# Patient Record
Sex: FEMALE | Race: WHITE | Employment: UNEMPLOYED | URBAN - METROPOLITAN AREA
[De-identification: names, ages, dates, MRNs, and addresses within clinical notes are randomized per-mention and may not be internally consistent; named-entity substitution may affect disease eponyms.]

---

## 2017-03-16 ENCOUNTER — OFFICE VISIT - HEALTHEAST (OUTPATIENT)
Dept: FAMILY MEDICINE | Facility: CLINIC | Age: 57
End: 2017-03-16

## 2017-03-16 DIAGNOSIS — R13.10 DYSPHAGIA, UNSPECIFIED TYPE: ICD-10-CM

## 2017-03-16 DIAGNOSIS — Z79.899 MEDICATION MANAGEMENT: ICD-10-CM

## 2017-03-16 DIAGNOSIS — Z00.00 HEALTH CARE MAINTENANCE: ICD-10-CM

## 2017-03-16 DIAGNOSIS — R22.0 SWOLLEN TONGUE: ICD-10-CM

## 2017-03-16 DIAGNOSIS — E11.9 TYPE 2 DIABETES MELLITUS WITHOUT COMPLICATION, WITHOUT LONG-TERM CURRENT USE OF INSULIN (H): ICD-10-CM

## 2017-03-16 DIAGNOSIS — E55.9 VITAMIN D DEFICIENCY: ICD-10-CM

## 2017-03-16 DIAGNOSIS — F41.9 ANXIETY: ICD-10-CM

## 2017-03-16 DIAGNOSIS — I25.10 CORONARY ATHEROSCLEROSIS: ICD-10-CM

## 2017-03-16 DIAGNOSIS — K21.9 GASTROESOPHAGEAL REFLUX DISEASE WITHOUT ESOPHAGITIS: ICD-10-CM

## 2017-03-16 DIAGNOSIS — R51.9 FACIAL PAIN: ICD-10-CM

## 2017-03-16 DIAGNOSIS — F33.0 MAJOR DEPRESSIVE DISORDER, RECURRENT EPISODE, MILD (H): ICD-10-CM

## 2017-03-16 LAB — HBA1C MFR BLD: 6.7 % (ref 3.5–6)

## 2017-03-16 ASSESSMENT — MIFFLIN-ST. JEOR: SCORE: 1057.59

## 2017-03-17 ENCOUNTER — COMMUNICATION - HEALTHEAST (OUTPATIENT)
Dept: FAMILY MEDICINE | Facility: CLINIC | Age: 57
End: 2017-03-17

## 2017-03-17 ENCOUNTER — AMBULATORY - HEALTHEAST (OUTPATIENT)
Dept: FAMILY MEDICINE | Facility: CLINIC | Age: 57
End: 2017-03-17

## 2017-03-17 LAB — ANA SER QL: 0.4 U

## 2017-03-28 ENCOUNTER — RECORDS - HEALTHEAST (OUTPATIENT)
Dept: ADMINISTRATIVE | Facility: OTHER | Age: 57
End: 2017-03-28

## 2017-03-29 ENCOUNTER — COMMUNICATION - HEALTHEAST (OUTPATIENT)
Dept: SCHEDULING | Facility: CLINIC | Age: 57
End: 2017-03-29

## 2017-03-29 ENCOUNTER — RECORDS - HEALTHEAST (OUTPATIENT)
Dept: ADMINISTRATIVE | Facility: OTHER | Age: 57
End: 2017-03-29

## 2017-04-28 ENCOUNTER — AMBULATORY - HEALTHEAST (OUTPATIENT)
Dept: CARDIOLOGY | Facility: CLINIC | Age: 57
End: 2017-04-28

## 2017-04-28 ENCOUNTER — OFFICE VISIT - HEALTHEAST (OUTPATIENT)
Dept: CARDIOLOGY | Facility: CLINIC | Age: 57
End: 2017-04-28

## 2017-04-28 DIAGNOSIS — I25.10 CORONARY ATHEROSCLEROSIS DUE TO CALCIFIED CORONARY LESION: ICD-10-CM

## 2017-04-28 DIAGNOSIS — Z95.0 CARDIAC PACEMAKER IN SITU: ICD-10-CM

## 2017-04-28 DIAGNOSIS — I25.84 CORONARY ATHEROSCLEROSIS DUE TO CALCIFIED CORONARY LESION: ICD-10-CM

## 2017-04-28 ASSESSMENT — MIFFLIN-ST. JEOR: SCORE: 1019.49

## 2017-05-01 ENCOUNTER — COMMUNICATION - HEALTHEAST (OUTPATIENT)
Dept: CARDIOLOGY | Facility: CLINIC | Age: 57
End: 2017-05-01

## 2017-05-02 ENCOUNTER — OFFICE VISIT - HEALTHEAST (OUTPATIENT)
Dept: FAMILY MEDICINE | Facility: CLINIC | Age: 57
End: 2017-05-02

## 2017-05-02 DIAGNOSIS — K12.1 STOMATITIS AND MUCOSITIS: ICD-10-CM

## 2017-05-02 DIAGNOSIS — G43.109 MIGRAINE WITH AURA AND WITHOUT STATUS MIGRAINOSUS, NOT INTRACTABLE: ICD-10-CM

## 2017-05-02 DIAGNOSIS — M26.609 TEMPOROMANDIBULAR JOINT DISORDER: ICD-10-CM

## 2017-05-02 DIAGNOSIS — I25.84 CORONARY ATHEROSCLEROSIS DUE TO CALCIFIED CORONARY LESION: ICD-10-CM

## 2017-05-02 DIAGNOSIS — I25.10 CORONARY ATHEROSCLEROSIS DUE TO CALCIFIED CORONARY LESION: ICD-10-CM

## 2017-05-02 DIAGNOSIS — R70.0 ELEVATED SED RATE: ICD-10-CM

## 2017-05-02 DIAGNOSIS — K12.30 STOMATITIS AND MUCOSITIS: ICD-10-CM

## 2017-05-02 DIAGNOSIS — G89.29 CHRONIC NECK PAIN: ICD-10-CM

## 2017-05-02 DIAGNOSIS — M54.2 CHRONIC NECK PAIN: ICD-10-CM

## 2017-05-02 ASSESSMENT — MIFFLIN-ST. JEOR: SCORE: 1030.37

## 2017-05-05 ENCOUNTER — COMMUNICATION - HEALTHEAST (OUTPATIENT)
Dept: FAMILY MEDICINE | Facility: CLINIC | Age: 57
End: 2017-05-05

## 2017-05-09 ENCOUNTER — COMMUNICATION - HEALTHEAST (OUTPATIENT)
Dept: FAMILY MEDICINE | Facility: CLINIC | Age: 57
End: 2017-05-09

## 2017-05-10 ENCOUNTER — HOSPITAL ENCOUNTER (OUTPATIENT)
Dept: NUCLEAR MEDICINE | Facility: HOSPITAL | Age: 57
Discharge: HOME | End: 2017-05-10
Attending: INTERNAL MEDICINE

## 2017-05-10 ENCOUNTER — HOSPITAL ENCOUNTER (OUTPATIENT)
Dept: CARDIOLOGY | Facility: HOSPITAL | Age: 57
Discharge: HOME | End: 2017-05-10
Attending: INTERNAL MEDICINE

## 2017-05-10 ENCOUNTER — RECORDS - HEALTHEAST (OUTPATIENT)
Dept: ADMINISTRATIVE | Facility: OTHER | Age: 57
End: 2017-05-10

## 2017-05-10 ENCOUNTER — COMMUNICATION - HEALTHEAST (OUTPATIENT)
Dept: CARDIOLOGY | Facility: CLINIC | Age: 57
End: 2017-05-10

## 2017-05-10 DIAGNOSIS — I25.10 CORONARY ATHEROSCLEROSIS DUE TO CALCIFIED CORONARY LESION: ICD-10-CM

## 2017-05-10 DIAGNOSIS — I25.84 CORONARY ATHEROSCLEROSIS DUE TO CALCIFIED CORONARY LESION: ICD-10-CM

## 2017-05-10 LAB
AORTIC ROOT: 3.3 CM
CV STRESS CURRENT BP HE: NORMAL
CV STRESS CURRENT HR HE: 64
CV STRESS CURRENT HR HE: 66
CV STRESS CURRENT HR HE: 67
CV STRESS CURRENT HR HE: 69
CV STRESS CURRENT HR HE: 72
CV STRESS CURRENT HR HE: 75
CV STRESS CURRENT HR HE: 76
CV STRESS CURRENT HR HE: 78
CV STRESS CURRENT HR HE: 78
CV STRESS CURRENT HR HE: 80
CV STRESS CURRENT HR HE: 81
CV STRESS CURRENT HR HE: 82
CV STRESS CURRENT HR HE: 84
CV STRESS CURRENT HR HE: 84
CV STRESS CURRENT HR HE: 85
CV STRESS CURRENT HR HE: 86
CV STRESS CURRENT HR HE: 86
CV STRESS CURRENT HR HE: 88
CV STRESS CURRENT HR HE: 91
CV STRESS CURRENT HR HE: 96
CV STRESS CURRENT HR HE: 96
CV STRESS CURRENT HR HE: 99
CV STRESS DEVIATION TIME HE: NORMAL
CV STRESS ECHO PERCENT HR HE: NORMAL
CV STRESS EXERCISE STAGE HE: NORMAL
CV STRESS FINAL RESTING BP HE: NORMAL
CV STRESS FINAL RESTING HR HE: 64
CV STRESS MAX HR HE: 99
CV STRESS MAX TREADMILL GRADE HE: 0
CV STRESS MAX TREADMILL SPEED HE: 0
CV STRESS PEAK DIA BP HE: NORMAL
CV STRESS PEAK SYS BP HE: NORMAL
CV STRESS PHASE HE: NORMAL
CV STRESS PROTOCOL HE: NORMAL
CV STRESS RESTING PT POSITION HE: NORMAL
CV STRESS ST DEVIATION AMOUNT HE: NORMAL
CV STRESS ST DEVIATION ELEVATION HE: NORMAL
CV STRESS ST EVELATION AMOUNT HE: NORMAL
CV STRESS TEST TYPE HE: NORMAL
CV STRESS TOTAL STAGE TIME MIN 1 HE: NORMAL
DOP CALC LVOT AREA: 2.27 CM2
DOP CALC LVOT DIAMETER: 1.7 CM
DOP CALC LVOT STROKE VOLUME: 59 CM3
DOP CALCLVOT PEAK VEL VTI: 26 CM
EJECTION FRACTION: 62 % (ref 55–75)
FRACTIONAL SHORTENING: 42.9 % (ref 28–44)
INTERVENTRICULAR SEPTUM IN END DIASTOLE: 1.2 CM (ref 0.6–0.9)
IVS/PW RATIO: 1.2
LEFT ATRIUM AREA: 11.8 CM2
LEFT ATRIUM LENGTH: 4 CM
LEFT ATRIUM SIZE: 3.1 CM
LEFT ATRIUM TO AORTIC ROOT RATIO: 0.94 NO UNITS
LEFT VENTRICLE DIASTOLIC VOLUME: 45 CM3 (ref 46–106)
LEFT VENTRICLE SYSTOLIC VOLUME: 17 CM3 (ref 14–42)
LEFT VENTRICULAR INTERNAL DIMENSION IN DIASTOLE: 4.2 CM (ref 3.8–5.2)
LEFT VENTRICULAR INTERNAL DIMENSION IN SYSTOLE: 2.4 CM (ref 2.2–3.5)
LEFT VENTRICULAR MASS: 157.1 G
LEFT VENTRICULAR OUTFLOW TRACT MEAN GRADIENT: 2 MMHG
LEFT VENTRICULAR OUTFLOW TRACT MEAN VELOCITY: 65.6 CM/S
LEFT VENTRICULAR POSTERIOR WALL IN END DIASTOLE: 1 CM (ref 0.6–0.9)
MITRAL VALVE E/A RATIO: 1.1
MV AVERAGE E/E' RATIO: 12.6 CM/S
MV DECELERATION TIME: 197 MS
MV E'TISSUE VEL-LAT: 7.41 CM/S
MV E'TISSUE VEL-MED: 6.34 CM/S
MV LATERAL E/E' RATIO: 11.7
MV MEDIAL E/E' RATIO: 13.7
MV PEAK A VELOCITY: 80.6 CM/S
MV PEAK E VELOCITY: 86.6 CM/S
NUC STRESS EJECTION FRACTION: 74 %
PR MAX PG: 3 MMHG
PR PEAK VELOCITY: 94.7 CM/S
STRESS ECHO BASELINE BP: NORMAL
STRESS ECHO BASELINE HR: 78
STRESS ECHO CALCULATED PERCENT HR: 60 %
STRESS ECHO LAST STRESS BP: NORMAL
STRESS ECHO LAST STRESS HR: 86
TRICUSPID REGURGITATION PEAK PRESSURE GRADIENT: 27.7 MMHG
TRICUSPID VALVE ANULAR PLANE SYSTOLIC EXCURSION: 2 CM
TRICUSPID VALVE PEAK REGURGITANT VELOCITY: 263 CM/S

## 2017-05-11 ENCOUNTER — COMMUNICATION - HEALTHEAST (OUTPATIENT)
Dept: FAMILY MEDICINE | Facility: CLINIC | Age: 57
End: 2017-05-11

## 2017-05-18 ENCOUNTER — OFFICE VISIT - HEALTHEAST (OUTPATIENT)
Dept: FAMILY MEDICINE | Facility: CLINIC | Age: 57
End: 2017-05-18

## 2017-05-18 ENCOUNTER — AMBULATORY - HEALTHEAST (OUTPATIENT)
Dept: FAMILY MEDICINE | Facility: CLINIC | Age: 57
End: 2017-05-18

## 2017-05-18 DIAGNOSIS — Z91.030 ALLERGY TO BEE STING: ICD-10-CM

## 2017-05-18 DIAGNOSIS — J45.20 INTERMITTENT ASTHMA, UNCOMPLICATED: ICD-10-CM

## 2017-05-18 DIAGNOSIS — S09.90XA HEAD INJURY, INITIAL ENCOUNTER: ICD-10-CM

## 2017-05-18 DIAGNOSIS — R55 SYNCOPE, UNSPECIFIED SYNCOPE TYPE: ICD-10-CM

## 2017-05-18 ASSESSMENT — MIFFLIN-ST. JEOR: SCORE: 1041.71

## 2017-05-21 ENCOUNTER — COMMUNICATION - HEALTHEAST (OUTPATIENT)
Dept: FAMILY MEDICINE | Facility: CLINIC | Age: 57
End: 2017-05-21

## 2017-05-23 ENCOUNTER — HOSPITAL ENCOUNTER (OUTPATIENT)
Dept: CT IMAGING | Facility: HOSPITAL | Age: 57
Discharge: HOME | End: 2017-05-23
Attending: NURSE PRACTITIONER

## 2017-05-23 DIAGNOSIS — S09.90XA HEAD INJURY, INITIAL ENCOUNTER: ICD-10-CM

## 2017-06-01 ENCOUNTER — RECORDS - HEALTHEAST (OUTPATIENT)
Dept: ADMINISTRATIVE | Facility: OTHER | Age: 57
End: 2017-06-01

## 2017-06-05 ENCOUNTER — COMMUNICATION - HEALTHEAST (OUTPATIENT)
Dept: FAMILY MEDICINE | Facility: CLINIC | Age: 57
End: 2017-06-05

## 2017-06-12 ENCOUNTER — COMMUNICATION - HEALTHEAST (OUTPATIENT)
Dept: SCHEDULING | Facility: CLINIC | Age: 57
End: 2017-06-12

## 2017-06-15 ENCOUNTER — RECORDS - HEALTHEAST (OUTPATIENT)
Dept: ADMINISTRATIVE | Facility: OTHER | Age: 57
End: 2017-06-15

## 2017-06-27 ENCOUNTER — RECORDS - HEALTHEAST (OUTPATIENT)
Dept: ADMINISTRATIVE | Facility: OTHER | Age: 57
End: 2017-06-27

## 2017-07-13 ENCOUNTER — RECORDS - HEALTHEAST (OUTPATIENT)
Dept: ADMINISTRATIVE | Facility: OTHER | Age: 57
End: 2017-07-13

## 2017-07-24 ENCOUNTER — RECORDS - HEALTHEAST (OUTPATIENT)
Dept: ADMINISTRATIVE | Facility: OTHER | Age: 57
End: 2017-07-24

## 2017-08-07 ENCOUNTER — RECORDS - HEALTHEAST (OUTPATIENT)
Dept: ADMINISTRATIVE | Facility: OTHER | Age: 57
End: 2017-08-07

## 2017-08-08 ENCOUNTER — RECORDS - HEALTHEAST (OUTPATIENT)
Dept: ADMINISTRATIVE | Facility: OTHER | Age: 57
End: 2017-08-08

## 2017-08-15 ENCOUNTER — RECORDS - HEALTHEAST (OUTPATIENT)
Dept: ADMINISTRATIVE | Facility: OTHER | Age: 57
End: 2017-08-15

## 2017-08-22 ENCOUNTER — RECORDS - HEALTHEAST (OUTPATIENT)
Dept: ADMINISTRATIVE | Facility: OTHER | Age: 57
End: 2017-08-22

## 2017-08-26 ENCOUNTER — HEALTH MAINTENANCE LETTER (OUTPATIENT)
Age: 57
End: 2017-08-26

## 2017-09-01 ENCOUNTER — RECORDS - HEALTHEAST (OUTPATIENT)
Dept: ADMINISTRATIVE | Facility: OTHER | Age: 57
End: 2017-09-01

## 2017-09-13 ENCOUNTER — RECORDS - HEALTHEAST (OUTPATIENT)
Dept: ADMINISTRATIVE | Facility: OTHER | Age: 57
End: 2017-09-13

## 2017-09-27 ENCOUNTER — COMMUNICATION - HEALTHEAST (OUTPATIENT)
Dept: FAMILY MEDICINE | Facility: CLINIC | Age: 57
End: 2017-09-27

## 2017-10-09 ENCOUNTER — TRANSFERRED RECORDS (OUTPATIENT)
Dept: HEALTH INFORMATION MANAGEMENT | Facility: CLINIC | Age: 57
End: 2017-10-09

## 2017-10-09 ENCOUNTER — RECORDS - HEALTHEAST (OUTPATIENT)
Dept: ADMINISTRATIVE | Facility: OTHER | Age: 57
End: 2017-10-09

## 2017-10-20 ENCOUNTER — AMBULATORY - HEALTHEAST (OUTPATIENT)
Dept: CARDIOLOGY | Facility: CLINIC | Age: 57
End: 2017-10-20

## 2017-10-20 ENCOUNTER — COMMUNICATION - HEALTHEAST (OUTPATIENT)
Dept: TELEHEALTH | Facility: CLINIC | Age: 57
End: 2017-10-20

## 2017-10-20 DIAGNOSIS — Z95.0 CARDIAC PACEMAKER IN SITU: ICD-10-CM

## 2017-10-20 ASSESSMENT — MIFFLIN-ST. JEOR: SCORE: 1053.96

## 2017-10-21 LAB — HCC DEVICE COMMENTS: NORMAL

## 2017-10-23 ENCOUNTER — RECORDS - HEALTHEAST (OUTPATIENT)
Dept: ADMINISTRATIVE | Facility: OTHER | Age: 57
End: 2017-10-23

## 2017-10-25 ENCOUNTER — RECORDS - HEALTHEAST (OUTPATIENT)
Dept: ADMINISTRATIVE | Facility: OTHER | Age: 57
End: 2017-10-25

## 2017-11-21 ENCOUNTER — RECORDS - HEALTHEAST (OUTPATIENT)
Dept: ADMINISTRATIVE | Facility: OTHER | Age: 57
End: 2017-11-21

## 2017-12-04 ENCOUNTER — RECORDS - HEALTHEAST (OUTPATIENT)
Dept: ADMINISTRATIVE | Facility: OTHER | Age: 57
End: 2017-12-04

## 2017-12-29 ENCOUNTER — RECORDS - HEALTHEAST (OUTPATIENT)
Dept: ADMINISTRATIVE | Facility: OTHER | Age: 57
End: 2017-12-29

## 2018-01-08 ENCOUNTER — RECORDS - HEALTHEAST (OUTPATIENT)
Dept: ADMINISTRATIVE | Facility: OTHER | Age: 58
End: 2018-01-08

## 2018-01-11 ENCOUNTER — RECORDS - HEALTHEAST (OUTPATIENT)
Dept: ADMINISTRATIVE | Facility: OTHER | Age: 58
End: 2018-01-11

## 2018-01-26 ENCOUNTER — AMBULATORY - HEALTHEAST (OUTPATIENT)
Dept: CARDIOLOGY | Facility: CLINIC | Age: 58
End: 2018-01-26

## 2018-01-26 DIAGNOSIS — Z95.0 CARDIAC PACEMAKER IN SITU: ICD-10-CM

## 2018-01-26 LAB — HCC DEVICE COMMENTS: NORMAL

## 2018-01-26 ASSESSMENT — MIFFLIN-ST. JEOR: SCORE: 1071.88

## 2018-02-09 ENCOUNTER — COMMUNICATION - HEALTHEAST (OUTPATIENT)
Dept: FAMILY MEDICINE | Facility: CLINIC | Age: 58
End: 2018-02-09

## 2018-02-22 ENCOUNTER — RECORDS - HEALTHEAST (OUTPATIENT)
Dept: ADMINISTRATIVE | Facility: OTHER | Age: 58
End: 2018-02-22

## 2018-03-19 ENCOUNTER — RECORDS - HEALTHEAST (OUTPATIENT)
Dept: ADMINISTRATIVE | Facility: OTHER | Age: 58
End: 2018-03-19

## 2018-07-01 ENCOUNTER — RECORDS - HEALTHEAST (OUTPATIENT)
Dept: ADMINISTRATIVE | Facility: OTHER | Age: 58
End: 2018-07-01

## 2018-07-17 ENCOUNTER — AMBULATORY - HEALTHEAST (OUTPATIENT)
Dept: PULMONOLOGY | Facility: OTHER | Age: 58
End: 2018-07-17

## 2018-07-17 ENCOUNTER — OFFICE VISIT - HEALTHEAST (OUTPATIENT)
Dept: FAMILY MEDICINE | Facility: CLINIC | Age: 58
End: 2018-07-17

## 2018-07-17 ENCOUNTER — COMMUNICATION - HEALTHEAST (OUTPATIENT)
Dept: ADMINISTRATIVE | Facility: CLINIC | Age: 58
End: 2018-07-17

## 2018-07-17 DIAGNOSIS — J42 CHRONIC BRONCHITIS, UNSPECIFIED CHRONIC BRONCHITIS TYPE (H): ICD-10-CM

## 2018-07-17 DIAGNOSIS — Z12.11 SCREEN FOR COLON CANCER: ICD-10-CM

## 2018-07-17 DIAGNOSIS — E11.9 TYPE 2 DIABETES MELLITUS WITHOUT COMPLICATION, WITHOUT LONG-TERM CURRENT USE OF INSULIN (H): ICD-10-CM

## 2018-07-17 DIAGNOSIS — Z12.31 VISIT FOR SCREENING MAMMOGRAM: ICD-10-CM

## 2018-07-17 DIAGNOSIS — F33.0 MAJOR DEPRESSIVE DISORDER, RECURRENT EPISODE, MILD (H): ICD-10-CM

## 2018-07-17 DIAGNOSIS — G43.909 MIGRAINE WITHOUT STATUS MIGRAINOSUS, NOT INTRACTABLE, UNSPECIFIED MIGRAINE TYPE: ICD-10-CM

## 2018-07-17 DIAGNOSIS — I25.10 CORONARY ARTERY DISEASE DUE TO CALCIFIED CORONARY LESION: ICD-10-CM

## 2018-07-17 DIAGNOSIS — R26.81 UNSTEADY GAIT: ICD-10-CM

## 2018-07-17 DIAGNOSIS — K80.20 CALCULUS OF GALLBLADDER WITHOUT CHOLECYSTITIS WITHOUT OBSTRUCTION: ICD-10-CM

## 2018-07-17 DIAGNOSIS — I25.84 CORONARY ARTERY DISEASE DUE TO CALCIFIED CORONARY LESION: ICD-10-CM

## 2018-07-17 DIAGNOSIS — J41.0 SIMPLE CHRONIC BRONCHITIS (H): ICD-10-CM

## 2018-07-17 DIAGNOSIS — Z91.030 BEE STING ALLERGY: ICD-10-CM

## 2018-07-17 DIAGNOSIS — F41.9 ANXIETY: ICD-10-CM

## 2018-07-17 ASSESSMENT — MIFFLIN-ST. JEOR: SCORE: 1009.73

## 2018-07-19 ENCOUNTER — OFFICE VISIT - HEALTHEAST (OUTPATIENT)
Dept: SURGERY | Facility: CLINIC | Age: 58
End: 2018-07-19

## 2018-07-19 DIAGNOSIS — K80.20 CALCULUS OF GALLBLADDER WITHOUT CHOLECYSTITIS WITHOUT OBSTRUCTION: ICD-10-CM

## 2018-07-19 ASSESSMENT — MIFFLIN-ST. JEOR: SCORE: 1004.97

## 2018-07-24 ENCOUNTER — HOSPITAL ENCOUNTER (OUTPATIENT)
Dept: NUCLEAR MEDICINE | Facility: HOSPITAL | Age: 58
Discharge: HOME | End: 2018-07-24
Attending: SURGERY

## 2018-07-24 DIAGNOSIS — K80.20 CALCULUS OF GALLBLADDER WITHOUT CHOLECYSTITIS WITHOUT OBSTRUCTION: ICD-10-CM

## 2018-07-24 ASSESSMENT — MIFFLIN-ST. JEOR: SCORE: 1008.6

## 2018-07-26 ENCOUNTER — COMMUNICATION - HEALTHEAST (OUTPATIENT)
Dept: FAMILY MEDICINE | Facility: CLINIC | Age: 58
End: 2018-07-26

## 2018-08-02 ENCOUNTER — RECORDS - HEALTHEAST (OUTPATIENT)
Dept: ADMINISTRATIVE | Facility: OTHER | Age: 58
End: 2018-08-02

## 2018-08-13 ENCOUNTER — OFFICE VISIT - HEALTHEAST (OUTPATIENT)
Dept: FAMILY MEDICINE | Facility: CLINIC | Age: 58
End: 2018-08-13

## 2018-08-13 DIAGNOSIS — I73.9 PERIPHERAL VASCULAR DISEASE (H): ICD-10-CM

## 2018-08-13 DIAGNOSIS — Z78.9 STATIN INTOLERANCE: ICD-10-CM

## 2018-08-13 DIAGNOSIS — I25.119 CORONARY ARTERY DISEASE WITH ANGINA PECTORIS, UNSPECIFIED VESSEL OR LESION TYPE, UNSPECIFIED WHETHER NATIVE OR TRANSPLANTED HEART (H): ICD-10-CM

## 2018-08-13 DIAGNOSIS — K11.7 XEROSTOMIA: ICD-10-CM

## 2018-08-13 DIAGNOSIS — R70.0 ELEVATED SED RATE: ICD-10-CM

## 2018-08-13 DIAGNOSIS — E11.9 TYPE 2 DIABETES MELLITUS WITHOUT COMPLICATION, WITHOUT LONG-TERM CURRENT USE OF INSULIN (H): ICD-10-CM

## 2018-08-13 DIAGNOSIS — Z12.11 SCREEN FOR COLON CANCER: ICD-10-CM

## 2018-08-13 DIAGNOSIS — M35.9 AUTOIMMUNE DISEASE (H): ICD-10-CM

## 2018-08-13 ASSESSMENT — MIFFLIN-ST. JEOR: SCORE: 1018.13

## 2018-08-27 ENCOUNTER — RECORDS - HEALTHEAST (OUTPATIENT)
Dept: ADMINISTRATIVE | Facility: OTHER | Age: 58
End: 2018-08-27

## 2018-09-04 ENCOUNTER — COMMUNICATION - HEALTHEAST (OUTPATIENT)
Dept: FAMILY MEDICINE | Facility: CLINIC | Age: 58
End: 2018-09-04

## 2018-09-21 ENCOUNTER — OFFICE VISIT - HEALTHEAST (OUTPATIENT)
Dept: PULMONOLOGY | Facility: OTHER | Age: 58
End: 2018-09-21

## 2018-09-21 ENCOUNTER — RECORDS - HEALTHEAST (OUTPATIENT)
Dept: ADMINISTRATIVE | Facility: OTHER | Age: 58
End: 2018-09-21

## 2018-09-21 DIAGNOSIS — F17.200 TOBACCO DEPENDENCE SYNDROME: ICD-10-CM

## 2018-09-21 DIAGNOSIS — R06.09 DOE (DYSPNEA ON EXERTION): ICD-10-CM

## 2018-09-21 ASSESSMENT — MIFFLIN-ST. JEOR: SCORE: 1019.03

## 2018-09-27 ENCOUNTER — AMBULATORY - HEALTHEAST (OUTPATIENT)
Dept: PULMONOLOGY | Facility: OTHER | Age: 58
End: 2018-09-27

## 2018-10-02 ENCOUNTER — HOSPITAL ENCOUNTER (OUTPATIENT)
Dept: CT IMAGING | Facility: HOSPITAL | Age: 58
Discharge: HOME | End: 2018-10-02
Attending: INTERNAL MEDICINE

## 2018-10-02 ENCOUNTER — COMMUNICATION - HEALTHEAST (OUTPATIENT)
Dept: FAMILY MEDICINE | Facility: CLINIC | Age: 58
End: 2018-10-02

## 2018-10-02 DIAGNOSIS — F17.200 TOBACCO DEPENDENCE SYNDROME: ICD-10-CM

## 2018-10-04 ENCOUNTER — COMMUNICATION - HEALTHEAST (OUTPATIENT)
Dept: PULMONOLOGY | Facility: OTHER | Age: 58
End: 2018-10-04

## 2018-10-17 ENCOUNTER — RECORDS - HEALTHEAST (OUTPATIENT)
Dept: ADMINISTRATIVE | Facility: OTHER | Age: 58
End: 2018-10-17

## 2018-11-06 ENCOUNTER — RECORDS - HEALTHEAST (OUTPATIENT)
Dept: ADMINISTRATIVE | Facility: OTHER | Age: 58
End: 2018-11-06

## 2018-11-08 ENCOUNTER — OFFICE VISIT - HEALTHEAST (OUTPATIENT)
Dept: CARDIOLOGY | Facility: CLINIC | Age: 58
End: 2018-11-08

## 2018-11-08 ENCOUNTER — AMBULATORY - HEALTHEAST (OUTPATIENT)
Dept: CARDIOLOGY | Facility: CLINIC | Age: 58
End: 2018-11-08

## 2018-11-08 DIAGNOSIS — Z95.0 CARDIAC PACEMAKER IN SITU: ICD-10-CM

## 2018-11-08 DIAGNOSIS — R55 VASOVAGAL SYNCOPE: ICD-10-CM

## 2018-11-08 LAB
HCC DEVICE COMMENTS: NORMAL
HCC DEVICE IMPLANTING PROVIDER: NORMAL
HCC DEVICE MANUFACTURE: NORMAL
HCC DEVICE MODEL: NORMAL
HCC DEVICE SERIAL NUMBER: NORMAL
HCC DEVICE TYPE: NORMAL

## 2018-11-08 ASSESSMENT — MIFFLIN-ST. JEOR: SCORE: 1031.28

## 2018-12-18 ENCOUNTER — AMBULATORY - HEALTHEAST (OUTPATIENT)
Dept: CARDIOLOGY | Facility: CLINIC | Age: 58
End: 2018-12-18

## 2018-12-18 DIAGNOSIS — Z95.0 CARDIAC PACEMAKER IN SITU: ICD-10-CM

## 2018-12-18 ASSESSMENT — MIFFLIN-ST. JEOR: SCORE: 1032.41

## 2018-12-19 ENCOUNTER — SURGERY - HEALTHEAST (OUTPATIENT)
Dept: CARDIOLOGY | Facility: CLINIC | Age: 58
End: 2018-12-19

## 2018-12-19 ENCOUNTER — AMBULATORY - HEALTHEAST (OUTPATIENT)
Dept: CARDIOLOGY | Facility: CLINIC | Age: 58
End: 2018-12-19

## 2018-12-19 ENCOUNTER — RECORDS - HEALTHEAST (OUTPATIENT)
Dept: ADMINISTRATIVE | Facility: OTHER | Age: 58
End: 2018-12-19

## 2018-12-19 DIAGNOSIS — Z45.010 PACEMAKER BATTERY DEPLETION: ICD-10-CM

## 2018-12-20 ENCOUNTER — RECORDS - HEALTHEAST (OUTPATIENT)
Dept: ADMINISTRATIVE | Facility: OTHER | Age: 58
End: 2018-12-20

## 2018-12-21 ENCOUNTER — COMMUNICATION - HEALTHEAST (OUTPATIENT)
Dept: CARDIOLOGY | Facility: CLINIC | Age: 58
End: 2018-12-21

## 2018-12-28 ENCOUNTER — OFFICE VISIT - HEALTHEAST (OUTPATIENT)
Dept: FAMILY MEDICINE | Facility: CLINIC | Age: 58
End: 2018-12-28

## 2018-12-28 ENCOUNTER — COMMUNICATION - HEALTHEAST (OUTPATIENT)
Dept: TELEHEALTH | Facility: CLINIC | Age: 58
End: 2018-12-28

## 2018-12-28 DIAGNOSIS — Z01.818 ENCOUNTER FOR PREOPERATIVE EXAMINATION FOR GENERAL SURGICAL PROCEDURE: ICD-10-CM

## 2018-12-28 DIAGNOSIS — I25.10 CORONARY ARTERY DISEASE DUE TO CALCIFIED CORONARY LESION: ICD-10-CM

## 2018-12-28 DIAGNOSIS — E11.9 TYPE 2 DIABETES MELLITUS WITHOUT COMPLICATION, WITHOUT LONG-TERM CURRENT USE OF INSULIN (H): ICD-10-CM

## 2018-12-28 DIAGNOSIS — F33.0 MAJOR DEPRESSIVE DISORDER, RECURRENT EPISODE, MILD (H): ICD-10-CM

## 2018-12-28 DIAGNOSIS — R55 VASOVAGAL SYNCOPE: ICD-10-CM

## 2018-12-28 DIAGNOSIS — G43.109 MIGRAINE WITH AURA AND WITHOUT STATUS MIGRAINOSUS, NOT INTRACTABLE: ICD-10-CM

## 2018-12-28 DIAGNOSIS — I25.84 CORONARY ARTERY DISEASE DUE TO CALCIFIED CORONARY LESION: ICD-10-CM

## 2018-12-28 DIAGNOSIS — F41.9 ANXIETY: ICD-10-CM

## 2018-12-28 DIAGNOSIS — Z95.0 CARDIAC PACEMAKER IN SITU: ICD-10-CM

## 2018-12-28 DIAGNOSIS — F43.10 POSTTRAUMATIC STRESS DISORDER: ICD-10-CM

## 2018-12-28 LAB — HGB BLD-MCNC: 13.9 G/DL (ref 12–16)

## 2018-12-28 ASSESSMENT — MIFFLIN-ST. JEOR: SCORE: 1043.75

## 2018-12-31 ENCOUNTER — SURGERY - HEALTHEAST (OUTPATIENT)
Dept: CARDIOLOGY | Facility: CLINIC | Age: 58
End: 2018-12-31

## 2018-12-31 ASSESSMENT — MIFFLIN-ST. JEOR: SCORE: 1053.96

## 2019-01-09 ENCOUNTER — AMBULATORY - HEALTHEAST (OUTPATIENT)
Dept: CARDIOLOGY | Facility: CLINIC | Age: 59
End: 2019-01-09

## 2019-01-09 DIAGNOSIS — Z95.0 CARDIAC PACEMAKER IN SITU: ICD-10-CM

## 2019-01-09 ASSESSMENT — MIFFLIN-ST. JEOR: SCORE: 1035.82

## 2019-01-28 ENCOUNTER — OFFICE VISIT - HEALTHEAST (OUTPATIENT)
Dept: FAMILY MEDICINE | Facility: CLINIC | Age: 59
End: 2019-01-28

## 2019-01-28 DIAGNOSIS — F33.0 MAJOR DEPRESSIVE DISORDER, RECURRENT EPISODE, MILD (H): ICD-10-CM

## 2019-01-28 DIAGNOSIS — E11.9 TYPE 2 DIABETES MELLITUS WITHOUT COMPLICATION, WITHOUT LONG-TERM CURRENT USE OF INSULIN (H): ICD-10-CM

## 2019-01-28 DIAGNOSIS — J42 CHRONIC BRONCHITIS, UNSPECIFIED CHRONIC BRONCHITIS TYPE (H): ICD-10-CM

## 2019-01-28 DIAGNOSIS — I25.119 CORONARY ARTERY DISEASE WITH ANGINA PECTORIS, UNSPECIFIED VESSEL OR LESION TYPE, UNSPECIFIED WHETHER NATIVE OR TRANSPLANTED HEART (H): ICD-10-CM

## 2019-01-28 DIAGNOSIS — Z79.899 MEDICATION MANAGEMENT: ICD-10-CM

## 2019-01-28 DIAGNOSIS — G43.109 MIGRAINE WITH AURA AND WITHOUT STATUS MIGRAINOSUS, NOT INTRACTABLE: ICD-10-CM

## 2019-01-28 DIAGNOSIS — R90.89 ABNORMAL CT OF BRAIN: ICD-10-CM

## 2019-01-28 DIAGNOSIS — M35.9 AUTOIMMUNE DISEASE (H): ICD-10-CM

## 2019-01-28 DIAGNOSIS — R55 VASOVAGAL SYNCOPE: ICD-10-CM

## 2019-01-28 DIAGNOSIS — I73.9 PERIPHERAL VASCULAR DISEASE (H): ICD-10-CM

## 2019-01-28 DIAGNOSIS — R26.81 UNSTEADY GAIT: ICD-10-CM

## 2019-01-28 LAB
ALBUMIN SERPL-MCNC: 3.8 G/DL (ref 3.5–5)
ALP SERPL-CCNC: 64 U/L (ref 45–120)
ALT SERPL W P-5'-P-CCNC: 13 U/L (ref 0–45)
ANION GAP SERPL CALCULATED.3IONS-SCNC: 11 MMOL/L (ref 5–18)
AST SERPL W P-5'-P-CCNC: 12 U/L (ref 0–40)
BILIRUB SERPL-MCNC: 0.3 MG/DL (ref 0–1)
BUN SERPL-MCNC: 14 MG/DL (ref 8–22)
C3 SERPL-MCNC: 117 MG/DL (ref 83–177)
C4 SERPL-MCNC: 23 MG/DL (ref 19–59)
CALCIUM SERPL-MCNC: 9.4 MG/DL (ref 8.5–10.5)
CHLORIDE BLD-SCNC: 107 MMOL/L (ref 98–107)
CO2 SERPL-SCNC: 26 MMOL/L (ref 22–31)
CREAT SERPL-MCNC: 0.74 MG/DL (ref 0.6–1.1)
ERYTHROCYTE [SEDIMENTATION RATE] IN BLOOD BY WESTERGREN METHOD: 15 MM/HR (ref 0–20)
GFR SERPL CREATININE-BSD FRML MDRD: >60 ML/MIN/1.73M2
GLUCOSE BLD-MCNC: 98 MG/DL (ref 70–125)
HBA1C MFR BLD: 6.3 % (ref 3.5–6)
POTASSIUM BLD-SCNC: 4.3 MMOL/L (ref 3.5–5)
PROT SERPL-MCNC: 6.6 G/DL (ref 6–8)
RHEUMATOID FACT SERPL-ACNC: <15 IU/ML (ref 0–30)
SODIUM SERPL-SCNC: 144 MMOL/L (ref 136–145)

## 2019-01-28 ASSESSMENT — MIFFLIN-ST. JEOR: SCORE: 1043.98

## 2019-01-29 LAB — ANA SER QL: 0.2 U

## 2019-01-31 ENCOUNTER — COMMUNICATION - HEALTHEAST (OUTPATIENT)
Dept: FAMILY MEDICINE | Facility: CLINIC | Age: 59
End: 2019-01-31

## 2019-01-31 LAB — CH50 SERPL-ACNC: 104 CAE UNITS (ref 60–144)

## 2019-02-27 ENCOUNTER — RECORDS - HEALTHEAST (OUTPATIENT)
Dept: ADMINISTRATIVE | Facility: OTHER | Age: 59
End: 2019-02-27

## 2019-02-27 ENCOUNTER — AMBULATORY - HEALTHEAST (OUTPATIENT)
Dept: CARDIOLOGY | Facility: CLINIC | Age: 59
End: 2019-02-27

## 2019-03-13 ENCOUNTER — RECORDS - HEALTHEAST (OUTPATIENT)
Dept: ADMINISTRATIVE | Facility: OTHER | Age: 59
End: 2019-03-13

## 2019-03-15 ENCOUNTER — HOSPITAL ENCOUNTER (OUTPATIENT)
Dept: MRI IMAGING | Facility: HOSPITAL | Age: 59
Discharge: HOME | End: 2019-03-15
Attending: INTERNAL MEDICINE

## 2019-03-15 ENCOUNTER — HOSPITAL ENCOUNTER (OUTPATIENT)
Dept: MRI IMAGING | Facility: HOSPITAL | Age: 59
Discharge: HOME | End: 2019-03-15
Attending: PSYCHIATRY & NEUROLOGY

## 2019-03-15 ENCOUNTER — HOSPITAL ENCOUNTER (OUTPATIENT)
Dept: RADIOLOGY | Facility: HOSPITAL | Age: 59
Discharge: HOME | End: 2019-03-15
Attending: PSYCHIATRY & NEUROLOGY

## 2019-03-15 ENCOUNTER — RECORDS - HEALTHEAST (OUTPATIENT)
Dept: LAB | Facility: HOSPITAL | Age: 59
End: 2019-03-15

## 2019-03-15 DIAGNOSIS — M54.2 CHRONIC NECK PAIN: ICD-10-CM

## 2019-03-15 DIAGNOSIS — R27.0 ATAXIA: ICD-10-CM

## 2019-03-15 DIAGNOSIS — Z95.0 CARDIAC PACEMAKER: ICD-10-CM

## 2019-03-15 DIAGNOSIS — R42 DIZZY: ICD-10-CM

## 2019-03-15 DIAGNOSIS — G89.29 CHRONIC NECK PAIN: ICD-10-CM

## 2019-03-15 LAB
CREAT BLD-MCNC: 0.7 MG/DL
CREAT BLD-MCNC: 0.7 MG/DL (ref 0.6–1.1)
POC GFR AMER AF HE - HISTORICAL: >60 ML/MIN/1.73M2
POC GFR NON AMER AF HE - HISTORICAL: >60 ML/MIN/1.73M2
TSH SERPL DL<=0.005 MIU/L-ACNC: 1.7 UIU/ML (ref 0.3–5)
VIT B12 SERPL-MCNC: 261 PG/ML (ref 213–816)

## 2019-03-18 ENCOUNTER — OFFICE VISIT - HEALTHEAST (OUTPATIENT)
Dept: RHEUMATOLOGY | Facility: CLINIC | Age: 59
End: 2019-03-18

## 2019-03-18 DIAGNOSIS — I25.84 CORONARY ARTERY DISEASE DUE TO CALCIFIED CORONARY LESION: ICD-10-CM

## 2019-03-18 DIAGNOSIS — Z95.0 CARDIAC PACEMAKER IN SITU: ICD-10-CM

## 2019-03-18 DIAGNOSIS — M25.50 POLYARTHRALGIA: ICD-10-CM

## 2019-03-18 DIAGNOSIS — I25.10 CORONARY ARTERY DISEASE DUE TO CALCIFIED CORONARY LESION: ICD-10-CM

## 2019-03-18 DIAGNOSIS — M65.322 ACQUIRED TRIGGER FINGER OF LEFT INDEX FINGER: ICD-10-CM

## 2019-03-18 LAB — 25(OH)D3 SERPL-MCNC: 8.4 NG/ML (ref 30–80)

## 2019-03-28 ENCOUNTER — OFFICE VISIT - HEALTHEAST (OUTPATIENT)
Dept: FAMILY MEDICINE | Facility: CLINIC | Age: 59
End: 2019-03-28

## 2019-03-28 DIAGNOSIS — M35.9 AUTOIMMUNE DISEASE (H): ICD-10-CM

## 2019-03-28 DIAGNOSIS — E11.9 TYPE 2 DIABETES MELLITUS WITHOUT COMPLICATION, WITH LONG-TERM CURRENT USE OF INSULIN (H): ICD-10-CM

## 2019-03-28 DIAGNOSIS — Z79.4 TYPE 2 DIABETES MELLITUS WITHOUT COMPLICATION, WITH LONG-TERM CURRENT USE OF INSULIN (H): ICD-10-CM

## 2019-03-28 DIAGNOSIS — L73.9 FOLLICULITIS: ICD-10-CM

## 2019-04-02 ENCOUNTER — AMBULATORY - HEALTHEAST (OUTPATIENT)
Dept: CARDIOLOGY | Facility: CLINIC | Age: 59
End: 2019-04-02

## 2019-04-02 ENCOUNTER — RECORDS - HEALTHEAST (OUTPATIENT)
Dept: ADMINISTRATIVE | Facility: OTHER | Age: 59
End: 2019-04-02

## 2019-04-02 DIAGNOSIS — Z95.0 CARDIAC PACEMAKER IN SITU: ICD-10-CM

## 2019-04-08 ENCOUNTER — OFFICE VISIT - HEALTHEAST (OUTPATIENT)
Dept: FAMILY MEDICINE | Facility: CLINIC | Age: 59
End: 2019-04-08

## 2019-04-08 DIAGNOSIS — Z91.030 BEE STING ALLERGY: ICD-10-CM

## 2019-04-08 DIAGNOSIS — E55.9 VITAMIN D DEFICIENCY: ICD-10-CM

## 2019-04-08 DIAGNOSIS — F41.9 ANXIETY: ICD-10-CM

## 2019-04-08 DIAGNOSIS — K13.70 ORAL LESION: ICD-10-CM

## 2019-04-08 DIAGNOSIS — M35.9 AUTOIMMUNE DISEASE (H): ICD-10-CM

## 2019-04-08 DIAGNOSIS — E11.9 TYPE 2 DIABETES MELLITUS WITHOUT COMPLICATION, WITH LONG-TERM CURRENT USE OF INSULIN (H): ICD-10-CM

## 2019-04-08 DIAGNOSIS — F33.0 MAJOR DEPRESSIVE DISORDER, RECURRENT EPISODE, MILD (H): ICD-10-CM

## 2019-04-08 DIAGNOSIS — F43.10 POSTTRAUMATIC STRESS DISORDER: ICD-10-CM

## 2019-04-08 DIAGNOSIS — Z79.4 TYPE 2 DIABETES MELLITUS WITHOUT COMPLICATION, WITH LONG-TERM CURRENT USE OF INSULIN (H): ICD-10-CM

## 2019-04-08 ASSESSMENT — MIFFLIN-ST. JEOR: SCORE: 1046.25

## 2019-04-09 ENCOUNTER — COMMUNICATION - HEALTHEAST (OUTPATIENT)
Dept: FAMILY MEDICINE | Facility: CLINIC | Age: 59
End: 2019-04-09

## 2019-04-09 DIAGNOSIS — E11.9 TYPE 2 DIABETES MELLITUS WITHOUT COMPLICATION, WITHOUT LONG-TERM CURRENT USE OF INSULIN (H): ICD-10-CM

## 2019-04-15 ENCOUNTER — OFFICE VISIT - HEALTHEAST (OUTPATIENT)
Dept: OTOLARYNGOLOGY | Facility: CLINIC | Age: 59
End: 2019-04-15

## 2019-04-15 DIAGNOSIS — K14.8 TONGUE LESION: ICD-10-CM

## 2019-05-29 ENCOUNTER — OFFICE VISIT - HEALTHEAST (OUTPATIENT)
Dept: FAMILY MEDICINE | Facility: CLINIC | Age: 59
End: 2019-05-29

## 2019-05-29 ENCOUNTER — COMMUNICATION - HEALTHEAST (OUTPATIENT)
Dept: FAMILY MEDICINE | Facility: CLINIC | Age: 59
End: 2019-05-29

## 2019-05-29 DIAGNOSIS — M35.9 XEROSTOMIA DUE TO AUTOIMMUNE DISEASE (H): ICD-10-CM

## 2019-05-29 DIAGNOSIS — K12.30 STOMATITIS AND MUCOSITIS: ICD-10-CM

## 2019-05-29 DIAGNOSIS — K11.7 XEROSTOMIA DUE TO AUTOIMMUNE DISEASE (H): ICD-10-CM

## 2019-05-29 DIAGNOSIS — K12.1 STOMATITIS AND MUCOSITIS: ICD-10-CM

## 2019-05-29 DIAGNOSIS — E11.9 TYPE 2 DIABETES MELLITUS WITHOUT COMPLICATION, WITHOUT LONG-TERM CURRENT USE OF INSULIN (H): ICD-10-CM

## 2019-05-29 DIAGNOSIS — M35.9 AUTOIMMUNE DISEASE (H): ICD-10-CM

## 2019-05-29 DIAGNOSIS — K13.21 LEUKOPLAKIA OF ORAL MUCOSA: ICD-10-CM

## 2019-06-03 ENCOUNTER — COMMUNICATION - HEALTHEAST (OUTPATIENT)
Dept: FAMILY MEDICINE | Facility: CLINIC | Age: 59
End: 2019-06-03

## 2019-06-03 DIAGNOSIS — J45.20 INTERMITTENT ASTHMA, UNCOMPLICATED: ICD-10-CM

## 2019-06-12 ENCOUNTER — OFFICE VISIT (OUTPATIENT)
Dept: RHEUMATOLOGY | Facility: CLINIC | Age: 59
End: 2019-06-12
Payer: MEDICAID

## 2019-06-12 VITALS
BODY MASS INDEX: 24.05 KG/M2 | SYSTOLIC BLOOD PRESSURE: 143 MMHG | OXYGEN SATURATION: 95 % | DIASTOLIC BLOOD PRESSURE: 73 MMHG | HEIGHT: 59 IN | WEIGHT: 119.3 LBS | HEART RATE: 65 BPM

## 2019-06-12 DIAGNOSIS — K14.8 ENLARGED TONGUE: ICD-10-CM

## 2019-06-12 DIAGNOSIS — M35.00 SICCA SYNDROME (H): Primary | ICD-10-CM

## 2019-06-12 PROCEDURE — 99204 OFFICE O/P NEW MOD 45 MIN: CPT | Performed by: STUDENT IN AN ORGANIZED HEALTH CARE EDUCATION/TRAINING PROGRAM

## 2019-06-12 RX ORDER — MUPIROCIN 20 MG/G
OINTMENT TOPICAL
Refills: 2 | COMMUNITY
Start: 2019-06-03 | End: 2023-07-11

## 2019-06-12 RX ORDER — CEVIMELINE HYDROCHLORIDE 30 MG/1
30 CAPSULE ORAL
COMMUNITY
Start: 2019-05-29 | End: 2023-07-11

## 2019-06-12 RX ORDER — EPINEPHRINE 0.3 MG/.3ML
0.3 INJECTION SUBCUTANEOUS
COMMUNITY
Start: 2006-01-01 | End: 2023-07-11

## 2019-06-12 RX ORDER — NITROGLYCERIN 400 UG/1
1 SPRAY ORAL
COMMUNITY
Start: 2006-01-01 | End: 2023-07-11

## 2019-06-12 RX ORDER — FLUTICASONE PROPIONATE 220 UG/1
AEROSOL, METERED RESPIRATORY (INHALATION)
Refills: 12 | COMMUNITY
Start: 2019-06-03 | End: 2023-07-11

## 2019-06-12 RX ORDER — BLOOD-GLUCOSE METER
1 EACH MISCELLANEOUS
COMMUNITY
Start: 2019-04-14

## 2019-06-12 RX ORDER — ERGOCALCIFEROL 1.25 MG/1
CAPSULE, LIQUID FILLED ORAL
Refills: 0 | COMMUNITY
Start: 2019-05-29 | End: 2023-07-11

## 2019-06-12 RX ORDER — PYRIDOXINE HCL (VITAMIN B6) 250 MG
TABLET ORAL
Refills: 3 | Status: ON HOLD | COMMUNITY
Start: 2018-07-18 | End: 2023-09-12

## 2019-06-12 ASSESSMENT — ROUTINE ASSESSMENT OF PATIENT INDEX DATA (RAPID3)
TOTAL RAPID3 SCORE: 24.3
RAPID3 INTERPRETATION: HIGH > 12.0

## 2019-06-12 ASSESSMENT — MIFFLIN-ST. JEOR: SCORE: 1021.77

## 2019-06-12 NOTE — LETTER
6/12/2019      RE: Kim Correa  [address] [place] [address]     Dear Colleague,    Thank you for referring your patient, Kim Correa, to the [facility]. Please see a copy of my visit note below.    Rheumatology Clinic Visit     Kim Correa MRN# [id number]   YOB: 1960 Age: 59 year old     Date of Visit: Jun 12, 2019   Primary care provider: Omayra Reyes          Assessment and Plan:     Assessment     -- Sicca symptoms  -- Enlarged tongue and submandibular glands  -- Polyarthralgia  -- Myalgia  -- Raynaud's  -- Minor salivary gland lip biopsy - 8/2017 - Focus score Zero.   -- Ventral tongue lesion biopsy - 12/2017 - No malignancy or dysplasia.   -- Elevated sed rate - 90 - 6/2017 ; in 1/2019 - 15 mm/hr  -- VITOR - 0.2, RF - < 15, Neg AntiCCP, SSA/SSB, MPO/Pr-3  -- Low Vitamin D - 8.4 mg/dl - 3/2019    Ms. Correa is 59 year old female seen in clinic for evaluation of sicca symptoms and joint pains.     Sicca symptoms : She complains of chronic dry mouth symptoms along with swelling of the tongue, pain and difficulty swallowing.  She has seen the ENT specialist at [facility] and had minor salivary biopsy which showed no inflammation ruling out Sjogren's disease.  She also had a biopsy of ventral tongue lesion which did not reveal any evidence of malignancy or inflammation.  No fibrosis was seen on the biopsy. VITOR and Sjogren's antibodies are negative as well.  It makes Sjogren's disease less likely. Sicca symptoms can be seen in IgG4 RD but on Minor salivary gland lip biopsy no evidence of fibrosis or inflammation was seen. She started using cevimeline couple weeks ago ordered by her primary care provider.  She can continue to use cevimeline for symptomatic improvement and can try Biotene products.     Polyarthralgia: She complains of widespread body aches and myalgias and reports improvement with prednisone.  She is off prednisone right now. She had evaluation done  at [facility] in 2017 where no definite diagnosis of autoimmune connective tissue disease was made.  Based on her exam inflammatory arthritis is likely. She had PET scan done in 1/2018 at [facility] which showed no evidence of abnormal uptake in any of the joint except around the left ventricle and in the left plantar fascia.     I did not recommend to restart prednisone. Her RF, anti CCP antibodies checked earlier are negative. She has neg VITOR, SSA/SSB,MPO/Pr-3 done in 10/2017. She has very low Vitamin D level in 3/2019 and is on 50,000 international unit(s) of Vitamin D since 5/29/19.     Plan     -- Blood tests : Previous autoimmune testing were reviewed and no further blood tests were ordered on this visit.    -- No synovitis and inflammation over the joints noted.     --She has enlarged tongue, enlarged salivary glands and need to follow-up with ENT or consider oral pathology at [facility] for further evaluation.     -- Will review her records from Rheumatology clinic in [facility] when available.    -- RTC on as needed basis. Patient was upset with the assessment and stormed out of room before I can give her follow up plan.               Active Problem List:     Patient Active Problem List    Diagnosis Date Noted     COSTOCHONDRITIS 03/20/2007     Priority: Medium     Refractory migraine without aura 02/05/2007     Priority: Medium     Problem list name updated by automated process. Provider to review       Dizziness and giddiness 12/11/2006     Priority: Medium     Mild intermittent asthma with exacerbation 11/10/2006     Priority: Medium     Qvar and albuterol, usually 2-3 days/week.       Esophageal reflux 11/10/2006     Priority: Medium     Moderate depressed bipolar I disorder (H) 11/10/2006     Priority: Medium     Problem list name updated by automated process. Provider to review       Hyperlipidemia 11/10/2006     Priority: Medium     Problem list name updated by automated process. Provider to review    "    Attention deficit disorder 11/10/2006     Priority: Medium     Problem list name updated by automated process. Provider to review       Chronic rhinitis 11/10/2006     Priority: Medium     Zyrtec daily       Sprain of lumbar region 09/08/2006     Priority: Medium            History of Present Illness:   Kim Correa is a 59 year old female with PMH of GERD, CAD, HLD, DM seen in the clinic in consultation at request of Dr Baeza for evaluation of joint pains. She is accompanied by Merline, her \"independent living assistant\" who helps her navigate her medical appointments.     As per her PCP  \"She has been experiencing dry mouth, difficulty speaking, enlarged tongue, rashes on her scalp, unsteady gait, joint pain, and muscle weakness for multiple years. She has seen numerous specialists including ENT, Neurology, and Rheumatology. She has had a persistently elevated sed rate. Her symptoms improved with Prednisone \".     She saw ENT Dr Theron Valadez in 8/2017 and had Lower lip biopsy which showed \"benign salivary gland tissue without significant fibrosis or inflammation (focus score 0).\" She was again seen by ENT on 12/23/17 for follow up at [facility] and had biopsy of the ventral tongue lesion which showed \" ulcerated hyperplastic squamous mucosa with acute and chronic inflammation and granulation tissue, negative for fungal organisms, dysplasia or malignancy. She reportedly has mild pain in mouth dating back to 2009 following some dental extraction and denture fitting. This includes her mouth and tongue. She describes sensation of swelling in the mouth, throat, tongue base and back of neck. She describes longstanding difficulty swallowing.    She has seen Dr Eisenberg in Rheumatology at [facility] in 10/2017, I do not have those notes to review. According to her PCP Kathy Finley at [facility] possibility of autoimmune disease was raised by Rheumatology, They rule out Sjogren's disease and placed her on 10 mg " prednisone and then recommended to follow up with PCP. Prednisone does help with her joint pains. She had PET CT in 1/2018 which showed increased uptake around the left ventricle and in the left plantar fascia.  No other abnormal uptake was seen in any of the joints.      She has multiple symptoms mostly head and neck. C/o dry mouth, dysphagia, swelling over her neck below the mandible. Her tongue is swollen makes it difficult to swallow.  She has white discoloration of her fingers, toes. She is smoker. She reports that sticky fluid like saliva comes out from her skin. She just started evoxac recently for dry mouth couple weeks ago. She has myalgia and cramping.     She also saw Dr Conway in Pulmonology in 9/2018 by Dr Conway for evaluation of chronic bronchitis. PFTs were normal, no evidence of obstruction present. Her symptoms were more consistent with asthma and seems to get frequent bronchitis in the setting of heavy tobacco use.    She was seen by Dr Yoon Street on 3/18/19 who found trigger finger in her left hand and injected with 20 mg Methylprednisolone. No evidence of RA or PsA was found on exam. For chronic pain he offered to start Cymbalta which she refused.     She reports hx of Psoriasis in her late 30's. She reports that her psoriasis was atypical and was told to go out in sun and take Vitamins which helped. No history of ulcerative colitis or chron's disease. No h/o iritis, enthesitis, finger or toe swelling like dactylitis, plantar fascitis or heel pain. Denies any malar rash, photosensitivity, pleuritic chest pains, recurrent sinusitis/rhinitis,  hearing or visual changes recently. No h/o arterial/venous thrombosis in the past. Denies any tick bite, recent GI/ infection.             Review of Systems:     Review Of Systems  Constitutional: denies fever, chills, night sweats and weight loss.  Skin: No skin rash.  Eyes: + dryness or irritation in eyes. No episode of eye inflammation or redness.    Ears/Nose/Throat: no recurrent sinus infections.  Respiratory: + shortness of breath, + dyspnea on exertion, + cough, or hemoptysis  Cardiovascular: no chest pain or palpitations.  Gastrointestinal: no nausea, vomiting, abdominal pain.  Normal bowel movements.  Genitourinary: no dysuria, frequency  or hematuria.  Musculoskeletal: as in HPI  Neurologic: no numbness, tingling.  Psychiatric: no mood disorders.  Hematologic/Lymphatic/Immunologic: no history of easy bruising, petechia or purpura.  No abnormal bleeding.   Endocrine: no h/o thyroid disease or + Diabetes.                  Past Medical History:     Past Medical History:   Diagnosis Date     Dysphagia      Enlarged tongue      Myalgia      Polyarthralgia      Sicca (H)      Past Surgical History:   Procedure Laterality Date     SURGICAL HISTORY OF -       vag hyst     SURGICAL HISTORY OF -       carpal tunnel bilateral     SURGICAL HISTORY OF -       arm surgery right side.              Social History:     Social History     Occupational History     Not on file   Tobacco Use     Smoking status: Current Every Day Smoker     Packs/day: 0.50     Types: Cigarettes     Smokeless tobacco: Never Used   Substance and Sexual Activity     Alcohol use: No     Drug use: No     Sexual activity: Not Currently            Family History:     Family History   Problem Relation Age of Onset     Diabetes Sister      Hypertension Mother      Thyroid Disease Mother      Alzheimer Disease Maternal Grandmother      Heart Disease Maternal Grandfather      Cerebrovascular Disease Paternal Grandmother      C.A.D. No family hx of             Allergies:     Allergies   Allergen Reactions     Bee Venom Anaphylaxis and Unknown     unknown  Unknown       Indomethacin Diarrhea and Hives     Stomach pain, sweats, shakes, not good combo with heart meds either.       Sumatriptan Anaphylaxis, Other (See Comments) and Unknown     unknown  Throat closing       Imitrex [Sumatriptan Succinate]       Levonorgestrel-Ethinyl Estrad Other (See Comments)     Sulfa Drugs      Gabapentin Anxiety and Nausea and Vomiting     Vancomycin Itching            Medications:     Current Outpatient Medications   Medication Sig Dispense Refill     ALBUTEROL 90 MCG/ACT IN AERS None Entered       aspirin (ASA) 81 MG tablet        blood glucose monitoring (CONTOUR NEXT MONITOR W/DEVICE KIT) meter device kit 1 each       cevimeline (EVOXAC) 30 MG capsule Take 30 mg by mouth       EPINEPHrine (EPIPEN 2-JOSI) 0.3 MG/0.3ML injection 2-pack 0.3 mg       FLOVENT  MCG/ACT inhaler INHALE 1 PUFF BY MOUTH TWO TIMES DAILY  12     lifitegrast (XIIDRA) 5 % opthalmic solution 1 drop       mupirocin (BACTROBAN) 2 % external ointment Apply to affected area 3 times daily for 7-14 days.  2     nitroGLYcerin (NITROLINGUAL) 0.4 MG/SPRAY spray Place 1 spray under the tongue       Pyridoxine HCl (B-6) 250 MG TABS   3     SYSTANE OVERNIGHT THERAPY 0.3 % GEL ophthalmic gel   11     vitamin D2 (ERGOCALCIFEROL) 71153 units (1250 mcg) capsule Take 1 capsule (50,000 Units total) by mouth 2 times a week for 24 doses.  0     ADDERALL XR# 20 MG OR CP24 3 CAPSULE EVERY MORNING       CLONAZEPAM 0.5 MG OR TABS 2 TABLETS AT BEDTIME; 1/2  TABLET TWICE DAILY       CYMBALTA 60 MG OR CPEP 1 CAPSULE DAILY       GEODON 60 MG OR CAPS 1 CAPSULE TWICE DAILY       NASACORT AQ 55 MCG/ACT NA AERS None Entered       ORDER FOR DME ankle splint (Patient not taking: Reported on 6/12/2019) 1 0     PREDNISONE 20 MG OR TABS 1 tablet by mouth daily X 5 days (Patient not taking: No sig reported) 5 0     PREMARIN 0.625 MG OR TABS 1 TABLET DAILY       PROPRANOLOL HCL 20 MG OR TABS  ! tab BID per  90 0     QVAR 80 MCG/ACT IN AERS None Entered       RANITIDINE  MG OR CAPS 1 CAPSULE BID       ROZEREM 8 MG OR TABS 1 TABLET AT BEDTIME       SEROQUEL 300 MG OR TABS 2 TABLETS AT BEDTIME       TOPAMAX 25 MG OR TABS 2 TABLETS TWICE DAILY. (Patient not taking: No sig  "reported) 1m 0     ZOCOR 20 MG OR TABS 1 TABLET AT BEDTIME       ZYRTEC 10 MG OR TABS 1 TABLET DAILY              Physical Exam:   Blood pressure 143/73, pulse 65, height 1.499 m (4' 11\"), weight 54.1 kg (119 lb 4.8 oz), SpO2 95 %.  Wt Readings from Last 4 Encounters:   06/12/19 54.1 kg (119 lb 4.8 oz)   03/20/07 88.4 kg (194 lb 12.8 oz)   02/02/07 89.8 kg (198 lb)   12/11/06 87.5 kg (193 lb)       Constitutional: well-developed, appearing stated age; cooperative  Eyes: nl EOM, PERRLA, vision, conjunctiva, sclera  ENT: nl external ears, nose, hearing, lips, teeth, gums, throat  No mucous membrane lesions, normal saliva pool  Neck: no mass or thyroid enlargement  Resp: lungs clear to auscultation, nl to palpation  CV: RRR, no murmurs, rubs or gallops, no edema  GI: no ABD mass or tenderness, no HSM  : not tested  Lymph: no cervical, supraclavicular, inguinal or epitrochlear nodes    MS: All TMJ, neck, shoulder, elbow, wrist, MCP/PIP/DIP, spine, hip, knee, ankle, and foot MTP/IP joints were examined.   --She has enlarged tongue, no canker sores seen, enlarged submandibular salivary glands.  --No synovitis and tenderness present over MCP, PIP, DIP joints, bilateral wrists, shoulders, elbows, ankles, MTP joints.  No knee effusions.  -- No active synovitis or deformity. Full ROM.  Normal  strength.   -- No dactylitis,  tenosynovitis, enthesopathy.    Skin: no nail pitting, alopecia, rash, nodules or lesions  Neuro: nl cranial nerves, strength, sensation, DTRs.   Psych: nl judgement, orientation, memory, affect.         Data:     Results for orders placed or performed in visit on 07/10/07   HEART FLOW (CORONARY ANGIO)    Impression    [facility] [address]  [address]  [place] [address]  [phone]    Coronary CT Angiogram:  07/05/07  Exam: CT of the heart with and without contrast.  Indication: Typical chest pain, equivocally abnormal stress test,  and syncope.    FINAL IMPRESSIONS:  1) " Low coronary artery calcium score.    Although the patients represents a 50th to 75th percentile for  her age and gender, the overall score would indicate a low risk  of coronary events.    2) Mild single vessel coronary artery disease involving the left  anterior descending artery.    3) Normal left ventricular structure and function.     4) Radiology review for incidental noncardiac findings .    Michael Jackson MD    -----------------------------------------------------------------  -----------------------------------------------------------------  ----    ADDENDUM: RADIOLOGY REVIEW FOR INCIDENTAL NONCARDIAC FINDINGS:  Addendum: Detailed [facility] review of [facility] Cardiac CT:7/5/2007.    Findings: Included portions of the lungs, mediastinum and chest  wall negative.    Conclusion:  1) No significant incidental extracardiac findings   2) Please refer to [facility] cardiac CT report.    Mariano Quiñones MD                                            No results for input(s): WBC, RBC, HGB, HCT, MCV, RDW, PLT, CCPABG, ALBUMIN, CRP, BUN, CREAT in the last 50332 hours.    Invalid input(s): MCT,  AST,  ALT,  ESR   No results for input(s): TSH, T4 in the last 74485 hours.  Hemoglobin   Date Value Ref Range Status   03/20/2007 15.1 11.7 - 15.7 g/dL Final   03/16/2007 15.1 11.7 - 15.7 g/dL Final   12/11/2006 14.5 13.3 - 17.7 g/dL Final     Urea Nitrogen   Date Value Ref Range Status   03/16/2007 12 5 - 24 mg/dL Final   11/02/2006 8 5 - 24 mg/dL Final     AST   Date Value Ref Range Status   11/02/2006 30 0 - 55 U/L Final     Albumin   Date Value Ref Range Status   11/02/2006 3.6 3.3 - 4.6 g/dL Final     Alkaline Phosphatase   Date Value Ref Range Status   11/02/2006 68 40 - 150 U/L Final     ALT   Date Value Ref Range Status   11/02/2006 40 0 - 70 U/L Final     Other studies:   Outside studies reviewed:   PFT's (9/21/2018)  FEV1 2.45L 105%  %  Ratio 0.73  No BD response  TLC  129%  %  RV/%  DLco 78% sourav for hgb (note she did not achieve >85% of VC for this test)  Impression: essentially normal PFTs with the exception of some hyperinflation and very slightly reduced diffusing capacity. Flow volume loop appears normal.      [facility]    LOW DOSE LUNG CANCER SCREENING CT CHEST    10/2/2018 10:33 AM        INDICATION: Current smoker. 38 pack-year smoking history.    TECHNIQUE: Low-dose lung cancer screening noncontrast CT chest. Dose reduction techniques were used.     COMPARISON: None.        FINDINGS:    LUNGS AND PLEURA: Large lung volumes. Minimal emphysema. Mild diffuse airway thickening. Borderline dilated airways. Subtle bilateral mid to upper lung predominant centrilobular groundglass micronodules (under 3 mm). 2 mm right major fissure nodule     (series 4, image 48). Negative pleural spaces.        MEDIASTINUM: No adenopathy. Nonaneurysmal aorta with moderate plaque. Dual-chamber pacer.        CORONARY ARTERY CALCIFICATION: Moderate. LAD coronary stenting.        LIMITED UPPER ABDOMEN: Colonic diverticulosis.        MUSCULOSKELETAL: Degenerative changes spine.      CONCLUSION:        1.  Right major fissure 2 mm and subtle centrilobular groundglass micronodules as detailed. No suspicious mass or adenopathy.        2.  Findings consistent with airways disease and respiratory bronchiolitis.        3.  Minimal emphysema.        4.  Moderate coronary calcifications. LAD stenting.        RADIOLOGIST RECOMMENDATION: Recommend annual low-dose lung cancer screening CT if clinically appropriate.        LungRADS CATEGORY: 2: Benign.        SIGNIFICANT INCIDENTAL FINDINGS: Negative.      Sedimentation Rate (06/15/2017 11:09 AM CDT)  Sedimentation Rate (06/15/2017 11:09 AM CDT)   Component Value Ref Range Performed At Pathologist Signature   Sedimentation Rate, B 90 (H) 0 - 30 MM[facility] POWERCHART       Sedimentation Rate (06/15/2017 11:09 AM CDT)   Specimen   Blood      Sedimentation Rate (06/15/2017 11:09 AM CDT)   Performing Organization Address City/State/Zipcode Phone Number   POWERCHART             Back to top of Lab Results       Connective Tissue Diseases [place] (06/15/2017 11:09 AM CDT)  Connective Tissue Diseases [place] (06/15/2017 11:09 AM CDT)   Component Value Ref Range Performed At Pathologist Signature   Cyclic Citrullinated Peptide Ab, S <15.6  Comment:   Test Performed by:  [facility]  [address] [place] [address]   <20.0 (Negative) UNITS POWERCHART     Antinuclear Ab Screen by IFA, S 0.3 <=1.0 (Negative) UNITS POWERCHART     Interpretation See Comment  Comment:   Tests for antibodies to dsDNA and MICHI antigens are not  performed automatically unless the VITOR result is > or =  3.0 U.  Studies performed at [facility] indicate that  positive VITOR results <3.0 U are rarely accompanied by  positive second order tests.  Test Performed by:  [facility]  [address] [place] [address]     POWERCHART       Vitamin B12 and Folate (06/15/2017 11:09 AM CDT)  Vitamin B12 and Folate (06/15/2017 11:09 AM CDT)   Component Value Ref Range Performed At Pathologist Signature   Vitamin B12 Assay, S 258 180 - 914 NGL POWERCHART     Folate, S 17.6  Comment:   Test Performed by:  [facility]  [address] [place] [address]   >=4.0 MCGL POWERCHART       Vitamin B12 and Folate (06/15/2017 11:09 AM CDT)   Specimen       Reviewed Rheumatology lab flowsheet    Siri Tamayo MD  [facility]  Department of Rheumatology & Autoimmune Disorders  [facility]: [phone]   Pager - [phone]        Again, thank you for allowing me to participate in the care of your patient.      Sincerely,    Siri Tamayo MD

## 2019-06-12 NOTE — PROGRESS NOTES
Rheumatology Clinic Visit     Kim Correa MRN# [id number]   YOB: 1960 Age: 59 year old     Date of Visit: Jun 12, 2019   Primary care provider: Omayra Reyes          Assessment and Plan:     Assessment     -- Sicca symptoms  -- Enlarged tongue and submandibular glands  -- Polyarthralgia  -- Myalgia  -- Raynaud's  -- Minor salivary gland lip biopsy - 8/2017 - Focus score Zero.   -- Ventral tongue lesion biopsy - 12/2017 - No malignancy or dysplasia.   -- Elevated sed rate - 90 - 6/2017 ; in 1/2019 - 15 mm/hr  -- VITOR - 0.2, RF - < 15, Neg AntiCCP, SSA/SSB, MPO/Pr-3  -- Low Vitamin D - 8.4 mg/dl - 3/2019    Ms. Correa is 59 year old female seen in clinic for evaluation of sicca symptoms and joint pains.     Sicca symptoms : She complains of chronic dry mouth symptoms along with swelling of the tongue, pain and difficulty swallowing.  She has seen the ENT specialist at [facility] and had minor salivary biopsy which showed no inflammation ruling out Sjogren's disease.  She also had a biopsy of ventral tongue lesion which did not reveal any evidence of malignancy or inflammation.  No fibrosis was seen on the biopsy. VITOR and Sjogren's antibodies are negative as well.  It makes Sjogren's disease less likely. Sicca symptoms can be seen in IgG4 RD but on Minor salivary gland lip biopsy no evidence of fibrosis or inflammation was seen. She started using cevimeline couple weeks ago ordered by her primary care provider.  She can continue to use cevimeline for symptomatic improvement and can try Biotene products.     Polyarthralgia: She complains of widespread body aches and myalgias and reports improvement with prednisone.  She is off prednisone right now. She had evaluation done at [facility] in 2017 where no definite diagnosis of autoimmune connective tissue disease was made.  Based on her exam inflammatory arthritis is likely. She had PET scan done in 1/2018 at [facility] which showed no evidence of  abnormal uptake in any of the joint except around the left ventricle and in the left plantar fascia.     I did not recommend to restart prednisone. Her RF, anti CCP antibodies checked earlier are negative. She has neg VITOR, SSA/SSB,MPO/Pr-3 done in 10/2017. She has very low Vitamin D level in 3/2019 and is on 50,000 international unit(s) of Vitamin D since 5/29/19.     Plan     -- Blood tests : Previous autoimmune testing were reviewed and no further blood tests were ordered on this visit.    -- No synovitis and inflammation over the joints noted.     --She has enlarged tongue, enlarged salivary glands and need to follow-up with ENT or consider oral pathology at [facility] for further evaluation.     -- Will review her records from Rheumatology clinic in [facility] when available.    -- RTC on as needed basis. Patient was upset with the assessment and stormed out of room before I can give her follow up plan.               Active Problem List:     Patient Active Problem List    Diagnosis Date Noted     COSTOCHONDRITIS 03/20/2007     Priority: Medium     Refractory migraine without aura 02/05/2007     Priority: Medium     Problem list name updated by automated process. Provider to review       Dizziness and giddiness 12/11/2006     Priority: Medium     Mild intermittent asthma with exacerbation 11/10/2006     Priority: Medium     Qvar and albuterol, usually 2-3 days/week.       Esophageal reflux 11/10/2006     Priority: Medium     Moderate depressed bipolar I disorder (H) 11/10/2006     Priority: Medium     Problem list name updated by automated process. Provider to review       Hyperlipidemia 11/10/2006     Priority: Medium     Problem list name updated by automated process. Provider to review       Attention deficit disorder 11/10/2006     Priority: Medium     Problem list name updated by automated process. Provider to review       Chronic rhinitis 11/10/2006     Priority: Medium     Zyrtec daily       Sprain of lumbar  "region 09/08/2006     Priority: Medium            History of Present Illness:   Kim Correa is a 59 year old female with PMH of GERD, CAD, HLD, DM seen in the clinic in consultation at request of Dr Baeza for evaluation of joint pains. She is accompanied by Merline, her \"independent living assistant\" who helps her navigate her medical appointments.     As per her PCP  \"She has been experiencing dry mouth, difficulty speaking, enlarged tongue, rashes on her scalp, unsteady gait, joint pain, and muscle weakness for multiple years. She has seen numerous specialists including ENT, Neurology, and Rheumatology. She has had a persistently elevated sed rate. Her symptoms improved with Prednisone \".     She saw ENT Dr Theron Valadez in 8/2017 and had Lower lip biopsy which showed \"benign salivary gland tissue without significant fibrosis or inflammation (focus score 0).\" She was again seen by ENT on 12/23/17 for follow up at [facility] and had biopsy of the ventral tongue lesion which showed \" ulcerated hyperplastic squamous mucosa with acute and chronic inflammation and granulation tissue, negative for fungal organisms, dysplasia or malignancy. She reportedly has mild pain in mouth dating back to 2009 following some dental extraction and denture fitting. This includes her mouth and tongue. She describes sensation of swelling in the mouth, throat, tongue base and back of neck. She describes longstanding difficulty swallowing.    She has seen Dr Eisenberg in Rheumatology at [facility] in 10/2017, I do not have those notes to review. According to her PCP Kathy Finley at [facility] possibility of autoimmune disease was raised by Rheumatology, They rule out Sjogren's disease and placed her on 10 mg prednisone and then recommended to follow up with PCP. Prednisone does help with her joint pains. She had PET CT in 1/2018 which showed increased uptake around the left ventricle and in the left plantar fascia.  No other abnormal " uptake was seen in any of the joints.      She has multiple symptoms mostly head and neck. C/o dry mouth, dysphagia, swelling over her neck below the mandible. Her tongue is swollen makes it difficult to swallow.  She has white discoloration of her fingers, toes. She is smoker. She reports that sticky fluid like saliva comes out from her skin. She just started evoxac recently for dry mouth couple weeks ago. She has myalgia and cramping.     She also saw Dr Conway in Pulmonology in 9/2018 by Dr Conway for evaluation of chronic bronchitis. PFTs were normal, no evidence of obstruction present. Her symptoms were more consistent with asthma and seems to get frequent bronchitis in the setting of heavy tobacco use.    She was seen by Dr Yoon Street on 3/18/19 who found trigger finger in her left hand and injected with 20 mg Methylprednisolone. No evidence of RA or PsA was found on exam. For chronic pain he offered to start Cymbalta which she refused.     She reports hx of Psoriasis in her late 30's. She reports that her psoriasis was atypical and was told to go out in sun and take Vitamins which helped. No history of ulcerative colitis or chron's disease. No h/o iritis, enthesitis, finger or toe swelling like dactylitis, plantar fascitis or heel pain. Denies any malar rash, photosensitivity, pleuritic chest pains, recurrent sinusitis/rhinitis,  hearing or visual changes recently. No h/o arterial/venous thrombosis in the past. Denies any tick bite, recent GI/ infection.             Review of Systems:     Review Of Systems  Constitutional: denies fever, chills, night sweats and weight loss.  Skin: No skin rash.  Eyes: + dryness or irritation in eyes. No episode of eye inflammation or redness.   Ears/Nose/Throat: no recurrent sinus infections.  Respiratory: + shortness of breath, + dyspnea on exertion, + cough, or hemoptysis  Cardiovascular: no chest pain or palpitations.  Gastrointestinal: no nausea, vomiting, abdominal pain.   Normal bowel movements.  Genitourinary: no dysuria, frequency  or hematuria.  Musculoskeletal: as in HPI  Neurologic: no numbness, tingling.  Psychiatric: no mood disorders.  Hematologic/Lymphatic/Immunologic: no history of easy bruising, petechia or purpura.  No abnormal bleeding.   Endocrine: no h/o thyroid disease or + Diabetes.                  Past Medical History:     Past Medical History:   Diagnosis Date     Dysphagia      Enlarged tongue      Myalgia      Polyarthralgia      Sicca (H)      Past Surgical History:   Procedure Laterality Date     SURGICAL HISTORY OF -       vag hyst     SURGICAL HISTORY OF -       carpal tunnel bilateral     SURGICAL HISTORY OF -       arm surgery right side.              Social History:     Social History     Occupational History     Not on file   Tobacco Use     Smoking status: Current Every Day Smoker     Packs/day: 0.50     Types: Cigarettes     Smokeless tobacco: Never Used   Substance and Sexual Activity     Alcohol use: No     Drug use: No     Sexual activity: Not Currently            Family History:     Family History   Problem Relation Age of Onset     Diabetes Sister      Hypertension Mother      Thyroid Disease Mother      Alzheimer Disease Maternal Grandmother      Heart Disease Maternal Grandfather      Cerebrovascular Disease Paternal Grandmother      C.A.D. No family hx of             Allergies:     Allergies   Allergen Reactions     Bee Venom Anaphylaxis and Unknown     unknown  Unknown       Indomethacin Diarrhea and Hives     Stomach pain, sweats, shakes, not good combo with heart meds either.       Sumatriptan Anaphylaxis, Other (See Comments) and Unknown     unknown  Throat closing       Imitrex [Sumatriptan Succinate]      Levonorgestrel-Ethinyl Estrad Other (See Comments)     Sulfa Drugs      Gabapentin Anxiety and Nausea and Vomiting     Vancomycin Itching            Medications:     Current Outpatient Medications   Medication Sig Dispense Refill      "ALBUTEROL 90 MCG/ACT IN AERS None Entered       aspirin (ASA) 81 MG tablet        blood glucose monitoring (CONTOUR NEXT MONITOR W/DEVICE KIT) meter device kit 1 each       cevimeline (EVOXAC) 30 MG capsule Take 30 mg by mouth       EPINEPHrine (EPIPEN 2-JOSI) 0.3 MG/0.3ML injection 2-pack 0.3 mg       FLOVENT  MCG/ACT inhaler INHALE 1 PUFF BY MOUTH TWO TIMES DAILY  12     lifitegrast (XIIDRA) 5 % opthalmic solution 1 drop       mupirocin (BACTROBAN) 2 % external ointment Apply to affected area 3 times daily for 7-14 days.  2     nitroGLYcerin (NITROLINGUAL) 0.4 MG/SPRAY spray Place 1 spray under the tongue       Pyridoxine HCl (B-6) 250 MG TABS   3     SYSTANE OVERNIGHT THERAPY 0.3 % GEL ophthalmic gel   11     vitamin D2 (ERGOCALCIFEROL) 82005 units (1250 mcg) capsule Take 1 capsule (50,000 Units total) by mouth 2 times a week for 24 doses.  0     ADDERALL XR# 20 MG OR CP24 3 CAPSULE EVERY MORNING       CLONAZEPAM 0.5 MG OR TABS 2 TABLETS AT BEDTIME; 1/2  TABLET TWICE DAILY       CYMBALTA 60 MG OR CPEP 1 CAPSULE DAILY       GEODON 60 MG OR CAPS 1 CAPSULE TWICE DAILY       NASACORT AQ 55 MCG/ACT NA AERS None Entered       ORDER FOR DME ankle splint (Patient not taking: Reported on 6/12/2019) 1 0     PREDNISONE 20 MG OR TABS 1 tablet by mouth daily X 5 days (Patient not taking: No sig reported) 5 0     PREMARIN 0.625 MG OR TABS 1 TABLET DAILY       PROPRANOLOL HCL 20 MG OR TABS  ! tab BID per  90 0     QVAR 80 MCG/ACT IN AERS None Entered       RANITIDINE  MG OR CAPS 1 CAPSULE BID       ROZEREM 8 MG OR TABS 1 TABLET AT BEDTIME       SEROQUEL 300 MG OR TABS 2 TABLETS AT BEDTIME       TOPAMAX 25 MG OR TABS 2 TABLETS TWICE DAILY. (Patient not taking: No sig reported) 1m 0     ZOCOR 20 MG OR TABS 1 TABLET AT BEDTIME       ZYRTEC 10 MG OR TABS 1 TABLET DAILY              Physical Exam:   Blood pressure 143/73, pulse 65, height 1.499 m (4' 11\"), weight 54.1 kg (119 lb 4.8 oz), SpO2 95 %.  Wt Readings " from Last 4 Encounters:   06/12/19 54.1 kg (119 lb 4.8 oz)   03/20/07 88.4 kg (194 lb 12.8 oz)   02/02/07 89.8 kg (198 lb)   12/11/06 87.5 kg (193 lb)       Constitutional: well-developed, appearing stated age; cooperative  Eyes: nl EOM, PERRLA, vision, conjunctiva, sclera  ENT: nl external ears, nose, hearing, lips, teeth, gums, throat  No mucous membrane lesions, normal saliva pool  Neck: no mass or thyroid enlargement  Resp: lungs clear to auscultation, nl to palpation  CV: RRR, no murmurs, rubs or gallops, no edema  GI: no ABD mass or tenderness, no HSM  : not tested  Lymph: no cervical, supraclavicular, inguinal or epitrochlear nodes    MS: All TMJ, neck, shoulder, elbow, wrist, MCP/PIP/DIP, spine, hip, knee, ankle, and foot MTP/IP joints were examined.   --She has enlarged tongue, no canker sores seen, enlarged submandibular salivary glands.  --No synovitis and tenderness present over MCP, PIP, DIP joints, bilateral wrists, shoulders, elbows, ankles, MTP joints.  No knee effusions.  -- No active synovitis or deformity. Full ROM.  Normal  strength.   -- No dactylitis,  tenosynovitis, enthesopathy.    Skin: no nail pitting, alopecia, rash, nodules or lesions  Neuro: nl cranial nerves, strength, sensation, DTRs.   Psych: nl judgement, orientation, memory, affect.         Data:     Results for orders placed or performed in visit on 07/10/07   HEART FLOW (CORONARY ANGIO)    Impression    [facility]  [address]  [id number]  [place] [address]  [phone]    Coronary CT Angiogram:  07/05/07  Exam: CT of the heart with and without contrast.  Indication: Typical chest pain, equivocally abnormal stress test,  and syncope.    FINAL IMPRESSIONS:  1) Low coronary artery calcium score.    Although the patients represents a 50th to 75th percentile for  her age and gender, the overall score would indicate a low risk  of coronary events.    2) Mild single vessel coronary artery disease involving  the left  anterior descending artery.    3) Normal left ventricular structure and function.     4) Radiology review for incidental noncardiac findings .    Michael Jackson MD    -----------------------------------------------------------------  -----------------------------------------------------------------  ----    ADDENDUM: RADIOLOGY REVIEW FOR INCIDENTAL NONCARDIAC FINDINGS:  Addendum: Detailed [facility] review of [facility] Cardiac CT:7/5/2007.    Findings: Included portions of the lungs, mediastinum and chest  wall negative.    Conclusion:  1) No significant incidental extracardiac findings   2) Please refer to [facility] cardiac CT report.    Mariano Quiñones MD                                            No results for input(s): WBC, RBC, HGB, HCT, MCV, RDW, PLT, CCPABG, ALBUMIN, CRP, BUN, CREAT in the last 60414 hours.    Invalid input(s): MCT,  AST,  ALT,  ESR   No results for input(s): TSH, T4 in the last 16523 hours.  Hemoglobin   Date Value Ref Range Status   03/20/2007 15.1 11.7 - 15.7 g/dL Final   03/16/2007 15.1 11.7 - 15.7 g/dL Final   12/11/2006 14.5 13.3 - 17.7 g/dL Final     Urea Nitrogen   Date Value Ref Range Status   03/16/2007 12 5 - 24 mg/dL Final   11/02/2006 8 5 - 24 mg/dL Final     AST   Date Value Ref Range Status   11/02/2006 30 0 - 55 U/L Final     Albumin   Date Value Ref Range Status   11/02/2006 3.6 3.3 - 4.6 g/dL Final     Alkaline Phosphatase   Date Value Ref Range Status   11/02/2006 68 40 - 150 U/L Final     ALT   Date Value Ref Range Status   11/02/2006 40 0 - 70 U/L Final     Other studies:   Outside studies reviewed:   PFT's (9/21/2018)  FEV1 2.45L 105%  %  Ratio 0.73  No BD response  %  %  RV/%  DLco 78% sourav for hgb (note she did not achieve >85% of VC for this test)  Impression: essentially normal PFTs with the exception of some hyperinflation and very slightly reduced diffusing capacity. Flow volume loop appears  normal.      [facility]    LOW DOSE LUNG CANCER SCREENING CT CHEST    10/2/2018 10:33 AM        INDICATION: Current smoker. 38 pack-year smoking history.    TECHNIQUE: Low-dose lung cancer screening noncontrast CT chest. Dose reduction techniques were used.     COMPARISON: None.        FINDINGS:    LUNGS AND PLEURA: Large lung volumes. Minimal emphysema. Mild diffuse airway thickening. Borderline dilated airways. Subtle bilateral mid to upper lung predominant centrilobular groundglass micronodules (under 3 mm). 2 mm right major fissure nodule     (series 4, image 48). Negative pleural spaces.        MEDIASTINUM: No adenopathy. Nonaneurysmal aorta with moderate plaque. Dual-chamber pacer.        CORONARY ARTERY CALCIFICATION: Moderate. LAD coronary stenting.        LIMITED UPPER ABDOMEN: Colonic diverticulosis.        MUSCULOSKELETAL: Degenerative changes spine.      CONCLUSION:        1.  Right major fissure 2 mm and subtle centrilobular groundglass micronodules as detailed. No suspicious mass or adenopathy.        2.  Findings consistent with airways disease and respiratory bronchiolitis.        3.  Minimal emphysema.        4.  Moderate coronary calcifications. LAD stenting.        RADIOLOGIST RECOMMENDATION: Recommend annual low-dose lung cancer screening CT if clinically appropriate.        LungRADS CATEGORY: 2: Benign.        SIGNIFICANT INCIDENTAL FINDINGS: Negative.      Sedimentation Rate (06/15/2017 11:09 AM CDT)  Sedimentation Rate (06/15/2017 11:09 AM CDT)   Component Value Ref Range Performed At Pathologist [facility]   Sedimentation Rate, B 90 (H) 0 - 30 MMHR POWERCHART       Sedimentation Rate (06/15/2017 11:09 AM CDT)   Specimen   Blood     Sedimentation Rate (06/15/2017 11:09 AM CDT)   Performing Organization Address City/State/Zipcode Phone Number   POWERCHART             Back to top of Lab Results       Connective Tissue Diseases [place] (06/15/2017 11:09 AM CDT)  Connective Tissue Diseases  [place] (06/15/2017 11:09 AM CDT)   Component Value Ref Range Performed At Pathologist [facility]   Cyclic Citrullinated Peptide Ab, S <15.6  Comment:   Test Performed by:  [facility] [place]  [address] [place] [address]   <20.0 (Negative) UNITS POWERCHART     Antinuclear Ab Screen by IFA, S 0.3 <=1.0 (Negative) UNITS POWERCHART     Interpretation See Comment  Comment:   Tests for antibodies to dsDNA and MICHI antigens are not  performed automatically unless the VITOR result is > or =  3.0 U.  Studies performed at [facility] indicate that  positive VITOR results <3.0 U are rarely accompanied by  positive second order tests.  Test Performed by:  [facility] [place]  [address] [place] [address]     POWERCHART       Vitamin B12 and Folate (06/15/2017 11:09 AM CDT)  Vitamin B12 and Folate (06/15/2017 11:09 AM CDT)   Component Value Ref Range Performed At [facility]   Vitamin B12 Assay, S 258 180 - 914 NGL POWERCHART     Folate, S 17.6  Comment:   Test Performed by:  [facility]  [address] [place] [address]   >=4.0 MCGL POWERCHART       Vitamin B12 and Folate (06/15/2017 11:09 AM CDT)   Specimen       Reviewed Rheumatology lab flowsheet    Siri Tamayo MD  [facility]  Department of Rheumatology & Autoimmune Disorders  [facility]: [phone]   Pager - [phone]

## 2019-06-15 ENCOUNTER — COMMUNICATION - HEALTHEAST (OUTPATIENT)
Dept: INTERNAL MEDICINE | Facility: CLINIC | Age: 59
End: 2019-06-15

## 2019-06-27 ENCOUNTER — OFFICE VISIT - HEALTHEAST (OUTPATIENT)
Dept: FAMILY MEDICINE | Facility: CLINIC | Age: 59
End: 2019-06-27

## 2019-06-27 ENCOUNTER — COMMUNICATION - HEALTHEAST (OUTPATIENT)
Dept: TELEHEALTH | Facility: CLINIC | Age: 59
End: 2019-06-27

## 2019-06-27 DIAGNOSIS — K12.1 STOMATITIS AND MUCOSITIS: ICD-10-CM

## 2019-06-27 DIAGNOSIS — I25.10 CORONARY ARTERY DISEASE DUE TO CALCIFIED CORONARY LESION: ICD-10-CM

## 2019-06-27 DIAGNOSIS — F33.0 MAJOR DEPRESSIVE DISORDER, RECURRENT EPISODE, MILD (H): ICD-10-CM

## 2019-06-27 DIAGNOSIS — M35.9 AUTOIMMUNE DISEASE (H): ICD-10-CM

## 2019-06-27 DIAGNOSIS — F43.10 POSTTRAUMATIC STRESS DISORDER: ICD-10-CM

## 2019-06-27 DIAGNOSIS — I25.84 CORONARY ARTERY DISEASE DUE TO CALCIFIED CORONARY LESION: ICD-10-CM

## 2019-06-27 DIAGNOSIS — F41.9 ANXIETY: ICD-10-CM

## 2019-06-27 DIAGNOSIS — K12.0 ORAL APHTHAE: ICD-10-CM

## 2019-06-27 DIAGNOSIS — J42 CHRONIC BRONCHITIS, UNSPECIFIED CHRONIC BRONCHITIS TYPE (H): ICD-10-CM

## 2019-06-27 DIAGNOSIS — K12.30 STOMATITIS AND MUCOSITIS: ICD-10-CM

## 2019-06-27 DIAGNOSIS — G43.109 MIGRAINE WITH AURA AND WITHOUT STATUS MIGRAINOSUS, NOT INTRACTABLE: ICD-10-CM

## 2019-06-27 ASSESSMENT — MIFFLIN-ST. JEOR: SCORE: 1036.72

## 2019-07-03 ENCOUNTER — AMBULATORY - HEALTHEAST (OUTPATIENT)
Dept: CARDIOLOGY | Facility: CLINIC | Age: 59
End: 2019-07-03

## 2019-07-03 DIAGNOSIS — Z95.0 CARDIAC PACEMAKER IN SITU: ICD-10-CM

## 2019-07-10 ENCOUNTER — OFFICE VISIT - HEALTHEAST (OUTPATIENT)
Dept: INTERNAL MEDICINE | Facility: CLINIC | Age: 59
End: 2019-07-10

## 2019-07-10 DIAGNOSIS — M25.50 POLYARTHRALGIA: ICD-10-CM

## 2019-07-10 ASSESSMENT — MIFFLIN-ST. JEOR: SCORE: 1021.53

## 2019-07-25 ENCOUNTER — COMMUNICATION - HEALTHEAST (OUTPATIENT)
Dept: FAMILY MEDICINE | Facility: CLINIC | Age: 59
End: 2019-07-25

## 2019-07-26 ENCOUNTER — COMMUNICATION - HEALTHEAST (OUTPATIENT)
Dept: FAMILY MEDICINE | Facility: CLINIC | Age: 59
End: 2019-07-26

## 2019-08-27 ENCOUNTER — COMMUNICATION - HEALTHEAST (OUTPATIENT)
Dept: ADMINISTRATIVE | Facility: CLINIC | Age: 59
End: 2019-08-27

## 2019-09-24 ENCOUNTER — OFFICE VISIT - HEALTHEAST (OUTPATIENT)
Dept: FAMILY MEDICINE | Facility: CLINIC | Age: 59
End: 2019-09-24

## 2019-09-24 DIAGNOSIS — M35.3 POLYMYALGIA (H): ICD-10-CM

## 2019-09-24 DIAGNOSIS — J01.00 ACUTE NON-RECURRENT MAXILLARY SINUSITIS: ICD-10-CM

## 2019-09-24 DIAGNOSIS — R25.2 LEG CRAMPS: ICD-10-CM

## 2019-09-24 DIAGNOSIS — I25.84 CORONARY ARTERY DISEASE DUE TO CALCIFIED CORONARY LESION: ICD-10-CM

## 2019-09-24 DIAGNOSIS — R21 RASH: ICD-10-CM

## 2019-09-24 DIAGNOSIS — I25.10 CORONARY ARTERY DISEASE DUE TO CALCIFIED CORONARY LESION: ICD-10-CM

## 2019-09-24 DIAGNOSIS — K12.1 STOMATITIS AND MUCOSITIS: ICD-10-CM

## 2019-09-24 DIAGNOSIS — F17.200 SMOKING: ICD-10-CM

## 2019-09-24 DIAGNOSIS — F43.10 POSTTRAUMATIC STRESS DISORDER: ICD-10-CM

## 2019-09-24 DIAGNOSIS — K12.30 STOMATITIS AND MUCOSITIS: ICD-10-CM

## 2019-09-24 DIAGNOSIS — R53.83 FATIGUE, UNSPECIFIED TYPE: ICD-10-CM

## 2019-09-24 DIAGNOSIS — E11.9 TYPE 2 DIABETES MELLITUS WITHOUT COMPLICATION, WITHOUT LONG-TERM CURRENT USE OF INSULIN (H): ICD-10-CM

## 2019-09-24 DIAGNOSIS — F33.0 MAJOR DEPRESSIVE DISORDER, RECURRENT EPISODE, MILD (H): ICD-10-CM

## 2019-09-24 DIAGNOSIS — L21.9 SEBORRHEIC DERMATITIS: ICD-10-CM

## 2019-09-24 LAB
ANION GAP SERPL CALCULATED.3IONS-SCNC: 11 MMOL/L (ref 5–18)
BUN SERPL-MCNC: 5 MG/DL (ref 8–22)
CALCIUM SERPL-MCNC: 9.5 MG/DL (ref 8.5–10.5)
CHLORIDE BLD-SCNC: 106 MMOL/L (ref 98–107)
CO2 SERPL-SCNC: 23 MMOL/L (ref 22–31)
CREAT SERPL-MCNC: 0.74 MG/DL (ref 0.6–1.1)
CREAT UR-MCNC: 44.6 MG/DL
D DIMER PPP FEU-MCNC: 0.39 FEU UG/ML
GFR SERPL CREATININE-BSD FRML MDRD: >60 ML/MIN/1.73M2
GLUCOSE BLD-MCNC: 96 MG/DL (ref 70–125)
HBA1C MFR BLD: 6.6 % (ref 3.5–6)
MAGNESIUM SERPL-MCNC: 2.1 MG/DL (ref 1.8–2.6)
MICROALBUMIN UR-MCNC: <0.5 MG/DL (ref 0–1.99)
MICROALBUMIN/CREAT UR: NORMAL MG/G{CREAT}
POTASSIUM BLD-SCNC: 4.3 MMOL/L (ref 3.5–5)
SODIUM SERPL-SCNC: 140 MMOL/L (ref 136–145)
T4 FREE SERPL-MCNC: 0.9 NG/DL (ref 0.7–1.8)
TSH SERPL DL<=0.005 MIU/L-ACNC: 1.09 UIU/ML (ref 0.3–5)

## 2019-09-24 ASSESSMENT — MIFFLIN-ST. JEOR: SCORE: 992.04

## 2019-09-25 LAB
25(OH)D3 SERPL-MCNC: 14 NG/ML (ref 30–80)
25(OH)D3 SERPL-MCNC: 14 NG/ML (ref 30–80)
THYROID PEROXIDASE ANTIBODIES - HISTORICAL: 3 IU/ML (ref 0–5.6)

## 2019-09-26 ENCOUNTER — AMBULATORY - HEALTHEAST (OUTPATIENT)
Dept: FAMILY MEDICINE | Facility: CLINIC | Age: 59
End: 2019-09-26

## 2019-09-26 DIAGNOSIS — E55.9 VITAMIN D DEFICIENCY: ICD-10-CM

## 2019-10-03 ENCOUNTER — AMBULATORY - HEALTHEAST (OUTPATIENT)
Dept: CARDIOLOGY | Facility: CLINIC | Age: 59
End: 2019-10-03

## 2019-10-03 ENCOUNTER — COMMUNICATION - HEALTHEAST (OUTPATIENT)
Dept: PULMONOLOGY | Facility: OTHER | Age: 59
End: 2019-10-03

## 2019-10-03 DIAGNOSIS — Z95.0 CARDIAC PACEMAKER IN SITU: ICD-10-CM

## 2019-11-07 ENCOUNTER — HEALTH MAINTENANCE LETTER (OUTPATIENT)
Age: 59
End: 2019-11-07

## 2019-11-20 ENCOUNTER — COMMUNICATION - HEALTHEAST (OUTPATIENT)
Dept: PULMONOLOGY | Facility: OTHER | Age: 59
End: 2019-11-20

## 2019-12-16 ENCOUNTER — COMMUNICATION - HEALTHEAST (OUTPATIENT)
Dept: FAMILY MEDICINE | Facility: CLINIC | Age: 59
End: 2019-12-16

## 2019-12-16 ENCOUNTER — COMMUNICATION - HEALTHEAST (OUTPATIENT)
Dept: SCHEDULING | Facility: CLINIC | Age: 59
End: 2019-12-16

## 2019-12-16 DIAGNOSIS — R21 RASH: ICD-10-CM

## 2019-12-16 DIAGNOSIS — Z91.030 BEE STING ALLERGY: ICD-10-CM

## 2020-01-08 ENCOUNTER — COMMUNICATION - HEALTHEAST (OUTPATIENT)
Dept: PULMONOLOGY | Facility: OTHER | Age: 60
End: 2020-01-08

## 2020-02-21 ENCOUNTER — COMMUNICATION - HEALTHEAST (OUTPATIENT)
Dept: FAMILY MEDICINE | Facility: CLINIC | Age: 60
End: 2020-02-21

## 2020-02-24 ENCOUNTER — AMBULATORY - HEALTHEAST (OUTPATIENT)
Dept: CARDIOLOGY | Facility: CLINIC | Age: 60
End: 2020-02-24

## 2020-06-02 ENCOUNTER — OFFICE VISIT - HEALTHEAST (OUTPATIENT)
Dept: FAMILY MEDICINE | Facility: CLINIC | Age: 60
End: 2020-06-02

## 2020-06-02 DIAGNOSIS — E11.9 TYPE 2 DIABETES MELLITUS WITHOUT COMPLICATION, WITHOUT LONG-TERM CURRENT USE OF INSULIN (H): ICD-10-CM

## 2020-06-02 DIAGNOSIS — I25.119 CORONARY ARTERY DISEASE WITH ANGINA PECTORIS, UNSPECIFIED VESSEL OR LESION TYPE, UNSPECIFIED WHETHER NATIVE OR TRANSPLANTED HEART (H): ICD-10-CM

## 2020-06-02 DIAGNOSIS — I73.9 PERIPHERAL VASCULAR DISEASE (H): ICD-10-CM

## 2020-06-02 DIAGNOSIS — R21 RASH: ICD-10-CM

## 2020-06-02 DIAGNOSIS — F33.0 MAJOR DEPRESSIVE DISORDER, RECURRENT EPISODE, MILD (H): ICD-10-CM

## 2020-06-02 DIAGNOSIS — M35.9 XEROSTOMIA DUE TO AUTOIMMUNE DISEASE (H): ICD-10-CM

## 2020-06-02 DIAGNOSIS — J42 CHRONIC BRONCHITIS, UNSPECIFIED CHRONIC BRONCHITIS TYPE (H): ICD-10-CM

## 2020-06-02 DIAGNOSIS — M35.9 AUTOIMMUNE DISEASE (H): ICD-10-CM

## 2020-06-02 DIAGNOSIS — K11.7 XEROSTOMIA DUE TO AUTOIMMUNE DISEASE (H): ICD-10-CM

## 2020-06-02 DIAGNOSIS — F17.200 TOBACCO DEPENDENCE SYNDROME: ICD-10-CM

## 2020-06-09 ENCOUNTER — COMMUNICATION - HEALTHEAST (OUTPATIENT)
Dept: FAMILY MEDICINE | Facility: CLINIC | Age: 60
End: 2020-06-09

## 2020-06-15 ENCOUNTER — COMMUNICATION - HEALTHEAST (OUTPATIENT)
Dept: FAMILY MEDICINE | Facility: CLINIC | Age: 60
End: 2020-06-15

## 2020-11-29 ENCOUNTER — HEALTH MAINTENANCE LETTER (OUTPATIENT)
Age: 60
End: 2020-11-29

## 2021-05-27 NOTE — PROGRESS NOTES
HISTORY OF PRESENT ILLNESS  Asked to be seen by Lorena Baeza CNP for evaluation of tongue lesion. Patient reports problems going on since 2009. She reports that she had excellent oral care. She ended up having problems in 2010 and had all of her teeth removed. She ended up having dentures done. Following creation of her dentures she reports increased oral secretions. She is wondering if her symptoms could be staph infection. The patient reports that she has had the area in questions under her tongue biopsied in the past and was negative.    REVIEW OF SYSTEMS  Review of Systems: a 10-system review was performed. Pertinent positives are noted in the HPI and on a separate scanned document in the chart.    PMH, PSH, FH and SH has documented in the EHR.      EXAM    CONSTITUTIONAL  General Appearance:  Normal, well developed, well nourished, no obvious distress  Ability to Communicate:  communicates appropriately.    HEAD AND FACE  Appearance and Symmetry:  Normal, no scalp or facial scarring or suspicious lesions.  Paranasal sinuses tenderness:  Normal, Paranasal sinuses non tender    EARS  Clinical speech reception threshold:  Normal, able to hear normal speech.  Auricle:  Normal, Auricles without scars, lesions, masses.  External auditory canal:  Normal, External auditory canal normal.  Tympanic membrane:  Normal, Tympanic membranes normal without swelling or erythema.  Tympanic membrane mobility:  Normal, Normal tympanic membrane mobility.    NOSE (speculum or scope)  Architecture:  Normal, Grossly normal external nasal architecture with no masses or lesions.  Mucosa:  Normal mucosa, No polyps or masses.  Septum:  Normal, Septum non-obstructing.  Turbinates:  Normal, No turbinate abnormalities    ORAL CAVITY AND OROPHARYNX  Lips:  Normal.  Dental and gingiva:  Normal, No obvious dental or gingival disease.  Mucosa:  7mm area of leukoplakia centrally located on the ventral tongue.   Tongue:  Normal, Tongue mobile with  no mucosal abnormalities  Hard and soft palate:  Normal, Hard and soft palate without cleft or mucosal lesions.  Oral pharynx:  Normal, Posterior pharynx without lesions or remarkable asymmetry.  Saliva:  Normal, Clear saliva.  Masses:  Normal, No palpable masses or pathologically enlarged lymph nodes.    NECK  Masses/lymph nodes:  Normal, No worrisome neck masses or lymph nodes.  Salivary glands:  Normal, Parotid and submandibular glands.  Trachea and larynx position:  Normal, Trachea and larynx midline.  Thyroid:  Normal, No thyroid abnormality.  Tenderness:  Normal, No cervical tenderness.  Suppleness:  Normal, Neck supple    NEUROLOGICAL  Speech pattern:  Normal, Proasaic    RESPIRATORY  Symmetry and Respiratory effort:  Normal, Symmetric chest movement and expansion with no increased intercostal retractions or use of accessory muscles.     IMPRESSION  Leukoplakia ventral tongue.     RECOMMENDATION  I discussed punch biopsy of the area. She declined. She said that the area had been biopsied multiple times in the past and the biopsies were negative for cancer. She reported that she was not worried.    Hector Rodriguez MD
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
## 2021-05-27 NOTE — PROGRESS NOTES
Assessment and Plan:     1. Folliculitis  cephalexin (KEFLEX) 500 MG capsule    mupirocin (BACTROBAN) 2 % ointment   2. Autoimmune disease (H)  Ambulatory referral to Rheumatology   3. Type 2 diabetes mellitus without complication, with long-term current use of insulin (H)  CONTOUR NEXT TEST STRIPS strips     We will treat folliculitis with cephalexin 500 mg 4 times daily for 10 days.  Educated on its indications and side effects.   Also provided prescription for Bactroban to use for 7-14 days.  Patient would like to see [facility] regarding her autoimmune disease.  She has an appointment with Dr. Delgado approaching regarding neurological concerns.  She has type 2 diabetes with an A1c of 6.3% on 1/20/19.  She is to follow-up in 3 months for medication management to start with any further concerns.  She is content with the plan.    Subjective:     Kim is a 58 y.o. female presenting to the clinic for multiple concerns today.  Patient has multiple comorbidities including PTSD, peripheral vascular disease, depression, CAD, GERD, TMJ, anxiety, type 2 diabetes, depression, autoimmune disease.  Patient was seen recently by Dr. Watkins with concerns of possible neurological disorder.  She had an MRI performed.  The patient had some difficulties with obtaining her results, but she does have an appointment scheduled April 2 with Dr. Bello.  Patient would like to see another rheumatologist.  She saw a [facility] provider whom she felt did not feel listened to her.  Patient presents today with concerns of a rash around her right ear and right lower chin.  Patient states that the area started out as a pimple-like lesions.  They have now become more firm and erythematous.  She complains of pain.  She feels as though her lymph nodes are swollen.  She has had a stabbing sensation in her ears.  She denies recent cold symptoms including rhinorrhea, postnasal drainage, cough, stomachache, nausea, vomiting, fever.      Review of  Systems: A complete 14 point review of systems was obtained and is negative or as stated in the history of present illness.    Social History     Socioeconomic History     Marital status:      Spouse name: Not on file     Number of children: 0     Years of education: Not on file     Highest education level: Not on file   Occupational History     Occupation: Unemployed past 10 years   Social Needs     Financial resource strain: Not on file     Food insecurity:     Worry: Not on file     Inability: Not on file     Transportation needs:     Medical: Not on file     Non-medical: Not on file   Tobacco Use     Smoking status: Current Every Day Smoker     Packs/day: 0.50     Years: 38.00     Pack years: 19.00     Smokeless tobacco: Current User   Substance and Sexual Activity     Alcohol use: No     Drug use: No     Sexual activity: Not on file     Comment: single    Lifestyle     Physical activity:     Days per week: Not on file     Minutes per session: Not on file     Stress: Not on file   Relationships     Social connections:     Talks on phone: Not on file     Gets together: Not on file     Attends Anglican service: Not on file     Active member of club or organization: Not on file     Attends meetings of clubs or organizations: Not on file     Relationship status: Not on file     Intimate partner violence:     Fear of current or ex partner: Not on file     Emotionally abused: Not on file     Physically abused: Not on file     Forced sexual activity: Not on file   Other Topics Concern     Not on file   Social History Narrative       about 8 years ago; step kids; she has a significant boyfriend who lives in [place];       Active Ambulatory Problems     Diagnosis Date Noted     Post-traumatic Stress Disorder      Peripheral Vascular Disease      Vitamin D Deficiency      Chronic Laryngitis      Depression      Coronary artery disease      Lower Back Pain      Recurrent Aphthous Ulcer      Cluster  Headache      Esophageal Reflux      Allergies      Cardiac pacemaker in situ, dual chamber      Temporomandibular Joint-pain Dysfunction Syndrome      Anxiety 11/10/2015     Chronic neck pain 11/10/2015     Insomnia 11/24/2015     Type 2 diabetes mellitus without complication (H) 11/24/2015     Major depressive disorder, recurrent episode, mild (H) 11/24/2015     Stomatitis and mucositis 03/24/2016     Migraine with aura 08/26/2016     Vasovagal syncope 12/29/2010     Bicuspid aortic valve 01/15/2018     Autoimmune disease (H) 06/30/2018     Angina at rest (H) 06/30/2018     Statin intolerance 08/13/2018     ROSARIO (dyspnea on exertion) 09/21/2018     Pacemaker battery depletion 12/19/2018     Chronic bronchitis, unspecified chronic bronchitis type (H) 01/28/2019     Resolved Ambulatory Problems     Diagnosis Date Noted     Tooth Pain      Foot Pain (Soft Tissue)      Nausea      Soreness Or Pain In A Salivary Gland      History of cardiac pacemaker in situ 06/04/2017     Bipolar affective disorder (H) 12/29/2010     Acute chest pain 06/30/2018     Scalp hematoma 06/30/2018     Past Medical History:   Diagnosis Date     Asthma      CAD (coronary artery disease)      GERD (gastroesophageal reflux disease)      High cholesterol      Memory problem      Migraines      Syncope        Family History   Problem Relation Age of Onset     Hypertension Mother      Alcohol abuse Mother      Heart disease Father      Alcohol abuse Father        Objective:     /76   Pulse 82   Wt 119 lb 11.2 oz (54.3 kg)   SpO2 97%   BMI 23.38 kg/m      Patient is alert, in no obvious distress.   Skin: Warm, dry.  She has a small erythematous scab present beneath her right lower lip. She has two small scabs present within her right TMJ and preauricular regions.  There is a 1/2 cm of erythema surrounding these.  There is no induration or fluctuance present.   HEENT:  Head normocephalic, atraumatic.  Eyes normal.   Ears normal.  Nose  patent, mucosa pink.  Oropharynx mucosa pink.  She has dentures in place. No lesions or tonsillar enlargement.   Neck: Supple, no lymphadenopathy.   Lungs:  Clear to auscultation. Respirations even and unlabored.  No wheezing or rales noted.   Heart:  Regular rate and rhythm.  .

## 2021-05-27 NOTE — TELEPHONE ENCOUNTER
Central PA team  [phone]  Pool: NEW PA MED ([id number])          PA has been initiated.       PA form completed and faxed insurance via Cover My Meds     Key:  [id number] - Rx #: [id number]     Medication:  OneTouch Verio VI STRP    Insurance:  [place] Medicaid         Response will be received via fax and may take up to 5-10 business days depending on plan

## 2021-05-27 NOTE — TELEPHONE ENCOUNTER
Fax received from [facility], they have started the Prior Authorization Process via Cover My Meds    CoverMyMeds Key: [id number]    Medication Name: One Touch Verio Strips    Insurance Plan: [place] Medicaid  PBM:   Patient ID: not provided on fax    Please complete the PA process

## 2021-05-28 ASSESSMENT — ASTHMA QUESTIONNAIRES: ACT_TOTALSCORE: 20

## 2021-05-29 NOTE — TELEPHONE ENCOUNTER
albuterol (PROAIR HFA;PROVENTIL HFA;VENTOLIN HFA) 90 mcg/actuation inhaler [[id number]]     Electronically signed by: Lorena Baeza CNP on 05/18/17 1447 Status: Discontinued   Ordering user: Lorena Baeza CNP 05/18/17 1447 Authorized by: Lorena Baeza CNP   Frequency: Q4H PRN 05/18/17 - 12/28/18  Discontinued by: Lorena Baeza CNP 12/28/18 0838 [Therapy completed]     Juliana Chang RN [facility] Triage/Medication Refill

## 2021-05-29 NOTE — TELEPHONE ENCOUNTER
Referral Request  Type of referral: rheumatologist  Who s requesting: Patient  Why the request: Per patient it was  Lorena's recommendation. [facility] needs a referral to schedule patient.   Have you been seen for this request: Yes  Does patient have a preference on a group/provider? Dr Derick Eisenberg  [facility]   Dept of Internal Division Rheuamlogy  Okay to leave a detailed message?  Yes  [phone]

## 2021-05-29 NOTE — TELEPHONE ENCOUNTER
This pt is scheduled to establish care with me < she was recent was seen by family medicine. Since she is completely new to our clinic and i'm not taking new patients, see if she could see Dr Giraldo instead.  Please ask scheduling not to put new patients to establish care since I'm not taking new patients.

## 2021-05-29 NOTE — TELEPHONE ENCOUNTER
Patient had this appointment scheduled since November when Dr. Bee was still taking new patients.     OK to keep her as scheduled?

## 2021-05-29 NOTE — PROGRESS NOTES
Assessment and Plan:     1. 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 Autoimmune disease (H)     3. Stomatitis and mucositis     4. Leukoplakia of oral mucosa     5. Type 2 diabetes mellitus without complication, without long-term current use of insulin (H)       I spent 40 minutes with the patient with greater than 50% spent discussing symptoms, treatment options, and coordination of care. She is quite frustrated with her health and lack of concern from the specialists.  I provided a prescription for Cevimeline to assist with xerostomia.  Discussed initiating Hydroxychloroquine to treat her underlying  rheumatological disease, but I explained that primary care does not typically prescribe this medication.  I am unsure as to why this has not been considered as a treatment option yet.  Discussed obtaining a biopsy of the lip to rule out Sjogren's syndrome.  Patient has an [facility] representative here who says that she is willing to drive the patient to [facility] now that her insurance has changed.  I feel as though this would be an ideal option as [facility] suspected the autoimmune disease and was willing to consider treatment.  I also encouraged her to have a biopsy of the leukoplakia.  Her diabetes is controlled.  Patient is content with the plan.      Subjective:     Kim is a 58 y.o. female presenting to the clinic for her ongoing health concerns. She has been experiencing dry mouth, difficulty speaking, enlarged tongue, rashes on her scalp, unsteady gait, joint pain, and muscle weakness for multiple years. She has seen numerous specialists including ENT, Neurology, and Rheumatology.  She was seen previously by Rheumatology at [facility] who suspected an autoimmune disease. She has had a persistently elevated sed rate.  Her symptoms improved with Prednisone.  Unfortunately, her insurance would not allow her to return to [place].  She is quite frustrated and feels as though the  specialists in the [facility] are not listening to her.  She was recently seen by ENT who diagnosed leukoplakia in the mouth.  She has had multiple biopsies of the mouth, but does not feel as though she has had a biopsy of the lip for possible Sjogren's.  She states the ENT presented the leukoplakia as not concerning which is why she did not have the biopsy performed.     Review of Systems: A complete 14 point review of systems was obtained and is negative or as stated in the history of present illness.    Social History     Socioeconomic History     Marital status:      Spouse name: Not on file     Number of children: 0     Years of education: Not on file     Highest education level: Not on file   Occupational History     Occupation: Unemployed past 10 years   Social Needs     Financial resource strain: Not on file     Food insecurity:     Worry: Not on file     Inability: Not on file     Transportation needs:     Medical: Not on file     Non-medical: Not on file   Tobacco Use     Smoking status: Current Every Day Smoker     Packs/day: 0.50     Years: 38.00     Pack years: 19.00     Smokeless tobacco: Current User   Substance and Sexual Activity     Alcohol use: No     Drug use: No     Sexual activity: Not on file     Comment: single    Lifestyle     Physical activity:     Days per week: Not on file     Minutes per session: Not on file     Stress: Not on file   Relationships     Social connections:     Talks on phone: Not on file     Gets together: Not on file     Attends Uats[employer] service: Not on file     Active member of club or organization: Not on file     Attends meetings of clubs or organizations: Not on file     Relationship status: Not on file     Intimate partner violence:     Fear of current or ex partner: Not on file     Emotionally abused: Not on file     Physically abused: Not on file     Forced sexual activity: Not on file   Other Topics Concern     Not on file   Social History Narrative        about 8 years ago; step kids; she has a significant boyfriend who lives in [place];       Active Ambulatory Problems     Diagnosis Date Noted     Post-traumatic Stress Disorder      Peripheral Vascular Disease      Vitamin D Deficiency      Chronic Laryngitis      Depression      Coronary artery disease      Lower Back Pain      Recurrent Aphthous Ulcer      Cluster Headache      Esophageal Reflux      Allergies      Cardiac pacemaker in situ, dual chamber      Temporomandibular Joint-pain Dysfunction Syndrome      Anxiety 11/10/2015     Chronic neck pain 11/10/2015     Insomnia 2015     Type 2 diabetes mellitus without complication (H) 2015     Major depressive disorder, recurrent episode, mild (H) 2015     Stomatitis and mucositis 2016     Migraine with aura 2016     Vasovagal syncope 2010     Bicuspid aortic valve 01/15/2018     Autoimmune disease (H) 2018     Angina at rest (H) 2018     Statin intolerance 2018     ROSARIO (dyspnea on exertion) 2018     Pacemaker battery depletion 2018     Chronic bronchitis, unspecified chronic bronchitis type (H) 2019     Resolved Ambulatory Problems     Diagnosis Date Noted     Tooth Pain      Foot Pain (Soft Tissue)      Nausea      Soreness Or Pain In A Salivary Gland      History of cardiac pacemaker in situ 2017     Bipolar affective disorder (H) 2010     Acute chest pain 2018     Scalp hematoma 2018     Past Medical History:   Diagnosis Date     Asthma      CAD (coronary artery disease)      GERD (gastroesophageal reflux disease)      High cholesterol      Memory problem      Migraines      Syncope        Family History   Problem Relation Age of Onset     Hypertension Mother      Alcohol abuse Mother      Heart disease Father      Alcohol abuse Father        Objective:     /60 (Patient Site: Right Arm, Patient Position: Sitting, Cuff Size: Adult Regular)    Pulse 80     Patient is alert, in no obvious distress.   Skin: Warm, dry.    Other exam deferred per counseling.

## 2021-05-30 ENCOUNTER — RECORDS - HEALTHEAST (OUTPATIENT)
Dept: ADMINISTRATIVE | Facility: CLINIC | Age: 61
End: 2021-05-30

## 2021-05-30 VITALS — BODY MASS INDEX: 22.85 KG/M2 | WEIGHT: 116.4 LBS | HEIGHT: 60 IN

## 2021-05-30 VITALS — WEIGHT: 118.8 LBS | HEIGHT: 60 IN | BODY MASS INDEX: 23.32 KG/M2

## 2021-05-30 VITALS — HEIGHT: 60 IN | WEIGHT: 124.8 LBS | BODY MASS INDEX: 24.5 KG/M2
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
## 2021-05-30 NOTE — PROGRESS NOTES
"Assessment and Plan:     1. Autoimmune disease (H)  Patient has seen numerous rheumatologists.  She did find relief with prednisone.  We have discussed initiating hydroxychloroquine, but I am uncomfortable prescribing it without knowing if she truly has an autoimmune disease.  Patient does not want to wait to see another rheumatologist.  I encouraged her to speak to her rheumatologist at [place] about initiating Hydroxychloroquine as it is low risk.  Patient and I had a thorough discussion on her worsening conditions and we ultimately discussed that it would benefit her to see an internist.  Will refer patient to an internal medicine provider through [facility] as she will be able to be seen quicker than at [place].  I also discussed that I will send a message to this provider in hopes that the provider will thoroughly reviewed the chart before she is seen as she does not want to \"start over\". Most of my recent visits with her have included discussions on how she does not feel as though she is heard by anyone other than me.     2. Recurrent Aphthous Ulcer  3. Stomatitis and mucositis  Patient has seen numerous ENT specialists.  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  She is currently taking cevimeline for dry mouth which is improving her symptoms.     4. Post-traumatic Stress Disorder  5. Anxiety  6. Major depressive disorder, recurrent episode, mild (H)  She has refused to address her mental health and refuses to complete PHQ 9 forms.  She becomes quite upset at every visit when my assistant provides this form.    7. Coronary artery disease due to calcified coronary lesion  She continues to see cardiology.    8. Migraine with aura and without status migrainosus, not intractable  She continues to see neurology.    9. Chronic bronchitis, unspecified chronic bronchitis type (H)  She continues to see pulmonology.  This is " "stable.    Patient is content with the plan and plans on establishing with an internal medicine provider within [facility]. I spent 40 minutes with the patient with greater than 50% spent discussing symptoms, treatment options, and coordination of care.       Subjective:     Kim is a 59 y.o. female presenting to the clinic for follow-up on a rheumatology visit. She is present with Shivani Fritz, an ILS specialist.  Patient has multiple comorbidities which has prompted her to see numerous specialists.  Patient has PTSD, depression, peripheral vascular disease, CAD, GERD, TMJ, anxiety, insomnia, type 2 diabetes, depression, chronic headaches, autoimmune disease.  Patient has been expressing dry mouth, difficulty speaking, enlarged tongue, facial pain for multiple years.  She has seen numerous specialists including multiple ENT specialist, neurologist, and rheumatologist.  She was seen by rheumatology at [place] who suspected an autoimmune disease.  She has had a persistently elevated sed rate.  Her symptoms improved with prednisone.  Patient was unable to return to [place] because her insurance would not allow it.  Patient has saw ENT who diagnosed her leukoplakia.  The ENT specialist recommended an oral biopsy, but she declined.  Patient was recently evaluated by [facility] rheumatology.  Unfortunately, the provider's documentation is not available for me to review. She presents today quite frustrated with the fact that the rheumatologist did not appear to have reviewed her chart and she felt as though she was \"starting over \".  Patient proceeds to swear at me frequently during this visit.  She says she wants to find an answer but does not want to start over with her story every time she sees a new provider.  She would like to start treatment for autoimmune disease, but states that no one is willing to proceed with this.  She did contact her rheumatologist at [place] who referred her to internal medicine there.  Patient states " that she is not willing to wait to multiple months to see an internist at [place].  Patient feels as though her health is declining.  She is having difficulty ambulating for prolonged periods.  Again, she is quite upset today and proceeds to raise her voice and swear frequently.    Review of Systems: A complete 14 point review of systems was obtained and is negative or as stated in the history of present illness.    Social History     Socioeconomic History     Marital status:      Spouse name: Not on file     Number of children: 0     Years of education: Not on file     Highest education level: Not on file   Occupational History     Occupation: Unemployed past 10 years   Social Needs     Financial resource strain: Not on file     Food insecurity:     Worry: Not on file     Inability: Not on file     Transportation needs:     Medical: Not on file     Non-medical: Not on file   Tobacco Use     Smoking status: Current Every Day Smoker     Packs/day: 0.50     Years: 38.00     Pack years: 19.00     Smokeless tobacco: Current User   Substance and Sexual Activity     Alcohol use: No     Drug use: No     Sexual activity: Not on file     Comment: single    Lifestyle     Physical activity:     Days per week: Not on file     Minutes per session: Not on file     Stress: Not on file   Relationships     Social connections:     Talks on phone: Not on file     Gets together: Not on file     Attends Worship service: Not on file     Active member of club or organization: Not on file     Attends meetings of clubs or organizations: Not on file     Relationship status: Not on file     Intimate partner violence:     Fear of current or ex partner: Not on file     Emotionally abused: Not on file     Physically abused: Not on file     Forced sexual activity: Not on file   Other Topics Concern     Not on file   Social History Narrative       about 8 years ago; step kids; she has a significant boyfriend who lives in  [place];       Active Ambulatory Problems     Diagnosis Date Noted     Post-traumatic Stress Disorder      Peripheral Vascular Disease      Vitamin D Deficiency      Chronic Laryngitis      Depression      Coronary artery disease      Lower Back Pain      Recurrent Aphthous Ulcer      Cluster Headache      Esophageal Reflux      Allergies      Cardiac pacemaker in situ, dual chamber      Temporomandibular Joint-pain Dysfunction Syndrome      Anxiety 11/10/2015     Chronic neck pain 11/10/2015     Insomnia 11/24/2015     Type 2 diabetes mellitus without complication (H) 11/24/2015     Major depressive disorder, recurrent episode, mild (H) 11/24/2015     Stomatitis and mucositis 03/24/2016     Migraine with aura 08/26/2016     Vasovagal syncope 12/29/2010     Bicuspid aortic valve 01/15/2018     Autoimmune disease (H) 06/30/2018     Angina at rest (H) 06/30/2018     Statin intolerance 08/13/2018     ROSARIO (dyspnea on exertion) 09/21/2018     Pacemaker battery depletion 12/19/2018     Chronic bronchitis, unspecified chronic bronchitis type (H) 01/28/2019     Resolved Ambulatory Problems     Diagnosis Date Noted     Tooth Pain      Foot Pain (Soft Tissue)      Nausea      Soreness Or Pain In A Salivary Gland      History of cardiac pacemaker in situ 06/04/2017     Bipolar affective disorder (H) 12/29/2010     Acute chest pain 06/30/2018     Scalp hematoma 06/30/2018     Past Medical History:   Diagnosis Date     Asthma      CAD (coronary artery disease)      GERD (gastroesophageal reflux disease)      High cholesterol      Memory problem      Migraines      Syncope        Family History   Problem Relation Age of Onset     Hypertension Mother      Alcohol abuse Mother      Heart disease Father      Alcohol abuse Father        Objective:     /66   Pulse 76   Ht 5' (1.524 m)   Wt 120 lb 3.2 oz (54.5 kg)   SpO2 97%   BMI 23.47 kg/m      Patient is alert, in no obvious distress.   Skin: Warm, dry.   Other exam  deferred per counseling

## 2021-05-30 NOTE — PROGRESS NOTES
[facility]  Internal Medicine - Office Visit    Patient: Kim Correa   MRN: [id number]   Date of Service: 07/10/19   Patient Care Team:  Lorena Baeza CNP as PCP - General (Family Medicine)    ASSESSMENT/PLAN     The patient is here today demanding that I initiate hydroxychloroquine for her autoimmune disease.  I reviewed her notes from both [facility] 2017 and her recent notes by her nurse practitioner, as well as Rheumatology consult in 3/2019 by  Rheumatology.  She was seen just again in June 2019 by a rheumatologist within the [facility], though the note from that visit is not available.  Her ILS worker says that that rheumatologist recommended further testing, but that the patient does not want to proceed with that.    The patient is adamant today that I initiate hydroxychloroquine.  She reports that her primary doctor has diagnosed her with an autoimmune disease and recommended that she start hydroxychloroquine.  She tells me that her rheumatologist told her to see an internist to treat her autoimmune disease.  I have reviewed multiple notes from her [facility] rheumatologist and our rheumatologist here in the [facility] system.  Back in 2017 when she saw [facility], there was initially some concern for Sjogren's, however work-up for that was negative.  It was noted in the past that she did have elevated inflammatory markers and was on prednisone, however back in 2017 when she saw rheumatology had a normal sed rate and there is no mention in the last visit note dated 2017 that she has any autoimmune disease per [facility]. She did not follow-up with rheumatology because for some time her insurance was not being accepted by [facility].  She then was seen by a [facility] rheumatologist in March 2019.  Per that assessment, it was felt that her arthralgias are due to early stages of osteoarthritis.  Her that no, there was low suspicion for psoriatic arthritis.  Of note, I do see  an elevated inflammatory marker 44 back in March 2017, however it was normal in January 2019.  She has also had a negative VITOR and a negative rheumatoid factor in January 2019.    I discussed with her that based on review of her chart and her negative serologies and normal inflammatory marker as of January 2019, I did not feel certain that she truly does have an autoimmune disease.  I feel that she should see a rheumatologist as starting her on hydroxychloroquine without a firm diagnosis would not be appropriate.  She was very upset.  She does not want to follow-up with a rheumatologist.  She left the encounter.  Her ILS worker stayed behind and did express that she has just been frustrated with having to rehash her history and repeat labs/workup.  I did discuss with her that if she is seeking a second opinion, it is not uncommon for doctors to want to gather the same history in order to best treat that patient and to re-examine the patient. I am not going to just start treatment without inquiring about her symptoms or examining her. She understands. She is going to speak to Kim.     I will CC her PCP on this.    Tomer Acosta MD  Internal Medicine and Pediatrics  [facility]  Pager [phone]    SUBJECTIVE     Kim Correa is a 59 year old with complicated past medical history who presents today for autoimmune disease treatment.  The patient is here today with her ILS worker.  She tells me upfront that she does not want to rehash her medical history and that she just wants to be treated for her autoimmune disease.  She does not want to talk about where her symptoms are.  She wants to be started on hydroxychloroquine.  She does not want to talk about all of her symptoms since they started back in her 30s.    She was very unpleasant throughout this encounter.    Review of Systems  Not reviewed as patient was uncooperative    Past Medical/Surgical History  Reviewed and updated as  appropriate    Immunizations  Reviewed.    Medications    Current Outpatient Medications:      artificial tears,hypromellose, (GENTEAL; SYSTANE) 0.3 % Gel, Administer to both eyes at bedtime., Disp: , Rfl:      aspirin 81 MG EC tablet, Take 81 mg by mouth daily., Disp: , Rfl:      blood glucose test (CONTOUR NEXT TEST STRIPS) strips, Use 1 each As Directed 2 (two) times a day. Dispense brand per patient's insurance at pharmacy discretion., Disp: 200 strip, Rfl: 3     blood-glucose meter (CONTOUR NEXT METER) Misc, Use 1 each As Directed 2 (two) times a day. Dispense brand per patient's insurance at pharmacy discretion., Disp: 1 each, Rfl: 0     cevimeline (EVOXAC) 30 mg capsule, Take 1 capsule (30 mg total) by mouth 3 (three) times a day., Disp: 90 capsule, Rfl: 5     cyanocobalamin 1000 MCG tablet, Take 1,000 mcg by mouth daily., Disp: , Rfl:      EPINEPHrine (EPIPEN) 0.3 mg/0.3 mL injection, Inject 0.3 mL (0.3 mg total) as directed as needed for anaphylaxis. Inject into thigh., Disp: 2 Pre-filled Pen Syringe, Rfl: 0     fluticasone (FLOVENT HFA) 220 mcg/actuation inhaler, Inhale 1 puff 2 (two) times a day., Disp: 1 Inhaler, Rfl: 12     generic lancets (MICROLET LANCET), Use 1 each As Directed 2 (two) times a day. Dispense brand per patient's insurance at pharmacy discretion., Disp: 200 each, Rfl: 3     lifitegrast (XIIDRA) 5 % Dpet, Administer 1 drop to both eyes as needed.    , Disp: , Rfl:      multivitamin (MULTIPLE VITAMIN ORAL), Take 1 tablet by mouth daily., Disp: , Rfl:      mupirocin (BACTROBAN) 2 % ointment, Apply to affected area 3 times daily for 7-14 days., Disp: , Rfl:      nitroglycerin (NITROLINGUAL) 400 mcg/spray spray, Place 1 spray under the tongue every 5 (five) minutes as needed for chest pain., Disp: 12 g, Rfl: 0    Allergies  Allergies   Allergen Reactions     Imitrex [Sumatriptan Succinate] Anaphylaxis     Indomethacin Hives and Diarrhea     Stomach pain, sweats, shakes, not good combo with  "heart meds either.     Sumatriptan Anaphylaxis, Other (See Comments) and Unknown     Throat closing  unknown  Throat closing     Venom-Honey Bee Anaphylaxis and Unknown     unknown  Unknown     Levonorgestrel-Ethinyl Estrad Other (See Comments)     Gabapentin Nausea And Vomiting and Anxiety     Sulfa (Sulfonamide Antibiotics) Rash and Angioedema     Vancomycin Itching       Family History  Reviewed and updated as appropriate.     Social History  Reviewed and updated as appropriate.          OBJECTIVE       /70 (Patient Site: Right Arm, Patient Position: Sitting, Cuff Size: Adult Regular)   Pulse 72   Resp 16   Ht 4' 11.5\" (1.511 m)   Wt 118 lb 9.6 oz (53.8 kg)   SpO2 98%   BMI 23.55 kg/m      Not examined thoroughly as patient left.     Labs/imaging/studies:  Reviewed        "

## 2021-05-31 ENCOUNTER — RECORDS - HEALTHEAST (OUTPATIENT)
Dept: ADMINISTRATIVE | Facility: CLINIC | Age: 61
End: 2021-05-31

## 2021-05-31 VITALS — HEIGHT: 61 IN | BODY MASS INDEX: 23.83 KG/M2 | WEIGHT: 126.2 LBS

## 2021-05-31 VITALS — BODY MASS INDEX: 24.35 KG/M2 | HEIGHT: 60 IN | WEIGHT: 124 LBS

## 2021-05-31 VITALS — WEIGHT: 121.3 LBS | HEIGHT: 60 IN | BODY MASS INDEX: 23.81 KG/M2

## 2021-06-01 ENCOUNTER — RECORDS - HEALTHEAST (OUTPATIENT)
Dept: ADMINISTRATIVE | Facility: CLINIC | Age: 61
End: 2021-06-01

## 2021-06-01 VITALS — WEIGHT: 114 LBS | BODY MASS INDEX: 22.38 KG/M2 | HEIGHT: 60 IN

## 2021-06-01 VITALS — WEIGHT: 113.2 LBS | BODY MASS INDEX: 22.23 KG/M2 | HEIGHT: 60 IN

## 2021-06-01 VITALS — WEIGHT: 116.1 LBS | HEIGHT: 60 IN | BODY MASS INDEX: 22.79 KG/M2

## 2021-06-01 VITALS — WEIGHT: 116 LBS | BODY MASS INDEX: 22.78 KG/M2 | HEIGHT: 60 IN

## 2021-06-01 NOTE — PROGRESS NOTES
Assessment and Plan:   1. Acute non-recurrent maxillary sinusitis  Facial pain and pressure is suspicious for sinusitis.  Will treat with Augmentin.  Educated on its indications and side effects. Discussed symptomatic treatment including performing nasal saline rinses.   - amoxicillin-clavulanate (AUGMENTIN) 875-125 mg per tablet; Take 1 tablet by mouth 2 (two) times a day for 10 days.  Dispense: 20 tablet; Refill: 0    2. Leg cramps  Will rule out electrolyte imbalance, low magnesium, vitamin D deficiency and blood clot.  Discussed the importance of good hydration.    - Basic Metabolic Panel  - Magnesium  - D-dimer, Quantitative  - Vitamin D, Total (25-Hydroxy)    3. Rash  She has a history of a rash on her face that improves with Bactroban.  No obvious rash is present today.   - mupirocin (BACTROBAN) 2 % ointment; Apply to affected area 3 times daily for 7-14 days.  Dispense: 30 g; Refill: 0    4. Seborrheic dermatitis  Will treat with ciclopirox shampoo.  Educated on its indications and side effects.   - ciclopirox 1 % shampoo; gently massage into affected areas and surrounding skin twice weekly for four weeks.  Leave on for 3 minutes and rinse off.  Dispense: 120 mL; Refill: 2    5. Polymyalgia (H)  Patient plans on returning to [place] for further evaluation.      6. Stomatitis and mucositis  Will refer to orthodontics.    - Ambulatory referral to Dentistry    7. Fatigue, unspecified type  She is concerned that she may have a thyroid disease.  Will notify her of the results.   - Thyroid Peroxidase Antibody  - Thyroid Stimulating Hormone (TSH)  - T4, Free    8. Type 2 diabetes mellitus without complication, without long-term current use of insulin (H)  This is diet controlled.   - Glycosylated Hemoglobin A1c  - Microalbumin, Random Urine    9. Coronary artery disease due to calcified coronary lesion  She is intolerant to statins.  She continues to see cardiology.     10. Smoking  Discussed smoking cessation  "options, but she declines.    I have counseled the patient for tobacco cessation and the follow up will occur  at the next visit.    11. Major depressive disorder, recurrent episode, mild (H)  12. Post-traumatic Stress Disorder  She refuses to complete a phq9.  She does not feel as though her mood and mental health is unstable.  She denies thoughts of suicide and thoughts of harming others.  Offered referral to counseling, but she declines.      Due to her complexity, I did discuss having her see another internist.  She plans on establishing care with [facility].  She is content with the plan. I spent 40 minutes with the patient with greater than 50% spent discussing symptoms, treatment options, and coordination of care.       Subjective:     Kim is a 59 y.o. female presenting to the clinic for multiple concerns today.  She is present with her friend, Víctor.  She has multiple comorbidities including depression, anxiety, PTSD, peripheral vascular disease, CAD, pacemaker, type 2 diabetes, chronic bronchitis.  Patient complains of ongoing polymyalgias.  She has seen numerous specialist and tends to leave the visits frustrated.  Patient does not like to \"start over\" in discussing her symptoms.  She assumes that all the providers will understand her history.  Patient has even left in the middle of specialty visits due to this.  Patient has not officially been diagnosed with an autoimmune disease which was evaluated by [facility].  Her symptoms did improve with prednisone treatment.  She has swelling in her gums, tongue, and lips.  She has seen numerous ENT specialists.  She is interested in seeing a dentist.  She does have dentures in place. She does take Cevimeline to assist with saliva production.   Patient requests refill of Bactroban which she uses as needed for rash on her face and hands.  She also has dry skin on her scalp.  This is pruritic.  She feels a tight sensation within her scalp.  She was evaluated on 9/22/19 at " "[facility] ER due to worsening headaches.  CT showed nonspecific mild to moderate patchy low-attenuation white matter changes, most commonly attributable to chronic small vessel ischemic changes.  She complains of pressure within her frontal and maxillary regions.  She has difficulty wearing her glasses.  She feels as though she has some swelling behind her neck.  She denies sinus congestion, postnasal drainage, but states she does have a metallic taste in her mouth.  She complains of leg cramps which have been present for 1 month.  She denies any travel.  She feels as though she has an unsteady gait.  Laying down exacerbates her symptoms.  She denies any personal or family history of blood clots.      I received a phone call from Kim's ILS , Greer, on 7/25/19.  Greer expressed her concerns that Kim had been talking about suicide for at least 3 weeks prior.  Kim had also stated that she and her friend, Vítcor, had plans to \"blow up\" the government center in [place].  If individuals escaped, they would punch the workers in the throat and face and stab them in the chest.  She had also hinted that her health care workers should all die.  Greer filed a police report with the [place] department.  I then spoke with Officer Israel from the [place] police department who had investigated this report.  The officer went to Kim's house where she had no weapons and appeared to have no intent in harming the government building.  He did not feel as though she was a threat to herself and others.  Initially, I had written a letter recommending that she be evaluated at [facility] for her mental health.  Officer Israel said that she denied that she was suicidal and he could not transport to the hospital due to this.  I called Kim in the afternoon of 7/25/19 where she denied thoughts of suicide and said that she felt safe at home.  She presents today stating that she has good mental health and does not " feel suicidal.  She does not feel the need to harm others.     Review of Systems: A complete 14 point review of systems was obtained and is negative or as stated in the history of present illness.    Social History     Socioeconomic History     Marital status:      Spouse name: Not on file     Number of children: 0     Years of education: Not on file     Highest education level: Not on file   Occupational History     Occupation: Unemployed past 10 years   Social Needs     Financial resource strain: Not on file     Food insecurity:     Worry: Not on file     Inability: Not on file     Transportation needs:     Medical: Not on file     Non-medical: Not on file   Tobacco Use     Smoking status: Current Every Day Smoker     Packs/day: 0.50     Years: 38.00     Pack years: 19.00     Smokeless tobacco: Current User   Substance and Sexual Activity     Alcohol use: No     Drug use: No     Sexual activity: Not on file     Comment: single    Lifestyle     Physical activity:     Days per week: Not on file     Minutes per session: Not on file     Stress: Not on file   Relationships     Social connections:     Talks on phone: Not on file     Gets together: Not on file     Attends Hinduism service: Not on file     Active member of club or organization: Not on file     Attends meetings of clubs or organizations: Not on file     Relationship status: Not on file     Intimate partner violence:     Fear of current or ex partner: Not on file     Emotionally abused: Not on file     Physically abused: Not on file     Forced sexual activity: Not on file   Other Topics Concern     Not on file   Social History Narrative       about 8 years ago; step kids; she has a significant boyfriend who lives in [place];       Active Ambulatory Problems     Diagnosis Date Noted     Post-traumatic Stress Disorder      Peripheral Vascular Disease      Vitamin D Deficiency      Chronic Laryngitis      Depression      Coronary  "artery disease      Lower Back Pain      Recurrent Aphthous Ulcer      Cluster Headache      Esophageal Reflux      Allergies      Cardiac pacemaker in situ, dual chamber      Temporomandibular Joint-pain Dysfunction Syndrome      Anxiety 11/10/2015     Chronic neck pain 11/10/2015     Insomnia 11/24/2015     Type 2 diabetes mellitus without complication (H) 11/24/2015     Major depressive disorder, recurrent episode, mild (H) 11/24/2015     Stomatitis and mucositis 03/24/2016     Migraine with aura 08/26/2016     Vasovagal syncope 12/29/2010     Bicuspid aortic valve 01/15/2018     Autoimmune disease (H) 06/30/2018     Angina at rest (H) 06/30/2018     Statin intolerance 08/13/2018     ROSARIO (dyspnea on exertion) 09/21/2018     Pacemaker battery depletion 12/19/2018     Chronic bronchitis, unspecified chronic bronchitis type (H) 01/28/2019     Resolved Ambulatory Problems     Diagnosis Date Noted     Tooth Pain      Foot Pain (Soft Tissue)      Nausea      Soreness Or Pain In A Salivary Gland      History of cardiac pacemaker in situ 06/04/2017     Bipolar affective disorder (H) 12/29/2010     Acute chest pain 06/30/2018     Scalp hematoma 06/30/2018     Past Medical History:   Diagnosis Date     Asthma      CAD (coronary artery disease)      GERD (gastroesophageal reflux disease)      High cholesterol      Memory problem      Migraines      Syncope        Family History   Problem Relation Age of Onset     Hypertension Mother      Alcohol abuse Mother      Heart disease Father      Alcohol abuse Father        Objective:     /72   Pulse 83   Ht 4' 11.5\" (1.511 m)   Wt 112 lb 1.6 oz (50.8 kg)   SpO2 95%   BMI 22.26 kg/m      Patient is alert, in no obvious distress.   Skin: Warm, dry.  She has dry scaly skin along her scalp line.   HEENT:  Head normocephalic, atraumatic.  Eyes normal.  PERRL.  EOM's intact.  No nystagmus. Ears normal.  Nose patent, mucosa red.  Oropharynx mucosa pink.  No lesions or " tonsillar enlargement. Her tongue appears mildly enlarged.   Sinuses: Tenderness to palpation of bilateral maxillary sinuses.   Neck: Supple, no lymphadenopathy.  No thyromegaly.  Lungs:  Clear to auscultation. Respirations even and unlabored.  No wheezing or rales noted.   Heart:  Regular rate and rhythm.  No murmurs, S3, S4, gallops, or rubs.    Abdomen: Soft, nontender.  No organomegaly. Bowel sounds normoactive. No guarding or masses noted.   Musculoskeletal: No edema present in bilateral lower extremities.  She does not have varicose veins.  Thomas's sign is negative.  No erythema, ecchymosis noted. Pedal pulses present and palpable.

## 2021-06-02 ENCOUNTER — RECORDS - HEALTHEAST (OUTPATIENT)
Dept: ADMINISTRATIVE | Facility: CLINIC | Age: 61
End: 2021-06-02

## 2021-06-02 VITALS — BODY MASS INDEX: 23.56 KG/M2 | HEIGHT: 60 IN | WEIGHT: 120 LBS

## 2021-06-02 VITALS — BODY MASS INDEX: 24.35 KG/M2 | HEIGHT: 60 IN | WEIGHT: 124 LBS

## 2021-06-02 VITALS — BODY MASS INDEX: 23.36 KG/M2 | WEIGHT: 119 LBS | HEIGHT: 60 IN

## 2021-06-02 VITALS — BODY MASS INDEX: 23.75 KG/M2 | HEIGHT: 60 IN | WEIGHT: 121 LBS

## 2021-06-02 VITALS — WEIGHT: 122.3 LBS | HEIGHT: 60 IN | BODY MASS INDEX: 24.01 KG/M2

## 2021-06-02 VITALS — WEIGHT: 119.7 LBS | BODY MASS INDEX: 23.38 KG/M2

## 2021-06-02 VITALS — WEIGHT: 116.3 LBS | HEIGHT: 60 IN | BODY MASS INDEX: 22.83 KG/M2

## 2021-06-02 VITALS — WEIGHT: 121.8 LBS | HEIGHT: 60 IN | BODY MASS INDEX: 23.91 KG/M2

## 2021-06-02 VITALS — BODY MASS INDEX: 24.25 KG/M2 | WEIGHT: 123.5 LBS | HEIGHT: 60 IN

## 2021-06-02 VITALS — BODY MASS INDEX: 23.44 KG/M2 | WEIGHT: 120 LBS

## 2021-06-03 VITALS — WEIGHT: 120.2 LBS | HEIGHT: 60 IN | BODY MASS INDEX: 23.6 KG/M2

## 2021-06-03 VITALS
HEART RATE: 83 BPM | SYSTOLIC BLOOD PRESSURE: 132 MMHG | DIASTOLIC BLOOD PRESSURE: 72 MMHG | OXYGEN SATURATION: 95 % | BODY MASS INDEX: 22.01 KG/M2 | HEIGHT: 60 IN | WEIGHT: 112.1 LBS

## 2021-06-03 VITALS — BODY MASS INDEX: 23.29 KG/M2 | HEIGHT: 60 IN | WEIGHT: 118.6 LBS

## 2021-06-03 NOTE — TELEPHONE ENCOUNTER
Spoke with patient over the phone, called to remind her that she is due for her annual lung cancer screening and CT scan. Patient unable to schedule office visit with Dr. Conway at this time, but will call back at later date to schedule so screening and scan can be done. Call back information provided for further needs or questions.

## 2021-06-04 NOTE — TELEPHONE ENCOUNTER
I have tried to call her a few times.  Please call her to see what she would like me to know.  Thanks.

## 2021-06-04 NOTE — TELEPHONE ENCOUNTER
Who is calling:  Patient  Reason for Call:  Patient wanted PCP to know is leaving for [place] tomorrow, and would like to speak with the PCP at her convienence please  Date of last appointment with primary care: 09/24/19  Okay to leave a detailed message: Yes

## 2021-06-04 NOTE — TELEPHONE ENCOUNTER
RN cannot approve Refill Request    RN can NOT refill this medication med is not covered by policy/route to provider.      Juliana Chang, [facility] Triage/Med Refill 12/17/2019    Requested Prescriptions   Pending Prescriptions Disp Refills     mupirocin (BACTROBAN) 2 % ointment [Pharmacy Med Name: Mupirocin External Ointment 2 %] 22 g 0     Sig: Apply to affected area 3 times daily for 7-14 days.       There is no refill protocol information for this order      Signed Prescriptions Disp Refills    EPINEPHrine (EPIPEN/ADRENACLICK/AUVI-Q) 0.3 mg/0.3 mL injection 2 each 0     Sig: Inject 0.3 mL as directed as needed for anaphylaxis. Inject into thigh.       Epinephrine (Bee Sting) Kit Refill Protocol Passed - 12/16/2019 10:14 AM        Passed - Patient to get 0.3mg dose (adult kit)          Passed - Patient has had office visit/physical in last 1 year     Last office visit with prescriber/PCP: 9/24/2019 Lorena Baeza CNP OR same dept: 9/24/2019 Lorena Baeza CNP OR same specialty: 9/24/2019 Lorena Baeza CNP  Last physical: 12/28/2018 Last MTM visit: Visit date not found   Next visit within 3 mo: Visit date not found  Next physical within 3 mo: Visit date not found  Prescriber OR PCP: Lorena Baeza CNP  Last diagnosis associated with med order: 1. Rash  - mupirocin (BACTROBAN) 2 % ointment [Pharmacy Med Name: Mupirocin External Ointment 2 %]; Apply to affected area 3 times daily for 7-14 days.  Dispense: 22 g; Refill: 0    2. Bee sting allergy  - EPINEPHrine (EPIPEN/ADRENACLICK/AUVI-Q) 0.3 mg/0.3 mL injection; Inject 0.3 mL as directed as needed for anaphylaxis. Inject into thigh.  Dispense: 2 each; Refill: 0    If protocol passes may refill for 12 months if within 3 months of last provider visit (or a total of 15 months).

## 2021-06-04 NOTE — TELEPHONE ENCOUNTER
Refill Approved    Rx renewed per Medication Renewal Policy. Medication was last renewed on 4/8/19.    Juliana Chang, [facility] Triage/Med Refill 12/17/2019     Requested Prescriptions   Pending Prescriptions Disp Refills     mupirocin (BACTROBAN) 2 % ointment [Pharmacy Med Name: Mupirocin External Ointment 2 %] 22 g 0     Sig: Apply to affected area 3 times daily for 7-14 days.       There is no refill protocol information for this order        EPINEPHrine (EPIPEN/ADRENACLICK/AUVI-Q) 0.3 mg/0.3 mL injection [Pharmacy Med Name: EPINEPHrine Injection Solution Auto-injector 0.3 MG/0.3ML] 2 each 0     Sig: Inject 0.3 mL as directed as needed for anaphylaxis. Inject into thigh.       Epinephrine (Bee Sting) Kit Refill Protocol Passed - 12/16/2019 10:14 AM        Passed - Patient to get 0.3mg dose (adult kit)          Passed - Patient has had office visit/physical in last 1 year     Last office visit with prescriber/PCP: 9/24/2019 Lorena Baeza CNP OR same dept: 9/24/2019 Lornea Baeza CNP OR same specialty: 9/24/2019 Lorena Baeza CNP  Last physical: 12/28/2018 Last MTM visit: Visit date not found   Next visit within 3 mo: Visit date not found  Next physical within 3 mo: Visit date not found  Prescriber OR PCP: Lorena Baeza CNP  Last diagnosis associated with med order: 1. Rash  - mupirocin (BACTROBAN) 2 % ointment [Pharmacy Med Name: Mupirocin External Ointment 2 %]; Apply to affected area 3 times daily for 7-14 days.  Dispense: 22 g; Refill: 0    2. Bee sting allergy  - EPINEPHrine (EPIPEN/ADRENACLICK/AUVI-Q) 0.3 mg/0.3 mL injection [Pharmacy Med Name: EPINEPHrine Injection Solution Auto-injector 0.3 MG/0.3ML]; Inject 0.3 mL as directed as needed for anaphylaxis. Inject into thigh.  Dispense: 2 each; Refill: 0    If protocol passes may refill for 12 months if within 3 months of last provider visit (or a total of 15 months).

## 2021-06-05 ENCOUNTER — HEALTH MAINTENANCE LETTER (OUTPATIENT)
Age: 61
End: 2021-06-05

## 2021-06-08 NOTE — TELEPHONE ENCOUNTER
Central PA team  [phone]  Pool: HE PA MED ([id number])          PA has been initiated.       PA form completed and faxed insurance via Cover My Meds     Key:  MARIBEL MARTINEZ [place]: [id number] - PA Case ID: [id number]     Medication:  Cevimeline HCl 30MG capsules    Insurance:  [facility]        Response will be received via fax and may take up to 5-10 business days depending on plan

## 2021-06-08 NOTE — PROGRESS NOTES
"Kim Correa is a 59 y.o. female who is being evaluated via a billable telephone visit.      The patient has been notified of following:     \"This telephone visit will be conducted via a call between you and your physician/provider. We have found that certain health care needs can be provided without the need for a physical exam.  This service lets us provide the care you need with a short phone conversation.  If a prescription is necessary we can send it directly to your pharmacy.  If lab work is needed we can place an order for that and you can then stop by our lab to have the test done at a later time.    Telephone visits are billed at different rates depending on your insurance coverage. During this emergency period, for some insurers they may be billed the same as an in-person visit.  Please reach out to your insurance provider with any questions.    If during the course of the call the physician/provider feels a telephone visit is not appropriate, you will not be charged for this service.\"    Patient has given verbal consent to a Telephone visit? Yes    What phone number would you like to be contacted at? [phone]    Patient would like to receive their AVS by AVS Preference: Kenyatta.    Additional provider notes:  Assessment and Plan:     1. Rash  mupirocin (BACTROBAN) 2 % ointment   2. Type 2 diabetes mellitus without complication, without long-term current use of insulin (H)  blood glucose test (CONTOUR NEXT TEST STRIPS) strips   3. Tobacco dependence syndrome  fluticasone propionate (FLOVENT HFA) 220 mcg/actuation inhaler   4. 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 Autoimmune disease (H)     6. Chronic bronchitis, unspecified chronic bronchitis type (H)     7. Major depressive disorder, recurrent episode, mild (H)     8. Peripheral vascular disease (H)     9. Coronary artery disease with angina pectoris, unspecified vessel or lesion type, unspecified whether " native or transplanted heart (H)       She will continue to use Bactroban as needed for the lesions that develop along her scalp line.  Her diabetes has historically been well controlled with diet.  Patient has a history of chronic bronchitis.  She continues Flovent daily and albuterol as needed.  She has been suspected to have an autoimmune disease in the past and has been evaluated at [facility].  She plans on reestablishing care at [facility] when she returns in 2 weeks.  I provided 3 months worth of prescriptions and her PCP will then take over those prescriptions.  I encouraged her to establish with a cardiologist and rheumatologist at [facility].  She is content with the plan.    Subjective:     Kim is a 59 y.o. female presenting for a phone visit.  She has multiple comorbidities including type 2 diabetes, CAD, PTSD, depression, anxiety, chronic bronchitis, and suspected autoimmune disease.  Patient states she moved to [place] earlier in the year, but this did not work out well for her.  She is now living in [place] with her life partner, Minh. They plan on returning to [place] in 2 weeks and establishing care again at [facility].  She has not been taking any of her medications because no one is accepting her insurance there.  She continues to experience a dry mouth and a swollen tongue.  She has scabs in her hairline in which she uses Bactroban for.  She saw a dentist here in [place] prior to leaving who referred her to an oral pathologist at the [facility].  Patient did not follow through with this appointment because she suffered from PTSD as she has not had great experiences at the [facility].  She continues to feel as though her face and throat are swollen.  She denies difficulty swallowing.  She has been using her Flovent daily, but does not have any albuterol on hand.  Her smoking has worsened.  She has not been checking her blood sugars, because she ran out of test strips for the past month.  She  denies symptoms of hypoglycemia.    Review of Systems: A complete 14 point review of systems was obtained and is negative or as stated in the history of present illness.    Social History     Socioeconomic History     Marital status:      Spouse name: Not on file     Number of children: 0     Years of education: Not on file     Highest education level: Not on file   Occupational History     Occupation: Unemployed past 10 years   Social Needs     Financial resource strain: Not on file     Food insecurity     Worry: Not on file     Inability: Not on file     Transportation needs     Medical: Not on file     Non-medical: Not on file   Tobacco Use     Smoking status: Current Every Day Smoker     Packs/day: 0.50     Years: 38.00     Pack years: 19.00     Smokeless tobacco: Current User   Substance and Sexual Activity     Alcohol use: No     Drug use: No     Sexual activity: Not on file     Comment: single    Lifestyle     Physical activity     Days per week: Not on file     Minutes per session: Not on file     Stress: Not on file   Relationships     Social connections     Talks on phone: Not on file     Gets together: Not on file     Attends Latter-day service: Not on file     Active member of club or organization: Not on file     Attends meetings of clubs or organizations: Not on file     Relationship status: Not on file     Intimate partner violence     Fear of current or ex partner: Not on file     Emotionally abused: Not on file     Physically abused: Not on file     Forced sexual activity: Not on file   Other Topics Concern     Not on file   Social History Narrative       about 8 years ago; step kids; she has a significant boyfriend who lives in [place];       Active Ambulatory Problems     Diagnosis Date Noted     Post-traumatic Stress Disorder      Peripheral Vascular Disease      Vitamin D Deficiency      Chronic Laryngitis      Depression      Coronary artery disease      Lower Back Pain       Recurrent Aphthous Ulcer      Cluster Headache      Esophageal Reflux      Allergies      Cardiac pacemaker in situ, dual chamber      Temporomandibular Joint-pain Dysfunction Syndrome      Anxiety 11/10/2015     Chronic neck pain 11/10/2015     Insomnia 11/24/2015     Type 2 diabetes mellitus without complication (H) 11/24/2015     Major depressive disorder, recurrent episode, mild (H) 11/24/2015     Stomatitis and mucositis 03/24/2016     Migraine with aura 08/26/2016     Vasovagal syncope 12/29/2010     Bicuspid aortic valve 01/15/2018     Autoimmune disease (H) 06/30/2018     Angina at rest (H) 06/30/2018     Statin intolerance 08/13/2018     ROSARIO (dyspnea on exertion) 09/21/2018     Pacemaker battery depletion 12/19/2018     Chronic bronchitis, unspecified chronic bronchitis type (H) 01/28/2019     Resolved Ambulatory Problems     Diagnosis Date Noted     Tooth Pain      Foot Pain (Soft Tissue)      Nausea      Soreness Or Pain In A Salivary Gland      History of cardiac pacemaker in situ 06/04/2017     Bipolar affective disorder (H) 12/29/2010     Acute chest pain 06/30/2018     Scalp hematoma 06/30/2018     Past Medical History:   Diagnosis Date     Asthma      CAD (coronary artery disease)      GERD (gastroesophageal reflux disease)      High cholesterol      Memory problem      Migraines      Syncope        Family History   Problem Relation Age of Onset     Hypertension Mother      Alcohol abuse Mother      Heart disease Father      Alcohol abuse Father        Objective:     There were no vitals taken for this visit.    Patient sounds alert and is speaking clearly.  She does not sound short of breath.  I do not hear audible wheezing.       Phone call duration:  13 minutes    Krupa Barboza [facility]

## 2021-06-08 NOTE — TELEPHONE ENCOUNTER
Central PA team  [phone]  Pool: HE PA MED ([id number])          PA has been initiated.       PA form completed and faxed insurance via Cover My Meds     [place]:  MARIBEL MARTINEZ (Hamilton: [id number]) - LMM      Medication:  cevimeline (EVOXAC) 30 mg capsule     Insurance:  [place] MEDICAID        Response will be received via fax and may take up to 5-10 business days depending on plan

## 2021-06-08 NOTE — TELEPHONE ENCOUNTER
Prior Authorization Request  Who s requesting:  Pharmacy  Pharmacy Name and Location: [place] [address]  Medication Name: cevimeline (EVOXAC) 30 mg capsule   Insurance Plan: [facility]  Insurance Member ID Number:  [id number]  CoverMyMeds Key: N/A  Informed patient that prior authorizations can take up to 10 business days for response:   No  Okay to leave a detailed message: No

## 2021-06-09 ENCOUNTER — RECORDS - HEALTHEAST (OUTPATIENT)
Dept: ADMINISTRATIVE | Facility: CLINIC | Age: 61
End: 2021-06-09

## 2021-06-09 NOTE — PROGRESS NOTES
Kim is a 56 y.o. female presenting to the clinic for multiple concerns.  Patient has been seeking care for oral pain for since 2009.  Patient states the [facility] removed her upper teeth in 2010 resulting in her having upper dentures.  She states that they have not fit well.  She is seen numerous dentists.  She states at one point she had some exposed bone within her mouth.  Since then, patient has developed a plethora of symptoms.  She has been diagnosed with migraines by neurology.  She was told by the neurologist that her saliva ducts may be clogged.  Patient has complaints of thick saliva and a swollen tongue.  She has been treated for candidal stomatitis with no relief.  Patient states now she has having difficulty moving her mouth and the skin around her mouth hurts.  She has difficulty opening her left eye and feels as though her neck is swollen.  She complains of dysphagia and has difficulty eating solid foods.  Patient has multiple comorbidities including type 2 diabetes, hyperlipidemia, and CAD.  Patient states she has a pacemaker in place so she cannot have MRIs performed.  She has had numerous CT scans of her head which have been normal.  She saw [facility] who suggested that she see rheumatology with the assumption that she had rheumatoid arthritis.  Patient states she does not have rheumatoid arthritis but has been diagnosed with psoriatic arthritis in the past.  Patient is currently working with a  and has her ILS worker here today.  She is interested in pain control today.  She was taking Cymbalta and gabapentin but discontinued this.  States it was not working well for her and she did not care for the side effects.  She continue continues to take Belsomra at bedtime for insomnia.  She uses omeprazole for esophageal reflux.  She is due to see cardiology.  She requests renewal of nitroglycerin spray. Patient states she is in the process of trying to move to [place].      Review of Systems: A complete 14 point review of systems was obtained and is negative or as stated in the history of present illness.    Social History     Social History     Marital status:      Spouse name: N/A     Number of children: N/A     Years of education: N/A     Occupational History     Not on file.     Social History Main Topics     Smoking status: Current Every Day Smoker     Smokeless tobacco: Not on file     Alcohol use Not on file     Drug use: Not on file     Sexual activity: Not on file     Other Topics Concern     Not on file     Social History Narrative       Active Ambulatory Problems     Diagnosis Date Noted     Post-traumatic Stress Disorder      Peripheral Vascular Disease      Vitamin D Deficiency      Chronic Laryngitis      Depression      Coronary Artery Disease      Lower Back Pain      Recurrent Aphthous Ulcer      Cluster Headache      Esophageal Reflux      Allergies      Pacemaker Placement      Temporomandibular Joint-pain Dysfunction Syndrome      Anxiety 11/10/2015     Chronic neck pain 11/10/2015     Insomnia 11/24/2015     Type 2 diabetes mellitus without complication 11/24/2015     Major depressive disorder, recurrent episode, mild 11/24/2015     Stomatitis and mucositis 03/24/2016     Migraine with aura 08/26/2016     Resolved Ambulatory Problems     Diagnosis Date Noted     Tooth Pain      Foot Pain (Soft Tissue)      Nausea      Soreness Or Pain In A Salivary Gland      Past Medical History:   Diagnosis Date     CAD (coronary artery disease)      Memory problem      Migraines        Family History   Problem Relation Age of Onset     Hypertension Mother      Alcohol abuse Mother      Heart disease Father      Alcohol abuse Father        OBJECTIVE:     Visit Vitals     /62 (Patient Site: Right Arm, Patient Position: Sitting, Cuff Size: Adult Regular)     Pulse 85     Ht 5' (1.524 m)     Wt 124 lb 12.8 oz (56.6 kg)     SpO2 95%     BMI 24.37 kg/m2       Patient  is alert, in no obvious distress.   Skin: Warm, dry.  No lesions or rashes.  Skin turgor rapid return.   HEENT:  Head normocephalic, atraumatic.  Eyes normal.  PERRL.  EOM's intact.  No nystagmus. Ears normal.  Nose patent, mucosa pink.  Oropharynx mucosa pink. She has a large swollen tongue.  No obvious fissures or discoloration.  Tonsils +2 without exudate.   Neck: Supple, no lymphadenopathy. No thyromegaly.  Lungs:  Clear to auscultation. Respirations even and unlabored.  No wheezing or rales noted.   Heart:  Regular rate and rhythm.  No murmurs.     ASSESSMENT AND PLAN:     1. Facial pain  Ambulatory referral to Pain Clinic    Ambulatory referral to [facility]   2. Dysphagia, unspecified type  Ambulatory referral to Gastroenterology   3. Swollen tongue  HM2(CBC w/o Differential)    Cortisol    Antinuclear Antibody (VITOR) Cascade    Sedimentation Rate    Iron and Transferrin Iron Binding Capacity   4. Coronary atherosclerosis  Ambulatory referral to Cardiology    nitroglycerin (NITROLINGUAL) 400 mcg/spray spray   5. Type 2 diabetes mellitus without complication, without long-term current use of insulin  Glycosylated Hemoglobin A1c   6. Major depressive disorder, recurrent episode, mild     7. Anxiety     8. Gastroesophageal reflux disease without esophagitis     9. Vitamin D deficiency  Vitamin D, Total (25-Hydroxy)   10. Medication management  Comprehensive Metabolic Panel   11. Health care maintenance  Mammo Screening Bilateral     Patient is frustrated with her lack of diagnoses.  She is interested in pain control.  Will refer to pain clinic for further evaluation.  We will also have her see a facial pain center to look for an underlying reason for her pain.  Did discuss possibility of stroke in the past, but unfortunately she cannot have an MRI performed.  She has had reported CT scans in the past which have been normal.  Due to the swollen tongue, will rule out Christopher's, lupus, vitamin deficiencies. She  may also have improper fitting dentures, so I recommend that she see an oral surgeon.  Patient is due to see cardiology so placed a referral.  Will check hemoglobin A1c for diabetes.  This is diet controlled.  For depression and anxiety, she refuses to complete a PHQ 9.  She states this is well controlled.  Will check vitamin D due to history of vitamin D deficiency.  Will refer to gastroenterology with concerns of dysphagia.  She may need an endoscopy.  She is content with the plan and will follow-up as needed   She is transferring care to [place].    I spent 40 minutes with the patient with greater than 50% spent discussing symptoms, treatment options, and coordination of care.

## 2021-06-10 NOTE — PROGRESS NOTES
Assessment and Plan:     1. Syncope, unspecified syncope type  We will rule out electrolyte imbalance, thyroid disease, anemia.  Patient has a history of syncope in the past.  Will refer to neurology.  - Basic Metabolic Panel  - Thyroid Cascade  - HM2(CBC w/o Differential)  - Ambulatory referral to Neurology    2. Head injury, initial encounter  We will obtain CT scan due to red flag symptoms.  Will also refer to concussion clinic.  Discussed concerning symptoms including worsening headache, vomiting, decreased visual acuity, numbness and tingling of her extremities.  If these occur, suggest ER evaluation.  - CT Head With and Without Contrast; Future  - Ambulatory referral to Concussion Clinic    3. Intermittent asthma, uncomplicated  Provide a prescription for Ventolin to use as needed.  Educated on proper use  - albuterol (PROAIR HFA;PROVENTIL HFA;VENTOLIN HFA) 90 mcg/actuation inhaler; Inhale 2 puffs every 4 (four) hours as needed.  Dispense: 1 Inhaler; Refill: 1    4. Allergy to bee sting  Patient will have EpiPen on hand in case of emergency.  She is content with the plan.  - EPINEPHrine (EPIPEN) 0.3 mg/0.3 mL atIn; Inject 0.3 mL (0.3 mg total) into the shoulder, thigh, or buttocks once for 1 dose.  Dispense: 0.3 mL; Refill: 0    I spent 40 minutes with the patient with greater than 50% spent discussing symptoms, treatment options, and coordination of care.  She plans on establishing with [facility] June 1.      Subjective:     Kim is a 56 y.o. female presenting to the clinic for concerns for syncopal episodes.  Patient states at the end of April, she developed some shortness of breath after using the bathroom.  Patient states she then blacked out.  She is unsure if she lost consciousness.  Patient states 4 days ago, she did not feel as though she could take in a full breath.  She developed the same blacking out sensation and fell down.  Patient states she bruised her left leg, left arm, and hit the left side of  her head against a cupboard.  Patient has had a headache since.  She complains of the worst headache ever.  She has had nausea with vomiting.  She complains of blurry vision and fatigue.  She has a history of concussions in the past.  Patient states she was diagnosed with syncope in the past as well.  Patient also requests prescription for inhaler.  States she was diagnosed with asthma in childhood.  She has some mold in her house which is causing her to feel short of breath.  Patient does smoke marijuana daily.  She also requests renewal of EpiPen.  She has a history of being hospitalized due to allergic reaction to bee sting.  Patient states she has an appointment to establish care with [facility] on June 1.    Review of Systems: A complete 14 point review of systems was obtained and is negative or as stated in the history of present illness.    Social History     Social History     Marital status:      Spouse name: N/A     Number of children: N/A     Years of education: N/A     Occupational History     Not on file.     Social History Main Topics     Smoking status: Current Every Day Smoker     Packs/day: 0.50     Smokeless tobacco: Never Used     Alcohol use No     Drug use: No     Sexual activity: Not on file      Comment: single      Other Topics Concern     Not on file     Social History Narrative       Active Ambulatory Problems     Diagnosis Date Noted     Post-traumatic Stress Disorder      Peripheral Vascular Disease      Vitamin D Deficiency      Chronic Laryngitis      Depression      Coronary Artery Disease      Lower Back Pain      Recurrent Aphthous Ulcer      Cluster Headache      Esophageal Reflux      Allergies      Cardiac pacemaker in situ, dual chamber      Temporomandibular Joint-pain Dysfunction Syndrome      Anxiety 11/10/2015     Chronic neck pain 11/10/2015     Insomnia 11/24/2015     Type 2 diabetes mellitus without complication 11/24/2015     Major depressive disorder,  recurrent episode, mild 11/24/2015     Stomatitis and mucositis 03/24/2016     Migraine with aura 08/26/2016     Resolved Ambulatory Problems     Diagnosis Date Noted     Tooth Pain      Foot Pain (Soft Tissue)      Nausea      Soreness Or Pain In A Salivary Gland      Past Medical History:   Diagnosis Date     Asthma      CAD (coronary artery disease)      GERD (gastroesophageal reflux disease)      High cholesterol      Memory problem      Migraines        Family History   Problem Relation Age of Onset     Hypertension Mother      Alcohol abuse Mother      Heart disease Father      Alcohol abuse Father        Objective:     There were no vitals taken for this visit.    Patient is alert, in no obvious distress.   Skin: Warm, dry.  She has a bruise present beneath her left eye.   HEENT:  Head normocephalic, atraumatic.  Eyes normal.  PERRL.  EOM's intact.  No nystagmus. Ears normal.  Nose patent, mucosa pink.  Oropharynx mucosa pink. Tongue appears mildly swollen.   Neck: Supple, no lymphadenopathy.   Lungs:  Clear to auscultation. Respirations even and unlabored.  No wheezing or rales noted.   Heart:  Regular rate and rhythm.  No murmurs, S3, S4, gallops, or rubs.    Musculoskeletal:  Full ROM of extremities.  DTRs symmetrical, sensations intact.  No obvious deformity.  Muscle strength equal +5/5.   Neurological:  Cranial nerves 2-12 intact.

## 2021-06-10 NOTE — PROGRESS NOTES
Kim here for scheduled Lexiscan nuclear stress test and echocardiogram.  Pt reports she has not been taking any medications for at least one month (except aspirin 81mg) due to insurance issues.  Kim also requests help with home PM interrogation device, as she has been unsuccessful doing this.  Candy Bolañosricardotemo reviewed instructions with pt and her father.  Also, Pt reports she has been syncopal with loss of conscious twice since seeing Dr Garrido.  On 5/1/2017 she reports witnessed LOC of about 2 mins. With urinary incontinence.  She stated she hit her head in left temporal area. (no bruise or bump noted today).  Kim reports LOC on 5/9/2017, sitting down before LOC, again witnessed for about 2 mins.  This information was reviewed with Dr Fields who recommended I notify SOLANGE Sauceda with Dr Garrido, of above events.  This was done.

## 2021-06-10 NOTE — PROGRESS NOTES
Kim is a 56 y.o. female presenting to the clinic for multiple concerns.  She is present today with Dwight Christian, her  through [place] Please see my dictation from 3/16/17.  Patient has been seeking care for oral pain since 2009.  She had her upper teeth removed in 2010 by the [facility] she has upper dentures.  She is seen a dentist who says she needs to have new dentures because they do not fit well but she refuses to do the molding because of the swelling within her mouth.  She feels as though there is an underlying reason for the swelling within her mouth and tongue.  She is seen numerous ear nose and throat specialist as well as head and neck specialist.  She was told by a neurologist that her saliva ducts may be clogged.  She has been treated for candidal stomatitis with no relief.  She recently saw [place] [facility] she was diagnosed with temporomandibular joint disorder, myofascial pain, neck pain, stomatitis.  The provider focused on the importance of having her dentures replaced which may be causing most of her symptoms.  Patient has refused to do so because her dentist disagrees.  Patient was quite frustrated with her visit there.  She did not feel as though she received answers.  She has been experiencing posterior neck pain and migraine headaches.  Has seen Neurology in the past.  She complains of unsteady gait and dizziness. She is interested in a medication to help relax her neck.  Last week, she developed umbilical pain.  She felt as though she received a tick bite.  She did remove part of the tick but she states the scab remains.  Patient has known CAD and recently saw her cardiologist who ordered a stress test and echocardiogram.    Review of Systems: A complete 14 point review of systems was obtained and is negative or as stated in the history of present illness.    Social History     Social History     Marital status:      Spouse name:  N/A     Number of children: N/A     Years of education: N/A     Occupational History     Not on file.     Social History Main Topics     Smoking status: Current Every Day Smoker     Packs/day: 1.00     Smokeless tobacco: Not on file     Alcohol use No     Drug use: No     Sexual activity: Not on file      Comment: single      Other Topics Concern     Not on file     Social History Narrative       Active Ambulatory Problems     Diagnosis Date Noted     Post-traumatic Stress Disorder      Peripheral Vascular Disease      Vitamin D Deficiency      Chronic Laryngitis      Depression      Coronary Artery Disease      Lower Back Pain      Recurrent Aphthous Ulcer      Cluster Headache      Esophageal Reflux      Allergies      Cardiac pacemaker in situ, dual chamber      Temporomandibular Joint-pain Dysfunction Syndrome      Anxiety 11/10/2015     Chronic neck pain 11/10/2015     Insomnia 11/24/2015     Type 2 diabetes mellitus without complication 11/24/2015     Major depressive disorder, recurrent episode, mild 11/24/2015     Stomatitis and mucositis 03/24/2016     Migraine with aura 08/26/2016     Resolved Ambulatory Problems     Diagnosis Date Noted     Tooth Pain      Foot Pain (Soft Tissue)      Nausea      Soreness Or Pain In A Salivary Gland      Past Medical History:   Diagnosis Date     CAD (coronary artery disease)      Memory problem      Migraines        Family History   Problem Relation Age of Onset     Hypertension Mother      Alcohol abuse Mother      Heart disease Father      Alcohol abuse Father        OBJECTIVE:     /62 (Patient Site: Right Arm, Patient Position: Sitting, Cuff Size: Adult Regular)  Pulse 93  Ht 5' (1.524 m)  Wt 118 lb 12.8 oz (53.9 kg)  SpO2 94%  BMI 23.2 kg/m2    Patient is alert, in no obvious distress.   Skin: Warm, dry.  No lesions or rashes.  Skin turgor rapid return.   HEENT:  Head normocephalic, atraumatic.  Eyes normal.  PERRL.  EOM's intact.  No nystagmus. Ears  normal.  Nose patent, mucosa pink.  She is wearing upper dentures and her tongue is swollen.   No tonsillar enlargement.   Neck: Supple, no lymphadenopathy.  No thyromegaly.  Lungs:  Clear to auscultation. Respirations even and unlabored.  No wheezing or rales noted.   Heart:  Regular rate and rhythm.  No murmurs, S3, S4, gallops, or rubs.    Abdomen: Soft, nontender.  No organomegaly. Bowel sounds normoactive. No guarding or masses noted. She has a small scab hanging from the skin of her umbilicus.  I removed this with a tweezers.  No bleeding was present and I do not visualize a tick.   Musculoskeletal:  Full ROM of extremities.  DTRs symmetrical, sensations intact.  No obvious deformity.  Muscle strength equal +5/5.   Neurological:  Cranial nerves 2-12 intact.      ASSESSMENT AND PLAN:     1. Chronic neck pain  cyclobenzaprine (FLEXERIL) 5 MG tablet   2. Migraine with aura and without status migrainosus, not intractable     3. Coronary atherosclerosis due to calcified coronary lesion     4. Stomatitis and mucositis     5. Temporomandibular Joint-pain Dysfunction Syndrome     6. Elevated sed rate       Patient requests pain medication to assist with neck pain.  Provide a prescription for cyclobenzaprine to be used as needed.  She is to avoid taking this with other sedatives.  Patient is frustrated with the lack of answers that she has received over the past 7 years.  She has seen numerous specialists for multiple symptoms.  We discussed the difference of opinion between the ENT specialists and her dentist.  I encouraged her to have her dentures replaced but she is reluctant.  We discussed having her see a rheumatologist due to an elevated sed rate and her symptoms but she would like to see [facility].  She plans on establishing care with a primary care provider at [facility] in [place].  She will follow-up as needed.    I spent 40 minutes with the patient with greater than 50% spent discussing symptoms, treatment  options, and coordination of care.

## 2021-06-10 NOTE — PROGRESS NOTES
Cardiology Progress Note    Assessment:  Coronary artery disease, status post remote LAD stenting  Chest pain rule out progression of coronary artery disease  Bipolar disorder   Recurrent neurocardiogenic syncope status post pacemaker, normal device function  Hyperlipidemia, on lipid-lowering medications  Tobacco abuse      Plan:  Stress test and echo.     She gets good symptomatic relief of chest pain with nitro spray.  She has been on nitro tablets sublingual before.  She did not have good results.    I stressed the importance of complete tobacco cessation    Follow-up in 1 year    Subjective:   This is 56 y.o. female who comes in today for follow-up visit.  She continues to experience chest pains.  Those pains are nonexertional.  She has syncopal episode.  She denies heart palpitations.  Her weight has been stable.  She is [place] out of state to live with her boyfriend.  She was supposed to have a stress test year and a half ago.  She does not know why she did not have the test.    Review of Systems:   General: Fever, Weight Loss  Eyes: Visual Distubance  Ears/Nose/Throat: Hearing Loss  Lungs: Cough, Shortness of Breath, Wheezing  Heart: Chest Pain, Arm Pain, Shortness of Breath with activity, Leg Swelling, Fainting  Stomach: Nausea  Bladder: WNL  Muscle/Joints: Joint Pain, Muscle Weakness, Muscle Pain  Skin: Poor Wound Healing, Rash  Nervous System: Falls, Daytime Sleepiness, Dizziness, Loss of Balance  Mental Health: Anxiety     Blood: WNL    Objective:   /78 (Patient Site: Left Arm, Patient Position: Sitting, Cuff Size: Adult Regular)  Pulse 80  Resp 16  Ht 5' (1.524 m)  Wt 116 lb 6.4 oz (52.8 kg)  BMI 22.73 kg/m2  Physical Exam:  GENERAL: no distress  NECK: No JVD  LUNGS: Clear to auscultation.  CARDIAC: regular rhythm, S1 & S2 normal.  No heaves, thrills, gallops or murmurs.  ABDOMEN: flat, negative hepatosplenomegaly, soft and non-tender.  EXTREMITIES: No evidence of cyanosis,  clubbing or edema.    Current Outpatient Prescriptions   Medication Sig Dispense Refill     aspirin 81 MG EC tablet Take 81 mg by mouth daily.       CONTOUR NEXT STRIPS strips Use 1 each As Directed 2 (two) times a day. 100 strip 3     ergocalciferol (ERGOCALCIFEROL) 50,000 unit capsule Take 1 capsule (50,000 Units total) by mouth once a week for 12 doses. 12 capsule 0     lancets (FINGERSTIX LANCETS) Misc 1 each by In Vitro route 2 (two) times a day. 100 each 3     CRESTOR 40 mg tablet TAKE 1 TABLET (40 MG TOTAL) BY MOUTH DAILY. 90 tablet 2     hydrOXYzine (VISTARIL) 25 MG capsule TAKE 1-2 CAPSULES (25-50 MG TOTAL) BY MOUTH EVERY 6 (SIX) HOURS AS NEEDED FOR ANXIETY (AND SLEEP). 60 capsule 1     isosorbide mononitrate (IMDUR) 30 MG 24 hr tablet TAKE 1 TABLET (30 MG TOTAL) BY MOUTH DAILY. 90 tablet 3     nitroglycerin (NITROLINGUAL) 400 mcg/spray spray Place 1 spray under the tongue every 5 (five) minutes as needed for chest pain. 12 g 0     omeprazole (PRILOSEC) 40 MG capsule TAKE 1 CAPSULE (40 MG TOTAL) BY MOUTH DAILY. 30 capsule 6     suvorexant (BELSOMRA) 5 mg Tab Take 5 tablets by mouth bedtime. 30 tablet 2     No current facility-administered medications for this visit.        Cardiographics:      Coronary angio: 2012  95% proximal RCA, mild to moderate diffuse less than 30% disease elsewhere    Lab Results:       Lab Results   Component Value Date    CHOL 258 (H) 08/26/2016    CHOL 235 (H) 11/10/2015    CHOL 165 01/16/2013     Lab Results   Component Value Date    HDL 35 (L) 08/26/2016    HDL 52 11/10/2015    HDL 51 01/16/2013     Lab Results   Component Value Date    LDLCALC 187 (H) 08/26/2016    LDLCALC 147 (H) 11/10/2015    LDLCALC 90 01/16/2013     Lab Results   Component Value Date    TRIG 178 (H) 08/26/2016    TRIG 181 (H) 11/10/2015    TRIG 121 01/16/2013     No components found for: CHOLHDL  BNP   Date Value Ref Range Status   09/13/2010 65 <74 pg/mL Final       Avelino (Jere)  MD Radhika

## 2021-06-16 NOTE — TELEPHONE ENCOUNTER
Telephone Encounter by Ifeoma Dias at 4/12/2019  9:38 AM     Author: Ifeoma Dias Service: -- Author Type: --    Filed: 4/12/2019  9:41 AM Encounter Date: 4/9/2019 Status: Signed    : Ifeoma Dias       PRIOR AUTHORIZATION DENIED    Denial Rational: Patient must use the preferred strips: ACCU-CHEK TRIXIE PLUS, ACCU-CHEK SMARTVIEW, CONTOUR NEXT TEST STRIP, CONTOUR TEST STRIP               Appeal Information: If provider would like to appeal please provide a letter of medical necessity stating why formulary alternatives would not be clinically appropriate for the patient and route back to the PA team.

## 2021-06-17 NOTE — TELEPHONE ENCOUNTER
Telephone Encounter by Darrell Sarkar at 6/15/2020 10:09 AM     Author: Darrell Sarkar Service: -- Author Type: --    Filed: 6/15/2020 10:12 AM Encounter Date: 6/9/2020 Status: Signed    : Darrell Sarkar       PRIOR AUTHORIZATION NOT NEEDED - [place] MEDICAID SUBMISSION    CREATED NEW TELEPHONE ENCOUNTER FOR SUBMISSION TO THE MEDICARE PART D BENEFITS AS PER [place] MEDICAID, THAT IS WHERE IT WOULD NEED TO BE SUBMITTED SINCE SHE IS MEDICARE ELIGABLE.

## 2021-06-17 NOTE — TELEPHONE ENCOUNTER
Telephone Encounter by Darrell Sarkar at 6/15/2020  9:47 AM     Author: Darrell Sarkar Service: -- Author Type: --    Filed: 6/15/2020 10:00 AM Encounter Date: 6/15/2020 Status: Signed    : Darrell Sarkar       MEDICARE PART D INFORMATION PER [facility]:

## 2021-06-17 NOTE — TELEPHONE ENCOUNTER
Telephone Encounter by Darrell Sarkar at 6/16/2020  9:42 AM     Author: Darrell Sarkar Service: -- Author Type: --    Filed: 6/16/2020  9:44 AM Encounter Date: 6/15/2020 Status: Signed    : Darrell Sarkar APPROVED:    Approval start date: 06/15/2020  Approval end date:  12/31/2020    Pharmacy has been notified of approval and will contact patient when medication is ready for pickup.

## 2021-06-18 NOTE — LETTER
Letter by Lorena Baeza CNP at      Author: Lorena Baeza CNP Service: -- Author Type: --    Filed:  Encounter Date: 2019 Status: (Other)       Kim DELORIS Sunny  [address] [place] [address]             2019         Dear Ms. Correa,    Below are the results from your recent visit:    Resulted Orders   Antinuclear Antibody (VITOR) Cascade   Result Value Ref Range    VITOR Screen Cascade 0.2 <=2.9 U    Narrative    <1.0 negative  1.1-2.9 weakly positive  3.0-5.9 positive ( reflex)  > or=6.0 strongly positive   Complement, C'3   Result Value Ref Range    C3 Complement 117 83 - 177 mg/dL   Complement, C'4   Result Value Ref Range    C4 Complement 23 19 - 59 mg/dL   Complement, Total   Result Value Ref Range    Complement Activity, Total  60 - 144  Units      Comment:      INTERPRETIVE INFORMATION: Complement Activity, Total EIA      59   Units or less .......... Low       Units .............. Normal    145  Units or greater ....... High  Performed by [facility],  [address] [place] [id number] [phone]  [email or web address], Lew Aponte MD, Lab. Director   Sedimentation Rate   Result Value Ref Range    Sed Rate 15 0 - 20 mm/hr   Rheumatoid Factor Quant   Result Value Ref Range    Rheumatoid Factor Quantitative <15.0 0 - 30 IU/mL   Glycosylated Hemoglobin A1c   Result Value Ref Range    Hemoglobin A1c 6.3 (H) 3.5 - 6.0 %   Comprehensive Metabolic Panel   Result Value Ref Range    Sodium 144 136 - 145 mmol/L    Potassium 4.3 3.5 - 5.0 mmol/L    Chloride 107 98 - 107 mmol/L    CO2 26 22 - 31 mmol/L    Anion Gap, Calculation 11 5 - 18 mmol/L    Glucose 98 70 - 125 mg/dL    BUN 14 8 - 22 mg/dL    Creatinine 0.74 0.60 - 1.10 mg/dL    GFR MDRD Af Amer >60 >60 mL/min/1.73m2    GFR MDRD Non Af Amer >60 >60 mL/min/1.73m2    Bilirubin, Total 0.3 0.0 - 1.0 mg/dL    Calcium 9.4 8.5 - 10.5 mg/dL    Protein, Total 6.6 6.0 - 8.0 g/dL    Albumin 3.8 3.5 - 5.0 g/dL    Alkaline  Phosphatase 64 45 - 120 U/L    AST 12 0 - 40 U/L    ALT 13 0 - 45 U/L    Narrative    Fasting Glucose reference range is 70-99 mg/dL per  American Diabetes Association (ADA) guidelines.       Your labs are all normal including your sed rate.  I recommend seeing rheumatology and neurology as we discussed.  Your A1C remains controlled without medication.     Please call with questions or contact us using [employer].    Sincerely,        Electronically signed by Lorena Baeza CNP

## 2021-06-19 NOTE — LETTER
Letter by Belinda Hastings at      Author: Belinda Hastings Service: -- Author Type: --    Filed:  Encounter Date: 4/2/2019 Status: (Other)         Kim Correa  [address] [place] [address]      April 2, 2019      Dear Ms. Correa,    RE: Remote Results    We are writing to you regarding your recent Remote Pacemaker check from home. Your transmission was received successfully. Battery status is satisfactory at this time.     Your results are within normal limits.    Your next device appointment will be a remote check on July 3, 2019; this will occur automatically.    To schedule or reschedule, please call [phone] and press 1.    NOTE: If you would like to do an extra transmission, please call [phone] and press 3 to speak to a nurse BEFORE transmitting. This ensures that the Device Clinic staff is aware of the reason you are sending a transmission, and can follow-up with you after it has been reviewed.    We will be checking your implanted device from home (remotely) every three months unless otherwise instructed. We will need to see you in the clinic at least once a year. You may need to be seen in the clinic sooner depending on the results of your check.    Please be aware:    The follow-up schedule is like a Physician prescription.    Your remote monitor is paired to your specific implanted device.      Sincerely,    [facility]

## 2021-06-19 NOTE — LETTER
Letter by Belinda Hastings at      Author: Belinda Hastings Service: -- Author Type: --    Filed:  Encounter Date: 7/3/2019 Status: (Other)         Kim Correa  [address] [place] [address]      July 3, 2019      Dear Ms. Correa,    RE: Remote Results    We are writing to you regarding your recent Remote Pacemaker check from home. Your transmission was received successfully. Battery status is satisfactory at this time.     Your results are within normal limits.    Your next device appointment will be a remote check on October 3, 2019; this will occur automatically.    To schedule or reschedule, please call [phone] and press 1.    NOTE: If you would like to do an extra transmission, please call [phone] and press 3 to speak to a nurse BEFORE transmitting. This ensures that the Device Clinic staff is aware of the reason you are sending a transmission, and can follow-up with you after it has been reviewed.    We will be checking your implanted device from home (remotely) every three months unless otherwise instructed. We will need to see you in the clinic at least once a year. You may need to be seen in the clinic sooner depending on the results of your check.    Please be aware:    The follow-up schedule is like a Physician prescription.    Your remote monitor is paired to your specific implanted device.      Sincerely,    [facility]

## 2021-06-19 NOTE — LETTER
Letter by Faith Owen, RN at      Author: Faith Owen RN Service: -- Author Type: --    Filed:  Encounter Date: 10/3/2019 Status: Signed         Kim Correa  [address] [place] [address]        October 3, 2019     RE: Reminder about lung cancer screening     Dear Kim,     Its time for your yearly exam to check for lung cancer.      You should have a yearly exam if:  Youre age 55 to 80 (55 to 77, if youre on Medicare)  Youve smoked a pack a day for at least 30 years (or 2 packs a day for at least 15 years)  If you no longer smoke, its been less than 15 years since you quit     Please call us when it is most convenient for you to schedule a clinic appointment before your Low Dose CT scan can be scheduled. Your scan needs to be done within 30 days of your office visit.   If you see a physician in the Pulmonary Clinic call [phone] to schedule.      These exams work best when done every year. They are good at finding lung cancer early, but they cant find all lung cancers. If you develop any symptoms (shortness of breath, chest pain, coughing up blood), call your doctor right away.     If you would like help to quit smoking, please talk with your doctor during your next visit. Or, call [facility] at [phone].        We look forward to seeing you soon.      Sincerely,     [facility])  Lung Cancer Screening Program

## 2021-06-19 NOTE — LETTER
Letter by Lorena Baeza CNP at      Author: Lorena Baeza CNP Service: -- Author Type: --    Filed:  Encounter Date: 7/25/2019 Status: (Other)         July 25, 2019     Patient: Kim Correa   YOB: 1960   Date of Visit: 7/25/2019       To Whom It May Concern:    I was notified today by Kim's ILS worker, Greer Fritz, that Kim has speaking about committing suicide for at least 3 weeks and has been making threats towards the government.  She has avoided discussing her mental health in the clinic and has refused to complete PHQ9 questionnaires.  Due to this, I would suggest that she is transported and evaluated at [facility] ER for possible admission for psychiatric care.      If you have any questions or concerns, please don't hesitate to call.    Sincerely,        Electronically signed by Lorena Baeza CNP
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
## 2021-06-19 NOTE — PROGRESS NOTES
HPI: Kim Correa is a 58 y.o. female referred to see me by Kathy Amaral MD for evaluation of gallstones.  She was referred by her primary care provider, Lorena Baeza, after reestablishing care this month.  She has previously had gallstones identified on abdominal imaging obtained as part of workup for elevated ESR and concern for malignancy.  Her medical history is notable for a diagnosis of gastroparesis, peripheral vascular disease, chronic migraine headaches, PTSD, and what sounds best described as chronic pain syndrome.  She reports having fairly constant pain everywhere, particularly her head shoulders and stomach.  Notes that she almost always has abdominal pain of some type.  She states that she throws up several times per week, typically worse with heavier foods such as meats.      She was admitted to the hospital 3 weeks ago with an episode of atypical angina.  She reports having a history of angina, however claims that the episode 3 weeks ago was inconsistent with her previous angina episodes, as it did not involve her right lateral neck or right arm.  States that it is more epigastric in nature.  Unfortunately no LFTs were obtained during that episode of pain.    She notes that the abdominal pain she has is a twisting cramping type pain.    Allergies:Imitrex [sumatriptan succinate]; Indomethacin; Venom-honey bee; Sumatriptan; and Sulfa (sulfonamide antibiotics)    Past Medical History:   Diagnosis Date     Asthma      CAD (coronary artery disease)      GERD (gastroesophageal reflux disease)      High cholesterol      Memory problem      Migraines        Past Surgical History:   Procedure Laterality Date     APPENDECTOMY       CARDIAC CATHETERIZATION       CARDIAC PACEMAKER PLACEMENT       CORONARY ANGIOPLASTY       CORONARY STENT PLACEMENT       INSERT / REPLACE / REMOVE PACEMAKER       HI APPENDECTOMY      Description: Appendectomy;  Recorded: 09/19/2008;  Comments: and ovarian cyst      DE REVISE MEDIAN N/CARPAL TUNNEL SURG      Description: Neuroplasty Decompression Median Nerve At Carpal Tunnel;  Recorded: 09/19/2008;     DE VAGINAL HYSTERECTOMY,UTERUS 250 GMS/<      Description: Vaginal Hysterectomy;  Recorded: 12/16/2011;       CURRENT MEDS:    Current Outpatient Prescriptions:      albuterol (PROAIR HFA;PROVENTIL HFA;VENTOLIN HFA) 90 mcg/actuation inhaler, Inhale 2 puffs every 4 (four) hours as needed., Disp: 1 Inhaler, Rfl: 1     artificial tears,hypromellose, (GENTEAL; SYSTANE) 0.3 % Gel, Administer to both eyes at bedtime., Disp: , Rfl:      aspirin 81 MG EC tablet, Take 81 mg by mouth daily., Disp: , Rfl:      CONTOUR NEXT STRIPS strips, Use 1 each As Directed 2 (two) times a day., Disp: 100 strip, Rfl: 3     cyanocobalamin 1000 MCG tablet, Take 1,000 mcg by mouth daily., Disp: , Rfl:      cyclobenzaprine (FLEXERIL) 5 MG tablet, Take 1 tablet (5 mg total) by mouth 3 (three) times a day as needed., Disp: 30 tablet, Rfl: 0     EPINEPHrine (EPIPEN) 0.3 mg/0.3 mL injection, Inject 0.3 mL (0.3 mg total) as directed as needed for anaphylaxis. Inject into thigh., Disp: 2 Pre-filled Pen Syringe, Rfl: 0     isosorbide mononitrate (IMDUR) 30 MG 24 hr tablet, Take 30 mg by mouth at bedtime., Disp: , Rfl:      lancets (FINGERSTIX LANCETS) Misc, 1 each by In Vitro route 2 (two) times a day., Disp: 100 each, Rfl: 3     lifitegrast (XIIDRA) 5 % Dpet, , Disp: , Rfl:      nitroglycerin (NITROLINGUAL) 400 mcg/spray spray, Place 1 spray under the tongue every 5 (five) minutes as needed for chest pain., Disp: 12 g, Rfl: 0     predniSONE (DELTASONE) 10 mg tablet, , Disp: , Rfl:      pyridoxine, vitamin B6, (B-6) 250 MG tablet, Take 250 mg by mouth daily., Disp: , Rfl:     Family History   Problem Relation Age of Onset     Hypertension Mother      Alcohol abuse Mother      Heart disease Father      Alcohol abuse Father         reports that she has been smoking.  She has been smoking about 0.50 packs per day.  She has never used smokeless tobacco. She reports that she does not drink alcohol or use illicit drugs.    Review of Systems:  The 10 point review of systems  is within normal limits except for as mentioned above in the HPI.  General ROS: No complaints or constitutional symptoms  Skin: No complaints or symptoms   Hematologic/Lymphatic: No symptoms or complaints  Psychiatric: No symptoms or complaints  Endocrine: No excessive fatigue, no hypermetabolic symptoms reported  Respiratory ROS: no cough, shortness of breath, or wheezing  Cardiovascular ROS: Negative except as noted in HPI  Gastrointestinal ROS: As per HPI  Musculoskeletal ROS: no recent injuries reported  Neurological ROS: Negative except as noted in HPI.        /82 (Patient Site: Right Arm, Patient Position: Sitting, Cuff Size: Adult Regular)  Pulse 66  Ht 5' (1.524 m)  Wt 113 lb 3.2 oz (51.3 kg)  SpO2 96%  Breastfeeding? No  BMI 22.11 kg/m2  Body mass index is 22.11 kg/(m^2).    EXAM:  General : Alert, cooperative, appears older than stated age  Skin: Skin color, texture, turgor normal, no rashes or lesions   Lymphatic: No obvious adenopathy, no swelling   Eyes: No scleral icterus, pupils equal  HENT: no traumatic injury to the head or face, no gross abnormalities  Lungs: Normal respiratory effort, breath sounds equal bilaterally  Heart: Regular rate and rhythm  Abdomen: Soft, nondistended.  Tender with palpation diffusely across the abdomen, with her right lower quadrant being more uncomfortable than the rest of her abdomen.  Musculoskeletal: No obvious swelling, tender with palpation over her shoulders bilaterally, upper arms, and upper thighs  Neurologic: Grossly intact      LABS:  Lab Results   Component Value Date    WBC 9.6 07/01/2018    HGB 13.4 07/01/2018    HCT 42.0 07/01/2018    MCV 93 07/01/2018     07/01/2018     INR/Prothrombin Time      Lab Results   Component Value Date    ALT 9 03/16/2017    AST 10 03/16/2017    ALKPHOS  81 03/16/2017    BILITOT 0.4 03/16/2017       IMAGES: Outside PET CT scan results reviewed from December 2017; notable findings include the presence of gallstones and sludge in the gallbladder.  No other imaging of the gallbladder is present in her chart.    Assessment/Plan:   1. Calculus of gallbladder without cholecystitis without obstruction        Kim Correa is a 58 y.o. female with a difficult diagnostic case.  While her symptoms in isolation could be attributable to gallbladder disease, or presence of gastroparesis, and what I think is best described as a chronic pain syndrome model any sort of clinical exam that she has.  With her diffuse abdominal pain that is ever present, physical exam I think is quite unreliable in her case.  We discussed the physiology of gallstone formation, and the fact that gallstones and of themselves do not lead to symptoms in most cases.  Given her complex medical history, I would be reticent to put her through the surgery without having more confidence that we would actually be fixing a problem.  I think to that end, we be well served by obtaining a HIDA scan to assess for ejection fraction and patency of the cystic duct.  If these things are normal, I would be hard pressed to recommend surgery for her for what may be incidental asymptomatic gallstones.    Matthew Valdivia MD  [phone]  [facility] Department of Surgery

## 2021-06-19 NOTE — LETTER
Letter by Avelino Garrido MD (Ted) at      Author: Avelino Garrido MD (Ted) Service: -- Author Type: --    Filed:  Encounter Date: 8/27/2019 Status: (Other)         Kim Correa  [address] [place] [address]      August 27, 2019      Dear Kim,    This letter is to remind you that you will be due for your follow up appointment with Dr. Jere Garrido . To help ensure you are in the best health possible, a regular follow-up with your cardiologist is essential.     Please call our Patient Scheduling Line at [phone] to schedule your appointment at your earliest convenience.  If you have recently scheduled an appointment, please disregard this letter.    We look forward to seeing you again. As always, we are available at the number  above for any questions or concerns you may have.      Sincerely,     The Physicians and Staff of [facility]

## 2021-06-19 NOTE — LETTER
Letter by Stacy Maldonado RDCS at      Author: Stacy Maldonado RDCS Service: -- Author Type: --    Filed:  Encounter Date: 10/3/2019 Status: Signed         Kim PUENTES Sunny  [address] [place] [address]      October 3, 2019      Dear Ms. Correa,    RE: Remote Results    We are writing to you regarding your recent Remote Pacemaker check from home. Your transmission was received successfully. Battery status is satisfactory at this time.     Your results are showing no significant changes.    Your next device appointment will be a clinic visit.  Please call in late October to schedule.      To schedule or reschedule, please call [phone] and press 1.    NOTE: If you would like to do an extra transmission, please call [phone] and press 3 to speak to a nurse BEFORE transmitting. This ensures that the Device Clinic staff is aware of the reason you are sending a transmission, and can follow-up with you after it has been reviewed.    We will be checking your implanted device from home (remotely) every three months unless otherwise instructed. We will need to see you in the clinic at least once a year. You may need to be seen in the clinic sooner depending on the results of your check.    Please be aware:    The follow-up schedule is like a Physician prescription.    Your remote monitor is paired to your specific implanted device.      Sincerely,    [facility]

## 2021-06-19 NOTE — PROGRESS NOTES
Assessment and Plan:     1. Elevated sed rate  Discussed differentials for an elevated sed rate.  Discussed that this is a nonspecific lab for inflammation.  Patient states me a rheumatology believe she has an autoimmune disease, but they do not know which one.  She is unable to return [place].  Will refer to [facility] for further evaluation. I encouraged her to take a picture of her rash.    - Ambulatory referral to Rheumatology    2. Autoimmune disease (H)  Patient was told and diagnosed with an autoimmune disease in the past.  Will refer to [facility].    3. Xerostomia  Patient continues PIlocarpine.  She will discuss increasing the dose with the Neurologist.     4. Screen for colon cancer  - Ambulatory referral for Colonoscopy    5. Coronary artery disease with angina pectoris, unspecified vessel or lesion type, unspecified whether native or transplanted heart (H)  She continues to see cardiology.     6. Peripheral vascular disease (H)  This is stable.      7. Type 2 diabetes mellitus without complication, without long-term current use of insulin (H)  This is diet controlled.  She continues daily asa.  She has an intolerance to statins.  She will discuss this further with her cardiologist.  She is due for a diabetes check in 3 months.     8. Statin intolerance  She will discuss this with cardiology.  She is content with the plan.     Subjective:     Kim is a 58 y.o. female presenting to the clinic for multiple concerns today.  Patient has a long-standing history of chronic pain, dry mouth, tongue and facial swelling, oral sores, and rashes.  Patient was recently seen at [facility] where she had an elevated sed rate.  Rheumatologist ruled out Sjogren's.  Patient feels as though she needs an answer for the elevated sed rate.  Her primary care provider at [facility] was providing prednisone but she does not want to resume this.  She did not have a great experience with the dermatologist at [facility].  States she was diagnosed with  psoriasis in her 30s, but she states that the rash that she experiences now is a different rash.  She describes the rash as small vesicles which develop on her scalp, neck, back, and abdomen.  She has not taken a picture of the rash.  She has chronic headaches and recently saw [facility].  They repeated VITOR, sed rate, CRP, but I do not have those results.  She has a follow-up appointment on 18 where they will consider starting Botox.  She was also prescribed pilocarpine once daily for the xerostomia.  She complains today of bilateral ear pain and tinnitus. She feels a pulling sensation within her hair.     Review of Systems: A complete 14 point review of systems was obtained and is negative or as stated in the history of present illness.    Social History     Social History     Marital status:      Spouse name: N/A     Number of children: 0     Years of education: N/A     Occupational History     Unemployed past 10 years      Social History Main Topics     Smoking status: Current Every Day Smoker     Packs/day: 0.50     Smokeless tobacco: Never Used     Alcohol use No     Drug use: No     Sexual activity: Not on file      Comment: single      Other Topics Concern     Not on file     Social History Narrative       about 8 years ago; step kids; she has a significant boyfriend who lives in [place];       Active Ambulatory Problems     Diagnosis Date Noted     Post-traumatic Stress Disorder      Peripheral Vascular Disease      Vitamin D Deficiency      Chronic Laryngitis      Depression      Coronary artery disease      Lower Back Pain      Recurrent Aphthous Ulcer      Cluster Headache      Esophageal Reflux      Allergies      Cardiac pacemaker in situ, dual chamber      Temporomandibular Joint-pain Dysfunction Syndrome      Anxiety 11/10/2015     Chronic neck pain 11/10/2015     Insomnia 2015     Type 2 diabetes mellitus without complication (H) 2015     Major depressive  disorder, recurrent episode, mild (H) 11/24/2015     Stomatitis and mucositis 03/24/2016     Migraine with aura 08/26/2016     Syncope 12/29/2010     Bicuspid aortic valve 01/15/2018     Acute chest pain 06/30/2018     Autoimmune disease (H) 06/30/2018     Scalp hematoma 06/30/2018     Angina at rest (H) 06/30/2018     Resolved Ambulatory Problems     Diagnosis Date Noted     Tooth Pain      Foot Pain (Soft Tissue)      Nausea      Soreness Or Pain In A Salivary Gland      History of cardiac pacemaker in situ 06/04/2017     Bipolar affective disorder (H) 12/29/2010     Past Medical History:   Diagnosis Date     Asthma      CAD (coronary artery disease)      GERD (gastroesophageal reflux disease)      High cholesterol      Memory problem      Migraines        Family History   Problem Relation Age of Onset     Hypertension Mother      Alcohol abuse Mother      Heart disease Father      Alcohol abuse Father        Objective:     /78  Pulse 70  Ht 5' (1.524 m)  Wt 116 lb 1.6 oz (52.7 kg)  BMI 22.67 kg/m2    Patient is alert, in no obvious distress.   Skin: Warm, dry.  No lesions or rashes.  Skin turgor rapid return.   HEENT:  Head normocephalic, atraumatic.  Eyes normal.  Ears normal.  Nose patent, mucosa pink.  Oropharynx mucosa pink.  No lesions or tonsillar enlargement. Her tongue appears mildly enlarged.  Neck: Supple, no lymphadenopathy. No thyromegaly.  Lungs:  Clear to auscultation. Respirations even and unlabored.  No wheezing or rales noted.   Heart:  Regular rate and rhythm.  Abdomen: Soft, nontender.  No organomegaly. Bowel sounds normoactive. No guarding or masses noted.

## 2021-06-20 NOTE — LETTER
Letter by Faith Owen, RN at      Author: Faith Owen RN Service: -- Author Type: --    Filed:  Encounter Date: 1/8/2020 Status: Signed         Kim Correa  [address] [place] [address]        January 8, 2020       Dear Kim,     Your annual lung cancer screening scan is overdue. We have attempted to reach you via mail and phone without success. The lung clinic would be happy to assist you in scheduling an office visit and follow up scan. If you remain interested please call [phone].  We will make no further attempts to call you to schedule at this point if we do not hear from you and assume you are no longer interested in the screening.     Sincerely,  [facility]

## 2021-06-20 NOTE — PROGRESS NOTES
"Pulmonary Clinic Outpatient Consultation    Assessment and Plan:   58 year old lady with history of GERD, CAD, HLD, presents for evaluation of chronic bronchitis. PFTs are largely normal and do not show evidence of obstruction. She has symptoms more consistent with asthma and seems to get frequent bronchitis, no doubt in the setting of heavy tobacco use. Discussed smoking cessation at length. She appears to be in the pre-contemplative stage. Today we will try to optimize her inhaler regimen and emphasize tobacco cessation.     Recommendations:  - Start flovent high dose 1 puff two times a day WITH SPACER. Inhaler teaching done in clinic today. Rinse/gargle mouth after each use  - discussed smoking cessation for 8 min. Not ready to quit yet. Discussed health benefits  - will Rx xopenex inhaler to be used as needed. She did not tolerate albuterol due to tremors and palpitations  - encouraged to stay active and exercise as able  - consideration for pulmonary rehab in the near future once her pharmacologic therapy is optimized; no evidence of COPD but clearly symptomatic so she may still benefit.  - advised flu vaccine, she declined. UTD with PSV23.    Follow up in 1-2 months for reassessment. All questions answered.       CCx: consult for chronic bronchitis    HPI: 58 year old lady with history of GERD, CAD, HLD, presents for evaluation of chronic bronchitis. She is a current every day smoker, smokes 1 pack per day. Has tried NRT, Chantix and wellbutrin in the past without success. She has anxiety. She is accompanied by Merline, her \"independent living assistant\" who helps her navigate her medical appointments and advocates for her.  In terms of her breathing, she has chronic dyspnea on exertion. Gets shortness of breath with climbing 6 steps. Does not leave the house much or exercise due to shortness of breath. She coughs frequently; occasionally productive of clear phlegm. She has been coughing for some time. She says " she had bronchitis Dec 2-17 - Jan 2018 and never really recovered from this; has been coughing since. She says she gets bronchitis at least once per year. She got abx and steroids for this. She has history of elevated ESR; autoimmune testing at [facility] was negative but she is going to be seen at the [facility] rheumatology to work this up further. She has hx of CAD. Denies chest pain, chest pressure, LE swelling, syncope or palpitations. She does have a pacemaker.    ROS:  A 12-system review was obtained and was negative with the exception of the symptoms endorsed in the history of present illness.    PMH:  Past Medical History:   Diagnosis Date     Asthma      CAD (coronary artery disease)      GERD (gastroesophageal reflux disease)      High cholesterol      Memory problem      Migraines        PSH:  Past Surgical History:   Procedure Laterality Date     APPENDECTOMY       CARDIAC CATHETERIZATION       CARDIAC PACEMAKER PLACEMENT       CORONARY ANGIOPLASTY       CORONARY STENT PLACEMENT       INSERT / REPLACE / REMOVE PACEMAKER       AK APPENDECTOMY      Description: Appendectomy;  Recorded: 09/19/2008;  Comments: and ovarian cyst     AK REVISE MEDIAN N/CARPAL TUNNEL SURG      Description: Neuroplasty Decompression Median Nerve At Carpal Tunnel;  Recorded: 09/19/2008;     AK VAGINAL HYSTERECTOMY,UTERUS 250 GMS/<      Description: Vaginal Hysterectomy;  Recorded: 12/16/2011;       Allergies:  Allergies   Allergen Reactions     Imitrex [Sumatriptan Succinate] Hives     Indomethacin Hives     Venom-Honey Bee Anaphylaxis     Sumatriptan      Sulfa (Sulfonamide Antibiotics) Rash and Angioedema       Family HX:  Family History   Problem Relation Age of Onset     Hypertension Mother      Alcohol abuse Mother      Heart disease Father      Alcohol abuse Father        Social Hx:  Social History     Social History     Marital status:      Spouse name: N/A     Number of children: 0     Years of education: N/A      Occupational History     Unemployed past 10 years      Social History Main Topics     Smoking status: Current Every Day Smoker     Packs/day: 1.00     Years: 38.00     Smokeless tobacco: Never Used     Alcohol use No     Drug use: No     Sexual activity: Not on file      Comment: single      Other Topics Concern     Not on file     Social History Narrative       about 8 years ago; step kids; she has a significant boyfriend who lives in [place];       Current Meds:  Current Outpatient Prescriptions   Medication Sig Dispense Refill     albuterol (PROAIR HFA;PROVENTIL HFA;VENTOLIN HFA) 90 mcg/actuation inhaler Inhale 2 puffs every 4 (four) hours as needed. 1 Inhaler 1     artificial tears,hypromellose, (GENTEAL; SYSTANE) 0.3 % Gel Administer to both eyes at bedtime.       aspirin 81 MG EC tablet Take 81 mg by mouth daily.       CONTOUR NEXT STRIPS strips Use 1 each As Directed 2 (two) times a day. 100 strip 3     cyanocobalamin 1000 MCG tablet Take 1,000 mcg by mouth daily.       EPINEPHrine (EPIPEN) 0.3 mg/0.3 mL injection Inject 0.3 mL (0.3 mg total) as directed as needed for anaphylaxis. Inject into thigh. 2 Pre-filled Pen Syringe 0     isosorbide mononitrate (IMDUR) 30 MG 24 hr tablet Take 30 mg by mouth at bedtime.       lancets (FINGERSTIX LANCETS) Misc 1 each by In Vitro route 2 (two) times a day. 100 each 3     lifitegrast (XIIDRA) 5 % Dpet        nitroglycerin (NITROLINGUAL) 400 mcg/spray spray Place 1 spray under the tongue every 5 (five) minutes as needed for chest pain. 12 g 0     pilocarpine (SALAGEN) 5 MG tablet Take 5 mg by mouth daily.       VITAMIN B-6 250 MG tablet Take 1 tablet by mouth daily.  3     fluticasone (FLOVENT HFA) 220 mcg/actuation inhaler Inhale 1 puff 2 (two) times a day. 1 Inhaler 12     No current facility-administered medications for this visit.        Physical Exam:  /62  Pulse 84  Resp 24  Ht 5' (1.524 m)  Wt 116 lb 4.8 oz (52.8 kg)  SpO2 96%  Comment: RA  BMI 22.71 kg/m2  Gen: awake, alert, oriented, no distress  HEENT: nasal turbinates are unremarkable, no oropharyngeal lesions, no cervical or supraclavicular lymphadenopathy  CV: RRR, no M/G/R  Resp: CTAB, no focal crackles or wheezes  Abd: soft, nontender, no palpable organomegaly  Skin: no apparent rashes  Ext: no cyanosis, clubbing or edema  Neuro: alert, nonfocal    Labs:  reviewed    Imaging studies:  None available    PFT's (9/21/2018)  FEV1 2.45L 105%  %  Ratio 0.73  No BD response  %  %  RV/%  DLco 78% sourav for hgb (note she did not achieve >85% of VC for this test)  Impression: essentially normal PFTs with the exception of some hyperinflation and very slightly reduced diffusing capacity. Flow volume loop appears normal.    Stevo (Uriah) MD Man  [facility]  Pager [phone]  Clinic [phone]

## 2021-06-20 NOTE — PROGRESS NOTES
Patient instructed in use of Flovent inhaler with spacer.  Patient states good understanding of how to use the inhaler and spacer device.  Sent home with printed instructions as well as phone numbers to call if any questions.

## 2021-06-21 NOTE — PROGRESS NOTES
In clinic device check with Dr. Garrido.  Please see link for full device report.  Patient was informed of results and next follow up during today's visit.

## 2021-06-21 NOTE — PROGRESS NOTES
Cardiology Progress Note    Assessment:    Coronary artery disease, status post remote LAD stenting, no angina  Bipolar disorder   History of neurocardiogenic syncope status post pacemaker, device is reaching elective replacement interval  Hyperlipidemia, on lipid-lowering medications  Tobacco abuse    Plan:  Monthly pacemaker checks  Are urged her to quit smoking  No additional cardiac testing this year    Follow-up in 1 year    Subjective:   This is 58 y.o. female who comes in today for follow-up visit.  She  reports no recent episodes of chest pains.  She has not had to use any sublingual nitroglycerin.  In July of this year she was admitted with atypical chest pain.  She was seen by my partner Dr. Dominguez.  She declined any additional cardiac testing.  Early in the year she was seen at [facility] for possible connective tissue disorder.  She was evaluated by cardiologist.  Diagnosis of myocarditis was entertained.  It is my understanding that the cardiac evaluation was unrevealing.  She is living with her parents now.  She is hoping to move to her own place soon.  Her weight has been stable.  She has no PND and orthopnea    Review of Systems:   General: WNL  Eyes: Visual Distubance  Ears/Nose/Throat: WNL  Lungs: Cough, Shortness of Breath, Wheezing  Heart: Shortness of Breath with activity, Fainting  Stomach: Nausea  Bladder: WNL  Muscle/Joints: Joint Pain, Muscle Weakness, Muscle Pain  Skin: WNL  Nervous System: Falls  Mental Health: WNL     Blood: WNL    Objective:   /68 (Patient Site: Left Arm, Patient Position: Sitting, Cuff Size: Adult Regular)  Pulse 84  Resp 18  Ht 5' (1.524 m)  Wt 119 lb (54 kg)  BMI 23.24 kg/m2  Physical Exam:  GENERAL: no distress  NECK: No JVD  LUNGS: Decreased breath sounds bilaterally  CARDIAC: regular rhythm, S1 & S2 normal.  No heaves, thrills, gallops or murmurs.  ABDOMEN: flat, negative hepatosplenomegaly, soft and non-tender.  EXTREMITIES: No evidence of cyanosis,  clubbing or edema.    Current Outpatient Prescriptions   Medication Sig Dispense Refill     artificial tears,hypromellose, (GENTEAL; SYSTANE) 0.3 % Gel Administer to both eyes at bedtime.       aspirin 81 MG EC tablet Take 81 mg by mouth daily.       CONTOUR NEXT STRIPS strips Use 1 each As Directed 2 (two) times a day. 100 strip 3     cyanocobalamin 1000 MCG tablet Take 1,000 mcg by mouth daily.       cyclobenzaprine (FLEXERIL) 5 MG tablet Take 5 mg by mouth 2 (two) times a day as needed for muscle spasms.       EPINEPHrine (EPIPEN) 0.3 mg/0.3 mL injection Inject 0.3 mL (0.3 mg total) as directed as needed for anaphylaxis. Inject into thigh. 2 Pre-filled Pen Syringe 0     fluticasone (FLOVENT HFA) 220 mcg/actuation inhaler Inhale 1 puff 2 (two) times a day. 1 Inhaler 12     isosorbide mononitrate (IMDUR) 30 MG 24 hr tablet Take 30 mg by mouth at bedtime.       lancets (FINGERSTIX LANCETS) Misc 1 each by In Vitro route 2 (two) times a day. 100 each 3     levalbuterol (XOPENEX HFA) 45 mcg/actuation inhaler Inhale 1-2 puffs every 4 (four) hours as needed for wheezing. 1 Inhaler 12     nitroglycerin (NITROLINGUAL) 400 mcg/spray spray Place 1 spray under the tongue every 5 (five) minutes as needed for chest pain. 12 g 0     VITAMIN B-6 250 MG tablet Take 1 tablet by mouth daily.  3     albuterol (PROAIR HFA;PROVENTIL HFA;VENTOLIN HFA) 90 mcg/actuation inhaler Inhale 2 puffs every 4 (four) hours as needed. 1 Inhaler 1     lifitegrast (XIIDRA) 5 % Dpet        pilocarpine (SALAGEN) 5 MG tablet Take 5 mg by mouth daily.       No current facility-administered medications for this visit.        Cardiographics:    Coronary angio: 2012  95% proximal RCA, mild to moderate diffuse less than 30% disease elsewhere    Echo: May 2017  1. Normal left ventricular size and systolic performance with a visually estimated ejection fraction of 60-65%.   2. No significant valvular heart disease is identified on this study.   3. Normal right  ventricular size and systolic performance.       Stress test: May 2017    2.There is a small area of ischemia in the inferoseptal segment(s) of the left ventricle. This is close to valve plane and could be false positive due to patient motion.    3.The left ventricular ejection fraction is 74%.    4.The patient is at a low risk of future cardiac ischemic events.    Lab Results:       Lab Results   Component Value Date    CHOL 258 (H) 08/26/2016    CHOL 235 (H) 11/10/2015    CHOL 165 01/16/2013     Lab Results   Component Value Date    HDL 35 (L) 08/26/2016    HDL 52 11/10/2015    HDL 51 01/16/2013     Lab Results   Component Value Date    LDLCALC 187 (H) 08/26/2016    LDLCALC 147 (H) 11/10/2015    LDLCALC 90 01/16/2013     Lab Results   Component Value Date    TRIG 178 (H) 08/26/2016    TRIG 181 (H) 11/10/2015    TRIG 121 01/16/2013     BNP   Date Value Ref Range Status   09/13/2010 65 <74 pg/mL Final       Avelino (Jere)  MD Radhika

## 2021-06-22 NOTE — PROGRESS NOTES
Pre-Intra-Post education and instructions as listed below were reviewed with Patient via phone  H&P on 12/28 with PMD, in [facility]  12/27/2018 3:38 PM  Faith Islas RN

## 2021-06-22 NOTE — PROGRESS NOTES
Heart Care Device Change-Out Checklist (NATALIE Checklist)    Device Data  Pacemaker and Dual    :  Medtronic  Model:  ADDRL1 Adapta  Serial Number:  [id number]    Implant Date: 1/3/2007  NATALIE Date:  12/1/2018  Device Diagnosis:  Syncope    Device Alert(s):  No    Lead Data  (Print Device History and attach to this document. Enter each lead  and model number.)  Last Interrogation Date: 12/18/2018 (last testing was 11/8/2018)      Atrium: Medtronic 5076 CapSureFix Novus   Lead Imp:  680Ohms, Pacing Threshold:  0.5V@0.4ms and Sensing Threshold:  2mV      Right Ventricle: Medtronic 5076 CapSureFix Novus   Lead Imp:  515Ohms, Pacing Threshold:  2V@0.4ms and Sensing Threshold:  8mV      Old Leads Present/Abandoned: No    Lead Alert(s):  No    Diagnostic Information  Intrinsic Rhythm:  SB-SR 40-80's    Atrial Fibrillation:  No  Takes Anticoagulant? No    Pacing Percentages  Atrial Pacing 45.7% and Ventricle Pacing <0.1%  Pacemaker Dependent? No    Ejection Fraction  Last EF Date:  5/10/2018    By Echocardiogram  Last EF Measurement:  60-65%      Heart Failure Diagnostics:  n/a    Special Instructions/Timeframe for change-out:  Patient notes fatigue and feeling heavy for the last 1.5week, reports activity tolerance isn't what is usually is. Discussed that this is likely related to NATALIE.. NATALIE date 12/1/2018, device to be changed out by 3/1/2019, however sooner if possible due to symptoms.     Routed to EP:  Yes: Dr. Chico Mcdowell      Device RN:   Qing Turner RN BSN PHN  Device Nurse   [facility]

## 2021-06-22 NOTE — PATIENT INSTRUCTIONS - HE
Pacemaker Post-operative Checklist      The Device Registered Nurse (RN) reviewed the pacemaker function.      The Device RN did a wound assessment and wound care teaching.    Please call the Device Nurses with any signs of infection or questions regarding wound healing. Device Nurse Line: [phone], Option #3      The Device RN demonstrated and displayed the specific remote monitoring system for your pacemaker.      The Device RN reviewed the Partnership Agreement Form.    Patient Instructions      To reduce the risk of infection, try to avoid any dental procedures for the first 6 weeks after your pacemaker implant. If you have an emergent or urgent dental need during this time, contact the device clinic for a prescription for an antibiotic.      You will receive a device identification (ID) card in the mail from the device  within 6 weeks to replace the temporary ID card you were given in the hospital.      You may travel by any mode of transportation; just show your pacemaker ID card. You may be subject to a hand search or use of a handheld wand, but official should not keep the wand over the implant site for greater than 5-10 seconds.      For any surgery, let your doctor know you have a pacemaker.       Your pacemaker is MRI safe       Most household appliances, including a microwave, will not interfere with your pacemaker function. If you suspect interference, simply move away from the source. Cell phones do not cause a problem. Please refer to the device booklet from the  or their website under the section on electromagnetic interference (RAHUL) for further guidelines on things that may interfere with your pacemaker.       Device Clinic Contact Information  Device Nurses: [phone], Option #3    Device Remote Specialists: [phone], Option #2. For questions about your Remote Transmission or Transmission Schedule  Device Schedulers: [phone], Option #1

## 2021-06-22 NOTE — PROGRESS NOTES
Preoperative Exam    Scheduled Procedure: replace pacemaker  Surgery Date:  12/31/18  Surgery Location: [facility], fax [phone]    Surgeon:      Assessment/Plan:     1. Encounter for preoperative examination for general surgical procedure  Patient sees cardiology due to CAD and vasovagal syncope.  She is due to have her pacemaker replaced.  CAD is stable and she has not needed to use nitroglycerin.  Recommend she discuss use of aspirin and Imdur use prior to surgery with her surgeon.    - Hemoglobin    2. Cardiac pacemaker in situ, dual chamber    3. Vasovagal syncope    4. Coronary artery disease due to calcified coronary lesion  This is stable.     5. Major depressive disorder, recurrent episode, mild (H)  6. Post-traumatic Stress Disorder  7. Anxiety  She is not currently taking medication.      8. Migraine with aura and without status migrainosus, not intractable  She continues to see [facility].     9. Type 2 diabetes mellitus without complication, without long-term current use of insulin (H)  This is diet controlled.      10.  Autoimmune disease  Patient has been evaluated at [facility].  She is not currently taking medication.       Surgical Procedure Risk: Intermediate (reported cardiac risk generally 1-5%)  Have you had prior anesthesia?: Yes  Have you or any family members had a previous anesthesia reaction:  No  Do you or any family members have a history of a clotting or bleeding disorder?: No  Cardiac Risk Assessment: no increased risk for major cardiac complications    Patient approved for surgery with general or local anesthesia.    Functional Status: Independent  Patient plans to recover at home with family.     Subjective:      Kim Correa is a 58 y.o. female who presents for a preoperative consultation.  Patient has a history of neurocardiogenic syncope status post pacemaker.  Device is reaching elective replacement interval.  She was hospitalized July of this year due to  atypical chest pain.  She declined any additional cardiac testing.  She has a history of CAD s/p remote LAD stenting.  She has not been using nitroglycerin.  She has a history of statin intolerance.  She does continue to smoke.    Patient has a long-standing history of chronic pain, dry mouth, tongue and facial swelling, oral sores, and rashes.  Patient was recently seen at [place] where she had an elevated sed rate.  It is believed she may have an autoimmune disease.  She was treated with Prednisone short-term.  She has chronic headaches as sees [facility].     She has a history of PTSD, depression, anxiety, bipolar disorder.  She is not currently taking medication.  She has been diagnosed with type 2 diabetes in the past which is diet controlled.  Last hemoglobin A1c was 6.7% 5 months ago.    All other systems reviewed and are negative, other than those listed in the HPI.    Pertinent History  Do you have difficulty breathing or chest pain after walking up a flight of stairs: No  History of obstructive sleep apnea: Yes: has fatigue  Steroid use in the last 6 months: No  Frequent Aspirin/NSAID use: Yes: 81 mg  Prior Blood Transfusion: No  Prior Blood Transfusion Reaction: No  If for some reason prior to, during or after the procedure, if it is medically indicated, would you be willing to have a blood transfusion?:  There is no transfusion refusal.    Current Outpatient Medications   Medication Sig Dispense Refill     artificial tears,hypromellose, (GENTEAL; SYSTANE) 0.3 % Gel Administer to both eyes at bedtime.       aspirin 81 MG EC tablet Take 81 mg by mouth daily.       CONTOUR NEXT STRIPS strips Use 1 each As Directed 2 (two) times a day. 100 strip 3     cyanocobalamin 1000 MCG tablet Take 1,000 mcg by mouth daily.       EPINEPHrine (EPIPEN) 0.3 mg/0.3 mL injection Inject 0.3 mL (0.3 mg total) as directed as needed for anaphylaxis. Inject into thigh. 2 Pre-filled Pen Syringe 0     fluticasone (FLOVENT HFA)  220 mcg/actuation inhaler Inhale 1 puff 2 (two) times a day. 1 Inhaler 12     isosorbide mononitrate (IMDUR) 30 MG 24 hr tablet Take 30 mg by mouth at bedtime.       lancets (FINGERSTIX LANCETS) Misc 1 each by In Vitro route 2 (two) times a day. 100 each 3     levalbuterol (XOPENEX HFA) 45 mcg/actuation inhaler Inhale 1-2 puffs every 4 (four) hours as needed for wheezing. 1 Inhaler 12     lifitegrast (XIIDRA) 5 % Dpet 1 drop as needed .             nitroglycerin (NITROLINGUAL) 400 mcg/spray spray Place 1 spray under the tongue every 5 (five) minutes as needed for chest pain. 12 g 0     VITAMIN B-6 250 MG tablet Take 1 tablet by mouth daily.  3     No current facility-administered medications for this visit.         Allergies   Allergen Reactions     Imitrex [Sumatriptan Succinate] Hives     Indomethacin Hives     Venom-Honey Bee Anaphylaxis     Sumatriptan      Gabapentin Nausea And Vomiting and Anxiety     Sulfa (Sulfonamide Antibiotics) Rash and Angioedema       Patient Active Problem List   Diagnosis     Post-traumatic Stress Disorder     Peripheral Vascular Disease     Vitamin D Deficiency     Chronic Laryngitis     Depression     Coronary artery disease     Lower Back Pain     Recurrent Aphthous Ulcer     Cluster Headache     Esophageal Reflux     Allergies     Cardiac pacemaker in situ, dual chamber     Temporomandibular Joint-pain Dysfunction Syndrome     Anxiety     Chronic neck pain     Insomnia     Type 2 diabetes mellitus without complication (H)     Major depressive disorder, recurrent episode, mild (H)     Stomatitis and mucositis     Migraine with aura     Vasovagal syncope     Bicuspid aortic valve     Acute chest pain     Autoimmune disease (H)     Scalp hematoma     Angina at rest (H)     Statin intolerance     ROSARIO (dyspnea on exertion)     Pacemaker battery depletion       Past Medical History:   Diagnosis Date     Asthma      CAD (coronary artery disease)      GERD (gastroesophageal reflux  "disease)      High cholesterol      Memory problem      Migraines        Past Surgical History:   Procedure Laterality Date     APPENDECTOMY       CARDIAC CATHETERIZATION       CARDIAC PACEMAKER PLACEMENT       CORONARY ANGIOPLASTY       CORONARY STENT PLACEMENT       INSERT / REPLACE / REMOVE PACEMAKER       WI APPENDECTOMY      Description: Appendectomy;  Recorded: 2008;  Comments: and ovarian cyst     WI REVISE MEDIAN N/CARPAL TUNNEL SURG      Description: Neuroplasty Decompression Median Nerve At Carpal Tunnel;  Recorded: 2008;     WI VAGINAL HYSTERECTOMY,UTERUS 250 GMS/<      Description: Vaginal Hysterectomy;  Recorded: 2011;       Social History     Socioeconomic History     Marital status:      Spouse name: Not on file     Number of children: 0     Years of education: Not on file     Highest education level: Not on file   Social Needs     Financial resource strain: Not on file     Food insecurity - worry: Not on file     Food insecurity - inability: Not on file     Transportation needs - medical: Not on file     Transportation needs - non-medical: Not on file   Occupational History     Occupation: Unemployed past 10 years   Tobacco Use     Smoking status: Current Every Day Smoker     Packs/day: 1.00     Years: 38.00     Pack years: 38.00     Smokeless tobacco: Never Used   Substance and Sexual Activity     Alcohol use: No     Drug use: No     Sexual activity: Not on file     Comment: single    Other Topics Concern     Not on file   Social History Narrative       about 8 years ago; step kids; she has a significant boyfriend who lives in [place];       Patient Care Team:  Lorena Baeza CNP as PCP - General (Family Medicine)          Objective:     Vitals:    18 0833   BP: 90/50   Pulse: 65   SpO2: 98%   Weight: 123 lb 8 oz (56 kg)   Height: 4' 11.5\" (1.511 m)         Physical Exam:  Physical Exam   BP 90/50   Pulse 65   Ht 4' 11.5\" (1.511 m)   Wt 123 lb 8 oz " (56 kg)   SpO2 98%   BMI 24.53 kg/m      General Appearance:    Alert, cooperative, no distress, appears stated age   Head:    Normocephalic, without obvious abnormality, atraumatic   Eyes:    PERRL, conjunctiva/corneas clear, EOM's intact, fundi     benign, both eyes   Ears:    Normal TM's and external ear canals, both ears   Nose:   Nares normal, septum midline, mucosa normal, no drainage     or sinus tenderness   Throat:   Lips, mucosa, and tongue normal; teeth and gums normal   Neck:   Supple, symmetrical, trachea midline, no adenopathy;     thyroid:  no enlargement/tenderness/nodules; no carotid    bruit or JVD   Back:     Symmetric, no curvature, ROM normal, no CVA tenderness   Lungs:     Clear to auscultation bilaterally, respirations unlabored   Chest Wall:    No tenderness or deformity    Heart:    Regular rate and rhythm, S1 and S2 normal, no murmur, rub    or gallop   Breast Exam:    deferred   Abdomen:     Soft, non-tender, bowel sounds active all four quadrants,     no masses, no organomegaly   Genitalia:   deferred   Rectal:    deferred   Extremities:   Extremities normal, atraumatic, no cyanosis or edema   Pulses:   2+ and symmetric all extremities   Skin:   Skin color, texture, turgor normal, no rashes or lesions   Lymph nodes:   Cervical, supraclavicular, and axillary nodes normal   Neurologic:   CNII-XII intact, normal strength, sensation and reflexes     throughout       Patient Instructions     Hold all supplements, aspirin and NSAIDs for 7 days prior to surgery.  Follow your surgeon's direction on when to stop eating and drinking prior to surgery.  Your surgeon will be managing your pain after your surgery.    Remove all jewelry and metal piercings before your surgery.   Remove nail polish from fingers before surgery.      EKG:  EKG performed on 6/30/18 showed electronic atrial pacemaker left anterior fascicular block, nonspecific ST abnormality.  When compared with EKG of 3/24/16 no  significant change was found.     Labs:  No results found for this or any previous visit (from the past 24 hour(s)).    Immunization History   Administered Date(s) Administered     Hep A, historic 01/09/2009, 08/28/2012     Hep B, historic 01/09/2009, 02/11/2009, 12/16/2011     Influenza, inj, historic,unspecified 11/12/2008, 08/28/2012     Influenza, seasonal,quad inj 6-35 mos 12/16/2011     Pneumo Polysac 23-V 12/16/2011     Td,adult,historic,unspecified 11/12/2008     Tdap 11/12/2008           Electronically signed by Lorena Baeza CNP 12/28/18 8:35 AM

## 2021-06-22 NOTE — PROGRESS NOTES
1960  Home:[phone] (home) Cell:[phone] (mobile)  Emergency Contact: Nico Madrigal     +++Important patient information for [facility]/Cath Lab staff : None+++    [facility] EP Cath Lab Procedure Order     Device Implant/Revision:  Procedure: Generator Change Out  Device Type: Dual Pacemaker  Device Company/Device Rep Needed for Procedure: Medtronic    Diagnosis:  Syncope, Device at NATALIE  Anticipated Case Duration:  Standard  Scheduling Needs/Timeframe:  ASAP, pt is symptomatic with NATALIE  Anesthesia:  Conscious Sedation  Research Protocol:  No    [facility] EP Cath Lab Prep   Ordering Provider: Dr Mcdowell  Ordering Date: 12/19/2018  Orders Status: Intial order placed and Order set placed    H&P:  Pt to schedule with PMD to complete  PCP: Lorena Baeza CNP, [phone]    Pre-op Labs: Ordered AM of procedure    Medical Records Pertinent for Procedure:  Stress test 5-10-17 , Echo 1-12-18 (zimmerman-in [employer]), EKG 6-30-18 paced @ 72 and Device Implant Record Implant 1-3-07 w/[employer]    Patient Education:    Teach with Patient: Will be completed via phone prior to procedure, and letter was also sent to pt via mail/Xeround with written pre-procedural instructions.    Risks Reviewed:     Pacemaker Insertion    <1% for each of the following:  infection, bleeding, hematoma, pneumothorax, subclavian vein thrombosis, cardiac perforation, cardiac tamponade, arrhythmias, pectoral or diaphragmatic stimulation, air embolus, pocket erosion, device interactions.    <4% lead dislodgment, <1% lead fracture or generator  malfunction.    <0.5% CVA or MI.    <0.1% death.    If external defibrillation or CV is needed, 25% risk for superficial burn.    For patients on anticoagulation, the risk of bleeding, hematoma and tamponade are increased.      Consent: Will be obtained in [facility] day of procedure    Pre-Procedure Instructions that were Reviewed with Patient:  NPO after midnight, Remove all jewelry prior to coming in for procedure, Shower prior to  arrival, Notified patient of time and date of procedure by CV , Transportation arrangements needed s/p procedure, Post-procedure follow up process, Sedation plan/orders and Pre-procedure letter was sent to pt by CV     Pre-Procedure Medication Instructions:  Instructions given to pt regarding anticoagulants: Pt is not on an anticoagulant- N/A  Instructions given to pt regarding antiarrhythmic medication: N/A for this type of procedure; N/A  Instructions for medication, other than anticoagulants/antiarrhythmics listed above, given to pt: to take all morning medications with small sips of water, with the exception of OTC supplements and MVI      Allergies   Allergen Reactions     Imitrex [Sumatriptan Succinate] Hives     Indomethacin Hives     Venom-Honey Bee Anaphylaxis     Sumatriptan      Gabapentin Nausea And Vomiting and Anxiety     Sulfa (Sulfonamide Antibiotics) Rash and Angioedema       Current Outpatient Medications:      albuterol (PROAIR HFA;PROVENTIL HFA;VENTOLIN HFA) 90 mcg/actuation inhaler, Inhale 2 puffs every 4 (four) hours as needed., Disp: 1 Inhaler, Rfl: 1     artificial tears,hypromellose, (GENTEAL; SYSTANE) 0.3 % Gel, Administer to both eyes at bedtime., Disp: , Rfl:      aspirin 81 MG EC tablet, Take 81 mg by mouth daily., Disp: , Rfl:      CONTOUR NEXT STRIPS strips, Use 1 each As Directed 2 (two) times a day., Disp: 100 strip, Rfl: 3     cyanocobalamin 1000 MCG tablet, Take 1,000 mcg by mouth daily., Disp: , Rfl:      cyclobenzaprine (FLEXERIL) 5 MG tablet, Take 5 mg by mouth 2 (two) times a day as needed for muscle spasms., Disp: , Rfl:      EPINEPHrine (EPIPEN) 0.3 mg/0.3 mL injection, Inject 0.3 mL (0.3 mg total) as directed as needed for anaphylaxis. Inject into thigh., Disp: 2 Pre-filled Pen Syringe, Rfl: 0     fluticasone (FLOVENT HFA) 220 mcg/actuation inhaler, Inhale 1 puff 2 (two) times a day., Disp: 1 Inhaler, Rfl: 12     isosorbide mononitrate (IMDUR) 30 MG 24 hr  tablet, Take 30 mg by mouth at bedtime., Disp: , Rfl:      lancets (FINGERSTIX LANCETS) Misc, 1 each by In Vitro route 2 (two) times a day., Disp: 100 each, Rfl: 3     levalbuterol (XOPENEX HFA) 45 mcg/actuation inhaler, Inhale 1-2 puffs every 4 (four) hours as needed for wheezing., Disp: 1 Inhaler, Rfl: 12     lifitegrast (XIIDRA) 5 % Dpet, 1 drop as needed .   , Disp: , Rfl:      nitroglycerin (NITROLINGUAL) 400 mcg/spray spray, Place 1 spray under the tongue every 5 (five) minutes as needed for chest pain., Disp: 12 g, Rfl: 0     pilocarpine (SALAGEN) 5 MG tablet, Take 5 mg by mouth daily., Disp: , Rfl:      VITAMIN B-6 250 MG tablet, Take 1 tablet by mouth daily., Disp: , Rfl: 3    Documentation Date:12/19/2018 9:28 AM  Casie Lamar RN

## 2021-06-22 NOTE — PROGRESS NOTES
DEVICE CLINIC RN POST-OP NOTE    Reason for visit: in clinic 1 week post op Pacemaker gen change and wound check     Device: Medtronic [id number] XT DR MRI  Procedure date: 12/31/2018  Implant Diagnosis: Syncope  Implanting Physician: Dr. [place] Mcdowell      Assessment  Subjective: Kim denies any pain.   Vitals:   Vitals:    01/09/19 1330   BP: 120/60   Pulse: 64   Resp: 16   Temp: 97.8  F (36.6  C)       Incision/device pocket: The steri-strips were removed from the incision and it was cleansed with adhesive remover and alcohol wipes. The incision is clean, dry and intact. There are no signs of infection present. The incision is well healed.         Device Findings  Interrogation of device reveals normal sensing and capture thresholds  See the Paceart Report for a full summary of the device information.        Patient Education    [facility]'s Partnership Agreement for Device Patients and Post-operative Checklist were presented and reviewed with patient at today's appointment.    Signs and symptoms of infection, poor wound healing, and device function were reviewed. She was issued a [id number] [id number] remote monitor and instructed on its set-up and use for remote monitoring by the [employer] representative prior to hospital discharge. These instructions were reviewed with the patient at today s visit. Pt will plug monitor in today.       Plan  Remote from home in 3 months on 4/2/2019    Qing Turner RN BSN PHN  Device Nurse   [facility]

## 2021-06-23 NOTE — PROGRESS NOTES
Assessment and Plan:     1. Autoimmune disease (H)  Patient has been diagnosed with an autoimmune disease at [place].  She received Prednisone and found relief.  We discussed Hydroxychloroquine as a treatment option.  Patient has symptoms of Sjogren's and RA.  Will check VITOR, complements, sed rate.  Will refer to rheumatology to evaluate and determine if Hydroxychloroquine is a treatment option.   - Antinuclear Antibody (VITOR) Cascade  - Complement, C'3  - Complement, C'4  - Complement, Total  - Sedimentation Rate  - Rheumatoid Factor Quant  - Ambulatory referral to Rheumatology    2. Vasovagal syncope  Discussed changing positions slowly.      3. Abnormal CT of brain  I am concerned for a demyelinating disease such as MS.  She has seen [facility] for migraine headaches, but did not feel as though they listened to her symptoms.  Will refer to Dr. [facility].   - Ambulatory referral to Neurology    4. Unsteady gait    5. Migraine with aura and without status migrainosus, not intractable  Will refer to Neurology.     6. Type 2 diabetes mellitus without complication, without long-term current use of insulin (H)  Will check A1C.  Discussed continuing healthy diet.   - Glycosylated Hemoglobin A1c    7. Major depressive disorder, recurrent episode, mild (H)  She is not interested in treatment.     8. Peripheral vascular disease (H)  9. Coronary artery disease with angina pectoris, unspecified vessel or lesion type, unspecified whether native or transplanted heart (H)  She continues to see cardiology.      10. Chronic bronchitis, unspecified chronic bronchitis type (H)  She sees pulmonology.     11. Medication management  - Comprehensive Metabolic Panel    The patient is content with the plan and will follow-up in 6 months for medication management or sooner with any further concerns.  I spent 40 minutes with the patient with greater than 50% spent discussing symptoms, treatment options, and  coordination of care.       Subjective:     Kim is a 58 y.o. female presenting to the clinic for concerns for worsening symptoms. Patient has a long-standing history of chronic pain, dry mouth, tongue and facial swelling, oral sores, and rashes.  Patient was recently seen at [facility] where she had an elevated sed rate.  It is believed she may have an autoimmune disease.  She was treated with Prednisone short-term and found relief with this. She has a history of PTSD, depression, anxiety, bipolar disorder.  She is not currently taking medication.  She has been diagnosed with type 2 diabetes in the past which is diet controlled.  Last hemoglobin A1c was 6.7% on 7/1/18.  She has chronic headaches as sees [facility]. She is very discouraged today, because she feels as though no one is listening to her.  She says that [facility] just wanted to start Botox for her migraine headaches and she is not interested in this.  She sees pulmonology for chronic bronchitis.She continues to experience hair loss and breakage of her hair.  She is concerned that her scalp is painful.  She continues to experiences sore in her mouthy.  She has pain in her legs with ambulation.  Her knees have been giving out from under her.  She complains of an unsteady gait and intermittent syncope.  She has been diagnosed with CAD and vasovagal syncope.  She had a pacemaker placed on 12/31/18.  She states a suture remains and occasionally irritates the skin. She was told by cardiology to snip the suture as it was supposed to be dissolvable.     She had a CT of the brain on 5/23/17 showing the following:     IMPRESSION:   1.  No finding for acute infarct or hemorrhage. Incidental left posterior falcine meningioma. No other findings for intracranial mass.     2.  Patchy areas of low attenuation change are seen within the frontal white matter, nonspecific but compatible with areas of gliosis and/or chronic myelin loss and are similar to  prior.      Review of Systems: A complete 14 point review of systems was obtained and is negative or as stated in the history of present illness.    Social History     Socioeconomic History     Marital status:      Spouse name: Not on file     Number of children: 0     Years of education: Not on file     Highest education level: Not on file   Social Needs     Financial resource strain: Not on file     Food insecurity - worry: Not on file     Food insecurity - inability: Not on file     Transportation needs - medical: Not on file     Transportation needs - non-medical: Not on file   Occupational History     Occupation: Unemployed past 10 years   Tobacco Use     Smoking status: Current Every Day Smoker     Packs/day: 0.50     Years: 38.00     Pack years: 19.00     Smokeless tobacco: Current User   Substance and Sexual Activity     Alcohol use: No     Drug use: No     Sexual activity: Not on file     Comment: single    Other Topics Concern     Not on file   Social History Narrative       about 8 years ago; step kids; she has a significant boyfriend who lives in [place];       Active Ambulatory Problems     Diagnosis Date Noted     Post-traumatic Stress Disorder      Peripheral Vascular Disease      Vitamin D Deficiency      Chronic Laryngitis      Depression      Coronary artery disease      Lower Back Pain      Recurrent Aphthous Ulcer      Cluster Headache      Esophageal Reflux      Allergies      Cardiac pacemaker in situ, dual chamber      Temporomandibular Joint-pain Dysfunction Syndrome      Anxiety 11/10/2015     Chronic neck pain 11/10/2015     Insomnia 2015     Type 2 diabetes mellitus without complication (H) 2015     Major depressive disorder, recurrent episode, mild (H) 2015     Stomatitis and mucositis 2016     Migraine with aura 2016     Vasovagal syncope 2010     Bicuspid aortic valve 01/15/2018     Acute chest pain 2018     Autoimmune  disease (H) 06/30/2018     Scalp hematoma 06/30/2018     Angina at rest (H) 06/30/2018     Statin intolerance 08/13/2018     ROSARIO (dyspnea on exertion) 09/21/2018     Pacemaker battery depletion 12/19/2018     Resolved Ambulatory Problems     Diagnosis Date Noted     Tooth Pain      Foot Pain (Soft Tissue)      Nausea      Soreness Or Pain In A Salivary Gland      History of cardiac pacemaker in situ 06/04/2017     Bipolar affective disorder (H) 12/29/2010     Past Medical History:   Diagnosis Date     Asthma      CAD (coronary artery disease)      GERD (gastroesophageal reflux disease)      High cholesterol      Memory problem      Migraines      Syncope        Family History   Problem Relation Age of Onset     Hypertension Mother      Alcohol abuse Mother      Heart disease Father      Alcohol abuse Father        Objective:     /62   Pulse 70   Ht 5' (1.524 m)   Wt 121 lb 12.8 oz (55.2 kg)   SpO2 98%   BMI 23.79 kg/m      Patient is alert, in no obvious distress.   Skin: Warm, dry.  She has a suture protruding from the right side of the pacemaker.  I used a suture scissor to remove this.    HEENT:  Head normocephalic, atraumatic.  Eyes normal. Ears normal.  Nose patent, mucosa pink.  Oropharynx mucosa pink.  No lesions or tonsillar enlargement.   Neck: Supple, no lymphadenopathy. No thyromegaly.  Lungs:  Clear to auscultation. Respirations even and unlabored.  No wheezing or rales noted.   Heart:  Regular rate and rhythm.  No murmurs, S3, S4, gallops, or rubs.    Musculoskeletal:  She ambulates slowly and has difficulty climbing onto the exam table.

## 2021-06-25 NOTE — PROGRESS NOTES
Patient was monitored by RN via EKG and pulse oximeter throughout procedure. Patient stable throughout.

## 2021-06-25 NOTE — PROGRESS NOTES
ASSESSMENT AND PLAN:  Kim Correa 58 y.o. female is seen here on 03/18/19 for evaluation of painful joints.  She has a long-standing myalgias.  She has arthralgias likely associated with early stages of osteoarthritis, triggering of the left index finger.  At one point in the past she was told that she may have psoriasis.  I have recommended that she considers further workup as noted.  Given the severity of her pain in her left index finger offered her corticosteroid injection in the tendon sheath she wants to proceed 20 mg of methylprednisolone was injected into the flexor tendon sheath of left index finger.  The likelihood of her having psoriatic arthritis appears to be remote.  In patients who have psoriasis or history of psoriasis, we discussed today, can get a relapse of psoriatic arthritis.  Should her joint symptoms change or develop swelling she will return for follow-up promptly.  I have offered her duloxetine.  She declines because of her previous experience with it and side effects.  In view of the coronary artery disease she is not a candidate for nonsteroidals.  I offered her to check x-rays of the hands, hips, she would like to defer those to.  We discussed the follow-up plans.  She wants to check with her primary physician and would let us know.  Diagnoses and all orders for this visit:    Polyarthralgia  -     XR Hands Bilateral 3 or More VWS  -     XR Pelvis W 2 Vw Hips Bilateral  -     XR Hands Bilateral 3 or More VWS  -     XR Pelvis W 2 Vw Hips Bilateral    Acquired trigger finger of left index finger  -     methylPREDNISolone acetate injection 20 mg (DEPO-MEDROL)    Coronary artery disease due to calcified coronary lesion    Cardiac pacemaker in situ, dual chamber      HISTORY OF PRESENTING ILLNESS:  Kim Correa, 58 y.o., female is here for evaluation of painful joints and muscles.  She reports that these have troubled her since her 20s.  She reports the worst of the pain is in her hands  especially the left hand where she has difficulty moving her index finger which sometimes locks and clicks she has to pry it open this comes in the way of doing the day-to-day activities.  At this is been the case over the past few months.  She reports pain in her neck and back.  At one stage she has had swelling of her left knee and her hands.  Does not recall other joint areas were swollen.  She recalls at one point diagnosis of psoriasis was made.  Her family physician diagnosed that.  She was asked to stay out of the sun as much as she can.  A year later the skin lesions resolved.  This is not happens since.  At one point an orthopedic surgeon thought that she may have psoriatic arthritis.  She was given prednisone.  She does not recall what it did for her.  At one stage she recalls having had elevated sed rate.  She reports her pains are moderately severe to severe.  These are associated with stiffness in the morning for about an hour.  Besides the hands, knees hips she has worse of the pains across her neck and trapezius region.  She does not get a good night sleep.  She reports no history of ulcerative colitis Crohn's disease herself or the family or family history of rheumatoid arthritis or lupus.  In the past her VITOR, and other rheumatologic autoantibodies have been consistently negative.  Her CK has been normal in the past.  Her most recent sed rate was normal.  She is accompanied by an independent living caregiver.  She rents and her parents home in [place].  Further historical information and ADL limitations as noted in the multidimensional health assessment questionnaire attached in the EMR. Rest of the 13 system ROS is negative.     ALLERGIES:Imitrex [sumatriptan succinate]; Indomethacin; Sumatriptan; Venom-honey bee; Gabapentin; Sulfa (sulfonamide antibiotics); and Vancomycin    PAST MEDICAL/ACTIVE PROBLEMS/MEDICATION/ FAMILY HISTORY/SOCIAL DATA:  The patient has a family history of  Past Medical  History:   Diagnosis Date     Asthma      CAD (coronary artery disease)      GERD (gastroesophageal reflux disease)      High cholesterol      Memory problem      Migraines      Syncope      Social History     Tobacco Use   Smoking Status Current Every Day Smoker     Packs/day: 0.50     Years: 38.00     Pack years: 19.00   Smokeless Tobacco Current User     Patient Active Problem List   Diagnosis     Post-traumatic Stress Disorder     Peripheral Vascular Disease     Vitamin D Deficiency     Chronic Laryngitis     Depression     Coronary artery disease     Lower Back Pain     Recurrent Aphthous Ulcer     Cluster Headache     Esophageal Reflux     Allergies     Cardiac pacemaker in situ, dual chamber     Temporomandibular Joint-pain Dysfunction Syndrome     Anxiety     Chronic neck pain     Insomnia     Type 2 diabetes mellitus without complication (H)     Major depressive disorder, recurrent episode, mild (H)     Stomatitis and mucositis     Migraine with aura     Vasovagal syncope     Bicuspid aortic valve     Autoimmune disease (H)     Angina at rest (H)     Statin intolerance     ROSARIO (dyspnea on exertion)     Pacemaker battery depletion     Chronic bronchitis, unspecified chronic bronchitis type (H)     Current Outpatient Medications   Medication Sig Dispense Refill     artificial tears,hypromellose, (GENTEAL; SYSTANE) 0.3 % Gel Administer to both eyes at bedtime.       aspirin 81 MG EC tablet Take 81 mg by mouth daily.       CONTOUR NEXT STRIPS strips Use 1 each As Directed 2 (two) times a day. 100 strip 3     cyanocobalamin 1000 MCG tablet Take 1,000 mcg by mouth daily.       EPINEPHrine (EPIPEN) 0.3 mg/0.3 mL injection Inject 0.3 mL (0.3 mg total) as directed as needed for anaphylaxis. Inject into thigh. 2 Pre-filled Pen Syringe 0     fluticasone (FLOVENT HFA) 220 mcg/actuation inhaler Inhale 1 puff 2 (two) times a day. 1 Inhaler 12     isosorbide mononitrate (IMDUR) 30 MG 24 hr tablet Take 30 mg by mouth at  bedtime.       lancets (FINGERSTIX LANCETS) Misc 1 each by In Vitro route 2 (two) times a day. 100 each 3     levalbuterol (XOPENEX HFA) 45 mcg/actuation inhaler Inhale 1-2 puffs every 4 (four) hours as needed for wheezing. 1 Inhaler 12     lifitegrast (XIIDRA) 5 % Dpet Administer 1 drop to both eyes as needed.              multivitamin (MULTIPLE VITAMIN ORAL) Take 1 tablet by mouth daily.       nitroglycerin (NITROLINGUAL) 400 mcg/spray spray Place 1 spray under the tongue every 5 (five) minutes as needed for chest pain. 12 g 0     VITAMIN B-6 250 MG tablet Take 1 tablet by mouth daily.  3     No current facility-administered medications for this visit.        COMPREHENSIVE EXAMINATION:  Vitals:    03/18/19 1113   BP: 138/78   Pulse: 72   Weight: 120 lb (54.4 kg)     A well appearing alert oriented female. Vital data as noted above. Her eyes without inflammation/scleromalacia. ENTwithout oral mucositis, thrush, nasal deformity, external ear redness, deformity. Her neck is without lymphadenopathy and supple. Lungs normal sounds, no pleural rub. Heart auscultation normal rate, rhythm; no pericardial rub and murmurs. Abdomen soft, non tender, no organomegaly. Skin examined for heliotrope, malar area eruption, lupus pernio, periungual erythema, sclerodactyly, papules, erythema nodosum, purpura, nail pitting, onycholysis, and obvious psoriasis lesion. Neurological examination shows normal alertness, speech, facial symmetry, tone and power in upper and lower extremities, Tinel's and Phalen's at wrist and gait. The joint examination is performed for swelling, tenderness, warmth, erythema, and range of motion in the following joints: DIPs, PIPs, MCPs, wrists, first CMC's, elbows, shoulders, hips, knees, ankles, feet; spine for range of motion and paraspinal muscles for tenderness. The salient normal / abnormal findings are appended.  She has triggering of the left index finger with A1 nodularity which is tender.  She does  not have synovitis in any of the palpable joints.  She is tender in the IP joints of the thumbs bilaterally.  She has mild tenderness in the trochanteric area.  She has tenderness across the trapezius along the paraspinal there is no dactylitis of the digits, toes.  She does not have nail pitting, onycholysis.  There is no enthesitis such as around the ankles, knees.  She ambulates with the help of a cane    LAB / IMAGING DATA:  ALT   Date Value Ref Range Status   01/28/2019 13 0 - 45 U/L Final   03/16/2017 9 0 - 45 U/L Final   08/26/2016 10 0 - 45 U/L Final     Albumin   Date Value Ref Range Status   01/28/2019 3.8 3.5 - 5.0 g/dL Final   03/16/2017 3.8 3.5 - 5.0 g/dL Final   11/10/2015 4.0 3.5 - 5.0 g/dL Final     Creatinine   Date Value Ref Range Status   01/28/2019 0.74 0.60 - 1.10 mg/dL Final   12/31/2018 0.70 0.60 - 1.10 mg/dL Final   07/01/2018 0.80 0.60 - 1.10 mg/dL Final       WBC   Date Value Ref Range Status   12/31/2018 10.2 4.0 - 11.0 thou/uL Final   07/01/2018 9.6 4.0 - 11.0 thou/uL Final     Hemoglobin   Date Value Ref Range Status   12/31/2018 14.0 12.0 - 16.0 g/dL Final   12/28/2018 13.9 12.0 - 16.0 g/dL Final   07/01/2018 13.4 12.0 - 16.0 g/dL Final     Platelets   Date Value Ref Range Status   12/31/2018 176 140 - 440 thou/uL Final   07/01/2018 160 140 - 440 thou/uL Final   05/18/2017 141 140 - 440 thou/uL Final       Lab Results   Component Value Date    RF <15.0 01/28/2019    SEDRATE 15 01/28/2019

## 2021-06-27 NOTE — PROGRESS NOTES
Progress Notes by Jessica Peguero RN at 2/27/2019  4:09 PM     Author: Jessica Peguero RN Service: -- Author Type: Registered Nurse    Filed: 3/29/2019 11:01 AM Encounter Date: 2/27/2019 Status: Signed    : Jessica Peguero RN (Registered Nurse)

## 2021-07-13 ENCOUNTER — RECORDS - HEALTHEAST (OUTPATIENT)
Dept: ADMINISTRATIVE | Facility: CLINIC | Age: 61
End: 2021-07-13

## 2021-07-14 PROBLEM — Z95.0 HISTORY OF CARDIAC PACEMAKER IN SITU: Status: RESOLVED | Noted: 2017-06-04 | Resolved: 2018-06-30

## 2021-07-21 ENCOUNTER — RECORDS - HEALTHEAST (OUTPATIENT)
Dept: ADMINISTRATIVE | Facility: CLINIC | Age: 61
End: 2021-07-21

## 2021-09-25 ENCOUNTER — HEALTH MAINTENANCE LETTER (OUTPATIENT)
Age: 61
End: 2021-09-25

## 2021-11-20 ENCOUNTER — HEALTH MAINTENANCE LETTER (OUTPATIENT)
Age: 61
End: 2021-11-20

## 2022-01-09 ENCOUNTER — HEALTH MAINTENANCE LETTER (OUTPATIENT)
Age: 62
End: 2022-01-09

## 2022-08-21 ENCOUNTER — HEALTH MAINTENANCE LETTER (OUTPATIENT)
Age: 62
End: 2022-08-21

## 2022-09-02 ENCOUNTER — DOCUMENTATION ONLY (OUTPATIENT)
Dept: CARDIOLOGY | Facility: CLINIC | Age: 62
End: 2022-09-02

## 2022-09-02 NOTE — PROGRESS NOTES
Patient has not scheduled a clinic appointment with [facility] since 1/9/2019. Multiple attempts to reach the patient have been unsuccessful. Delinquent letter was sent per protocol asking the patient to update our clinic or will be made inactive 30 days after the letter has been sent.       Katie Bermudez  Device Tech  [phone] option 2

## 2022-10-05 ENCOUNTER — DOCUMENTATION ONLY (OUTPATIENT)
Dept: CARDIOLOGY | Facility: CLINIC | Age: 62
End: 2022-10-05

## 2022-10-05 NOTE — PROGRESS NOTES
Patient did not respond to the delinquent letter sent 9/2/2022. After 30 days of letter and no response, patient has been made inactive in [employer] database. This does not affect the function of their implanted device, however unable to monitor status. Patient is welcome to re-establish with [facility] anytime patient wishes.      Katie Bermudez  Device Tech  [phone]

## 2022-12-26 ENCOUNTER — HEALTH MAINTENANCE LETTER (OUTPATIENT)
Age: 62
End: 2022-12-26

## 2023-04-16 ENCOUNTER — HEALTH MAINTENANCE LETTER (OUTPATIENT)
Age: 63
End: 2023-04-16

## 2023-07-10 RX ORDER — MELOXICAM 15 MG/1
15 TABLET ORAL
COMMUNITY
End: 2023-07-11

## 2023-07-10 RX ORDER — DIHYDROERGOTAMINE MESYLATE 4 MG/ML
1 SPRAY NASAL
COMMUNITY
End: 2023-07-11

## 2023-07-10 RX ORDER — OMEPRAZOLE 40 MG/1
CAPSULE, DELAYED RELEASE ORAL
COMMUNITY
Start: 2006-01-01 | End: 2023-07-11

## 2023-07-10 RX ORDER — FLUTICASONE PROPIONATE 50 MCG
SPRAY, SUSPENSION (ML) NASAL
COMMUNITY
End: 2023-07-11

## 2023-07-10 RX ORDER — ROSUVASTATIN CALCIUM 40 MG/1
40 TABLET, COATED ORAL
COMMUNITY
Start: 2006-01-01 | End: 2023-07-11

## 2023-07-10 RX ORDER — IBUPROFEN 200 MG
CAPSULE ORAL
COMMUNITY
End: 2023-07-11

## 2023-07-10 RX ORDER — CLOPIDOGREL BISULFATE 75 MG/1
75 TABLET ORAL
COMMUNITY
End: 2023-07-13

## 2023-07-10 RX ORDER — NORTRIPTYLINE HCL 25 MG
25 CAPSULE ORAL
COMMUNITY
End: 2023-07-11

## 2023-07-10 RX ORDER — LEVALBUTEROL TARTRATE 45 UG/1
1 AEROSOL, METERED ORAL
COMMUNITY
End: 2023-07-11

## 2023-07-10 RX ORDER — MULTIVITAMIN
1 CAPSULE ORAL
COMMUNITY
End: 2023-07-11

## 2023-07-10 RX ORDER — NIACIN 100 MG
TABLET ORAL
COMMUNITY
End: 2023-07-11

## 2023-07-10 RX ORDER — ASPIRIN 81; 777 MG/1; MG/1
81 TABLET, COATED ORAL
COMMUNITY
End: 2023-07-11

## 2023-07-10 RX ORDER — ISOSORBIDE MONONITRATE 30 MG/1
30 TABLET, EXTENDED RELEASE ORAL
COMMUNITY
Start: 2006-01-01 | End: 2023-07-11

## 2023-07-10 RX ORDER — MIDODRINE HYDROCHLORIDE 10 MG/1
10 TABLET ORAL
COMMUNITY
End: 2023-07-11

## 2023-07-11 ENCOUNTER — OFFICE VISIT (OUTPATIENT)
Dept: FAMILY MEDICINE | Facility: CLINIC | Age: 63
End: 2023-07-11
Payer: MEDICARE

## 2023-07-11 VITALS
BODY MASS INDEX: 22.7 KG/M2 | SYSTOLIC BLOOD PRESSURE: 126 MMHG | WEIGHT: 112.6 LBS | TEMPERATURE: 98.4 F | HEART RATE: 83 BPM | OXYGEN SATURATION: 94 % | RESPIRATION RATE: 24 BRPM | HEIGHT: 59 IN | DIASTOLIC BLOOD PRESSURE: 72 MMHG

## 2023-07-11 DIAGNOSIS — J45.30 MILD PERSISTENT ASTHMA WITHOUT COMPLICATION: ICD-10-CM

## 2023-07-11 DIAGNOSIS — I25.10 ATHEROSCLEROSIS OF CORONARY ARTERY OF NATIVE HEART WITHOUT ANGINA PECTORIS, UNSPECIFIED VESSEL OR LESION TYPE: ICD-10-CM

## 2023-07-11 DIAGNOSIS — I73.9 PAD (PERIPHERAL ARTERY DISEASE) (H): ICD-10-CM

## 2023-07-11 DIAGNOSIS — E78.2 MIXED HYPERLIPIDEMIA: ICD-10-CM

## 2023-07-11 DIAGNOSIS — E11.9 TYPE 2 DIABETES MELLITUS WITHOUT COMPLICATION, WITHOUT LONG-TERM CURRENT USE OF INSULIN (H): ICD-10-CM

## 2023-07-11 DIAGNOSIS — Z87.891 PERSONAL HISTORY OF TOBACCO USE: ICD-10-CM

## 2023-07-11 DIAGNOSIS — E55.9 VITAMIN D DEFICIENCY: ICD-10-CM

## 2023-07-11 DIAGNOSIS — R26.81 UNSTEADY GAIT: Primary | ICD-10-CM

## 2023-07-11 PROCEDURE — 99204 OFFICE O/P NEW MOD 45 MIN: CPT | Performed by: NURSE PRACTITIONER

## 2023-07-11 PROCEDURE — G0296 VISIT TO DETERM LDCT ELIG: HCPCS | Performed by: NURSE PRACTITIONER

## 2023-07-11 RX ORDER — FLUTICASONE PROPIONATE 220 UG/1
AEROSOL, METERED RESPIRATORY (INHALATION)
Qty: 12 G | Refills: 12 | Status: SHIPPED | OUTPATIENT
Start: 2023-07-11 | End: 2024-07-26

## 2023-07-11 RX ORDER — ALBUTEROL SULFATE 90 UG/1
AEROSOL, METERED RESPIRATORY (INHALATION)
COMMUNITY
Start: 2023-05-22 | End: 2024-07-26

## 2023-07-11 RX ORDER — ROSUVASTATIN CALCIUM 40 MG/1
40 TABLET, COATED ORAL DAILY
Qty: 90 TABLET | Refills: 3 | Status: SHIPPED | OUTPATIENT
Start: 2023-07-11

## 2023-07-11 RX ORDER — ERGOCALCIFEROL 1.25 MG/1
50000 CAPSULE, LIQUID FILLED ORAL WEEKLY
Qty: 12 CAPSULE | Refills: 0 | Status: SHIPPED | OUTPATIENT
Start: 2023-07-11 | End: 2023-09-27

## 2023-07-11 ASSESSMENT — PATIENT HEALTH QUESTIONNAIRE - PHQ9
SUM OF ALL RESPONSES TO PHQ QUESTIONS 1-9: 1
10. IF YOU CHECKED OFF ANY PROBLEMS, HOW DIFFICULT HAVE THESE PROBLEMS MADE IT FOR YOU TO DO YOUR WORK, TAKE CARE OF THINGS AT HOME, OR GET ALONG WITH OTHER PEOPLE: NOT DIFFICULT AT ALL
SUM OF ALL RESPONSES TO PHQ QUESTIONS 1-9: 1

## 2023-07-11 ASSESSMENT — ASTHMA QUESTIONNAIRES: ACT_TOTALSCORE: 16

## 2023-07-11 ASSESSMENT — PAIN SCALES - GENERAL: PAINLEVEL: NO PAIN (0)

## 2023-07-11 NOTE — PROGRESS NOTES
Assessment and Plan:     Unsteady gait  Will refer to Neurology for further evaluation.    - Adult Neurology  Referral    Type 2 diabetes mellitus without complication, without long-term current use of insulin (H)  This is diet controlled.     Atherosclerosis of coronary artery of native heart without angina pectoris, unspecified vessel or lesion type  PAD (peripheral artery disease) (H)  Mixed hyperlipidemia  Recommend smoking cessation.  She continues Rosuvastatin.  Recommend daily aspirin.  Will have her re-establish with cardiology.   - rosuvastatin (CRESTOR) 40 MG tablet  Dispense: 90 tablet; Refill: 3    Personal history of tobacco use  Low dose CT ordered.    - Prof fee: Shared Decision Making for Lung Cancer Screening  - CT Chest Lung Cancer Scrn Low Dose wo    Mild persistent asthma without complication  Will resume Flovent.  She continues Albuterol as needed.    - FLOVENT  MCG/ACT inhaler  Dispense: 12 g; Refill: 12    Vitamin D deficiency  Will start high dose vitamin D supplementation.  Recommend recheck in 12 weeks.   - vitamin D2 (ERGOCALCIFEROL) 02013 units (1250 mcg) capsule  Dispense: 12 capsule; Refill: 0    54 minutes spent by me on the date of the encounter doing chart review, history and exam, documentation and further activities per the note      Subjective:     Kim is a 63 year old female presenting to the clinic for concerns for an unsteady gait.  Patient has had an unsteady gait for multiple years.  She is falling frequently and has been experiencing tremors.  She saw neurology in the past.  Patient has chronic dry mouth symptoms including swelling of the tongue and difficulty swallowing.  As she has been seen by [facility] where minor salivary biopsy showed no inflammation.  Patient continues to experience sicca symptoms.  She suffers from polyarthralgia and has a history of taking prednisone.  She has a history of stent placement in LAD coronary artery.  Blood pressure  "is well controlled.  She has declined statin therapy.  We will have her reestablish with cardiology.  She has a pacemaker.  She continues to smoke 1/2 to 1 pack/day.  She is also using marijuana.  She has a history of a mood disorder.  Her father passed away 1 year ago.  She is living with her mother.  She has had difficulty seeing healthcare providers if she feels as though there is \"sabotage: involved in her care.  She has a history of vitamin D deficiency and vitamin B12 deficiency.  She has been prescribed supplementation.  She has type 2 diabetes which is diet controlled.  She does not check her blood sugars.  She consumes a vegan diet.  Her last eye exam was in 2019.    Reviewof Systems: A complete 14 point review of systems was obtained and is negative or as stated in the history of present illness.    Social History     Socioeconomic History     Marital status:      Spouse name: Not on file     Number of children: Not on file     Years of education: Not on file     Highest education level: Not on file   Occupational History     Not on file   Tobacco Use     Smoking status: Every Day     Packs/day: 1.00     Years: 38.00     Pack years: 38.00     Types: Cigarettes     Smokeless tobacco: Never   Vaping Use     Vaping Use: Some days     Substances: Nicotine, THC, CBD     Devices: Disposable, Pre-filled or refillable cartridge, Refillable tank, Pre-filled pod   Substance and Sexual Activity     Alcohol use: No     Drug use: No     Sexual activity: Not on file   Other Topics Concern     Not on file   Social History Narrative     Not on file     Social Determinants of Health     Financial Resource Strain: Not on file   Food Insecurity: Not on file   Transportation Needs: Not on file   Physical Activity: Not on file   Stress: Not on file   Social Connections: Not on file   Intimate Partner Violence: Not on file   Housing Stability: Not on file       Active Ambulatory Problems     Diagnosis Date Noted     " "Sprain of lumbar region 09/08/2006     Mild intermittent asthma with exacerbation 11/10/2006     Esophageal reflux 11/10/2006     Moderate depressed bipolar I disorder (H) 11/10/2006     Hyperlipidemia 11/10/2006     Attention deficit disorder 11/10/2006     Chronic rhinitis 11/10/2006     Dizziness and giddiness 12/11/2006     Refractory migraine without aura 02/05/2007     COSTOCHONDRITIS 03/20/2007     Resolved Ambulatory Problems     Diagnosis Date Noted     History of cardiac pacemaker in situ 06/04/2017     Bipolar affective disorder (H) 12/29/2010     Past Medical History:   Diagnosis Date     Dysphagia      Enlarged tongue      Myalgia      Polyarthralgia      Sicca (H)        Family History   Problem Relation Age of Onset     Diabetes Sister      Hypertension Mother      Thyroid Disease Mother      Alzheimer Disease Maternal Grandmother      Heart Disease Maternal Grandfather      Cerebrovascular Disease Paternal Grandmother      C.A.D. No family hx of      Alcoholism Mother      Heart Disease Father      Alcoholism Father        Objective:     /72   Pulse 83   Temp 98.4  F (36.9  C)   Resp 24   Ht 1.51 m (4' 11.45\")   Wt 51.1 kg (112 lb 9.6 oz)   SpO2 94%   BMI 22.40 kg/m      Patient is alert, in   Lungs:  Clear to auscultation. Respirations even and unlabored.  No wheezing or rales noted.   Heart:  Regular rate and rhythm.  No murmurs, S3, S4, gallops, or rubs.    Musculoskeletal:  Patient has an unsteady gait and has difficulty climbing onto the exam table.  Monofilament testing is normal bilaterally.               Lung Cancer Screening Shared Decision Making Visit     Kim Correa, a 63 year old female, is eligible for lung cancer screening    History   Smoking Status     Every Day     Packs/day: 1.00     Years: 45.00     Types: Cigarettes   Smokeless Tobacco     Never       I have discussed with patient the risks and benefits of screening for lung cancer with low-dose CT.     The risks " include:    radiation exposure: one low dose chest CT has as much ionizing radiation as about 15 chest x-rays, or 6 months of background radiation living in [place]      false positives: most findings/nodules are NOT cancer, but some might still require additional diagnostic evaluation, including biopsy    over-diagnosis: some slow growing cancers that might never have been clinically significant will be detected and treated unnecessarily     The benefit of early detection of lung cancer is contingent upon adherence to annual screening or more frequent follow up if indicated.     Furthermore, to benefit from screening, Kim must be willing and able to undergo diagnostic procedures, if indicated. Although no specific guide is available for determining severity of comorbidities, it is reasonable to withhold screening in patients who have greater mortality risk from other diseases.     We did discuss that the best way to prevent lung cancer is to not smoke.    Some patients may value a numeric estimation of lung cancer risk when evaluating if lung cancer screening is right for them, here is one calculator:    ShouldIScreen  Answers for HPI/ROS submitted by the patient on 7/11/2023  If you checked off any problems, how difficult have these problems made it for you to do your work, take care of things at home, or get along with other people?: Not difficult at all  PHQ9 TOTAL SCORE: 1  Frequency of checking blood sugars:: not at all  Diabetic concerns:: none  Paraesthesia present:: numbness in feet, burning in feet, excessive thirst  Headache Symptoms are: worsened  How often are you getting headaches or migraines? : Everyday  Are you able to do normal daily activities when you have a migraine?: No  Migraine Rescue/Relief Medications:: Tylenol, other  Effectiveness of rescue/relief medications:: I get some relief  Migraine Preventative Medications:: no medications to prevent migraines  ER or UC Visits:: 0 times  Do you  have a cough?: Yes  Are you experiencing any wheezing in your chest?: No  Do you have any shortness of breath?: Yes  Use of rescue inhaler:: 2-3 times per day  Taking Asthma medication as prescribed:: 0  Asthma triggers:: cold air, humidity, mold, pollens, smoke, strong odors and fumes  Have you had any Emergency Room visits, Urgent Care visits, or Hospital Admissions since your last office visit?: No  Nitroglycerin use:: occasionally  Do you take an aspirin every day?: No  Your back pain is: chronic  Where is your back pain located? : right lower back, left lower back, right middle of back, left middle of back  How would you describe your back pain? : burning, other  Where does your back pain spread? : right shoulder, right side of neck  Since you noticed your back pain, how has it changed? : gradually worsening  Does your back pain interfere with your job?: Not applicable  If yes, which:: acetaminophen (Tylenol), activity or exercise, cold, heat, rest, stretching, topical pain relievers  What is the reason for your visit today? : Returning to practice  How many servings of fruits and vegetables do you eat daily?: 2-3  On average, how many sweetened beverages do you drink each day (Examples: soda, juice, sweet tea, etc.  Do NOT count diet or artificially sweetened beverages)?: 1  How many minutes a day do you exercise enough to make your heart beat faster?: 9 or less  How many days a week do you exercise enough to make your heart beat faster?: 3 or less  How many days per week do you miss taking your medication?: 0

## 2023-07-13 PROBLEM — Z78.9 STATIN INTOLERANCE: Status: ACTIVE | Noted: 2018-08-13

## 2023-08-04 ENCOUNTER — OFFICE VISIT (OUTPATIENT)
Dept: CARDIOLOGY | Facility: CLINIC | Age: 63
End: 2023-08-04
Payer: MEDICARE

## 2023-08-04 ENCOUNTER — ANCILLARY PROCEDURE (OUTPATIENT)
Dept: CARDIOLOGY | Facility: CLINIC | Age: 63
End: 2023-08-04
Attending: INTERNAL MEDICINE
Payer: MEDICARE

## 2023-08-04 ENCOUNTER — HOSPITAL ENCOUNTER (OUTPATIENT)
Dept: CT IMAGING | Facility: HOSPITAL | Age: 63
Discharge: HOME | End: 2023-08-04
Attending: NURSE PRACTITIONER | Admitting: NURSE PRACTITIONER
Payer: MEDICARE

## 2023-08-04 VITALS
RESPIRATION RATE: 16 BRPM | SYSTOLIC BLOOD PRESSURE: 106 MMHG | BODY MASS INDEX: 22 KG/M2 | WEIGHT: 110.6 LBS | HEART RATE: 66 BPM | OXYGEN SATURATION: 97 % | DIASTOLIC BLOOD PRESSURE: 64 MMHG

## 2023-08-04 DIAGNOSIS — I25.10 ATHEROSCLEROSIS OF CORONARY ARTERY OF NATIVE HEART WITHOUT ANGINA PECTORIS, UNSPECIFIED VESSEL OR LESION TYPE: ICD-10-CM

## 2023-08-04 DIAGNOSIS — Z87.891 PERSONAL HISTORY OF TOBACCO USE: ICD-10-CM

## 2023-08-04 DIAGNOSIS — E78.2 MIXED HYPERLIPIDEMIA: ICD-10-CM

## 2023-08-04 DIAGNOSIS — Z95.0 PACEMAKER: ICD-10-CM

## 2023-08-04 DIAGNOSIS — I49.5 SICK SINUS SYNDROME (H): Primary | ICD-10-CM

## 2023-08-04 DIAGNOSIS — I73.9 PAD (PERIPHERAL ARTERY DISEASE) (H): ICD-10-CM

## 2023-08-04 PROCEDURE — 99204 OFFICE O/P NEW MOD 45 MIN: CPT | Performed by: INTERNAL MEDICINE

## 2023-08-04 PROCEDURE — 71271 CT THORAX LUNG CANCER SCR C-: CPT

## 2023-08-04 PROCEDURE — 93280 PM DEVICE PROGR EVAL DUAL: CPT | Performed by: INTERNAL MEDICINE

## 2023-08-04 NOTE — PROGRESS NOTES
Thank you, Dr. Baeza, for asking [facility] to evaluate Ms. Kim Correa.      Assessment/Recommendations   Assessment:    Dyspnea, primarily related to chronic tobacco use, no evidence of heart failure/ fluid overload  Chest pain, atypical features  Permanent pacemaker for neurogenic syncope, normal function  Coronary artery disease with remote stenting of RCA in 2012 no classical angina  Hypercholesterolemia on statin again  Bipolar disorder    Plan:  Echo and stress test to assess shortness of breath and chest pain  Follow-up in 1 year       History of Present Illness    Ms. Kim Correa is a 63 year old female who comes in for cardiac evaluation.  I have seen her in the past most recently in 2018.  She has a history of RCA stenting in 2012.  She has a permanent pacemaker for neurocardiogenic syncope.  She [place] out of [place] but she is back now in [place].  She continues to experience shortness of breath with exertion but denies weight gain, PND, orthopnea.  She smokes 1 pack of cigarettes a day.  She does get nonexertional chest pains.  She denies recurrence of syncope.    Pacemaker interrogation: 8/4/2023  Device: Medtronic Steffi (D) pacemaker  Pacing %/Programmed: AP 75.5%,  2.2%, DDDR 60-130bpm  Lead(s): Stable, RV sensing changed from 6.626mV (1/9/2019) to 2.3mV today and RV threshold changed from 1.5V @ 1.0ms (1/9/2019) to 2.25V @ 1.0ms today.  Battery longevity: Estimating 9.4 years remaining  Presenting rhythm: APVS 82bpm  Underlying rhythm: SB 50's  Heart rates: Stable, HR's per histogram range 60-130bpm and primarily 60-100bpm  Atrial High rates: None  Anticoagulant: None  Ventricular High rates: 5 VHR and 5 Fast A&V episodes logged, available EGM from 12/17/22 suggest 18 beats NSVT 167bpm, all other remaining EGM's suggest -180bpm lasting up to ~ 3 mins, patient unable to recall symptoms. EF 60-65% per echo 5/10/2017.    Coronary angio: 2012  95% proximal RCA,  mild to moderate diffuse less than 30% disease elsewhere     Echo: May 2017  Normal left ventricular size and systolic performance with a visually estimated ejection fraction of 60-65%.   No significant valvular heart disease is identified on this study.   Normal right ventricular size and systolic performance.         Stress test: May 2017  2.There is a small area of ischemia in the inferoseptal segment(s) of the left ventricle. This is close to valve plane and could be false positive due to patient motion.  3.The left ventricular ejection fraction is 74%.  4.The patient is at a low risk of future cardiac ischemic events.     Physical Examination Review of Systems   /64 (BP Location: Left arm, Patient Position: Sitting, Cuff Size: Adult Regular)   Pulse 66   Resp 16   Wt 50.2 kg (110 lb 9.6 oz)   SpO2 97%   BMI 22.00 kg/m    Body mass index is 22 kg/m .  Wt Readings from Last 3 Encounters:   08/04/23 50.2 kg (110 lb 9.6 oz)   07/11/23 51.1 kg (112 lb 9.6 oz)   09/24/19 50.8 kg (112 lb 1.6 oz)     General Appearance:   Alert, cooperative, no distress, appears stated age   Head/ENT: Normocephalic, without obvious abnormality. Membranes moist      EYES:  no scleral icterus, normal conjunctivae   Neck: Supple, symmetrical, trachea midline, no adenopathy, thyroid: not enlarged, symmetric, no carotid bruit or JVD   Chest/Lungs:   Lungs are clear to auscultation, respirations unlabored. No tenderness or deformity    Cardiovascular:   Regular rhythm, S1, S2 normal, no murmur, rub or gallop.   Abdomen:  Soft, non-tender, bowel sounds active all four quadrants,  no masses, no organomegaly   Extremities: no cyanosis or clubbing. No edema   Skin: Skin color, texture, turgor normal, no rashes or lesions.    Psychiatric: Normal affect, calm   Neurologic: Alert and oriented x 3, moving all four extremities.     Enc Vitals  BP: 106/64  Pulse: 66  Resp: 16  SpO2: 97 %  Weight: 50.2 kg (110 lb 9.6 oz)                                           Lab Results    Chemistry/lipid CBC Cardiac Enzymes/BNP/TSH/INR   Recent Labs   Lab Test 08/26/16  1158   CHOL 258*   HDL 35*   *   TRIG 178*     Recent Labs   Lab Test 08/26/16  1158 11/10/15  1621   * 147*     Recent Labs   Lab Test 09/24/19  1424      POTASSIUM 4.3   CHLORIDE 106   CO2 23   GLC 96   BUN 5*   CR 0.74   GFRESTIMATED >60   MANISHA 9.5     Recent Labs   Lab Test 09/24/19  1424 03/15/19  1413 03/15/19  1412   CR 0.74 0.7 0.7     Recent Labs   Lab Test 09/24/19  1424 01/28/19  1036 07/01/18  0706   A1C 6.6* 6.3* 6.7*          Recent Labs   Lab Test 12/31/18  0746   WBC 10.2   HGB 14.0   HCT 43.5   MCV 93        Recent Labs   Lab Test 12/31/18  0746 12/28/18  0852 07/01/18  0706   HGB 14.0 13.9 13.4    Recent Labs   Lab Test 07/01/18  0706 07/01/18  0053 06/30/18  1613   TROPONINI <0.01 <0.01 <0.01     No results for input(s): BNP, NTBNPI, NTBNP in the last 74545 hours.  Recent Labs   Lab Test 09/24/19  1424   TSH 1.09     No results for input(s): INR in the last 27537 hours.     Medical History  Surgical History Family History Social History   Past Medical History:   Diagnosis Date    Dysphagia     Enlarged tongue     Myalgia     Polyarthralgia     Sicca (H)      Past Surgical History:   Procedure Laterality Date    APPENDECTOMY      CARDIAC CATHETERIZATION      CORONARY ANGIOPLASTY      CORONARY STENT PLACEMENT      HC REVISE MEDIAN N/CARPAL TUNNEL SURG      Description: Neuroplasty Decompression Median Nerve At Carpal Tunnel;  Recorded: 09/19/2008;    IMPLANT PACEMAKER      INSERT / REPLACE / REMOVE PACEMAKER      IR MISCELLANEOUS PROCEDURE  2/2/2009    IL VAGINAL HYSTERECTOMY,UTERUS 250 GMS/<      Description: Vaginal Hysterectomy;  Recorded: 12/16/2011;    SURGICAL HISTORY OF -       vag hyst    SURGICAL HISTORY OF -       carpal tunnel bilateral    SURGICAL HISTORY OF -       arm surgery right side.      [facility] APPENDECTOMY      Description: Appendectomy;   Recorded: 09/19/2008;  Comments: and ovarian cyst     No premature CAD, SCD,cardiomyopathy   Social History     Socioeconomic History    Marital status:      Spouse name: Not on file    Number of children: Not on file    Years of education: Not on file    Highest education level: Not on file   Occupational History    Not on file   Tobacco Use    Smoking status: Every Day     Packs/day: 1.00     Years: 45.00     Pack years: 45.00     Types: Cigarettes    Smokeless tobacco: Never   Vaping Use    Vaping Use: Some days    Substances: Nicotine, THC, CBD    Devices: Disposable, Pre-filled or refillable cartridge, Refillable tank, Pre-filled pod   Substance and Sexual Activity    Alcohol use: No    Drug use: No    Sexual activity: Not on file   Other Topics Concern    Not on file   Social History Narrative    Not on file     Social Determinants of Health     Financial Resource Strain: Not on file   Food Insecurity: Not on file   Transportation Needs: Not on file   Physical Activity: Not on file   Stress: Not on file   Social Connections: Not on file   Intimate Partner Violence: Not on file   Housing Stability: Not on file         Medications  Allergies   Current Outpatient Medications   Medication Sig Dispense Refill    blood glucose monitoring (CONTOUR NEXT MONITOR W/DEVICE KIT) meter device kit 1 each      FLOVENT  MCG/ACT inhaler INHALE 1 PUFF BY MOUTH TWO TIMES DAILY 12 g 12    rosuvastatin (CRESTOR) 40 MG tablet Take 1 tablet (40 mg) by mouth daily 90 tablet 3    VENTOLIN  (90 Base) MCG/ACT inhaler INHALE 1-2 PUFFS BY MOUTH EVERY 4 HOURS AS NEEDED FOR WHEEZING.*      vitamin B-12 (CYANOCOBALAMIN) 1000 MCG tablet Take 1,000 mcg by mouth      vitamin D2 (ERGOCALCIFEROL) 26611 units (1250 mcg) capsule Take 1 capsule (50,000 Units) by mouth once a week for 12 doses 12 capsule 0    Pyridoxine HCl (B-6) 250 MG TABS  (Patient not taking: Reported on 7/11/2023)  3        Allergies   Allergen Reactions     Bee Venom Anaphylaxis and Unknown     unknown  Unknown    unknown  Unknown    Indomethacin Diarrhea and Hives     Stomach pain, sweats, shakes, not good combo with heart meds either.      Sumatriptan Anaphylaxis, Other (See Comments) and Unknown     unknown  Throat closing      Imitrex [Sumatriptan Succinate]     Levonorgestrel-Ethinyl Estrad Other (See Comments)    Sulfa Antibiotics     Gabapentin Anxiety and Nausea and Vomiting    Vancomycin Itching

## 2023-08-04 NOTE — LETTER
8/4/2023    Lorena Baeza, APRN CNP  [address] [place] [address]    RE: Kim Correa       Dear Colleague,     I had the pleasure of seeing Kim Correa in the [facility].        Thank you, Dr. Baeza, for asking [facility] to evaluate Ms. Kim Correa.      Assessment/Recommendations   Assessment:    Dyspnea, primarily related to chronic tobacco use, no evidence of heart failure/ fluid overload  Chest pain, atypical features  Permanent pacemaker for neurogenic syncope, normal function  Coronary artery disease with remote stenting of RCA in 2012 no classical angina  Hypercholesterolemia on statin again  Bipolar disorder    Plan:  Echo and stress test to assess shortness of breath and chest pain  Follow-up in 1 year       History of Present Illness    Ms. Kim Correa is a 63 year old female who comes in for cardiac evaluation.  I have seen her in the past most recently in 2018.  She has a history of RCA stenting in 2012.  She has a permanent pacemaker for neurocardiogenic syncope.  She moved out of [facility] but she is back now in [place].  She continues to experience shortness of breath with exertion but denies weight gain, PND, orthopnea.  She smokes 1 pack of cigarettes a day.  She does get nonexertional chest pains.  She denies recurrence of syncope.    Pacemaker interrogation: 8/4/2023  Device: Medtronic Palisades (D) pacemaker  Pacing %/Programmed: AP 75.5%,  2.2%, DDDR 60-130bpm  Lead(s): Stable, RV sensing changed from 6.626mV (1/9/2019) to 2.3mV today and RV threshold changed from 1.5V @ 1.0ms (1/9/2019) to 2.25V @ 1.0ms today.  Battery longevity: Estimating 9.4 years remaining  Presenting rhythm: APVS 82bpm  Underlying rhythm: SB 50's  Heart rates: Stable, HR's per histogram range 60-130bpm and primarily 60-100bpm  Atrial High rates: None  Anticoagulant: None  Ventricular High rates: 5 VHR and 5 Fast A&V episodes logged, available EGM from 12/17/22 suggest  18 beats NSVT 167bpm, all other remaining EGM's suggest -180bpm lasting up to ~ 3 mins, patient unable to recall symptoms. EF 60-65% per echo 5/10/2017.    Coronary angio: 2012  95% proximal RCA, mild to moderate diffuse less than 30% disease elsewhere     Echo: May 2017  Normal left ventricular size and systolic performance with a visually estimated ejection fraction of 60-65%.   No significant valvular heart disease is identified on this study.   Normal right ventricular size and systolic performance.         Stress test: May 2017  2.There is a small area of ischemia in the inferoseptal segment(s) of the left ventricle. This is close to valve plane and could be false positive due to patient motion.  3.The left ventricular ejection fraction is 74%.  4.The patient is at a low risk of future cardiac ischemic events.     Physical Examination Review of Systems   /64 (BP Location: Left arm, Patient Position: Sitting, Cuff Size: Adult Regular)   Pulse 66   Resp 16   Wt 50.2 kg (110 lb 9.6 oz)   SpO2 97%   BMI 22.00 kg/m    Body mass index is 22 kg/m .  Wt Readings from Last 3 Encounters:   08/04/23 50.2 kg (110 lb 9.6 oz)   07/11/23 51.1 kg (112 lb 9.6 oz)   09/24/19 50.8 kg (112 lb 1.6 oz)     General Appearance:   Alert, cooperative, no distress, appears stated age   Head/ENT: Normocephalic, without obvious abnormality. Membranes moist      EYES:  no scleral icterus, normal conjunctivae   Neck: Supple, symmetrical, trachea midline, no adenopathy, thyroid: not enlarged, symmetric, no carotid bruit or JVD   Chest/Lungs:   Lungs are clear to auscultation, respirations unlabored. No tenderness or deformity    Cardiovascular:   Regular rhythm, S1, S2 normal, no murmur, rub or gallop.   Abdomen:  Soft, non-tender, bowel sounds active all four quadrants,  no masses, no organomegaly   Extremities: no cyanosis or clubbing. No edema   Skin: Skin color, texture, turgor normal, no rashes or lesions.     Psychiatric: Normal affect, calm   Neurologic: Alert and oriented x 3, moving all four extremities.     Enc Vitals  BP: 106/64  Pulse: 66  Resp: 16  SpO2: 97 %  Weight: 50.2 kg (110 lb 9.6 oz)                                          Lab Results    Chemistry/lipid CBC Cardiac Enzymes/BNP/TSH/INR   Recent Labs   Lab Test 08/26/16  1158   CHOL 258*   HDL 35*   *   TRIG 178*     Recent Labs   Lab Test 08/26/16  1158 11/10/15  1621   * 147*     Recent Labs   Lab Test 09/24/19  1424      POTASSIUM 4.3   CHLORIDE 106   CO2 23   GLC 96   BUN 5*   CR 0.74   GFRESTIMATED >60   MANISHA 9.5     Recent Labs   Lab Test 09/24/19  1424 03/15/19  1413 03/15/19  1412   CR 0.74 0.7 0.7     Recent Labs   Lab Test 09/24/19  1424 01/28/19  1036 07/01/18  0706   A1C 6.6* 6.3* 6.7*          Recent Labs   Lab Test 12/31/18  0746   WBC 10.2   HGB 14.0   HCT 43.5   MCV 93        Recent Labs   Lab Test 12/31/18  0746 12/28/18  0852 07/01/18  0706   HGB 14.0 13.9 13.4    Recent Labs   Lab Test 07/01/18  0706 07/01/18  0053 06/30/18  1613   TROPONINI <0.01 <0.01 <0.01     No results for input(s): BNP, NTBNPI, NTBNP in the last 39397 hours.  Recent Labs   Lab Test 09/24/19  1424   TSH 1.09     No results for input(s): INR in the last 15064 hours.     Medical History  Surgical History Family History Social History   Past Medical History:   Diagnosis Date    Dysphagia     Enlarged tongue     Myalgia     Polyarthralgia     Sicca (H)      Past Surgical History:   Procedure Laterality Date    APPENDECTOMY      CARDIAC CATHETERIZATION      CORONARY ANGIOPLASTY      CORONARY STENT PLACEMENT      HC REVISE MEDIAN N/CARPAL TUNNEL SURG      Description: Neuroplasty Decompression Median Nerve At Carpal Tunnel;  Recorded: 09/19/2008;    IMPLANT PACEMAKER      INSERT / REPLACE / REMOVE PACEMAKER      IR MISCELLANEOUS PROCEDURE  2/2/2009    OH VAGINAL HYSTERECTOMY,UTERUS 250 GMS/<      Description: Vaginal Hysterectomy;  Recorded:  12/16/2011;    SURGICAL HISTORY OF -       vag hyst    SURGICAL HISTORY OF -       carpal tunnel bilateral    SURGICAL HISTORY OF -       arm surgery right side.      ZZC APPENDECTOMY      Description: Appendectomy;  Recorded: 09/19/2008;  Comments: and ovarian cyst     No premature CAD, SCD,cardiomyopathy   Social History     Socioeconomic History    Marital status:      Spouse name: Not on file    Number of children: Not on file    Years of education: Not on file    Highest education level: Not on file   Occupational History    Not on file   Tobacco Use    Smoking status: Every Day     Packs/day: 1.00     Years: 45.00     Pack years: 45.00     Types: Cigarettes    Smokeless tobacco: Never   Vaping Use    Vaping Use: Some days    Substances: Nicotine, THC, CBD    Devices: Disposable, Pre-filled or refillable cartridge, Refillable tank, Pre-filled pod   Substance and Sexual Activity    Alcohol use: No    Drug use: No    Sexual activity: Not on file   Other Topics Concern    Not on file   Social History Narrative    Not on file     Social Determinants of Health     Financial Resource Strain: Not on file   Food Insecurity: Not on file   Transportation Needs: Not on file   Physical Activity: Not on file   Stress: Not on file   Social Connections: Not on file   Intimate Partner Violence: Not on file   Housing Stability: Not on file         Medications  Allergies   Current Outpatient Medications   Medication Sig Dispense Refill    blood glucose monitoring (CONTOUR NEXT MONITOR W/DEVICE KIT) meter device kit 1 each      FLOVENT  MCG/ACT inhaler INHALE 1 PUFF BY MOUTH TWO TIMES DAILY 12 g 12    rosuvastatin (CRESTOR) 40 MG tablet Take 1 tablet (40 mg) by mouth daily 90 tablet 3    VENTOLIN  (90 Base) MCG/ACT inhaler INHALE 1-2 PUFFS BY MOUTH EVERY 4 HOURS AS NEEDED FOR WHEEZING.*      vitamin B-12 (CYANOCOBALAMIN) 1000 MCG tablet Take 1,000 mcg by mouth      vitamin D2 (ERGOCALCIFEROL) 14778 units  (1250 mcg) capsule Take 1 capsule (50,000 Units) by mouth once a week for 12 doses 12 capsule 0    Pyridoxine HCl (B-6) 250 MG TABS  (Patient not taking: Reported on 7/11/2023)  3        Allergies   Allergen Reactions    Bee Venom Anaphylaxis and Unknown     unknown  Unknown    unknown  Unknown    Indomethacin Diarrhea and Hives     Stomach pain, sweats, shakes, not good combo with heart meds either.      Sumatriptan Anaphylaxis, Other (See Comments) and Unknown     unknown  Throat closing      Imitrex [Sumatriptan Succinate]     Levonorgestrel-Ethinyl Estrad Other (See Comments)    Sulfa Antibiotics     Gabapentin Anxiety and Nausea and Vomiting    Vancomycin Itching                        Thank you for allowing me to participate in the care of your patient.      Sincerely,     Jere Garrido MD     [facility]  cc:   Lorena Baeza, APRN CNP  [address]  [place] [address]

## 2023-08-07 LAB
MDC_IDC_EPISODE_DTM: NORMAL
MDC_IDC_EPISODE_DURATION: 0 S
MDC_IDC_EPISODE_DURATION: 1 S
MDC_IDC_EPISODE_DURATION: 3 S
MDC_IDC_EPISODE_DURATION: 3 S
MDC_IDC_EPISODE_DURATION: 48 S
MDC_IDC_EPISODE_DURATION: 48 S
MDC_IDC_EPISODE_DURATION: 60 S
MDC_IDC_EPISODE_DURATION: 70 S
MDC_IDC_EPISODE_DURATION: 73 S
MDC_IDC_EPISODE_ID: 10
MDC_IDC_EPISODE_ID: 2
MDC_IDC_EPISODE_ID: 3
MDC_IDC_EPISODE_ID: 4
MDC_IDC_EPISODE_ID: 5
MDC_IDC_EPISODE_ID: 6
MDC_IDC_EPISODE_ID: 7
MDC_IDC_EPISODE_ID: 8
MDC_IDC_EPISODE_ID: 9
MDC_IDC_EPISODE_TYPE: NORMAL
MDC_IDC_LEAD_IMPLANT_DT: NORMAL
MDC_IDC_LEAD_IMPLANT_DT: NORMAL
MDC_IDC_LEAD_LOCATION: NORMAL
MDC_IDC_LEAD_LOCATION: NORMAL
MDC_IDC_LEAD_LOCATION_DETAIL_1: NORMAL
MDC_IDC_LEAD_LOCATION_DETAIL_1: NORMAL
MDC_IDC_LEAD_MFG: NORMAL
MDC_IDC_LEAD_MFG: NORMAL
MDC_IDC_LEAD_MODEL: NORMAL
MDC_IDC_LEAD_MODEL: NORMAL
MDC_IDC_LEAD_POLARITY_TYPE: NORMAL
MDC_IDC_LEAD_POLARITY_TYPE: NORMAL
MDC_IDC_LEAD_SERIAL: NORMAL
MDC_IDC_LEAD_SERIAL: NORMAL
MDC_IDC_MSMT_BATTERY_DTM: NORMAL
MDC_IDC_MSMT_BATTERY_REMAINING_LONGEVITY: 111 MO
MDC_IDC_MSMT_BATTERY_RRT_TRIGGER: 2.62
MDC_IDC_MSMT_BATTERY_STATUS: NORMAL
MDC_IDC_MSMT_BATTERY_VOLTAGE: 3 V
MDC_IDC_MSMT_LEADCHNL_RA_IMPEDANCE_VALUE: 532 OHM
MDC_IDC_MSMT_LEADCHNL_RA_IMPEDANCE_VALUE: 570 OHM
MDC_IDC_MSMT_LEADCHNL_RA_PACING_THRESHOLD_AMPLITUDE: 0.75 V
MDC_IDC_MSMT_LEADCHNL_RA_PACING_THRESHOLD_AMPLITUDE: 0.75 V
MDC_IDC_MSMT_LEADCHNL_RA_PACING_THRESHOLD_PULSEWIDTH: 0.4 MS
MDC_IDC_MSMT_LEADCHNL_RA_PACING_THRESHOLD_PULSEWIDTH: 0.4 MS
MDC_IDC_MSMT_LEADCHNL_RA_SENSING_INTR_AMPL: 0.38 MV
MDC_IDC_MSMT_LEADCHNL_RA_SENSING_INTR_AMPL: 1 MV
MDC_IDC_MSMT_LEADCHNL_RV_IMPEDANCE_VALUE: 342 OHM
MDC_IDC_MSMT_LEADCHNL_RV_IMPEDANCE_VALUE: 494 OHM
MDC_IDC_MSMT_LEADCHNL_RV_PACING_THRESHOLD_AMPLITUDE: 2.25 V
MDC_IDC_MSMT_LEADCHNL_RV_PACING_THRESHOLD_AMPLITUDE: 2.5 V
MDC_IDC_MSMT_LEADCHNL_RV_PACING_THRESHOLD_PULSEWIDTH: 0.4 MS
MDC_IDC_MSMT_LEADCHNL_RV_PACING_THRESHOLD_PULSEWIDTH: 1 MS
MDC_IDC_MSMT_LEADCHNL_RV_SENSING_INTR_AMPL: 2.25 MV
MDC_IDC_MSMT_LEADCHNL_RV_SENSING_INTR_AMPL: 2.3 MV
MDC_IDC_MSMT_LEADCHNL_RV_SENSING_INTR_AMPL: 2.5 MV
MDC_IDC_PG_IMPLANT_DTM: NORMAL
MDC_IDC_PG_MFG: NORMAL
MDC_IDC_PG_MODEL: NORMAL
MDC_IDC_PG_SERIAL: NORMAL
MDC_IDC_PG_TYPE: NORMAL
MDC_IDC_SESS_CLINIC_NAME: NORMAL
MDC_IDC_SESS_DTM: NORMAL
MDC_IDC_SESS_TYPE: NORMAL
MDC_IDC_SET_BRADY_AT_MODE_SWITCH_RATE: 171 {BEATS}/MIN
MDC_IDC_SET_BRADY_HYSTRATE: NORMAL
MDC_IDC_SET_BRADY_LOWRATE: 60 {BEATS}/MIN
MDC_IDC_SET_BRADY_MAX_SENSOR_RATE: 130 {BEATS}/MIN
MDC_IDC_SET_BRADY_MAX_TRACKING_RATE: 130 {BEATS}/MIN
MDC_IDC_SET_BRADY_MODE: NORMAL
MDC_IDC_SET_BRADY_PAV_DELAY_LOW: 300 MS
MDC_IDC_SET_BRADY_SAV_DELAY_LOW: 250 MS
MDC_IDC_SET_LEADCHNL_RA_PACING_AMPLITUDE: NORMAL
MDC_IDC_SET_LEADCHNL_RA_PACING_ANODE_ELECTRODE_1: NORMAL
MDC_IDC_SET_LEADCHNL_RA_PACING_ANODE_LOCATION_1: NORMAL
MDC_IDC_SET_LEADCHNL_RA_PACING_CAPTURE_MODE: NORMAL
MDC_IDC_SET_LEADCHNL_RA_PACING_CATHODE_ELECTRODE_1: NORMAL
MDC_IDC_SET_LEADCHNL_RA_PACING_CATHODE_LOCATION_1: NORMAL
MDC_IDC_SET_LEADCHNL_RA_PACING_POLARITY: NORMAL
MDC_IDC_SET_LEADCHNL_RA_PACING_PULSEWIDTH: 0.4 MS
MDC_IDC_SET_LEADCHNL_RA_SENSING_ANODE_ELECTRODE_1: NORMAL
MDC_IDC_SET_LEADCHNL_RA_SENSING_ANODE_LOCATION_1: NORMAL
MDC_IDC_SET_LEADCHNL_RA_SENSING_CATHODE_ELECTRODE_1: NORMAL
MDC_IDC_SET_LEADCHNL_RA_SENSING_CATHODE_LOCATION_1: NORMAL
MDC_IDC_SET_LEADCHNL_RA_SENSING_POLARITY: NORMAL
MDC_IDC_SET_LEADCHNL_RA_SENSING_SENSITIVITY: 0.3 MV
MDC_IDC_SET_LEADCHNL_RV_PACING_AMPLITUDE: 3.5 V
MDC_IDC_SET_LEADCHNL_RV_PACING_ANODE_ELECTRODE_1: NORMAL
MDC_IDC_SET_LEADCHNL_RV_PACING_ANODE_LOCATION_1: NORMAL
MDC_IDC_SET_LEADCHNL_RV_PACING_CAPTURE_MODE: NORMAL
MDC_IDC_SET_LEADCHNL_RV_PACING_CATHODE_ELECTRODE_1: NORMAL
MDC_IDC_SET_LEADCHNL_RV_PACING_CATHODE_LOCATION_1: NORMAL
MDC_IDC_SET_LEADCHNL_RV_PACING_POLARITY: NORMAL
MDC_IDC_SET_LEADCHNL_RV_PACING_PULSEWIDTH: 1 MS
MDC_IDC_SET_LEADCHNL_RV_SENSING_ANODE_ELECTRODE_1: NORMAL
MDC_IDC_SET_LEADCHNL_RV_SENSING_ANODE_LOCATION_1: NORMAL
MDC_IDC_SET_LEADCHNL_RV_SENSING_CATHODE_ELECTRODE_1: NORMAL
MDC_IDC_SET_LEADCHNL_RV_SENSING_CATHODE_LOCATION_1: NORMAL
MDC_IDC_SET_LEADCHNL_RV_SENSING_POLARITY: NORMAL
MDC_IDC_SET_LEADCHNL_RV_SENSING_SENSITIVITY: 0.9 MV
MDC_IDC_SET_ZONE_DETECTION_INTERVAL: 350 MS
MDC_IDC_SET_ZONE_DETECTION_INTERVAL: 400 MS
MDC_IDC_SET_ZONE_TYPE: NORMAL
MDC_IDC_STAT_AT_BURDEN_PERCENT: 0 %
MDC_IDC_STAT_AT_DTM_END: NORMAL
MDC_IDC_STAT_AT_DTM_START: NORMAL
MDC_IDC_STAT_AT_MODE_SW_COUNT: 0
MDC_IDC_STAT_BRADY_AP_VP_PERCENT: 2.16 %
MDC_IDC_STAT_BRADY_AP_VS_PERCENT: 73.3 %
MDC_IDC_STAT_BRADY_AS_VP_PERCENT: 0 %
MDC_IDC_STAT_BRADY_AS_VS_PERCENT: 24.53 %
MDC_IDC_STAT_BRADY_DTM_END: NORMAL
MDC_IDC_STAT_BRADY_DTM_START: NORMAL
MDC_IDC_STAT_BRADY_RA_PERCENT_PACED: 75.47 %
MDC_IDC_STAT_BRADY_RV_PERCENT_PACED: 2.17 %
MDC_IDC_STAT_EPISODE_RECENT_COUNT: 0
MDC_IDC_STAT_EPISODE_RECENT_COUNT: 0
MDC_IDC_STAT_EPISODE_RECENT_COUNT: 5
MDC_IDC_STAT_EPISODE_RECENT_COUNT_DTM_END: NORMAL
MDC_IDC_STAT_EPISODE_RECENT_COUNT_DTM_START: NORMAL
MDC_IDC_STAT_EPISODE_TOTAL_COUNT: 0
MDC_IDC_STAT_EPISODE_TOTAL_COUNT: 0
MDC_IDC_STAT_EPISODE_TOTAL_COUNT: 5
MDC_IDC_STAT_EPISODE_TOTAL_COUNT_DTM_END: NORMAL
MDC_IDC_STAT_EPISODE_TOTAL_COUNT_DTM_START: NORMAL
MDC_IDC_STAT_EPISODE_TYPE: NORMAL

## 2023-08-14 ENCOUNTER — OFFICE VISIT (OUTPATIENT)
Dept: NEUROLOGY | Facility: CLINIC | Age: 63
End: 2023-08-14
Attending: NURSE PRACTITIONER
Payer: MEDICARE

## 2023-08-14 VITALS
BODY MASS INDEX: 21.88 KG/M2 | RESPIRATION RATE: 18 BRPM | HEART RATE: 80 BPM | DIASTOLIC BLOOD PRESSURE: 64 MMHG | SYSTOLIC BLOOD PRESSURE: 106 MMHG | WEIGHT: 110 LBS

## 2023-08-14 DIAGNOSIS — R29.898 BILATERAL LEG WEAKNESS: ICD-10-CM

## 2023-08-14 DIAGNOSIS — R26.81 UNSTEADY GAIT: ICD-10-CM

## 2023-08-14 DIAGNOSIS — E83.00 COPPER METABOLISM DISORDER (H): ICD-10-CM

## 2023-08-14 DIAGNOSIS — G62.9 NEUROPATHY: ICD-10-CM

## 2023-08-14 DIAGNOSIS — G25.0 ESSENTIAL TREMOR: ICD-10-CM

## 2023-08-14 DIAGNOSIS — E53.8 LOW SERUM VITAMIN B12: Primary | ICD-10-CM

## 2023-08-14 PROCEDURE — 99205 OFFICE O/P NEW HI 60 MIN: CPT | Performed by: PSYCHIATRY & NEUROLOGY

## 2023-08-14 RX ORDER — CYANOCOBALAMIN 1000 UG/ML
1 INJECTION, SOLUTION INTRAMUSCULAR; SUBCUTANEOUS
Qty: 12 ML | Refills: 1 | Status: SHIPPED | OUTPATIENT
Start: 2023-08-14 | End: 2024-05-07

## 2023-08-14 RX ORDER — SYRINGE-NEEDLE,INSULIN,0.5 ML 27GX1/2"
SYRINGE, EMPTY DISPOSABLE MISCELLANEOUS
Qty: 12 EACH | Refills: 0 | Status: SHIPPED | OUTPATIENT
Start: 2023-08-14 | End: 2024-05-07

## 2023-08-14 NOTE — LETTER
8/14/2023         RE: Kim Correa  [address] [facility] [address]        Dear Colleague,    Thank you for referring your patient, Kim Correa, to the [facility] [place]. Please see a copy of my visit note below.    NEUROLOGY OUTPATIENT CONSULT NOTE   Aug 14, 2023     CHIEF COMPLAINT/REASON FOR VISIT/REASON FOR CONSULT  Patient presents with:  Consult: Tremors, gait concerns, parkinson's concerns     REASON FOR CONSULTATION- Gait difficulty    REFERRAL SOURCE  Dr. Lorena Baeza  CC Dr. Lorena Baeza    HISTORY OF PRESENT ILLNESS  Kim Correa is a 63 year old female seen today for evaluation of gait difficulty.  She previously had a lot of symptoms and seen by multiple providers.  There was concerns about Parkinson's disease 7 years ago but then she was lost to follow-up because of transportation issues and loss of insurance.    7 years ago she was having difficulty with motor skills.  Since then her symptoms have progressed.  She has some shaking and jerking of her upper extremities.  This can happen at rest and is worse with activity.  She further complains that she walks funny with her legs tilted inward.  This complaint of balance issues as well.  Has had multiple concussions in the past.  Does occasionally shuffle her feet.  She does complain of some stiffness in her arms and legs.  She does have cervical disc herniation issues as well.  Does have a diagnosis of diet-controlled diabetes.  Does complain of some numbness in her feet at times.  Also has headaches.  Does complain of some vertigo at times.  Her B12 level has chronically been low and she is on oral supplement without benefit.  A1c is 6.6.    She has been on Risperdal in the past.  No other antipsychotic use.    Previous history is reviewed and this is unchanged.    PAST MEDICAL/SURGICAL HISTORY  Past Medical History:   Diagnosis Date     Dysphagia      Enlarged tongue      Myalgia      Polyarthralgia       Sicca (H)      Patient Active Problem List   Diagnosis     Sprain of lumbar region     Mild intermittent asthma with exacerbation     Esophageal reflux     Moderate depressed bipolar I disorder (H)     Hyperlipidemia     Attention deficit disorder     Chronic rhinitis     Dizziness and giddiness     Refractory migraine without aura     COSTOCHONDRITIS     Adenomatous polyp of colon     Anxiety     Asthma     PTSD (post-traumatic stress disorder)     Coronary atherosclerosis     PAD (peripheral artery disease) (H)     Statin intolerance   Significant for high cholesterol, diabetes, migraines, tremors, mental illness, arthritis, vision loss.  Questionable diagnosis of Sjogren's.  Has not followed up with rheumatology.    FAMILY HISTORY  Family History   Problem Relation Age of Onset     Hypertension Mother      Thyroid Disease Mother      Alcoholism Mother      Heart Disease Father      Alcoholism Father      Diabetes Sister      Alzheimer Disease Maternal Grandmother      Heart Disease Maternal Grandfather      Cerebrovascular Disease Paternal Grandmother      C.A.D. No family hx of    Positive for Parkinson's disease in multiple uncles and father.    SOCIAL HISTORY  Social History     Tobacco Use     Smoking status: Every Day     Packs/day: 1.00     Years: 45.00     Pack years: 45.00     Types: Cigarettes     Smokeless tobacco: Never   Vaping Use     Vaping Use: Some days     Substances: Nicotine, THC, CBD     Devices: Disposable, Pre-filled or refillable cartridge, Refillable tank, Pre-filled pod   Substance Use Topics     Alcohol use: No     Drug use: No       SYSTEMS REVIEW  Twelve-system ROS was done and other than the HPI this was negative/positive for neck pain, back pain, arm and leg pain, joint pain, numbness/tingling, weakness/paralysis, difficulty walking, falling, balance/coordination problems, movement disorder, bladder symptoms, dizziness, speech disturbance, difficulty swallowing, ringing in ears,  hearing loss, vision symptoms, sleepiness during the day, sleeping problems, headaches, memory loss, anxiety, chest pain, cardiac/heart problems, stomach pain, respiratory problems, skin changes, weight gain, appetite problems.    MEDICATIONS  blood glucose monitoring (CONTOUR NEXT MONITOR W/DEVICE KIT) meter device kit, 1 each  FLOVENT  MCG/ACT inhaler, INHALE 1 PUFF BY MOUTH TWO TIMES DAILY  rosuvastatin (CRESTOR) 40 MG tablet, Take 1 tablet (40 mg) by mouth daily  VENTOLIN  (90 Base) MCG/ACT inhaler, INHALE 1-2 PUFFS BY MOUTH EVERY 4 HOURS AS NEEDED FOR WHEEZING.*  vitamin B-12 (CYANOCOBALAMIN) 1000 MCG tablet, Take 1,000 mcg by mouth  vitamin D2 (ERGOCALCIFEROL) 10461 units (1250 mcg) capsule, Take 1 capsule (50,000 Units) by mouth once a week for 12 doses  Pyridoxine HCl (B-6) 250 MG TABS,     No current facility-administered medications on file prior to visit.       PHYSICAL EXAMINATION  VITALS: /64   Pulse 80   Resp 18   Wt 49.9 kg (110 lb)   BMI 21.88 kg/m    GENERAL: Healthy appearing, alert, no acute distress, normal habitus.  CARDIOVASCULAR: Extremities warm and well perfused. Pulses present.   NEUROLOGICAL:  Patient is awake and oriented to self, place and time.  Attention span is normal.  Memory is grossly intact.  Language is fluent and follows commands appropriately.  Appropriate fund of knowledge. Cranial nerves 2-12 are intact. There is no pronator drift.  Motor exam shows 5/5 strength in all extremities except bilateral lower extremity hip flexion weakness and bilateral knee extension weakness.  Tone is symmetric bilaterally in upper and lower extremities.  Reflexes are symmetric and absent in upper extremities and lower extremities. Sensory exam is grossly intact to light touch, pin prick and vibration with decreased pinprick and vibratory sense in the feet.  Finger to nose and heel to shin is without dysmetria.  Romberg is negative.  Gait is normal and the patient is able  to do tandem walk and walk on toes and heels with mild difficulty more related to her left leg.  She reports that the left leg is smaller than the right leg.  Does have a postural tremor in the upper extremities but no resting tremor or cogwheeling    DIAGNOSTICS  MRI  CONCLUSION:  1.  Mild to moderate burden of nonspecific T2 prolongation in the supratentorial parenchyma. Mild burden of T2 prolongation in the crossing fibers of the tiffany. The findings may be due to microvascular disease given the patient's smoking history.  2.  The pattern is not classic for demyelination but areas of chronic demyelination are not entirely excluded.  3.  No mass, hemorrhage or stroke.  4.  Incidental left parietal intraosseous hemangioma.    EMG- [facility] 2017      EEG-2019      RELEVANT LABS  2023 labs  B12 173  A1c 6.6  CBC and BMP noncontributory    OUTSIDE RECORDS  Outside referral notes and chart notes were reviewed and pertinent information has been summarized (in addition to the HPI):-      Notes from 2019 from Dr. Bateman were reviewed.  Patient had normal gait.  Was diagnosed to have a B12 deficiency and was put on a B12 supplement.        IMPRESSION/REPORT/PLAN  Low serum vitamin B12  Unsteady gait  Rule out neuropathy  Bilateral leg weakness  Essential tremor      This is a 63 year old female with multiple issues.  Previous work-up in 2019 was negative  1.  Unsteady gait:-Exam does not show any evidence of Parkinson's disease.  Could be related to her left hip issues and knee issues.  Could be related to other neurological issues.      2.  Tremor:-This is suggestive of essential tremor.  Discussed medication and she wants to hold off for right now since its not too bothersome to her.  Tremors are not suggestive of Parkinson's disease on exam.  She does have extensive family history and can consider DaTscan if there is significant progression.    3.  Exam does show decreased vibratory and pinprick sensation in the feet.   "I suspect she does have a neuropathy.  We will check a EMG and blood work to look for causes of neuropathy.  Could be related to low vitamin B12.  Could be related to autoimmune disease that was being worked up in the past.  We will hold off on repeating autoimmune labs today.  She is taking a vitamin B6 supplement and will check level.    4.  She does have bilateral hip flexion and knee extension weakness bilaterally.  There could be a superimposed cervical or lumbar lesion that could be causing this weakness and we will check MRIs.  Could be related to the vitamin B12 deficiency.  Reflexes are absent.    5.  Patient's B12 has chronically been low despite her being on oral supplementation.  We will switch her injections to see if that helps.    I can see her back in 2 months.    -     cyanocobalamin (CYANOCOBALAMIN) 1000 MCG/ML injection; Inject 1 mL (1,000 mcg) into the muscle every 7 days  -     insulin syringe-needle U-100 (30G X 1/2\" 1 ML) 30G X 1/2\" 1 ML miscellaneous; Use 1 syringes daily or as directed. For b12  -     Vitamin B1 whole blood; Future  -     TSH with free T4 reflex; Future  -     Protein Immunofixation Serum; Future  -     Protein electrophoresis; Future  -     Ceruloplasmin; Future  -     Copper level; Future  -     EMG; Future  -     MR Cervical Spine w/o Contrast; Future  -     MR Lumbar Spine w/o Contrast; Future  -     Vitamin B6; Future    Return in about 2 months (around 10/14/2023) for In-Clinic Visit (must), After testing.    Over 60 minutes were spent coordinating the care for the patient on the day of the encounter.  This includes previsit, during visit and post visit activities as documented above.  Counseling patient.  Multiple problems reviewed/addressed.  Previous testing reviewed.  (Activities include but not inclusive of reviewing chart, reviewing outside records, reviewing labs and imaging study results as well as the images, patient visit time including getting history and " exam,  use if applicable, review of test results with the patient and coming up with a plan in a shared model, counseling patient and family, education and answering patient questions, EMR , EMR diagnosis entry and problem list management, medication reconciliation and prescription management if applicable, paperwork if applicable, printing documents and documentation of the visit activities.)        Rudolph Person MD  Neurologist  [facility]  Tel:- [phone]    This note was dictated using voice recognition software.  Any grammatical or context distortions are unintentional and inherent to the software.      Again, thank you for allowing me to participate in the care of your patient.        Sincerely,        Rudolph Person MD

## 2023-08-14 NOTE — PROGRESS NOTES
NEUROLOGY OUTPATIENT CONSULT NOTE   Aug 14, 2023     CHIEF COMPLAINT/REASON FOR VISIT/REASON FOR CONSULT  Patient presents with:  Consult: Tremors, gait concerns, parkinson's concerns     REASON FOR CONSULTATION- Gait difficulty    REFERRAL SOURCE  Dr. Lorena Baeza   Dr. Lorena Baeza    HISTORY OF PRESENT ILLNESS  Kim Correa is a 63 year old female seen today for evaluation of gait difficulty.  She previously had a lot of symptoms and seen by multiple providers.  There was concerns about Parkinson's disease 7 years ago but then she was lost to follow-up because of transportation issues and loss of insurance.    7 years ago she was having difficulty with motor skills.  Since then her symptoms have progressed.  She has some shaking and jerking of her upper extremities.  This can happen at rest and is worse with activity.  She further complains that she walks funny with her legs tilted inward.  This complaint of balance issues as well.  Has had multiple concussions in the past.  Does occasionally shuffle her feet.  She does complain of some stiffness in her arms and legs.  She does have cervical disc herniation issues as well.  Does have a diagnosis of diet-controlled diabetes.  Does complain of some numbness in her feet at times.  Also has headaches.  Does complain of some vertigo at times.  Her B12 level has chronically been low and she is on oral supplement without benefit.  A1c is 6.6.    She has been on Risperdal in the past.  No other antipsychotic use.    Previous history is reviewed and this is unchanged.    PAST MEDICAL/SURGICAL HISTORY  Past Medical History:   Diagnosis Date    Dysphagia     Enlarged tongue     Myalgia     Polyarthralgia     Sicca (H)      Patient Active Problem List   Diagnosis    Sprain of lumbar region    Mild intermittent asthma with exacerbation    Esophageal reflux    Moderate depressed bipolar I disorder (H)    Hyperlipidemia    Attention deficit disorder    Chronic rhinitis     Dizziness and giddiness    Refractory migraine without aura    COSTOCHONDRITIS    Adenomatous polyp of colon    Anxiety    Asthma    PTSD (post-traumatic stress disorder)    Coronary atherosclerosis    PAD (peripheral artery disease) (H)    Statin intolerance   Significant for high cholesterol, diabetes, migraines, tremors, mental illness, arthritis, vision loss.  Questionable diagnosis of Sjogren's.  Has not followed up with rheumatology.    FAMILY HISTORY  Family History   Problem Relation Age of Onset    Hypertension Mother     Thyroid Disease Mother     Alcoholism Mother     Heart Disease Father     Alcoholism Father     Diabetes Sister     Alzheimer Disease Maternal Grandmother     Heart Disease Maternal Grandfather     Cerebrovascular Disease Paternal Grandmother     C.A.D. No family hx of    Positive for Parkinson's disease in multiple uncles and father.    SOCIAL HISTORY  Social History     Tobacco Use    Smoking status: Every Day     Packs/day: 1.00     Years: 45.00     Pack years: 45.00     Types: Cigarettes    Smokeless tobacco: Never   Vaping Use    Vaping Use: Some days    Substances: Nicotine, THC, CBD    Devices: Disposable, Pre-filled or refillable cartridge, Refillable tank, Pre-filled pod   Substance Use Topics    Alcohol use: No    Drug use: No       SYSTEMS REVIEW  Twelve-system ROS was done and other than the HPI this was negative/positive for neck pain, back pain, arm and leg pain, joint pain, numbness/tingling, weakness/paralysis, difficulty walking, falling, balance/coordination problems, movement disorder, bladder symptoms, dizziness, speech disturbance, difficulty swallowing, ringing in ears, hearing loss, vision symptoms, sleepiness during the day, sleeping problems, headaches, memory loss, anxiety, chest pain, cardiac/heart problems, stomach pain, respiratory problems, skin changes, weight gain, appetite problems.    MEDICATIONS  blood glucose monitoring (CONTOUR NEXT MONITOR W/DEVICE  KIT) meter device kit, 1 each  FLOVENT  MCG/ACT inhaler, INHALE 1 PUFF BY MOUTH TWO TIMES DAILY  rosuvastatin (CRESTOR) 40 MG tablet, Take 1 tablet (40 mg) by mouth daily  VENTOLIN  (90 Base) MCG/ACT inhaler, INHALE 1-2 PUFFS BY MOUTH EVERY 4 HOURS AS NEEDED FOR WHEEZING.*  vitamin B-12 (CYANOCOBALAMIN) 1000 MCG tablet, Take 1,000 mcg by mouth  vitamin D2 (ERGOCALCIFEROL) 58413 units (1250 mcg) capsule, Take 1 capsule (50,000 Units) by mouth once a week for 12 doses  Pyridoxine HCl (B-6) 250 MG TABS,     No current facility-administered medications on file prior to visit.       PHYSICAL EXAMINATION  VITALS: /64   Pulse 80   Resp 18   Wt 49.9 kg (110 lb)   BMI 21.88 kg/m    GENERAL: Healthy appearing, alert, no acute distress, normal habitus.  CARDIOVASCULAR: Extremities warm and well perfused. Pulses present.   NEUROLOGICAL:  Patient is awake and oriented to self, place and time.  Attention span is normal.  Memory is grossly intact.  Language is fluent and follows commands appropriately.  Appropriate fund of knowledge. Cranial nerves 2-12 are intact. There is no pronator drift.  Motor exam shows 5/5 strength in all extremities except bilateral lower extremity hip flexion weakness and bilateral knee extension weakness.  Tone is symmetric bilaterally in upper and lower extremities.  Reflexes are symmetric and absent in upper extremities and lower extremities. Sensory exam is grossly intact to light touch, pin prick and vibration with decreased pinprick and vibratory sense in the feet.  Finger to nose and heel to shin is without dysmetria.  Romberg is negative.  Gait is normal and the patient is able to do tandem walk and walk on toes and heels with mild difficulty more related to her left leg.  She reports that the left leg is smaller than the right leg.  Does have a postural tremor in the upper extremities but no resting tremor or cogwheeling    DIAGNOSTICS  MRI  CONCLUSION:  1.  Mild to  moderate burden of nonspecific T2 prolongation in the supratentorial parenchyma. Mild burden of T2 prolongation in the crossing fibers of the tiffany. The findings may be due to microvascular disease given the patient's smoking history.  2.  The pattern is not classic for demyelination but areas of chronic demyelination are not entirely excluded.  3.  No mass, hemorrhage or stroke.  4.  Incidental left parietal intraosseous hemangioma.    EMG- [facility] 2017      EEG-2019      RELEVANT LABS  2023 labs  B12 173  A1c 6.6  CBC and BMP noncontributory    OUTSIDE RECORDS  Outside referral notes and chart notes were reviewed and pertinent information has been summarized (in addition to the HPI):-      Notes from 2019 from Dr. Bateman were reviewed.  Patient had normal gait.  Was diagnosed to have a B12 deficiency and was put on a B12 supplement.        IMPRESSION/REPORT/PLAN  Low serum vitamin B12  Unsteady gait  Rule out neuropathy  Bilateral leg weakness  Essential tremor      This is a 63 year old female with multiple issues.  Previous work-up in 2019 was negative  1.  Unsteady gait:-Exam does not show any evidence of Parkinson's disease.  Could be related to her left hip issues and knee issues.  Could be related to other neurological issues.      2.  Tremor:-This is suggestive of essential tremor.  Discussed medication and she wants to hold off for right now since its not too bothersome to her.  Tremors are not suggestive of Parkinson's disease on exam.  She does have extensive family history and can consider DaTscan if there is significant progression.    3.  Exam does show decreased vibratory and pinprick sensation in the feet.  I suspect she does have a neuropathy.  We will check a EMG and blood work to look for causes of neuropathy.  Could be related to low vitamin B12.  Could be related to autoimmune disease that was being worked up in the past.  We will hold off on repeating autoimmune labs today.  She is taking a  "vitamin B6 supplement and will check level.    4.  She does have bilateral hip flexion and knee extension weakness bilaterally.  There could be a superimposed cervical or lumbar lesion that could be causing this weakness and we will check MRIs.  Could be related to the vitamin B12 deficiency.  Reflexes are absent.    5.  Patient's B12 has chronically been low despite her being on oral supplementation.  We will switch her injections to see if that helps.    I can see her back in 2 months.    -     cyanocobalamin (CYANOCOBALAMIN) 1000 MCG/ML injection; Inject 1 mL (1,000 mcg) into the muscle every 7 days  -     insulin syringe-needle U-100 (30G X 1/2\" 1 ML) 30G X 1/2\" 1 ML miscellaneous; Use 1 syringes daily or as directed. For b12  -     Vitamin B1 whole blood; Future  -     TSH with free T4 reflex; Future  -     Protein Immunofixation Serum; Future  -     Protein electrophoresis; Future  -     Ceruloplasmin; Future  -     Copper level; Future  -     EMG; Future  -     MR Cervical Spine w/o Contrast; Future  -     MR Lumbar Spine w/o Contrast; Future  -     Vitamin B6; Future    Return in about 2 months (around 10/14/2023) for In-Clinic Visit (must), After testing.    Over 60 minutes were spent coordinating the care for the patient on the day of the encounter.  This includes previsit, during visit and post visit activities as documented above.  Counseling patient.  Multiple problems reviewed/addressed.  Previous testing reviewed.  (Activities include but not inclusive of reviewing chart, reviewing outside records, reviewing labs and imaging study results as well as the images, patient visit time including getting history and exam,  use if applicable, review of test results with the patient and coming up with a plan in a shared model, counseling patient and family, education and answering patient questions, EMR , EMR diagnosis entry and problem list management, medication reconciliation and " prescription management if applicable, paperwork if applicable, printing documents and documentation of the visit activities.)        Rudolph Person MD  Neurologist  [facility]  Tel:- [phone]    This note was dictated using voice recognition software.  Any grammatical or context distortions are unintentional and inherent to the software.

## 2023-08-14 NOTE — NURSING NOTE
Chief Complaint   Patient presents with    Consult     Tremors, gait concerns, parkinson's concerns     Faith Randhawa MA,CMA,12:41 PM

## 2023-09-01 ENCOUNTER — HOSPITAL ENCOUNTER (OUTPATIENT)
Dept: CARDIOLOGY | Facility: HOSPITAL | Age: 63
Discharge: HOME | End: 2023-09-01
Attending: INTERNAL MEDICINE
Payer: MEDICARE

## 2023-09-01 ENCOUNTER — HOSPITAL ENCOUNTER (OUTPATIENT)
Dept: NUCLEAR MEDICINE | Facility: HOSPITAL | Age: 63
Discharge: HOME | End: 2023-09-01
Attending: INTERNAL MEDICINE
Payer: MEDICARE

## 2023-09-01 ENCOUNTER — LAB (OUTPATIENT)
Dept: LAB | Facility: CLINIC | Age: 63
End: 2023-09-01
Payer: MEDICARE

## 2023-09-01 DIAGNOSIS — I25.10 ATHEROSCLEROSIS OF CORONARY ARTERY OF NATIVE HEART WITHOUT ANGINA PECTORIS, UNSPECIFIED VESSEL OR LESION TYPE: ICD-10-CM

## 2023-09-01 DIAGNOSIS — E83.00 COPPER METABOLISM DISORDER (H): ICD-10-CM

## 2023-09-01 DIAGNOSIS — G62.9 NEUROPATHY: ICD-10-CM

## 2023-09-01 DIAGNOSIS — G25.0 ESSENTIAL TREMOR: ICD-10-CM

## 2023-09-01 DIAGNOSIS — R26.81 UNSTEADY GAIT: ICD-10-CM

## 2023-09-01 DIAGNOSIS — R29.898 BILATERAL LEG WEAKNESS: ICD-10-CM

## 2023-09-01 DIAGNOSIS — E53.8 LOW SERUM VITAMIN B12: ICD-10-CM

## 2023-09-01 LAB
CV STRESS CURRENT BP HE: NORMAL
CV STRESS CURRENT HR HE: 62
CV STRESS CURRENT HR HE: 63
CV STRESS CURRENT HR HE: 71
CV STRESS CURRENT HR HE: 71
CV STRESS CURRENT HR HE: 75
CV STRESS CURRENT HR HE: 75
CV STRESS CURRENT HR HE: 76
CV STRESS CURRENT HR HE: 77
CV STRESS CURRENT HR HE: 80
CV STRESS CURRENT HR HE: 81
CV STRESS CURRENT HR HE: 83
CV STRESS CURRENT HR HE: 83
CV STRESS CURRENT HR HE: 89
CV STRESS DEVIATION TIME HE: NORMAL
CV STRESS ECHO PERCENT HR HE: NORMAL
CV STRESS EXERCISE STAGE HE: NORMAL
CV STRESS FINAL RESTING BP HE: NORMAL
CV STRESS FINAL RESTING HR HE: 76
CV STRESS MAX HR HE: 84
CV STRESS MAX TREADMILL GRADE HE: 0
CV STRESS MAX TREADMILL SPEED HE: 0
CV STRESS PEAK DIA BP HE: NORMAL
CV STRESS PEAK SYS BP HE: NORMAL
CV STRESS PHASE HE: NORMAL
CV STRESS PROTOCOL HE: NORMAL
CV STRESS RESTING PT POSITION HE: NORMAL
CV STRESS ST DEVIATION AMOUNT HE: NORMAL
CV STRESS ST DEVIATION ELEVATION HE: NORMAL
CV STRESS ST EVELATION AMOUNT HE: NORMAL
CV STRESS TEST TYPE HE: NORMAL
CV STRESS TOTAL STAGE TIME MIN 1 HE: NORMAL
RATE PRESSURE PRODUCT: NORMAL
STRESS ECHO BASELINE DIASTOLIC HE: 62
STRESS ECHO BASELINE HR: 89
STRESS ECHO BASELINE SYSTOLIC BP: 133
STRESS ECHO CALCULATED PERCENT HR: 54 %
STRESS ECHO LAST STRESS DIASTOLIC BP: 66
STRESS ECHO LAST STRESS HR: 71
STRESS ECHO LAST STRESS SYSTOLIC BP: 136
STRESS ECHO TARGET HR: 157
TOTAL PROTEIN SERUM FOR ELP: 6.9 G/DL (ref 6.4–8.3)
TSH SERPL DL<=0.005 MIU/L-ACNC: 1.57 UIU/ML (ref 0.3–4.2)

## 2023-09-01 PROCEDURE — 343N000001 HC RX 343: Performed by: INTERNAL MEDICINE

## 2023-09-01 PROCEDURE — 93306 TTE W/DOPPLER COMPLETE: CPT

## 2023-09-01 PROCEDURE — 78452 HT MUSCLE IMAGE SPECT MULT: CPT | Mod: 26 | Performed by: INTERNAL MEDICINE

## 2023-09-01 PROCEDURE — 36415 COLL VENOUS BLD VENIPUNCTURE: CPT

## 2023-09-01 PROCEDURE — 93017 CV STRESS TEST TRACING ONLY: CPT

## 2023-09-01 PROCEDURE — 84425 ASSAY OF VITAMIN B-1: CPT | Mod: 90

## 2023-09-01 PROCEDURE — 86334 IMMUNOFIX E-PHORESIS SERUM: CPT

## 2023-09-01 PROCEDURE — 93018 CV STRESS TEST I&R ONLY: CPT | Performed by: INTERNAL MEDICINE

## 2023-09-01 PROCEDURE — A9500 TC99M SESTAMIBI: HCPCS | Performed by: INTERNAL MEDICINE

## 2023-09-01 PROCEDURE — 250N000011 HC RX IP 250 OP 636: Performed by: INTERNAL MEDICINE

## 2023-09-01 PROCEDURE — 93016 CV STRESS TEST SUPVJ ONLY: CPT | Mod: 59 | Performed by: INTERNAL MEDICINE

## 2023-09-01 PROCEDURE — 84443 ASSAY THYROID STIM HORMONE: CPT

## 2023-09-01 PROCEDURE — 84165 PROTEIN E-PHORESIS SERUM: CPT

## 2023-09-01 PROCEDURE — 82525 ASSAY OF COPPER: CPT | Mod: 90

## 2023-09-01 PROCEDURE — G1010 CDSM STANSON: HCPCS

## 2023-09-01 PROCEDURE — G1010 CDSM STANSON: HCPCS | Performed by: INTERNAL MEDICINE

## 2023-09-01 PROCEDURE — 93306 TTE W/DOPPLER COMPLETE: CPT | Mod: 26 | Performed by: INTERNAL MEDICINE

## 2023-09-01 PROCEDURE — 84155 ASSAY OF PROTEIN SERUM: CPT

## 2023-09-01 PROCEDURE — 99000 SPECIMEN HANDLING OFFICE-LAB: CPT

## 2023-09-01 PROCEDURE — 82390 ASSAY OF CERULOPLASMIN: CPT

## 2023-09-01 PROCEDURE — 84207 ASSAY OF VITAMIN B-6: CPT | Mod: 90

## 2023-09-01 RX ORDER — NITROGLYCERIN 400 UG/1
SPRAY ORAL
COMMUNITY
Start: 2023-08-16

## 2023-09-01 RX ORDER — AMINOPHYLLINE 25 MG/ML
50 INJECTION, SOLUTION INTRAVENOUS
Status: DISCONTINUED | OUTPATIENT
Start: 2023-09-01 | End: 2023-09-01 | Stop reason: HOSPADM

## 2023-09-01 RX ORDER — REGADENOSON 0.08 MG/ML
0.4 INJECTION, SOLUTION INTRAVENOUS ONCE
Status: COMPLETED | OUTPATIENT
Start: 2023-09-01 | End: 2023-09-01

## 2023-09-01 RX ORDER — EPINEPHRINE 0.3 MG/.3ML
INJECTION SUBCUTANEOUS
COMMUNITY
Start: 2023-08-15

## 2023-09-01 RX ADMIN — REGADENOSON 0.4 MG: 0.08 INJECTION, SOLUTION INTRAVENOUS at 13:08

## 2023-09-01 RX ADMIN — Medication 30.2 MILLICURIE: at 14:00

## 2023-09-01 RX ADMIN — Medication 8.2 MILLICURIE: at 12:03

## 2023-09-03 LAB — COPPER SERPL-MCNC: 113.1 UG/DL

## 2023-09-04 LAB — PYRIDOXAL PHOS SERPL-SCNC: 20.3 NMOL/L

## 2023-09-05 DIAGNOSIS — R06.09 DOE (DYSPNEA ON EXERTION): ICD-10-CM

## 2023-09-05 DIAGNOSIS — Z01.812 PRE-PROCEDURE LAB EXAM: ICD-10-CM

## 2023-09-05 DIAGNOSIS — R94.39 ABNORMAL CARDIOVASCULAR STRESS TEST: Primary | ICD-10-CM

## 2023-09-05 LAB
ALBUMIN SERPL ELPH-MCNC: 4.3 G/DL (ref 3.7–5.1)
ALPHA1 GLOB SERPL ELPH-MCNC: 0.3 G/DL (ref 0.2–0.4)
ALPHA2 GLOB SERPL ELPH-MCNC: 0.8 G/DL (ref 0.5–0.9)
B-GLOBULIN SERPL ELPH-MCNC: 0.7 G/DL (ref 0.6–1)
GAMMA GLOB SERPL ELPH-MCNC: 0.7 G/DL (ref 0.7–1.6)
M PROTEIN SERPL ELPH-MCNC: 0 G/DL
PROT PATTERN SERPL ELPH-IMP: NORMAL
PROT PATTERN SERPL IFE-IMP: NORMAL
VIT B1 PYROPHOSHATE BLD-SCNC: 155 NMOL/L

## 2023-09-07 ENCOUNTER — TELEPHONE (OUTPATIENT)
Dept: CARDIOLOGY | Facility: CLINIC | Age: 63
End: 2023-09-07
Payer: MEDICARE

## 2023-09-07 LAB — CERULOPLASMIN SERPL-MCNC: 27 MG/DL (ref 20–60)

## 2023-09-07 NOTE — TELEPHONE ENCOUNTER
----- Message from Julia Mcmahon sent at 9/7/2023 12:43 PM CDT -----  Regarding: RE: [facility] ordering  Case type: CA Poss PCI  Procedure Physician(s): CHELY/TIFFANIE  Procedure Date and Patient Arrival Time: Tuesday 9/12, with arrival time of 0630  H&P: Pt will scheduled with PMD  Pre-Procedure Lab Appt: on admission due to transpiration - place lab orders if you haven't already!  Alerts/Important Info: None  Interpretor Requested: N/A    Thank you,  Julia        ----- Message -----  From: Padmini Ace RN  Sent: 9/6/2023   9:57 AM CDT  To: [facility] Procedure  Pool - david  Subject: WT[facility] ordering                                     Case Type: CA P.PCI  Ordering Provider: Dr. Garrido  H&P: Needed  Alerts: none  Additional Info/Urgency: next available  Orders for Pre-Procedure Labs? 9/5/2023

## 2023-09-10 LAB
ABO/RH(D): NORMAL
ANTIBODY SCREEN: NEGATIVE
SPECIMEN EXPIRATION DATE: NORMAL

## 2023-09-11 ENCOUNTER — PREP FOR PROCEDURE (OUTPATIENT)
Dept: CARDIOLOGY | Facility: CLINIC | Age: 63
End: 2023-09-11

## 2023-09-11 ENCOUNTER — OFFICE VISIT (OUTPATIENT)
Dept: FAMILY MEDICINE | Facility: CLINIC | Age: 63
End: 2023-09-11
Payer: MEDICARE

## 2023-09-11 VITALS
WEIGHT: 109.8 LBS | OXYGEN SATURATION: 97 % | HEIGHT: 60 IN | RESPIRATION RATE: 24 BRPM | BODY MASS INDEX: 21.55 KG/M2 | SYSTOLIC BLOOD PRESSURE: 108 MMHG | TEMPERATURE: 98 F | DIASTOLIC BLOOD PRESSURE: 63 MMHG | HEART RATE: 71 BPM

## 2023-09-11 DIAGNOSIS — Z11.59 NEED FOR HEPATITIS C SCREENING TEST: ICD-10-CM

## 2023-09-11 DIAGNOSIS — R94.39 ABNORMAL CARDIOVASCULAR STRESS TEST: Primary | ICD-10-CM

## 2023-09-11 DIAGNOSIS — Z11.4 SCREENING FOR HIV (HUMAN IMMUNODEFICIENCY VIRUS): ICD-10-CM

## 2023-09-11 DIAGNOSIS — R06.09 DOE (DYSPNEA ON EXERTION): ICD-10-CM

## 2023-09-11 DIAGNOSIS — I25.10 ATHEROSCLEROSIS OF CORONARY ARTERY OF NATIVE HEART WITHOUT ANGINA PECTORIS, UNSPECIFIED VESSEL OR LESION TYPE: ICD-10-CM

## 2023-09-11 DIAGNOSIS — E11.9 TYPE 2 DIABETES MELLITUS WITHOUT COMPLICATION, WITHOUT LONG-TERM CURRENT USE OF INSULIN (H): ICD-10-CM

## 2023-09-11 DIAGNOSIS — Z01.812 PRE-PROCEDURE LAB EXAM: ICD-10-CM

## 2023-09-11 DIAGNOSIS — I73.9 PAD (PERIPHERAL ARTERY DISEASE) (H): ICD-10-CM

## 2023-09-11 DIAGNOSIS — J45.909 ASTHMA, UNSPECIFIED ASTHMA SEVERITY, UNSPECIFIED WHETHER COMPLICATED, UNSPECIFIED WHETHER PERSISTENT: ICD-10-CM

## 2023-09-11 DIAGNOSIS — R94.39 ABNORMAL CARDIOVASCULAR STRESS TEST: ICD-10-CM

## 2023-09-11 DIAGNOSIS — R06.02 SOB (SHORTNESS OF BREATH): ICD-10-CM

## 2023-09-11 DIAGNOSIS — Z01.818 PRE-OP EVALUATION: Primary | ICD-10-CM

## 2023-09-11 DIAGNOSIS — D64.9 ANEMIA, UNSPECIFIED TYPE: ICD-10-CM

## 2023-09-11 LAB
ANION GAP SERPL CALCULATED.3IONS-SCNC: 12 MMOL/L (ref 7–15)
ATRIAL RATE - MUSE: 84 BPM
BUN SERPL-MCNC: 8.2 MG/DL (ref 8–23)
CALCIUM SERPL-MCNC: 9.6 MG/DL (ref 8.8–10.2)
CHLORIDE SERPL-SCNC: 104 MMOL/L (ref 98–107)
CHOLEST SERPL-MCNC: 270 MG/DL
CREAT SERPL-MCNC: 0.74 MG/DL (ref 0.51–0.95)
CREAT UR-MCNC: 114 MG/DL
DEPRECATED HCO3 PLAS-SCNC: 25 MMOL/L (ref 22–29)
DIASTOLIC BLOOD PRESSURE - MUSE: NORMAL MMHG
EGFRCR SERPLBLD CKD-EPI 2021: 90 ML/MIN/1.73M2
ERYTHROCYTE [DISTWIDTH] IN BLOOD BY AUTOMATED COUNT: 13 % (ref 10–15)
GLUCOSE SERPL-MCNC: 88 MG/DL (ref 70–99)
HBA1C MFR BLD: 6.2 % (ref 0–5.6)
HCT VFR BLD AUTO: 44.1 % (ref 35–47)
HCV AB SERPL QL IA: NONREACTIVE
HDLC SERPL-MCNC: 57 MG/DL
HGB BLD-MCNC: 14.7 G/DL (ref 11.7–15.7)
HIV 1+2 AB+HIV1 P24 AG SERPL QL IA: NONREACTIVE
INTERPRETATION ECG - MUSE: NORMAL
LDLC SERPL CALC-MCNC: 180 MG/DL
MCH RBC QN AUTO: 30.9 PG (ref 26.5–33)
MCHC RBC AUTO-ENTMCNC: 33.3 G/DL (ref 31.5–36.5)
MCV RBC AUTO: 93 FL (ref 78–100)
MICROALBUMIN UR-MCNC: 21.5 MG/L
MICROALBUMIN/CREAT UR: 18.86 MG/G CR (ref 0–25)
NONHDLC SERPL-MCNC: 213 MG/DL
P AXIS - MUSE: 48 DEGREES
PLATELET # BLD AUTO: 170 10E3/UL (ref 150–450)
POTASSIUM SERPL-SCNC: 4.2 MMOL/L (ref 3.4–5.3)
PR INTERVAL - MUSE: 206 MS
QRS DURATION - MUSE: 134 MS
QT - MUSE: 414 MS
QTC - MUSE: 489 MS
R AXIS - MUSE: -67 DEGREES
RBC # BLD AUTO: 4.75 10E6/UL (ref 3.8–5.2)
SODIUM SERPL-SCNC: 141 MMOL/L (ref 136–145)
SYSTOLIC BLOOD PRESSURE - MUSE: NORMAL MMHG
T AXIS - MUSE: 18 DEGREES
TRIGL SERPL-MCNC: 167 MG/DL
VENTRICULAR RATE- MUSE: 84 BPM
WBC # BLD AUTO: 9.9 10E3/UL (ref 4–11)

## 2023-09-11 PROCEDURE — 93010 ELECTROCARDIOGRAM REPORT: CPT | Mod: OFF | Performed by: GENERAL ACUTE CARE HOSPITAL

## 2023-09-11 PROCEDURE — 87389 HIV-1 AG W/HIV-1&-2 AB AG IA: CPT | Performed by: FAMILY MEDICINE

## 2023-09-11 PROCEDURE — 82570 ASSAY OF URINE CREATININE: CPT | Performed by: FAMILY MEDICINE

## 2023-09-11 PROCEDURE — 86901 BLOOD TYPING SEROLOGIC RH(D): CPT | Performed by: FAMILY MEDICINE

## 2023-09-11 PROCEDURE — 99215 OFFICE O/P EST HI 40 MIN: CPT | Performed by: FAMILY MEDICINE

## 2023-09-11 PROCEDURE — 80048 BASIC METABOLIC PNL TOTAL CA: CPT | Performed by: FAMILY MEDICINE

## 2023-09-11 PROCEDURE — 86850 RBC ANTIBODY SCREEN: CPT | Performed by: FAMILY MEDICINE

## 2023-09-11 PROCEDURE — 80061 LIPID PANEL: CPT | Performed by: FAMILY MEDICINE

## 2023-09-11 PROCEDURE — 86803 HEPATITIS C AB TEST: CPT | Performed by: FAMILY MEDICINE

## 2023-09-11 PROCEDURE — 86900 BLOOD TYPING SEROLOGIC ABO: CPT | Performed by: FAMILY MEDICINE

## 2023-09-11 PROCEDURE — 36415 COLL VENOUS BLD VENIPUNCTURE: CPT | Performed by: FAMILY MEDICINE

## 2023-09-11 PROCEDURE — 93005 ELECTROCARDIOGRAM TRACING: CPT | Performed by: FAMILY MEDICINE

## 2023-09-11 PROCEDURE — 82043 UR ALBUMIN QUANTITATIVE: CPT | Performed by: FAMILY MEDICINE

## 2023-09-11 PROCEDURE — 85027 COMPLETE CBC AUTOMATED: CPT | Performed by: FAMILY MEDICINE

## 2023-09-11 PROCEDURE — 83036 HEMOGLOBIN GLYCOSYLATED A1C: CPT | Performed by: FAMILY MEDICINE

## 2023-09-11 RX ORDER — ASPIRIN 325 MG
325 TABLET ORAL ONCE
Status: CANCELLED | OUTPATIENT
Start: 2023-09-12 | End: 2023-09-11

## 2023-09-11 RX ORDER — ASPIRIN 81 MG/1
243 TABLET, CHEWABLE ORAL ONCE
Status: CANCELLED | OUTPATIENT
Start: 2023-09-12

## 2023-09-11 RX ORDER — LIDOCAINE 40 MG/G
CREAM TOPICAL
Status: CANCELLED | OUTPATIENT
Start: 2023-09-11

## 2023-09-11 RX ORDER — FENTANYL CITRATE 50 UG/ML
25 INJECTION, SOLUTION INTRAMUSCULAR; INTRAVENOUS
Status: CANCELLED | OUTPATIENT
Start: 2023-09-12

## 2023-09-11 RX ORDER — SODIUM CHLORIDE 9 MG/ML
INJECTION, SOLUTION INTRAVENOUS CONTINUOUS
Status: CANCELLED | OUTPATIENT
Start: 2023-09-12

## 2023-09-11 ASSESSMENT — ASTHMA QUESTIONNAIRES
QUESTION_5 LAST FOUR WEEKS HOW WOULD YOU RATE YOUR ASTHMA CONTROL: WELL CONTROLLED
QUESTION_4 LAST FOUR WEEKS HOW OFTEN HAVE YOU USED YOUR RESCUE INHALER OR NEBULIZER MEDICATION (SUCH AS ALBUTEROL): ONE OR TWO TIMES PER DAY
ACT_TOTALSCORE: 17
QUESTION_3 LAST FOUR WEEKS HOW OFTEN DID YOUR ASTHMA SYMPTOMS (WHEEZING, COUGHING, SHORTNESS OF BREATH, CHEST TIGHTNESS OR PAIN) WAKE YOU UP AT NIGHT OR EARLIER THAN USUAL IN THE MORNING: NOT AT ALL
QUESTION_1 LAST FOUR WEEKS HOW MUCH OF THE TIME DID YOUR ASTHMA KEEP YOU FROM GETTING AS MUCH DONE AT WORK, SCHOOL OR AT HOME: NONE OF THE TIME
QUESTION_2 LAST FOUR WEEKS HOW OFTEN HAVE YOU HAD SHORTNESS OF BREATH: MORE THAN ONCE A DAY
ACT_TOTALSCORE: 17

## 2023-09-11 ASSESSMENT — PAIN SCALES - GENERAL: PAINLEVEL: SEVERE PAIN (6)

## 2023-09-11 NOTE — H&P (VIEW-ONLY)
[address] [address]  Phone: [phone]  Fax: [phone]  Primary Provider: Lorena Baeza  Pre-op Performing Provider: DERRICK PADILLA      PREOPERATIVE EVALUATION:  Today's date: 9/11/2023    Kim Correa is a 63 year old female who presents for a preoperative evaluation, undergoing aforementioned procedure, for increasing shortness of breath.           9/11/2023    11:38 AM   Additional Questions   Roomed by SOMMER Jeffrey   Accompanied by SANYA       Surgical Information:  Surgery/Procedure: Coronary Angiogram, Percutaneous Coronary Intervention   Surgery Location: [facility] CATH LAB CV   Surgeon: Arnie Queen MD & Shankar Ng MD   Surgery Date: 9/12/2023   Time of Surgery: 8:30 AM   Where patient plans to recover: At home with family  Fax number for surgical facility: Note does not need to be faxed, will be available electronically in Epic.    Assessment & Plan     The proposed surgical procedure is considered HIGH risk.    Problem List Items Addressed This Visit          Respiratory    Asthma       Endocrine    Type 2 diabetes mellitus without complication, without long-term current use of insulin (H)     Diet controlled          Relevant Orders    Hemoglobin A1c       Circulatory    Coronary atherosclerosis    PAD (peripheral artery disease) (H)     Other Visit Diagnoses       Pre-op evaluation    -  Primary    Relevant Orders    EKG 12-lead, tracing only (Completed)    CBC with platelets    Basic metabolic panel    SOB (shortness of breath)        h/o Anemia, unspecified type        Screening for HIV (human immunodeficiency virus)        Need for hepatitis C screening test        Abnormal cardiovascular stress test        ROSARIO (dyspnea on exertion)        Pre-procedure lab exam                 Implanted Device:  [employer] [place] (D) pacemaker     - No identified additional risk factors other than previously  addressed    Antiplatelet or Anticoagulation Medication Instructions:   - Patient is on no antiplatelet or anticoagulation medications.    Additional Medication Instructions:  Patient is to take all scheduled medications on the day of surgery    RECOMMENDATION:  Proceed with the proposed procedure.       I spent a total of 49 minutes on the day of the visit.   Time spent by me doing chart review, history and exam, documentation and further activities per the note      Subjective         9/11/2023    11:32 AM   Preop Questions   1. Have you ever had a heart attack or stroke? YES - CVA   2. Have you ever had surgery on your heart or blood vessels, such as a stent placement, a coronary artery bypass, or surgery on an artery in your head, neck, heart, or legs? YES - history of PCI   3. Do you have chest pain with activity? YES -    4. Do you have a history of  heart failure? No   5. Do you currently have a cold, bronchitis or symptoms of other infection? No   6. Do you have a cough, shortness of breath, or wheezing? YES -    7. Do you or anyone in your family have previous history of blood clots? UNKNOWN -    8. Do you or does anyone in your family have a serious bleeding problem such as prolonged bleeding following surgeries or cuts? UNKNOWN -    9. Have you ever had problems with anemia or been told to take iron pills? YES -    10. Have you had any abnormal blood loss such as black, tarry or bloody stools, or abnormal vaginal bleeding? No   11. Have you ever had a blood transfusion? No   12. Are you willing to have a blood transfusion if it is medically needed before, during, or after your surgery? Yes   13. Have you or any of your relatives ever had problems with anesthesia? No    14. Do you have sleep apnea, excessive snoring or daytime drowsiness? NO   14a. Do you have a CPAP machine? No   15. Do you have any artifical heart valves or other implanted medical devices like a pacemaker, defibrillator, or continuous  glucose monitor? YES -    15a. What type of device do you have? Pacemaker   15b. Name of the clinic that manages your device:  unknown   16. Do you have artificial joints? No   17. Are you allergic to latex? No     Health Care Directive:  Patient does not have a Health Care Directive or Living Will: Discussed advance care planning with patient; however, patient declined at this time.    Preoperative Review of :   reviewed - no record of controlled substances prescribed.        Review of Systems  Constitutional, neuro, ENT, endocrine, pulmonary, cardiac, gastrointestinal, genitourinary, musculoskeletal, integument and psychiatric systems are negative, except as otherwise noted.    Patient Active Problem List    Diagnosis Date Noted    Statin intolerance 08/13/2018     Priority: Medium    Anxiety 11/10/2015     Priority: Medium    Adenomatous polyp of colon 11/19/2013     Priority: Medium    PAD (peripheral artery disease) (H) 12/29/2010     Priority: Medium     Formatting of this note might be different from the original.  Created by Conversion  Peripheral Vascular Disease (PVD)  per external records      Asthma 04/06/2010     Priority: Medium    Coronary atherosclerosis 04/06/2010     Priority: Medium     Coronary Artery Disease (CAD) NOS  Formatting of this note might be different from the original.  LAD stenting 2007, 2009      COSTOCHONDRITIS 03/20/2007     Priority: Medium    Refractory migraine without aura 02/05/2007     Priority: Medium     Problem list name updated by automated process. Provider to review      Dizziness and giddiness 12/11/2006     Priority: Medium    Mild intermittent asthma with exacerbation 11/10/2006     Priority: Medium     Qvar and albuterol, usually 2-3 days/week.      Esophageal reflux 11/10/2006     Priority: Medium    Moderate depressed bipolar I disorder (H) 11/10/2006     Priority: Medium     Problem list name updated by automated process. Provider to review       "Hyperlipidemia 11/10/2006     Priority: Medium     Problem list name updated by automated process. Provider to review      Attention deficit disorder 11/10/2006     Priority: Medium     Problem list name updated by automated process. Provider to review      Chronic rhinitis 11/10/2006     Priority: Medium     Zyrtec daily      Sprain of lumbar region 09/08/2006     Priority: Medium    PTSD (post-traumatic stress disorder) 01/01/2006     Priority: Medium      Past Medical History:   Diagnosis Date    Dysphagia     Enlarged tongue     Myalgia     Polyarthralgia     Sicca (H)      Past Surgical History:   Procedure Laterality Date    APPENDECTOMY      CARDIAC CATHETERIZATION      CORONARY ANGIOPLASTY      CORONARY STENT PLACEMENT      HC REVISE MEDIAN N/CARPAL TUNNEL SURG      Description: Neuroplasty Decompression Median Nerve At Carpal Tunnel;  Recorded: 09/19/2008;    IMPLANT PACEMAKER      INSERT / REPLACE / REMOVE PACEMAKER      IR MISCELLANEOUS PROCEDURE  2/2/2009    NC VAGINAL HYSTERECTOMY,UTERUS 250 GMS/<      Description: Vaginal Hysterectomy;  Recorded: 12/16/2011;    SURGICAL HISTORY OF -       vag hyst    SURGICAL HISTORY OF -       carpal tunnel bilateral    SURGICAL HISTORY OF -       arm surgery right side.      [facility] APPENDECTOMY      Description: Appendectomy;  Recorded: 09/19/2008;  Comments: and ovarian cyst     Current Outpatient Medications   Medication Sig Dispense Refill    blood glucose monitoring (CONTOUR NEXT MONITOR W/DEVICE KIT) meter device kit 1 each      cyanocobalamin (CYANOCOBALAMIN) 1000 MCG/ML injection Inject 1 mL (1,000 mcg) into the muscle every 7 days 12 mL 1    EPINEPHrine (ANY BX GENERIC EQUIV) 0.3 MG/0.3ML injection 2-pack Inject 0.3 mL (0.3 mg) intramuscularly once as needed for up to 1 dose.*      FLOVENT  MCG/ACT inhaler INHALE 1 PUFF BY MOUTH TWO TIMES DAILY 12 g 12    insulin syringe-needle U-100 (30G X 1/2\" 1 ML) 30G X 1/2\" 1 ML miscellaneous Use 1 syringes daily or " "as directed. For b12 12 each 0    nitroGLYcerin (NITROLINGUAL) 0.4 MG/SPRAY spray PLACE 1 SPRAY (0.4 MG) UNDER TONGUE EVERY 5 MINUTES AS NEEDED FOR CHEST PAIN FOR UP TO 3 DOSES. CALL 911 IF NO RELIEF AFTER FIRST SPRAY*      rosuvastatin (CRESTOR) 40 MG tablet Take 1 tablet (40 mg) by mouth daily 90 tablet 3    VENTOLIN  (90 Base) MCG/ACT inhaler INHALE 1-2 PUFFS BY MOUTH EVERY 4 HOURS AS NEEDED FOR WHEEZING.*      vitamin B-12 (CYANOCOBALAMIN) 1000 MCG tablet Take 1,000 mcg by mouth      vitamin D2 (ERGOCALCIFEROL) 92117 units (1250 mcg) capsule Take 1 capsule (50,000 Units) by mouth once a week for 12 doses 12 capsule 0    Pyridoxine HCl (B-6) 250 MG TABS  (Patient not taking: Reported on 7/11/2023)  3       Allergies   Allergen Reactions    Bee Venom Anaphylaxis and Unknown     unknown  Unknown    unknown  Unknown    Indomethacin Diarrhea and Hives     Stomach pain, sweats, shakes, not good combo with heart meds either.      Sumatriptan Anaphylaxis, Other (See Comments) and Unknown     unknown  Throat closing      Imitrex [Sumatriptan Succinate]     Levonorgestrel-Ethinyl Estrad Other (See Comments)    Sulfa Antibiotics     Gabapentin Anxiety and Nausea and Vomiting    Vancomycin Itching        Social History     Tobacco Use    Smoking status: Every Day     Packs/day: 1.00     Years: 45.00     Pack years: 45.00     Types: Cigarettes    Smokeless tobacco: Never   Substance Use Topics    Alcohol use: No       History   Drug Use No         Objective     /63 (BP Location: Right arm, Patient Position: Sitting, Cuff Size: Adult Regular)   Pulse 71   Temp 98  F (36.7  C) (Oral)   Resp 24   Ht 1.511 m (4' 11.5\")   Wt 49.8 kg (109 lb 12.8 oz)   LMP  (LMP Unknown)   SpO2 97%   BMI 21.81 kg/m      Physical Exam    GENERAL APPEARANCE: alert and active     HENT: nose and mouth without ulcers or lesions     NECK: no adenopathy, no asymmetry, masses, or scars and thyroid normal to palpation     RESP: lungs " clear to auscultation - no rales, rhonchi or wheezes     CV: regular rates and rhythm     ABDOMEN:  soft, nontender, no HSM or masses and bowel sounds normal     MS: extremities normal- no gross deformities noted, no evidence of inflammation in joints, FROM in all extremities.     SKIN: no suspicious lesions or rashes     NEURO: Normal strength and tone, sensory exam grossly normal, mentation intact and speech normal     PSYCH: mentation appears normal. and affect normal/bright     LYMPHATICS: No cervical adenopathy    No results for input(s): HGB, PLT, INR, NA, POTASSIUM, CR, A1C in the last 31264 hours.     Diagnostics:  Recent Results (from the past 24 hour(s))   EKG 12-lead, tracing only    Collection Time: 09/11/23 12:38 PM   Result Value Ref Range    Systolic Blood Pressure  mmHg    Diastolic Blood Pressure  mmHg    Ventricular Rate 84 BPM    Atrial Rate 84 BPM    KS Interval 206 ms    QRS Duration 134 ms     ms    QTc 489 ms    P Axis 48 degrees    R AXIS -67 degrees    T Axis 18 degrees    Interpretation ECG        Poor data quality, interpretation may be adversely affected  Atrial-paced rhythm  Right bundle branch block  Left anterior fascicular block   Bifascicular block   Abnormal ECG  When compared with ECG of 30-JUN-2018 15:26,  Right bundle branch block is now Present  Confirmed by AUBREE NOVAK MD LOC:[facility] ([id number]) on 9/11/2023 1:48:15 PM     HEMOGLOBIN A1C    Collection Time: 09/11/23  1:05 PM   Result Value Ref Range    Hemoglobin A1C 6.2 (H) 0.0 - 5.6 %   CBC with platelets    Collection Time: 09/11/23  1:05 PM   Result Value Ref Range    WBC Count 9.9 4.0 - 11.0 10e3/uL    RBC Count 4.75 3.80 - 5.20 10e6/uL    Hemoglobin 14.7 11.7 - 15.7 g/dL    Hematocrit 44.1 35.0 - 47.0 %    MCV 93 78 - 100 fL    MCH 30.9 26.5 - 33.0 pg    MCHC 33.3 31.5 - 36.5 g/dL    RDW 13.0 10.0 - 15.0 %    Platelet Count 170 150 - 450 10e3/uL   Adult Type and Screen    Collection Time: 09/11/23  1:05 PM   Result Value  Ref Range    ABO/RH(D) A POS     Antibody Screen Negative Negative    SPECIMEN EXPIRATION DATE [id number]         Revised Cardiac Risk Index (RCRI):  The patient has the following serious cardiovascular risks for perioperative complications:   - Coronary Artery Disease (MI, positive stress test, angina, Qs on EKG) = 1 point     RCRI Interpretation: 1 point: Class II (low risk - 0.9% complication rate)         Signed Electronically by: Jad Santiago MD  Copy of this evaluation report is provided to requesting physician.

## 2023-09-11 NOTE — PROGRESS NOTES
[address] [address]  Phone: [phone]  Fax: [phone]  Primary Provider: Lorena Baeza  Pre-op Performing Provider: DERRICK PADILLA      PREOPERATIVE EVALUATION:  Today's date: 9/11/2023    Kim Correa is a 63 year old female who presents for a preoperative evaluation, undergoing aforementioned procedure, for increasing shortness of breath.           9/11/2023    11:38 AM   Additional Questions   Roomed by SOMMER Jeffrey   Accompanied by SANYA       Surgical Information:  Surgery/Procedure: Coronary Angiogram, Percutaneous Coronary Intervention   Surgery Location: [facility] CATH LAB CV   Surgeon: Arnie Queen MD & Shankar Ng MD   Surgery Date: 9/12/2023   Time of Surgery: 8:30 AM   Where patient plans to recover: At home with family  Fax number for surgical facility: Note does not need to be faxed, will be available electronically in Epic.    Assessment & Plan     The proposed surgical procedure is considered HIGH risk.    Problem List Items Addressed This Visit          Respiratory    Asthma       Endocrine    Type 2 diabetes mellitus without complication, without long-term current use of insulin (H)     Diet controlled          Relevant Orders    Hemoglobin A1c       Circulatory    Coronary atherosclerosis    PAD (peripheral artery disease) (H)     Other Visit Diagnoses       Pre-op evaluation    -  Primary    Relevant Orders    EKG 12-lead, tracing only (Completed)    CBC with platelets    Basic metabolic panel    SOB (shortness of breath)        h/o Anemia, unspecified type        Screening for HIV (human immunodeficiency virus)        Need for hepatitis C screening test        Abnormal cardiovascular stress test        ROSARIO (dyspnea on exertion)        Pre-procedure lab exam                 Implanted Device:  [employer] [place] (D) pacemaker     - No identified additional risk factors other than previously  addressed    Antiplatelet or Anticoagulation Medication Instructions:   - Patient is on no antiplatelet or anticoagulation medications.    Additional Medication Instructions:  Patient is to take all scheduled medications on the day of surgery    RECOMMENDATION:  Proceed with the proposed procedure.       I spent a total of 49 minutes on the day of the visit.   Time spent by me doing chart review, history and exam, documentation and further activities per the note      Subjective         9/11/2023    11:32 AM   Preop Questions   1. Have you ever had a heart attack or stroke? YES - CVA   2. Have you ever had surgery on your heart or blood vessels, such as a stent placement, a coronary artery bypass, or surgery on an artery in your head, neck, heart, or legs? YES - history of PCI   3. Do you have chest pain with activity? YES -    4. Do you have a history of  heart failure? No   5. Do you currently have a cold, bronchitis or symptoms of other infection? No   6. Do you have a cough, shortness of breath, or wheezing? YES -    7. Do you or anyone in your family have previous history of blood clots? UNKNOWN -    8. Do you or does anyone in your family have a serious bleeding problem such as prolonged bleeding following surgeries or cuts? UNKNOWN -    9. Have you ever had problems with anemia or been told to take iron pills? YES -    10. Have you had any abnormal blood loss such as black, tarry or bloody stools, or abnormal vaginal bleeding? No   11. Have you ever had a blood transfusion? No   12. Are you willing to have a blood transfusion if it is medically needed before, during, or after your surgery? Yes   13. Have you or any of your relatives ever had problems with anesthesia? No    14. Do you have sleep apnea, excessive snoring or daytime drowsiness? NO   14a. Do you have a CPAP machine? No   15. Do you have any artifical heart valves or other implanted medical devices like a pacemaker, defibrillator, or continuous  glucose monitor? YES -    15a. What type of device do you have? Pacemaker   15b. Name of the clinic that manages your device:  unknown   16. Do you have artificial joints? No   17. Are you allergic to latex? No     Health Care Directive:  Patient does not have a Health Care Directive or Living Will: Discussed advance care planning with patient; however, patient declined at this time.    Preoperative Review of :   reviewed - no record of controlled substances prescribed.        Review of Systems  Constitutional, neuro, ENT, endocrine, pulmonary, cardiac, gastrointestinal, genitourinary, musculoskeletal, integument and psychiatric systems are negative, except as otherwise noted.    Patient Active Problem List    Diagnosis Date Noted    Statin intolerance 08/13/2018     Priority: Medium    Anxiety 11/10/2015     Priority: Medium    Adenomatous polyp of colon 11/19/2013     Priority: Medium    PAD (peripheral artery disease) (H) 12/29/2010     Priority: Medium     Formatting of this note might be different from the original.  Created by Conversion  Peripheral Vascular Disease (PVD)  per external records      Asthma 04/06/2010     Priority: Medium    Coronary atherosclerosis 04/06/2010     Priority: Medium     Coronary Artery Disease (CAD) NOS  Formatting of this note might be different from the original.  LAD stenting 2007, 2009      COSTOCHONDRITIS 03/20/2007     Priority: Medium    Refractory migraine without aura 02/05/2007     Priority: Medium     Problem list name updated by automated process. Provider to review      Dizziness and giddiness 12/11/2006     Priority: Medium    Mild intermittent asthma with exacerbation 11/10/2006     Priority: Medium     Qvar and albuterol, usually 2-3 days/week.      Esophageal reflux 11/10/2006     Priority: Medium    Moderate depressed bipolar I disorder (H) 11/10/2006     Priority: Medium     Problem list name updated by automated process. Provider to review       "Hyperlipidemia 11/10/2006     Priority: Medium     Problem list name updated by automated process. Provider to review      Attention deficit disorder 11/10/2006     Priority: Medium     Problem list name updated by automated process. Provider to review      Chronic rhinitis 11/10/2006     Priority: Medium     Zyrtec daily      Sprain of lumbar region 09/08/2006     Priority: Medium    PTSD (post-traumatic stress disorder) 01/01/2006     Priority: Medium      Past Medical History:   Diagnosis Date    Dysphagia     Enlarged tongue     Myalgia     Polyarthralgia     Sicca (H)      Past Surgical History:   Procedure Laterality Date    APPENDECTOMY      CARDIAC CATHETERIZATION      CORONARY ANGIOPLASTY      CORONARY STENT PLACEMENT      HC REVISE MEDIAN N/CARPAL TUNNEL SURG      Description: Neuroplasty Decompression Median Nerve At Carpal Tunnel;  Recorded: 09/19/2008;    IMPLANT PACEMAKER      INSERT / REPLACE / REMOVE PACEMAKER      IR MISCELLANEOUS PROCEDURE  2/2/2009    MO VAGINAL HYSTERECTOMY,UTERUS 250 GMS/<      Description: Vaginal Hysterectomy;  Recorded: 12/16/2011;    SURGICAL HISTORY OF -       vag hyst    SURGICAL HISTORY OF -       carpal tunnel bilateral    SURGICAL HISTORY OF -       arm surgery right side.      [facility]      Description: Appendectomy;  Recorded: 09/19/2008;  Comments: and ovarian cyst     Current Outpatient Medications   Medication Sig Dispense Refill    blood glucose monitoring (CONTOUR NEXT MONITOR W/DEVICE KIT) meter device kit 1 each      cyanocobalamin (CYANOCOBALAMIN) 1000 MCG/ML injection Inject 1 mL (1,000 mcg) into the muscle every 7 days 12 mL 1    EPINEPHrine (ANY BX GENERIC EQUIV) 0.3 MG/0.3ML injection 2-pack Inject 0.3 mL (0.3 mg) intramuscularly once as needed for up to 1 dose.*      FLOVENT  MCG/ACT inhaler INHALE 1 PUFF BY MOUTH TWO TIMES DAILY 12 g 12    insulin syringe-needle U-100 (30G X 1/2\" 1 ML) 30G X 1/2\" 1 ML miscellaneous Use 1 syringes daily or " "as directed. For b12 12 each 0    nitroGLYcerin (NITROLINGUAL) 0.4 MG/SPRAY spray PLACE 1 SPRAY (0.4 MG) UNDER TONGUE EVERY 5 MINUTES AS NEEDED FOR CHEST PAIN FOR UP TO 3 DOSES. CALL 911 IF NO RELIEF AFTER FIRST SPRAY*      rosuvastatin (CRESTOR) 40 MG tablet Take 1 tablet (40 mg) by mouth daily 90 tablet 3    VENTOLIN  (90 Base) MCG/ACT inhaler INHALE 1-2 PUFFS BY MOUTH EVERY 4 HOURS AS NEEDED FOR WHEEZING.*      vitamin B-12 (CYANOCOBALAMIN) 1000 MCG tablet Take 1,000 mcg by mouth      vitamin D2 (ERGOCALCIFEROL) 34075 units (1250 mcg) capsule Take 1 capsule (50,000 Units) by mouth once a week for 12 doses 12 capsule 0    Pyridoxine HCl (B-6) 250 MG TABS  (Patient not taking: Reported on 7/11/2023)  3       Allergies   Allergen Reactions    Bee Venom Anaphylaxis and Unknown     unknown  Unknown    unknown  Unknown    Indomethacin Diarrhea and Hives     Stomach pain, sweats, shakes, not good combo with heart meds either.      Sumatriptan Anaphylaxis, Other (See Comments) and Unknown     unknown  Throat closing      Imitrex [Sumatriptan Succinate]     Levonorgestrel-Ethinyl Estrad Other (See Comments)    Sulfa Antibiotics     Gabapentin Anxiety and Nausea and Vomiting    Vancomycin Itching        Social History     Tobacco Use    Smoking status: Every Day     Packs/day: 1.00     Years: 45.00     Pack years: 45.00     Types: Cigarettes    Smokeless tobacco: Never   Substance Use Topics    Alcohol use: No       History   Drug Use No         Objective     /63 (BP Location: Right arm, Patient Position: Sitting, Cuff Size: Adult Regular)   Pulse 71   Temp 98  F (36.7  C) (Oral)   Resp 24   Ht 1.511 m (4' 11.5\")   Wt 49.8 kg (109 lb 12.8 oz)   LMP  (LMP Unknown)   SpO2 97%   BMI 21.81 kg/m      Physical Exam    GENERAL APPEARANCE: alert and active     HENT: nose and mouth without ulcers or lesions     NECK: no adenopathy, no asymmetry, masses, or scars and thyroid normal to palpation     RESP: lungs " clear to auscultation - no rales, rhonchi or wheezes     CV: regular rates and rhythm     ABDOMEN:  soft, nontender, no HSM or masses and bowel sounds normal     MS: extremities normal- no gross deformities noted, no evidence of inflammation in joints, FROM in all extremities.     SKIN: no suspicious lesions or rashes     NEURO: Normal strength and tone, sensory exam grossly normal, mentation intact and speech normal     PSYCH: mentation appears normal. and affect normal/bright     LYMPHATICS: No cervical adenopathy    No results for input(s): HGB, PLT, INR, NA, POTASSIUM, CR, A1C in the last 61756 hours.     Diagnostics:  Recent Results (from the past 24 hour(s))   EKG 12-lead, tracing only    Collection Time: 09/11/23 12:38 PM   Result Value Ref Range    Systolic Blood Pressure  mmHg    Diastolic Blood Pressure  mmHg    Ventricular Rate 84 BPM    Atrial Rate 84 BPM    KS Interval 206 ms    QRS Duration 134 ms     ms    QTc 489 ms    P Axis 48 degrees    R AXIS -67 degrees    T Axis 18 degrees    Interpretation ECG        Poor data quality, interpretation may be adversely affected  Atrial-paced rhythm  Right bundle branch block  Left anterior fascicular block   Bifascicular block   Abnormal ECG  When compared with ECG of 30-JUN-2018 15:26,  Right bundle branch block is now Present  Confirmed by AUBREE NOVAK MD LOC:[facility] ([id number]) on 9/11/2023 1:48:15 PM     HEMOGLOBIN A1C    Collection Time: 09/11/23  1:05 PM   Result Value Ref Range    Hemoglobin A1C 6.2 (H) 0.0 - 5.6 %   CBC with platelets    Collection Time: 09/11/23  1:05 PM   Result Value Ref Range    WBC Count 9.9 4.0 - 11.0 10e3/uL    RBC Count 4.75 3.80 - 5.20 10e6/uL    Hemoglobin 14.7 11.7 - 15.7 g/dL    Hematocrit 44.1 35.0 - 47.0 %    MCV 93 78 - 100 fL    MCH 30.9 26.5 - 33.0 pg    MCHC 33.3 31.5 - 36.5 g/dL    RDW 13.0 10.0 - 15.0 %    Platelet Count 170 150 - 450 10e3/uL   Adult Type and Screen    Collection Time: 09/11/23  1:05 PM   Result Value  Ref Range    ABO/RH(D) A POS     Antibody Screen Negative Negative    SPECIMEN EXPIRATION DATE [id number]         Revised Cardiac Risk Index (RCRI):  The patient has the following serious cardiovascular risks for perioperative complications:   - Coronary Artery Disease (MI, positive stress test, angina, Qs on EKG) = 1 point     RCRI Interpretation: 1 point: Class II (low risk - 0.9% complication rate)         Signed Electronically by: Jad Santiago MD  Copy of this evaluation report is provided to requesting physician.

## 2023-09-11 NOTE — TELEPHONE ENCOUNTER
Kim Correa  [address] [address] [address]  [phone] (home)     PCP:  Lorena Baeza  H&P completed by:  9/11/23 with PCP  Admit date 9/12/2023 Arrival time:  06:30  Anticoagulation:  NA  Previous PCI: Yes 2012  Bypass Grafts: No  Renal Issues: No  Diabetic?: No  Device?: No    Ambulation status: independent    Reason for Visit:  Telephone call to discuss pre-procedure education in preparation for: Coronary Angiogram with Possible PCI    Procedure Prep:  EKG results obtained, dated: To be completed on day of cath lab procedure  Hemogram results obtained: To be completed on day of cath lab procedure  Basic Metabolic Panel results obtained: To be completed on day of cath lab procedure  Pertinent cardiac test results: abnormal NM Stress Test 9/1/2023    Patient Education    Your arrival time is 06:30.  [place] [address] - Main Entrance of the Hospital  Please plan on being at the hospital all day.  At any time, emergencies and/or urgent cases may come up which could delay the start of your procedure.      Pre-procedure instructions  Take your temperature in the morning prior to coming in.  If your temperature is 100 F please call [facility] [phone] and notify them.  If you do not have access to a thermometer at home, please come in for testing.  If you are running a temperature your procedure may be rescheduled.  Patient instructed to not Eat or Drink after midnight.  Patient instructed to shower the evening before or the morning of the procedure.  Patient instructed to arrange for transportation home following procedure from a responsible family member or friend. No driving for at least 24 hours.  Patient instructed to have a responsible adult with them for 24 hours post-procedure.  Post-procedure follow up process.  Conscious sedation discussed.      Pre-procedure medication instructions.  Continue medications as scheduled,  "with a small amount of water on the day of the procedure unless indicated. (NO Diabetic Medications or Blood Thinners)  Pt instructed not to consume Alcohol, Tobacco, Caffeine, or Carbonated beverages 12 hours prior to procedure.  Patient instructed to take 325 mg of Aspirin the night before and morning of procedure: Yes  Other medication: instructed to only take 325 mg aspirin a.m. of the procedure.                Patient states understanding of procedure and agrees to proceed.    *PATIENTS RECORDS AVAILABLE IN [facility] UNLESS OTHERWISE INDICATED*      Patient Active Problem List   Diagnosis    Sprain of lumbar region    Mild intermittent asthma with exacerbation    Esophageal reflux    Moderate depressed bipolar I disorder (H)    Hyperlipidemia    Attention deficit disorder    Chronic rhinitis    Dizziness and giddiness    Refractory migraine without aura    COSTOCHONDRITIS    Adenomatous polyp of colon    Anxiety    Asthma    PTSD (post-traumatic stress disorder)    Coronary atherosclerosis    PAD (peripheral artery disease) (H)    Statin intolerance    Type 2 diabetes mellitus without complication, without long-term current use of insulin (H)       Current Outpatient Medications   Medication Sig Dispense Refill    blood glucose monitoring (CONTOUR NEXT MONITOR W/DEVICE KIT) meter device kit 1 each      cyanocobalamin (CYANOCOBALAMIN) 1000 MCG/ML injection Inject 1 mL (1,000 mcg) into the muscle every 7 days 12 mL 1    EPINEPHrine (ANY BX GENERIC EQUIV) 0.3 MG/0.3ML injection 2-pack Inject 0.3 mL (0.3 mg) intramuscularly once as needed for up to 1 dose.*      FLOVENT  MCG/ACT inhaler INHALE 1 PUFF BY MOUTH TWO TIMES DAILY 12 g 12    insulin syringe-needle U-100 (30G X 1/2\" 1 ML) 30G X 1/2\" 1 ML miscellaneous Use 1 syringes daily or as directed. For b12 12 each 0    nitroGLYcerin (NITROLINGUAL) 0.4 MG/SPRAY spray PLACE 1 SPRAY (0.4 MG) UNDER TONGUE EVERY 5 MINUTES AS NEEDED FOR CHEST PAIN FOR UP TO 3 DOSES. " CALL 911 IF NO RELIEF AFTER FIRST SPRAY*      Pyridoxine HCl (B-6) 250 MG TABS  (Patient not taking: Reported on 7/11/2023)  3    rosuvastatin (CRESTOR) 40 MG tablet Take 1 tablet (40 mg) by mouth daily 90 tablet 3    VENTOLIN  (90 Base) MCG/ACT inhaler INHALE 1-2 PUFFS BY MOUTH EVERY 4 HOURS AS NEEDED FOR WHEEZING.*      vitamin B-12 (CYANOCOBALAMIN) 1000 MCG tablet Take 1,000 mcg by mouth      vitamin D2 (ERGOCALCIFEROL) 84878 units (1250 mcg) capsule Take 1 capsule (50,000 Units) by mouth once a week for 12 doses 12 capsule 0       Allergies   Allergen Reactions    Bee Venom Anaphylaxis and Unknown     unknown  Unknown    unknown  Unknown    Indomethacin Diarrhea and Hives     Stomach pain, sweats, shakes, not good combo with heart meds either.      Sumatriptan Anaphylaxis, Other (See Comments) and Unknown     unknown  Throat closing      Imitrex [Sumatriptan Succinate]     Levonorgestrel-Ethinyl Estrad Other (See Comments)    Sulfa Antibiotics     Gabapentin Anxiety and Nausea and Vomiting    Vancomycin Itching       KELLY GARCIA RN on 9/11/2023 at 1:11 PM

## 2023-09-11 NOTE — PROGRESS NOTES
[address] [address]  Phone: [phone]  Fax: [phone]  Primary Provider: Lorena Baeza  Pre-op Performing Provider: DERRICK PADILLA    {Provider  Link to PREOP SmartSet  Use this to apply standard patient instructions to AVS; includes medication directions, common orders, guidelines for anemia, warfarin, additional testing   :245358}  PREOPERATIVE EVALUATION:  Today's date: 9/11/2023    Kim Correa is a 63 year old female who presents for a preoperative evaluation.      9/11/2023    11:38 AM   Additional Questions   Roomed by SOMMER Jeffrey   Accompanied by SANYA       Surgical Information:  Surgery/Procedure: Coronary Angiogram,  Percutaneous Coronary Intervention   Surgery Location: [facility] CATH LAB CV   Surgeon: Arnie Queen MD & Shankar Ng MD   Surgery Date: 9/12/2023   Time of Surgery: 8:30 AM   Where patient plans to recover: {Preop post recovery plans :016827}  Fax number for surgical facility: Note does not need to be faxed, will be available electronically in Epic.    {2021 Provider Charting Preference for Preop :522048}    Subjective       HPI related to upcoming procedure: ***          9/11/2023    11:32 AM   Preop Questions   1. Have you ever had a heart attack or stroke? YES - ***   2. Have you ever had surgery on your heart or blood vessels, such as a stent placement, a coronary artery bypass, or surgery on an artery in your head, neck, heart, or legs? YES - ***   3. Do you have chest pain with activity? YES - ***   4. Do you have a history of  heart failure? No   5. Do you currently have a cold, bronchitis or symptoms of other infection? No   6. Do you have a cough, shortness of breath, or wheezing? YES - ***   7. Do you or anyone in your family have previous history of blood clots? UNKNOWN - ***   8. Do you or does anyone in your family have a serious bleeding problem such as prolonged bleeding following  surgeries or cuts? UNKNOWN - ***   9. Have you ever had problems with anemia or been told to take iron pills? YES - ***   10. Have you had any abnormal blood loss such as black, tarry or bloody stools, or abnormal vaginal bleeding? No     Health Care Directive:  Patient does not have a Health Care Directive or Living Will: Discussed advance care planning with patient; however, patient declined at this time.    Preoperative Review of :  {Mnpmpreport:729132}  {Review MNPMP for all patients per [facility] MNPMP Profile:315785}    {Chronic problem details (Optional) :036343}    Review of Systems  {ROS Preop Choices:947022}    Patient Active Problem List    Diagnosis Date Noted    Statin intolerance 08/13/2018     Priority: Medium    Anxiety 11/10/2015     Priority: Medium    Adenomatous polyp of colon 11/19/2013     Priority: Medium    PAD (peripheral artery disease) (H) 12/29/2010     Priority: Medium     Formatting of this note might be different from the original.  Created by Conversion  Peripheral Vascular Disease (PVD)  per external records      Asthma 04/06/2010     Priority: Medium    Coronary atherosclerosis 04/06/2010     Priority: Medium     Coronary Artery Disease (CAD) NOS  Formatting of this note might be different from the original.  LAD stenting 2007, 2009      COSTOCHONDRITIS 03/20/2007     Priority: Medium    Refractory migraine without aura 02/05/2007     Priority: Medium     Problem list name updated by automated process. Provider to review      Dizziness and giddiness 12/11/2006     Priority: Medium    Mild intermittent asthma with exacerbation 11/10/2006     Priority: Medium     Qvar and albuterol, usually 2-3 days/week.      Esophageal reflux 11/10/2006     Priority: Medium    Moderate depressed bipolar I disorder (H) 11/10/2006     Priority: Medium     Problem list name updated by automated process. Provider to review      Hyperlipidemia 11/10/2006     Priority: Medium     Problem list name updated  "by automated process. Provider to review      Attention deficit disorder 11/10/2006     Priority: Medium     Problem list name updated by automated process. Provider to review      Chronic rhinitis 11/10/2006     Priority: Medium     Zyrtec daily      Sprain of lumbar region 09/08/2006     Priority: Medium    PTSD (post-traumatic stress disorder) 01/01/2006     Priority: Medium      Past Medical History:   Diagnosis Date    Dysphagia     Enlarged tongue     Myalgia     Polyarthralgia     Sicca (H)      Past Surgical History:   Procedure Laterality Date    APPENDECTOMY      CARDIAC CATHETERIZATION      CORONARY ANGIOPLASTY      CORONARY STENT PLACEMENT      HC REVISE MEDIAN N/CARPAL TUNNEL SURG      Description: Neuroplasty Decompression Median Nerve At Carpal Tunnel;  Recorded: 09/19/2008;    IMPLANT PACEMAKER      INSERT / REPLACE / REMOVE PACEMAKER      IR MISCELLANEOUS PROCEDURE  2/2/2009    DE VAGINAL HYSTERECTOMY,UTERUS 250 GMS/<      Description: Vaginal Hysterectomy;  Recorded: 12/16/2011;    SURGICAL HISTORY OF -       vag hyst    SURGICAL HISTORY OF -       carpal tunnel bilateral    SURGICAL HISTORY OF -       arm surgery right side.      [facility] APPENDECTOMY      Description: Appendectomy;  Recorded: 09/19/2008;  Comments: and ovarian cyst     Current Outpatient Medications   Medication Sig Dispense Refill    blood glucose monitoring (CONTOUR NEXT MONITOR W/DEVICE KIT) meter device kit 1 each      cyanocobalamin (CYANOCOBALAMIN) 1000 MCG/ML injection Inject 1 mL (1,000 mcg) into the muscle every 7 days 12 mL 1    EPINEPHrine (ANY BX GENERIC EQUIV) 0.3 MG/0.3ML injection 2-pack Inject 0.3 mL (0.3 mg) intramuscularly once as needed for up to 1 dose.*      FLOVENT  MCG/ACT inhaler INHALE 1 PUFF BY MOUTH TWO TIMES DAILY 12 g 12    insulin syringe-needle U-100 (30G X 1/2\" 1 ML) 30G X 1/2\" 1 ML miscellaneous Use 1 syringes daily or as directed. For b12 12 each 0    nitroGLYcerin (NITROLINGUAL) 0.4 MG/SPRAY " spray PLACE 1 SPRAY (0.4 MG) UNDER TONGUE EVERY 5 MINUTES AS NEEDED FOR CHEST PAIN FOR UP TO 3 DOSES. CALL 911 IF NO RELIEF AFTER FIRST SPRAY*      Pyridoxine HCl (B-6) 250 MG TABS  (Patient not taking: Reported on 2023)  3    rosuvastatin (CRESTOR) 40 MG tablet Take 1 tablet (40 mg) by mouth daily 90 tablet 3    VENTOLIN  (90 Base) MCG/ACT inhaler INHALE 1-2 PUFFS BY MOUTH EVERY 4 HOURS AS NEEDED FOR WHEEZING.*      vitamin B-12 (CYANOCOBALAMIN) 1000 MCG tablet Take 1,000 mcg by mouth      vitamin D2 (ERGOCALCIFEROL) 29945 units (1250 mcg) capsule Take 1 capsule (50,000 Units) by mouth once a week for 12 doses 12 capsule 0       Allergies   Allergen Reactions    Bee Venom Anaphylaxis and Unknown     unknown  Unknown    unknown  Unknown    Indomethacin Diarrhea and Hives     Stomach pain, sweats, shakes, not good combo with heart meds either.      Sumatriptan Anaphylaxis, Other (See Comments) and Unknown     unknown  Throat closing      Imitrex [Sumatriptan Succinate]     Levonorgestrel-Ethinyl Estrad Other (See Comments)    Sulfa Antibiotics     Gabapentin Anxiety and Nausea and Vomiting    Vancomycin Itching        Social History     Tobacco Use    Smoking status: Every Day     Packs/day: 1.00     Years: 45.00     Pack years: 45.00     Types: Cigarettes    Smokeless tobacco: Never   Substance Use Topics    Alcohol use: No     {FAMILY HISTORY (Optional):652290367}  History   Drug Use No         Objective     There were no vitals taken for this visit.    Physical Exam  {EXAM Preop Choices:628794}    No results for input(s): HGB, PLT, INR, NA, POTASSIUM, CR, A1C in the last 94425 hours.     Diagnostics:  {LABS:159387}   {EK}    Revised Cardiac Risk Index (RCRI):  The patient has the following serious cardiovascular risks for perioperative complications:  {PREOP REVISED CARDIAC RISK INDEX (RCRI) :036049}     RCRI Interpretation: {REVISED CARDIAC RISK INTERPRETATION :303307}         Signed  Electronically by: Jad Santiago MD  Copy of this evaluation report is provided to requesting physician.    {Provider Resources  Preop [facility] Guidelines  Revised Cardiac Risk Index :421173}

## 2023-09-12 ENCOUNTER — HOSPITAL ENCOUNTER (OUTPATIENT)
Facility: HOSPITAL | Age: 63
Discharge: HOME | End: 2023-09-12
Attending: INTERNAL MEDICINE | Admitting: INTERNAL MEDICINE
Payer: MEDICARE

## 2023-09-12 VITALS
DIASTOLIC BLOOD PRESSURE: 74 MMHG | BODY MASS INDEX: 21.55 KG/M2 | HEIGHT: 60 IN | RESPIRATION RATE: 33 BRPM | WEIGHT: 109.8 LBS | SYSTOLIC BLOOD PRESSURE: 137 MMHG | TEMPERATURE: 98.3 F | HEART RATE: 72 BPM | OXYGEN SATURATION: 98 %

## 2023-09-12 DIAGNOSIS — Z98.61 STATUS POST PERCUTANEOUS TRANSLUMINAL CORONARY ANGIOPLASTY: Primary | ICD-10-CM

## 2023-09-12 DIAGNOSIS — R06.09 DOE (DYSPNEA ON EXERTION): ICD-10-CM

## 2023-09-12 DIAGNOSIS — R94.39 ABNORMAL CARDIOVASCULAR STRESS TEST: ICD-10-CM

## 2023-09-12 PROBLEM — Z98.890 STATUS POST CORONARY ANGIOGRAM: Status: ACTIVE | Noted: 2023-09-12

## 2023-09-12 LAB
ACT BLD: 218 SECONDS (ref 74–150)
ACT BLD: 272 SECONDS (ref 74–150)
ACT BLD: 301 SECONDS (ref 74–150)
ACT BLD: 347 SECONDS (ref 74–150)
ATRIAL RATE - MUSE: 67 BPM
ATRIAL RATE - MUSE: 78 BPM
CHOLEST SERPL-MCNC: 227 MG/DL
DIASTOLIC BLOOD PRESSURE - MUSE: NORMAL MMHG
DIASTOLIC BLOOD PRESSURE - MUSE: NORMAL MMHG
HDLC SERPL-MCNC: 52 MG/DL
INTERPRETATION ECG - MUSE: NORMAL
INTERPRETATION ECG - MUSE: NORMAL
LDLC SERPL CALC-MCNC: 147 MG/DL
NONHDLC SERPL-MCNC: 175 MG/DL
P AXIS - MUSE: 50 DEGREES
P AXIS - MUSE: 62 DEGREES
PR INTERVAL - MUSE: 128 MS
PR INTERVAL - MUSE: 186 MS
QRS DURATION - MUSE: 128 MS
QRS DURATION - MUSE: 130 MS
QT - MUSE: 428 MS
QT - MUSE: 444 MS
QTC - MUSE: 469 MS
QTC - MUSE: 487 MS
R AXIS - MUSE: -55 DEGREES
R AXIS - MUSE: -57 DEGREES
SYSTOLIC BLOOD PRESSURE - MUSE: NORMAL MMHG
SYSTOLIC BLOOD PRESSURE - MUSE: NORMAL MMHG
T AXIS - MUSE: -11 DEGREES
T AXIS - MUSE: 37 DEGREES
TRIGL SERPL-MCNC: 139 MG/DL
VENTRICULAR RATE- MUSE: 67 BPM
VENTRICULAR RATE- MUSE: 78 BPM

## 2023-09-12 PROCEDURE — 92921 HC PRQ TRLUML CORONARY ANGIOPLASTY ADDL BRANCH: CPT | Mod: LD | Performed by: INTERNAL MEDICINE

## 2023-09-12 PROCEDURE — 85347 COAGULATION TIME ACTIVATED: CPT

## 2023-09-12 PROCEDURE — 99152 MOD SED SAME PHYS/QHP 5/>YRS: CPT | Performed by: INTERNAL MEDICINE

## 2023-09-12 PROCEDURE — 92920 PRQ TRLUML C ANGIOP 1ART&/BR: CPT | Mod: LD | Performed by: INTERNAL MEDICINE

## 2023-09-12 PROCEDURE — 93010 ELECTROCARDIOGRAM REPORT: CPT | Mod: HOP | Performed by: INTERNAL MEDICINE

## 2023-09-12 PROCEDURE — 36415 COLL VENOUS BLD VENIPUNCTURE: CPT | Performed by: INTERNAL MEDICINE

## 2023-09-12 PROCEDURE — 250N000011 HC RX IP 250 OP 636: Performed by: INTERNAL MEDICINE

## 2023-09-12 PROCEDURE — 272N000001 HC OR GENERAL SUPPLY STERILE: Performed by: INTERNAL MEDICINE

## 2023-09-12 PROCEDURE — C1894 INTRO/SHEATH, NON-LASER: HCPCS | Performed by: INTERNAL MEDICINE

## 2023-09-12 PROCEDURE — 92920 PRQ TRLUML C ANGIOP 1ART&/BR: CPT | Performed by: INTERNAL MEDICINE

## 2023-09-12 PROCEDURE — 250N000009 HC RX 250: Performed by: INTERNAL MEDICINE

## 2023-09-12 PROCEDURE — 92921 PR PRQ TRLUML CORONARY ANGIOPLASTY ADDL BRANCH: CPT | Mod: LD | Performed by: INTERNAL MEDICINE

## 2023-09-12 PROCEDURE — C1725 CATH, TRANSLUMIN NON-LASER: HCPCS | Performed by: INTERNAL MEDICINE

## 2023-09-12 PROCEDURE — 258N000003 HC RX IP 258 OP 636: Performed by: INTERNAL MEDICINE

## 2023-09-12 PROCEDURE — 80061 LIPID PANEL: CPT | Performed by: INTERNAL MEDICINE

## 2023-09-12 PROCEDURE — 93458 L HRT ARTERY/VENTRICLE ANGIO: CPT | Mod: XU | Performed by: INTERNAL MEDICINE

## 2023-09-12 PROCEDURE — 255N000002 HC RX 255 OP 636: Performed by: INTERNAL MEDICINE

## 2023-09-12 PROCEDURE — 250N000013 HC RX MED GY IP 250 OP 250 PS 637: Performed by: INTERNAL MEDICINE

## 2023-09-12 PROCEDURE — C1769 GUIDE WIRE: HCPCS | Performed by: INTERNAL MEDICINE

## 2023-09-12 PROCEDURE — 93458 L HRT ARTERY/VENTRICLE ANGIO: CPT | Mod: 26 | Performed by: INTERNAL MEDICINE

## 2023-09-12 PROCEDURE — 99153 MOD SED SAME PHYS/QHP EA: CPT

## 2023-09-12 PROCEDURE — 999N000054 HC STATISTIC EKG NON-CHARGEABLE

## 2023-09-12 PROCEDURE — 93005 ELECTROCARDIOGRAM TRACING: CPT

## 2023-09-12 PROCEDURE — C1887 CATHETER, GUIDING: HCPCS | Performed by: INTERNAL MEDICINE

## 2023-09-12 PROCEDURE — 92920 PRQ TRLUML C ANGIOP 1ART&/BR: CPT | Mod: RC | Performed by: INTERNAL MEDICINE

## 2023-09-12 PROCEDURE — 250N000013 HC RX MED GY IP 250 OP 250 PS 637: Performed by: NURSE PRACTITIONER

## 2023-09-12 RX ORDER — OXYCODONE HYDROCHLORIDE 5 MG/1
5 TABLET ORAL EVERY 4 HOURS PRN
Status: DISCONTINUED | OUTPATIENT
Start: 2023-09-12 | End: 2023-09-12 | Stop reason: HOSPADM

## 2023-09-12 RX ORDER — METOPROLOL TARTRATE 1 MG/ML
5 INJECTION, SOLUTION INTRAVENOUS
Status: DISCONTINUED | OUTPATIENT
Start: 2023-09-12 | End: 2023-09-12 | Stop reason: HOSPADM

## 2023-09-12 RX ORDER — CLOPIDOGREL BISULFATE 75 MG/1
TABLET ORAL
Status: DISCONTINUED | OUTPATIENT
Start: 2023-09-12 | End: 2023-09-12 | Stop reason: HOSPADM

## 2023-09-12 RX ORDER — ASPIRIN 325 MG
325 TABLET ORAL ONCE
Status: DISCONTINUED | OUTPATIENT
Start: 2023-09-12 | End: 2023-09-12 | Stop reason: HOSPADM

## 2023-09-12 RX ORDER — OXYCODONE HYDROCHLORIDE 5 MG/1
10 TABLET ORAL EVERY 4 HOURS PRN
Status: DISCONTINUED | OUTPATIENT
Start: 2023-09-12 | End: 2023-09-12 | Stop reason: HOSPADM

## 2023-09-12 RX ORDER — CLOPIDOGREL BISULFATE 75 MG/1
75 TABLET ORAL DAILY
Status: DISCONTINUED | OUTPATIENT
Start: 2023-09-13 | End: 2023-09-12 | Stop reason: HOSPADM

## 2023-09-12 RX ORDER — HYDRALAZINE HYDROCHLORIDE 20 MG/ML
INJECTION INTRAMUSCULAR; INTRAVENOUS
Status: DISCONTINUED | OUTPATIENT
Start: 2023-09-12 | End: 2023-09-12 | Stop reason: HOSPADM

## 2023-09-12 RX ORDER — ASPIRIN 81 MG/1
243 TABLET, CHEWABLE ORAL ONCE
Status: DISCONTINUED | OUTPATIENT
Start: 2023-09-12 | End: 2023-09-12 | Stop reason: HOSPADM

## 2023-09-12 RX ORDER — CLOPIDOGREL BISULFATE 75 MG/1
75 TABLET ORAL DAILY
Qty: 30 TABLET | Refills: 0 | Status: SHIPPED | OUTPATIENT
Start: 2023-09-13 | End: 2024-02-09

## 2023-09-12 RX ORDER — NALOXONE HYDROCHLORIDE 0.4 MG/ML
0.2 INJECTION, SOLUTION INTRAMUSCULAR; INTRAVENOUS; SUBCUTANEOUS
Status: DISCONTINUED | OUTPATIENT
Start: 2023-09-12 | End: 2023-09-12 | Stop reason: HOSPADM

## 2023-09-12 RX ORDER — SODIUM CHLORIDE 9 MG/ML
INJECTION, SOLUTION INTRAVENOUS CONTINUOUS
Status: DISCONTINUED | OUTPATIENT
Start: 2023-09-12 | End: 2023-09-12 | Stop reason: HOSPADM

## 2023-09-12 RX ORDER — ATROPINE SULFATE 0.1 MG/ML
0.5 INJECTION INTRAVENOUS
Status: DISCONTINUED | OUTPATIENT
Start: 2023-09-12 | End: 2023-09-12 | Stop reason: HOSPADM

## 2023-09-12 RX ORDER — ONDANSETRON 4 MG/1
4 TABLET, ORALLY DISINTEGRATING ORAL EVERY 6 HOURS PRN
Status: DISCONTINUED | OUTPATIENT
Start: 2023-09-12 | End: 2023-09-12 | Stop reason: HOSPADM

## 2023-09-12 RX ORDER — ASPIRIN 81 MG/1
81 TABLET ORAL DAILY
Status: DISCONTINUED | OUTPATIENT
Start: 2023-09-13 | End: 2023-09-12 | Stop reason: HOSPADM

## 2023-09-12 RX ORDER — EPTIFIBATIDE 2 MG/ML
INJECTION, SOLUTION INTRAVENOUS
Status: DISCONTINUED | OUTPATIENT
Start: 2023-09-12 | End: 2023-09-12 | Stop reason: HOSPADM

## 2023-09-12 RX ORDER — FENTANYL CITRATE 50 UG/ML
25 INJECTION, SOLUTION INTRAMUSCULAR; INTRAVENOUS
Status: DISCONTINUED | OUTPATIENT
Start: 2023-09-12 | End: 2023-09-12 | Stop reason: HOSPADM

## 2023-09-12 RX ORDER — ASPIRIN 81 MG/1
81 TABLET ORAL DAILY
Qty: 30 TABLET | Refills: 3 | Status: SHIPPED | OUTPATIENT
Start: 2023-09-13

## 2023-09-12 RX ORDER — FENTANYL CITRATE 50 UG/ML
INJECTION, SOLUTION INTRAMUSCULAR; INTRAVENOUS
Status: DISCONTINUED | OUTPATIENT
Start: 2023-09-12 | End: 2023-09-12 | Stop reason: HOSPADM

## 2023-09-12 RX ORDER — NALOXONE HYDROCHLORIDE 0.4 MG/ML
0.4 INJECTION, SOLUTION INTRAMUSCULAR; INTRAVENOUS; SUBCUTANEOUS
Status: DISCONTINUED | OUTPATIENT
Start: 2023-09-12 | End: 2023-09-12 | Stop reason: HOSPADM

## 2023-09-12 RX ORDER — HEPARIN SODIUM 1000 [USP'U]/ML
INJECTION, SOLUTION INTRAVENOUS; SUBCUTANEOUS
Status: DISCONTINUED | OUTPATIENT
Start: 2023-09-12 | End: 2023-09-12 | Stop reason: HOSPADM

## 2023-09-12 RX ORDER — HYDRALAZINE HYDROCHLORIDE 20 MG/ML
10 INJECTION INTRAMUSCULAR; INTRAVENOUS EVERY 4 HOURS PRN
Status: DISCONTINUED | OUTPATIENT
Start: 2023-09-12 | End: 2023-09-12 | Stop reason: HOSPADM

## 2023-09-12 RX ORDER — NITROGLYCERIN 0.4 MG/1
0.4 TABLET SUBLINGUAL EVERY 5 MIN PRN
Status: DISCONTINUED | OUTPATIENT
Start: 2023-09-12 | End: 2023-09-12 | Stop reason: HOSPADM

## 2023-09-12 RX ORDER — ACETAMINOPHEN 325 MG/1
650 TABLET ORAL EVERY 4 HOURS PRN
Status: DISCONTINUED | OUTPATIENT
Start: 2023-09-12 | End: 2023-09-12 | Stop reason: HOSPADM

## 2023-09-12 RX ORDER — IODIXANOL 320 MG/ML
INJECTION, SOLUTION INTRAVASCULAR
Status: DISCONTINUED | OUTPATIENT
Start: 2023-09-12 | End: 2023-09-12 | Stop reason: HOSPADM

## 2023-09-12 RX ORDER — FLUMAZENIL 0.1 MG/ML
0.2 INJECTION, SOLUTION INTRAVENOUS
Status: DISCONTINUED | OUTPATIENT
Start: 2023-09-12 | End: 2023-09-12 | Stop reason: HOSPADM

## 2023-09-12 RX ORDER — LIDOCAINE 40 MG/G
CREAM TOPICAL
Status: DISCONTINUED | OUTPATIENT
Start: 2023-09-12 | End: 2023-09-12 | Stop reason: HOSPADM

## 2023-09-12 RX ORDER — DIAZEPAM 5 MG
5 TABLET ORAL ONCE
Status: COMPLETED | OUTPATIENT
Start: 2023-09-12 | End: 2023-09-12

## 2023-09-12 RX ORDER — ONDANSETRON 2 MG/ML
4 INJECTION INTRAMUSCULAR; INTRAVENOUS EVERY 6 HOURS PRN
Status: DISCONTINUED | OUTPATIENT
Start: 2023-09-12 | End: 2023-09-12 | Stop reason: HOSPADM

## 2023-09-12 RX ADMIN — ACETAMINOPHEN 650 MG: 325 TABLET ORAL at 13:50

## 2023-09-12 RX ADMIN — DIAZEPAM 5 MG: 5 TABLET ORAL at 08:16

## 2023-09-12 RX ADMIN — SODIUM CHLORIDE: 9 INJECTION, SOLUTION INTRAVENOUS at 07:12

## 2023-09-12 RX ADMIN — OXYCODONE HYDROCHLORIDE 5 MG: 5 TABLET ORAL at 11:53

## 2023-09-12 ASSESSMENT — ACTIVITIES OF DAILY LIVING (ADL)
ADLS_ACUITY_SCORE: 35

## 2023-09-12 ASSESSMENT — EJECTION FRACTION: EF_VALUE: .26

## 2023-09-12 NOTE — Clinical Note
The first balloon was inserted into the right coronary artery and proximal right coronary artery.Max pressure = 8 jonathan. Total duration = 16 seconds.

## 2023-09-12 NOTE — Clinical Note
The first balloon was inserted into the left anterior descending and proximal left anterior descending.Max pressure = 16 jonathan. Total duration = 8 seconds.

## 2023-09-12 NOTE — PRE-PROCEDURE
GENERAL PRE-PROCEDURE:   Procedure:  Coronary angiogram with possible PCI  Date/Time:  9/12/2023 6:47 AM    Written consent obtained?: Yes    Risks and benefits: Risks, benefits and alternatives were discussed    Consent given by:  Patient  Patient states understanding of procedure being performed: Yes    Patient's understanding of procedure matches consent: Yes    Procedure consent matches procedure scheduled: Yes    Expected level of sedation:  Moderate  Appropriately NPO:  Yes  ASA Class:  3 (CAD; s/p remote RCA PCI, PPM in situ, HLD, statin intolerance, tobacco use disorder, PAD, Bipolar disorder)  Mallampati  :  Grade 2- soft palate, base of uvula, tonsillar pillars, and portion of posterior pharyngeal wall visible  Lungs:  Lungs clear with good breath sounds bilaterally  Heart:  Normal heart sounds and rate  History & Physical reviewed:  History and physical reviewed and updates made (see comment)  H&P Comments:  Clinically Significant Risk Factors Present on Admission    Cardiovascular : Not present on admission    Fluid & Electrolyte Disorders : Not present on admission    Gastroenterology : Not present on admission    Hematology/Oncology : Not present on admission    Nephrology : Not present on admission    Neurology : Not present on admission    Pulmonology : Not present on admission    Systemic : Not present on admission    Statement of review:  I have reviewed the lab findings, diagnostic data, medications, and the plan for sedation

## 2023-09-12 NOTE — Clinical Note
The first balloon was inserted into the left anterior descending and proximal left anterior descending.Max pressure = 10 jonathan. Total duration = 12 seconds.     Max pressure = 12 jonathan. Total duration = 8 seconds.    Balloon reinflated a second time: Max pressure = 12 jonathan. Total duration = 8 seconds.  Balloon reinflated a third time: Max pressure = 12 jonathan. Total duration = 12 seconds.  Balloon reinflated a fourth time: Max pressure = 12 jonathan . Total duration = 12 seconds. mid Balloon reinflated a fourth time: Max pressure = 12 jonathan. Total duration = 12 seconds.

## 2023-09-12 NOTE — Clinical Note
The first balloon was inserted into the left anterior descending and middle left anterior descending.Max pressure = 8 jonathan. Total duration = 8 seconds.     Max pressure = 12 jonathan. Total duration = 12 seconds.    Balloon reinflated a second time: Max pressure = 12 jonathan. Total duration = 12 seconds.

## 2023-09-12 NOTE — Clinical Note
The DP pulses are 1+ bilaterally. The PT pulses are 1+ bilaterally. The radial pulses are 1+ bilaterally.

## 2023-09-12 NOTE — Clinical Note
The first balloon was inserted into the left anterior descending and middle left anterior descending.Max pressure = 12 jonathan. Total duration = 12 seconds.

## 2023-09-12 NOTE — Clinical Note
The first balloon was inserted into the left anterior descending and middle left anterior descending.Max pressure = 14 jnoathan. Total duration = 12 seconds.     Max pressure = 14 jonathan. Total duration = 5 seconds.    Balloon reinflated a second time: Max pressure = 14 jonathan. Total duration = 5 seconds.  Balloon reinflated a third time: Max pressure = 15 jonathan. Total duration = 16 seconds.  Balloon reinflated a fourth time: Max pressure = 15 jonathan.  Total duration = 16 seconds.

## 2023-09-12 NOTE — PLAN OF CARE
Goal Outcome Evaluation:         Pt admitted for CA/P.PCI due to shortness of breath. Pt prepped and ready for procedure. Pt's mom and sister are here for support.      Kathy Wong RN

## 2023-09-12 NOTE — Clinical Note
The first balloon was inserted into the left anterior descending and ostium left anterior descending diagonal.Max pressure = 8 jonathan. Total duration = 20 seconds.     Max pressure = 12 jonathan. Total duration = 26 seconds.    Balloon reinflated a second time: Max pressure = 12 jonathan. Total duration = 26 seconds.

## 2023-09-12 NOTE — Clinical Note
The first balloon was inserted into the left anterior descending and middle left anterior descending.Max pressure = 12 jonathan. Total duration = 18 seconds.     Max pressure = 16 jonathan. Total duration = 16 seconds.    Balloon reinflated a second time: Max pressure = 16 jonathan. Total duration = 16 seconds.  Balloon reinflated a third time: Max pressure = 16 jonathan. Total duration = 3 seconds. Max pressure = 16 jonathan. Total duration = 4 seconds.

## 2023-09-12 NOTE — Clinical Note
RCA Cine(s)  injected and visualized utilizing power injector system.Rate (mL/sec) 2 Total Volume (mL) 6

## 2023-09-12 NOTE — Clinical Note
The first balloon was inserted into the left anterior descending and distal left anterior descending.Max pressure = 4 jonathan. Total duration = 8 seconds.     Max pressure = 8 jonathan. Total duration = 6 seconds.    Balloon reinflated a second time: Max pressure = 8 jonathan. Total duration = 6 seconds.  Balloon reinflated a third time: Max pressure = 4 jonathan. Total duration = 8 seconds.  Balloon reinflated a fourth time: Max pressure = 10 jonathan. Total  duration = 4 seconds.

## 2023-09-12 NOTE — DISCHARGE INSTRUCTIONS

## 2023-09-12 NOTE — Clinical Note
The first balloon was inserted into the left anterior descending and proximal left anterior descending.Max pressure = 12 jonathan. Total duration = 12 seconds.

## 2023-09-12 NOTE — Clinical Note
The first balloon was inserted into the left anterior descending and proximal left anterior descending.Max pressure = 16 jonathan. Total duration = 6 seconds.

## 2023-09-12 NOTE — Clinical Note
The first balloon was inserted into the left anterior descending and proximal left anterior descending.Max pressure = 14 jonathan. Total duration = 7 seconds.     Max pressure = 12 jonathan. Total duration = 6 seconds.    Balloon reinflated a second time: Max pressure = 12 jonathan. Total duration = 6 seconds.

## 2023-09-12 NOTE — Clinical Note
The first balloon was inserted into the left anterior descending and middle left anterior descending.Max pressure = 12 jonathan. Total duration = 12 seconds.     Max pressure = 12 jonathan. Total duration = 10 seconds.    Balloon reinflated a second time: Max pressure = 12 jonathan. Total duration = 10 seconds.

## 2023-09-12 NOTE — Clinical Note
The first balloon was inserted into the left anterior descending and distal left anterior descending.Max pressure = 4 jonathan. Total duration = 14 seconds.     Max pressure = 6 jonathan. Total duration = 6 seconds.    Balloon reinflated a second time: Max pressure = 6 jonathan. Total duration = 6 seconds.

## 2023-09-17 ENCOUNTER — HEALTH MAINTENANCE LETTER (OUTPATIENT)
Age: 63
End: 2023-09-17

## 2023-09-21 ENCOUNTER — OFFICE VISIT (OUTPATIENT)
Dept: CARDIOLOGY | Facility: CLINIC | Age: 63
End: 2023-09-21
Payer: MEDICARE

## 2023-09-21 VITALS
OXYGEN SATURATION: 97 % | HEART RATE: 84 BPM | RESPIRATION RATE: 24 BRPM | WEIGHT: 110 LBS | BODY MASS INDEX: 21.85 KG/M2 | SYSTOLIC BLOOD PRESSURE: 120 MMHG | DIASTOLIC BLOOD PRESSURE: 48 MMHG

## 2023-09-21 DIAGNOSIS — I25.10 ATHEROSCLEROSIS OF CORONARY ARTERY OF NATIVE HEART WITHOUT ANGINA PECTORIS, UNSPECIFIED VESSEL OR LESION TYPE: ICD-10-CM

## 2023-09-21 DIAGNOSIS — Z95.5 S/P DRUG ELUTING CORONARY STENT PLACEMENT: ICD-10-CM

## 2023-09-21 DIAGNOSIS — E78.2 MIXED HYPERLIPIDEMIA: Primary | ICD-10-CM

## 2023-09-21 PROCEDURE — 99214 OFFICE O/P EST MOD 30 MIN: CPT

## 2023-09-21 NOTE — LETTER
9/21/2023    Lorena Baeza, APRN CNP  [address] [place] [address]    RE: Kim PUENTES Sunny       Dear Colleague,     I had the pleasure of seeing Kim Correa in the [facility].          Assessment/Recommendations   Assessment:    1.  Coronary artery disease: Angiogram performed after abnormal nuclear stress test.  Severe in-stent restenosis of mid LAD stents, D2 stenosis, distal LAD stenosis, proximal stenosis of RCA most all treated with PTCA.  PTCA of RCA was unsuccessful given 99% stenosed calcified fibrotic lesion, stenting was not performed due to LAD collaterals to nondominant vessel. Hx of LAD stenting.  - Patient notices mild improvement in dyspnea.  Continues to be present with no emphysema, smoking history.  Intermittent sharp chest pain of left chest wall not always related to exertion still present.  This pain lasts seconds.  - On dual antiplatelet therapy with ASA 81 mg indefinitely and Clopidogrel (Plavix) 75 mg for 1 month  - Cardiac rehab unable to be scheduled due to lack of transportation for patient  - Reviewed most recent BMP, Hgb, platelet- stable.    2.  Dyslipidemia with LDL goal <70/Obesity with a BMI of 21.85: Kim Correa is on high intensity statin therapy with rosuvastatin 40 mg. Most recent LDL is 147.  Most recent AST/ALT are 12/13  - Patient's LDL still not at goal on rosuvastatin. Discussed PCSK9 inhibitors as an addition to regimen. Educated on mechanism, subcutaneous administration, and possible side effects including most commonly injection site irritation and less likely angioedema/anaphylaxis, infection, muscle toxicity, respiratory effects, insulin resistance.    3.  Prediabetes: Most recent A1C is 6.2. Managed by PCP.    4.  Neurogenic syncope with permanent pacemaker: Next device check 11/6/2023.    5.  Tobacco abuse: Encouraged cessation.     Plan:  - We discussed the importance of antiplatelet therapy and talking with her cardiologist prior to  stopping these medications for any reason.  We discussed about utilization of as needed nitroglycerin.   - Encouraged to seek medical attention if recurrent chest pain or shortness of breath.    - Consider Repatha and Praluent with insurance    - We discussed a diet low in saturated fat, weight loss, and exercise along with medication for better control of cholesterol.  Highly encouraged to participate in nutrition class in cardiac rehab.  - Risk factor modification and lifestyle management topics were discussed including managing comorbidities, weight loss, heart healthy diet, exercise, smoking cessation, stress reduction, alcohol use, and drug use.        Follow up with Dr. Garrido 12/15/2023, offered sooner follow-up with me post stent patient elects to wait.     History of Present Illness/Subjective    Ms. Kim Correa is a 63 year old female smoker with a past medical history of CAD who is seen at [facility] for post coronary intervention follow up.  Underwent coronary angiogram after abnormal NM scan stress test.  Revealing severe in-stent restenosis of mid LAD stents, D2 stenosis, distal LAD stenosis, proximal stenosis of RCA most all treated with PTCA.  PTCA of RCA was unsuccessful, stenting was not performed due to LAD collaterals to nondominant vessel.  ECHO/Stress test showed an EF of 60-65%.     Patient notices mild improvement in exertional dyspnea after stent placement.  Longstanding shortness of breath and exertional dyspnea continues.  Continues to smoke.  She feels her legs are very weak.  Denies falls or syncope.  Is unable to participate in rehab because she does not have transportation.  Still having intermittent left chest pain with and without exertion that lasts seconds. Resolves spontaneously. Not reproducible with palpation.     She denies orthopnea, PND, palpitations, abdominal fullness/bloating, and lower extremity edema.      Coronary Angiogram  9/12/2023 reviewed:    Mid LAD lesion is 90% stenosed.    2nd Diag lesion is 90% stenosed.    1st LPL lesion is 20% stenosed.    Ost LAD lesion is 40% stenosed.    Prox RCA lesion is 99% stenosed.    Prox Cx to Mid Cx lesion is 40% stenosed.    Prox LAD to Mid LAD lesion is 40% stenosed.    Dist LAD lesion is 80% stenosed.     1.  Severe diffuse in-stent restenosis of the mid LAD stents.  The proximal stent edge appears smaller in caliber than the mid stent zone.  2.  Severe proximal stenosis of nondominant right coronary artery  3. Moderately severe stenosis of distal LAD beyond mid LAD stents, successfully treated with conventional PTCA.  4.  Successful PTCA of mid LAD stents; PTCA of ostium of jailed diagonal with persistence of normal flow.  Apical segment antegrade flow has been restored.  5.  Unsuccessful PTCA attempt of proximal right coronary artery.  Do not need to reattempt as long as LAD does not develop restenosis within the stent or in the distal segment.  Restenting would be a possible option at that point.  6.  Continue Plavix x1 month    ECHO 9/1/2023 Reviewed:   1. Normal left ventricular size and systolic performance with a visually  estimated ejection fraction of 65%.  2. There is mild, to perhaps mild-moderate, tricuspid insufficiency.  3. Normal right ventricular size and systolic performance.    Stress test 9/1/2023 Reviewed    1.The nuclear stress test is abnormal.    2.Negative pharmacological regadenoson ECG for ischemia.    3.There is a small area of mild ischemia in the distal inferoseptal segment(s) of the left ventricle.  No scar seen.    4.The left ventricular ejection fraction at stress is greater than 70%.    5.The patient is at a low risk of future cardiac ischemic events.    A prior study was conducted on 5/10/2017.  The minimal distal inferior ischemia is new, prior study suggesting basal inferior ischemia was felt to be artifactual.       Physical Examination Review of Systems    /48 (BP Location: Right arm, Patient Position: Sitting, Cuff Size: Adult Regular)   Pulse 84   Resp 24   Wt 49.9 kg (110 lb)   LMP  (LMP Unknown)   SpO2 97%   BMI 21.85 kg/m    Body mass index is 21.85 kg/m .  Wt Readings from Last 3 Encounters:   09/21/23 49.9 kg (110 lb)   09/12/23 49.8 kg (109 lb 12.8 oz)   09/11/23 49.8 kg (109 lb 12.8 oz)     General Appearance:   no distress, normal body habitus   ENT/Mouth: membranes moist, no oral lesions or bleeding gums.      EYES:  no scleral icterus, normal conjunctivae   Neck: no carotid bruits or thyromegaly   Chest/Lungs:   lungs are clear to auscultation, no rales or wheezing, equal chest wall expansion    Cardiovascular:   Regular. Normal first and second heart sounds with no murmurs, rubs, or gallops; the carotid, radial and posterior tibial pulses are intact, absent edema bilaterally    Abdomen:  no organomegaly, masses, bruits, or tenderness; bowel sounds are present   Extremities  Puncture Site: no cyanosis or clubbing  Right radial site is soft with minimal bruising.  Radial pulses and Pedal pulses intact and symmetrical.  CMS intact.   Skin: no xanthelasma, warm.    Neurologic: normal  bilateral, no tremors     Psychiatric: alert and oriented x3, calm                                                        Negative unless noted in HPI     Medical History  Surgical History Family History Social History   Past Medical History:   Diagnosis Date    Dysphagia     Enlarged tongue     Myalgia     Polyarthralgia     Sicca (H)     Past Surgical History:   Procedure Laterality Date    APPENDECTOMY      CARDIAC CATHETERIZATION      CORONARY ANGIOPLASTY      CORONARY STENT PLACEMENT      CV CORONARY ANGIOGRAM N/A 9/12/2023    Procedure: Coronary Angiogram;  Surgeon: Arnie Queen MD;  Location: [facility] CV    CV LEFT HEART CATH N/A 9/12/2023    Procedure: Left Heart Catheterization;  Surgeon: Arnie Queen MD;  Location: [facility]  CV    CV PCI N/A 9/12/2023    Procedure: Percutaneous Coronary Intervention;  Surgeon: Arnie Queen MD;  Location: [facility] CATH LAB CV    HC REVISE MEDIAN N/CARPAL TUNNEL SURG      Description: Neuroplasty Decompression Median Nerve At Carpal Tunnel;  Recorded: 09/19/2008;    IMPLANT PACEMAKER      INSERT / REPLACE / REMOVE PACEMAKER      IR MISCELLANEOUS PROCEDURE  2/2/2009    NV VAGINAL HYSTERECTOMY,UTERUS 250 GMS/<      Description: Vaginal Hysterectomy;  Recorded: 12/16/2011;    SURGICAL HISTORY OF -       vag hyst    SURGICAL HISTORY OF -       carpal tunnel bilateral    SURGICAL HISTORY OF -       arm surgery right side.      ZZC APPENDECTOMY      Description: Appendectomy;  Recorded: 09/19/2008;  Comments: and ovarian cyst    Family History   Problem Relation Age of Onset    Hypertension Mother     Thyroid Disease Mother     Alcoholism Mother     Heart Disease Father     Alcoholism Father     Diabetes Sister     Alzheimer Disease Maternal Grandmother     Heart Disease Maternal Grandfather     Cerebrovascular Disease Paternal Grandmother     C.A.D. No family hx of     Social History     Socioeconomic History    Marital status:      Spouse name: Not on file    Number of children: Not on file    Years of education: Not on file    Highest education level: Not on file   Occupational History    Not on file   Tobacco Use    Smoking status: Every Day     Packs/day: 1.00     Years: 45.00     Pack years: 45.00     Types: Cigarettes    Smokeless tobacco: Never   Vaping Use    Vaping Use: Some days    Substances: Nicotine, THC, CBD    Devices: Disposable, Pre-filled or refillable cartridge, Refillable tank, Pre-filled pod   Substance and Sexual Activity    Alcohol use: No    Drug use: Yes     Types: Marijuana    Sexual activity: Not on file   Other Topics Concern    Not on file   Social History Narrative    Not on file     Social Determinants of Health     Financial Resource Strain: Not on file   Food  "Insecurity: Not on file   Transportation Needs: Not on file   Physical Activity: Not on file   Stress: Not on file   Social Connections: Not on file   Interpersonal Safety: Not on file   Housing Stability: Not on file          Medications  Allergies   Current Outpatient Medications   Medication Sig Dispense Refill    aspirin 81 MG EC tablet Take 1 tablet (81 mg) by mouth daily Start tomorrow. 30 tablet 3    blood glucose monitoring (CONTOUR NEXT MONITOR W/DEVICE KIT) meter device kit 1 each      clopidogrel (PLAVIX) 75 MG tablet Take 1 tablet (75 mg) by mouth daily 30 tablet 0    cyanocobalamin (CYANOCOBALAMIN) 1000 MCG/ML injection Inject 1 mL (1,000 mcg) into the muscle every 7 days 12 mL 1    EPINEPHrine (ANY BX GENERIC EQUIV) 0.3 MG/0.3ML injection 2-pack Inject 0.3 mL (0.3 mg) intramuscularly once as needed for up to 1 dose.*      FLOVENT  MCG/ACT inhaler INHALE 1 PUFF BY MOUTH TWO TIMES DAILY 12 g 12    insulin syringe-needle U-100 (30G X 1/2\" 1 ML) 30G X 1/2\" 1 ML miscellaneous Use 1 syringes daily or as directed. For b12 12 each 0    nitroGLYcerin (NITROLINGUAL) 0.4 MG/SPRAY spray PLACE 1 SPRAY (0.4 MG) UNDER TONGUE EVERY 5 MINUTES AS NEEDED FOR CHEST PAIN FOR UP TO 3 DOSES. CALL 911 IF NO RELIEF AFTER FIRST SPRAY*      rosuvastatin (CRESTOR) 40 MG tablet Take 1 tablet (40 mg) by mouth daily 90 tablet 3    VENTOLIN  (90 Base) MCG/ACT inhaler INHALE 1-2 PUFFS BY MOUTH EVERY 4 HOURS AS NEEDED FOR WHEEZING.*      vitamin B-12 (CYANOCOBALAMIN) 1000 MCG tablet Take 1,000 mcg by mouth      vitamin D2 (ERGOCALCIFEROL) 55834 units (1250 mcg) capsule Take 1 capsule (50,000 Units) by mouth once a week for 12 doses (Patient not taking: Reported on 9/21/2023) 12 capsule 0    Allergies   Allergen Reactions    Bee Venom Anaphylaxis and Unknown     unknown  Unknown    unknown  Unknown    Indomethacin Diarrhea and Hives     Stomach pain, sweats, shakes, not good combo with heart meds either.      Sumatriptan " Anaphylaxis, Other (See Comments) and Unknown     unknown  Throat closing      Imitrex [Sumatriptan Succinate]     Levonorgestrel-Ethinyl Estrad Other (See Comments)    Sulfa Antibiotics     Gabapentin Anxiety and Nausea and Vomiting    Vancomycin Itching         Lab Results    Chemistry/lipid CBC Cardiac Enzymes/BNP/TSH/INR   Lab Results   Component Value Date    CHOL 227 (H) 09/12/2023    HDL 52 09/12/2023    TRIG 139 09/12/2023    BUN 8.2 09/11/2023     09/11/2023    CO2 25 09/11/2023    Lab Results   Component Value Date    WBC 9.9 09/11/2023    HGB 14.7 09/11/2023    HCT 44.1 09/11/2023    MCV 93 09/11/2023     09/11/2023    Lab Results   Component Value Date    TROPONINI <0.01 07/01/2018    TSH 1.57 09/01/2023          This note has been dictated using voice recognition software. Any grammatical, typographical, or context distortions are unintentional and inherent to the software    Catherine Kumar PA-C      Thank you for allowing me to participate in the care of your patient.      Sincerely,     Catherine Tatum PA-C     [facility]  cc:   No referring provider defined for this encounter.

## 2023-09-21 NOTE — PATIENT INSTRUCTIONS
Kim Correa,    It was a pleasure to see you today at the [facility].     My recommendations after this visit include:    - No medications changes made today. Stop Plavix when prescription is gone    - Look into Repatha or Praluent cost with insurance fr cholesterol control    - Please seek medical attention if you develop recurrent chest pain or shortness of breath or similar symptoms you experienced prior to recent cardiac event    - Cardiac rehab as scheduled    - Follow up with Dr. Garrido    - Please call [phone], if you have any questions or concerns    Catherine Kumar PA-C    Medication     Take all your medications as prescribed  Do not stop any medications without talking with a healthcare provider    Exercise      Physical activity is important for overall health  Set a goal of 150 minutes of exercise each week  For example, 30 minutes of exercise 5 days each week.    These 30 minutes can be broken into shorter periods of 15 minutes twice daily or 10 minutes three times daily  Start any exercise program slowly and work towards the goal of 150 minutes each week  For example, you may start with 10 minutes and plan to add a few minutes each week as you get stronger   Examples of exercise include walking, swimming, or biking  Remember to stretch and stay hydrated with exercise    Diet     A heart healthy diet includes:  A variety of fruits and vegetables  Whole grains  Low-fat dairy (fat-free, 1% fat, and low-fat)  Lean meats and poultry without skin   Fish (eat fish 2 times each week)  Nuts  Limit saturated fat to about 13 grams each day (based on a 2000 calorie diet)  Limit red meat  Limit sugars (sweets and sugary beverages)  Limit your portion sizes  Do not add salt to your food when cooking or at the table  Limit alcohol intake (no more than 1 drink each day for women or 2 drinks each day for men)    Weight Loss     Work on losing weight with diet and exercise  You BMI  (body mass index) should be between 18.5-24.9  This is a calculation of your weight and height  Please ask your healthcare provider for your BMI    Manage Other Chronic Health Conditions     Control cholesterol  Eat a diet low in saturated fat  Exercise   Take a statin medication as prescribed  Manage blood pressure  Eat a diet low in sodium  Exercise  Reduce stress  Lose weight   Take blood pressure medications as prescribed  Control blood sugars if diabetic  Monitor sugars and carbohydrates in your diet  Lose weight   Take diabetes medications as prescribed  Follow-up with your primary care provider to make sure your blood sugars are well controlled    Stress Reduction     Find time each day to relax  Reading, listening to music, yoga, meditation, exercise, spending time with friends and family, volunteering   Get 6-8 hours of sleep each night    Smoking Cessation     Smoking causes numerous health problems including coronary artery disease  It is never too late to quit  Set realistic goals for quitting  Decrease the number of cigarettes used each week  Use nicotine gum or patches to help you quit    Information from the [employer].  Please visit their website at www.heart.org

## 2023-09-21 NOTE — PROGRESS NOTES
Assessment/Recommendations   Assessment:    1.  Coronary artery disease: Angiogram performed after abnormal nuclear stress test.  Severe in-stent restenosis of mid LAD stents, D2 stenosis, distal LAD stenosis, proximal stenosis of RCA most all treated with PTCA.  PTCA of RCA was unsuccessful given 99% stenosed calcified fibrotic lesion, stenting was not performed due to LAD collaterals to nondominant vessel. Hx of LAD stenting.  - Patient notices mild improvement in dyspnea.  Continues to be present with no emphysema, smoking history.  Intermittent sharp chest pain of left chest wall not always related to exertion still present.  This pain lasts seconds.  - On dual antiplatelet therapy with ASA 81 mg indefinitely and Clopidogrel (Plavix) 75 mg for 1 month  - Cardiac rehab unable to be scheduled due to lack of transportation for patient  - Reviewed most recent BMP, Hgb, platelet- stable.    2.  Dyslipidemia with LDL goal <70/Obesity with a BMI of 21.85: Kim Correa is on high intensity statin therapy with rosuvastatin 40 mg. Most recent LDL is 147.  Most recent AST/ALT are 12/13  - Patient's LDL still not at goal on rosuvastatin. Discussed PCSK9 inhibitors as an addition to regimen. Educated on mechanism, subcutaneous administration, and possible side effects including most commonly injection site irritation and less likely angioedema/anaphylaxis, infection, muscle toxicity, respiratory effects, insulin resistance.    3.  Prediabetes: Most recent A1C is 6.2. Managed by PCP.    4.  Neurogenic syncope with permanent pacemaker: Next device check 11/6/2023.    5.  Tobacco abuse: Encouraged cessation.     Plan:  - We discussed the importance of antiplatelet therapy and talking with her cardiologist prior to stopping these medications for any reason.  We discussed about utilization of as needed nitroglycerin.   - Encouraged to seek medical attention if recurrent chest pain or shortness of breath.    - Consider  Repatha and Praluent with insurance    - We discussed a diet low in saturated fat, weight loss, and exercise along with medication for better control of cholesterol.  Highly encouraged to participate in nutrition class in cardiac rehab.  - Risk factor modification and lifestyle management topics were discussed including managing comorbidities, weight loss, heart healthy diet, exercise, smoking cessation, stress reduction, alcohol use, and drug use.        Follow up with Dr. Garrido 12/15/2023, offered sooner follow-up with me post stent patient elects to wait.     History of Present Illness/Subjective    Ms. Kim Correa is a 63 year old female smoker with a past medical history of CAD who is seen at [facility] for post coronary intervention follow up.  Underwent coronary angiogram after abnormal NM scan stress test.  Revealing severe in-stent restenosis of mid LAD stents, D2 stenosis, distal LAD stenosis, proximal stenosis of RCA most all treated with PTCA.  PTCA of RCA was unsuccessful, stenting was not performed due to LAD collaterals to nondominant vessel.  ECHO/Stress test showed an EF of 60-65%.     Patient notices mild improvement in exertional dyspnea after stent placement.  Longstanding shortness of breath and exertional dyspnea continues.  Continues to smoke.  She feels her legs are very weak.  Denies falls or syncope.  Is unable to participate in rehab because she does not have transportation.  Still having intermittent left chest pain with and without exertion that lasts seconds. Resolves spontaneously. Not reproducible with palpation.     She denies orthopnea, PND, palpitations, abdominal fullness/bloating, and lower extremity edema.      Coronary Angiogram 9/12/2023 reviewed:     Mid LAD lesion is 90% stenosed.     2nd Diag lesion is 90% stenosed.     1st LPL lesion is 20% stenosed.     Ost LAD lesion is 40% stenosed.     Prox RCA lesion is 99% stenosed.     Prox  Cx to Mid Cx lesion is 40% stenosed.     Prox LAD to Mid LAD lesion is 40% stenosed.     Dist LAD lesion is 80% stenosed.     1.  Severe diffuse in-stent restenosis of the mid LAD stents.  The proximal stent edge appears smaller in caliber than the mid stent zone.  2.  Severe proximal stenosis of nondominant right coronary artery  3. Moderately severe stenosis of distal LAD beyond mid LAD stents, successfully treated with conventional PTCA.  4.  Successful PTCA of mid LAD stents; PTCA of ostium of jailed diagonal with persistence of normal flow.  Apical segment antegrade flow has been restored.  5.  Unsuccessful PTCA attempt of proximal right coronary artery.  Do not need to reattempt as long as LAD does not develop restenosis within the stent or in the distal segment.  Restenting would be a possible option at that point.  6.  Continue Plavix x1 month    ECHO 9/1/2023 Reviewed:   1. Normal left ventricular size and systolic performance with a visually  estimated ejection fraction of 65%.  2. There is mild, to perhaps mild-moderate, tricuspid insufficiency.  3. Normal right ventricular size and systolic performance.    Stress test 9/1/2023 Reviewed     1.The nuclear stress test is abnormal.     2.Negative pharmacological regadenoson ECG for ischemia.     3.There is a small area of mild ischemia in the distal inferoseptal segment(s) of the left ventricle.  No scar seen.     4.The left ventricular ejection fraction at stress is greater than 70%.     5.The patient is at a low risk of future cardiac ischemic events.     A prior study was conducted on 5/10/2017.  The minimal distal inferior ischemia is new, prior study suggesting basal inferior ischemia was felt to be artifactual.       Physical Examination Review of Systems   /48 (BP Location: Right arm, Patient Position: Sitting, Cuff Size: Adult Regular)   Pulse 84   Resp 24   Wt 49.9 kg (110 lb)   LMP  (LMP Unknown)   SpO2 97%   BMI 21.85 kg/m    Body mass  index is 21.85 kg/m .  Wt Readings from Last 3 Encounters:   09/21/23 49.9 kg (110 lb)   09/12/23 49.8 kg (109 lb 12.8 oz)   09/11/23 49.8 kg (109 lb 12.8 oz)     General Appearance:   no distress, normal body habitus   ENT/Mouth: membranes moist, no oral lesions or bleeding gums.      EYES:  no scleral icterus, normal conjunctivae   Neck: no carotid bruits or thyromegaly   Chest/Lungs:   lungs are clear to auscultation, no rales or wheezing, equal chest wall expansion    Cardiovascular:   Regular. Normal first and second heart sounds with no murmurs, rubs, or gallops; the carotid, radial and posterior tibial pulses are intact, absent edema bilaterally    Abdomen:  no organomegaly, masses, bruits, or tenderness; bowel sounds are present   Extremities  Puncture Site: no cyanosis or clubbing  Right radial site is soft with minimal bruising.  Radial pulses and Pedal pulses intact and symmetrical.  CMS intact.   Skin: no xanthelasma, warm.    Neurologic: normal  bilateral, no tremors     Psychiatric: alert and oriented x3, calm                                                        Negative unless noted in HPI     Medical History  Surgical History Family History Social History   Past Medical History:   Diagnosis Date     Dysphagia      Enlarged tongue      Myalgia      Polyarthralgia      Sicca (H)     Past Surgical History:   Procedure Laterality Date     APPENDECTOMY       CARDIAC CATHETERIZATION       CORONARY ANGIOPLASTY       CORONARY STENT PLACEMENT       CV CORONARY ANGIOGRAM N/A 9/12/2023    Procedure: Coronary Angiogram;  Surgeon: Arnie Queen MD;  Location: [facility] CV     CV LEFT HEART CATH N/A 9/12/2023    Procedure: Left Heart Catheterization;  Surgeon: Arnie Queen MD;  Location: [facility] CV     CV PCI N/A 9/12/2023    Procedure: Percutaneous Coronary Intervention;  Surgeon: Arnie Queen MD;  Location: [facility] CATH [facility] CV     HC REVISE MEDIAN N/CARPAL TUNNEL SURG       Description: Neuroplasty Decompression Median Nerve At Carpal Tunnel;  Recorded: 09/19/2008;     IMPLANT PACEMAKER       INSERT / REPLACE / REMOVE PACEMAKER       IR MISCELLANEOUS PROCEDURE  2/2/2009     MO VAGINAL HYSTERECTOMY,UTERUS 250 GMS/<      Description: Vaginal Hysterectomy;  Recorded: 12/16/2011;     SURGICAL HISTORY OF -       vag hyst     SURGICAL HISTORY OF -       carpal tunnel bilateral     SURGICAL HISTORY OF -       arm surgery right side.       ZZC APPENDECTOMY      Description: Appendectomy;  Recorded: 09/19/2008;  Comments: and ovarian cyst    Family History   Problem Relation Age of Onset     Hypertension Mother      Thyroid Disease Mother      Alcoholism Mother      Heart Disease Father      Alcoholism Father      Diabetes Sister      Alzheimer Disease Maternal Grandmother      Heart Disease Maternal Grandfather      Cerebrovascular Disease Paternal Grandmother      C.A.D. No family hx of     Social History     Socioeconomic History     Marital status:      Spouse name: Not on file     Number of children: Not on file     Years of education: Not on file     Highest education level: Not on file   Occupational History     Not on file   Tobacco Use     Smoking status: Every Day     Packs/day: 1.00     Years: 45.00     Pack years: 45.00     Types: Cigarettes     Smokeless tobacco: Never   Vaping Use     Vaping Use: Some days     Substances: Nicotine, THC, CBD     Devices: Disposable, Pre-filled or refillable cartridge, Refillable tank, Pre-filled pod   Substance and Sexual Activity     Alcohol use: No     Drug use: Yes     Types: Marijuana     Sexual activity: Not on file   Other Topics Concern     Not on file   Social History Narrative     Not on file     Social Determinants of Health     Financial Resource Strain: Not on file   Food Insecurity: Not on file   Transportation Needs: Not on file   Physical Activity: Not on file   Stress: Not on file   Social Connections: Not on file  "  Interpersonal Safety: Not on file   Housing Stability: Not on file          Medications  Allergies   Current Outpatient Medications   Medication Sig Dispense Refill     aspirin 81 MG EC tablet Take 1 tablet (81 mg) by mouth daily Start tomorrow. 30 tablet 3     blood glucose monitoring (CONTOUR NEXT MONITOR W/DEVICE KIT) meter device kit 1 each       clopidogrel (PLAVIX) 75 MG tablet Take 1 tablet (75 mg) by mouth daily 30 tablet 0     cyanocobalamin (CYANOCOBALAMIN) 1000 MCG/ML injection Inject 1 mL (1,000 mcg) into the muscle every 7 days 12 mL 1     EPINEPHrine (ANY BX GENERIC EQUIV) 0.3 MG/0.3ML injection 2-pack Inject 0.3 mL (0.3 mg) intramuscularly once as needed for up to 1 dose.*       FLOVENT  MCG/ACT inhaler INHALE 1 PUFF BY MOUTH TWO TIMES DAILY 12 g 12     insulin syringe-needle U-100 (30G X 1/2\" 1 ML) 30G X 1/2\" 1 ML miscellaneous Use 1 syringes daily or as directed. For b12 12 each 0     nitroGLYcerin (NITROLINGUAL) 0.4 MG/SPRAY spray PLACE 1 SPRAY (0.4 MG) UNDER TONGUE EVERY 5 MINUTES AS NEEDED FOR CHEST PAIN FOR UP TO 3 DOSES. CALL 911 IF NO RELIEF AFTER FIRST SPRAY*       rosuvastatin (CRESTOR) 40 MG tablet Take 1 tablet (40 mg) by mouth daily 90 tablet 3     VENTOLIN  (90 Base) MCG/ACT inhaler INHALE 1-2 PUFFS BY MOUTH EVERY 4 HOURS AS NEEDED FOR WHEEZING.*       vitamin B-12 (CYANOCOBALAMIN) 1000 MCG tablet Take 1,000 mcg by mouth       vitamin D2 (ERGOCALCIFEROL) 92549 units (1250 mcg) capsule Take 1 capsule (50,000 Units) by mouth once a week for 12 doses (Patient not taking: Reported on 9/21/2023) 12 capsule 0    Allergies   Allergen Reactions     Bee Venom Anaphylaxis and Unknown     unknown  Unknown    unknown  Unknown     Indomethacin Diarrhea and Hives     Stomach pain, sweats, shakes, not good combo with heart meds either.       Sumatriptan Anaphylaxis, Other (See Comments) and Unknown     unknown  Throat closing       Imitrex [Sumatriptan Succinate]      " Levonorgestrel-Ethinyl Estrad Other (See Comments)     Sulfa Antibiotics      Gabapentin Anxiety and Nausea and Vomiting     Vancomycin Itching         Lab Results    Chemistry/lipid CBC Cardiac Enzymes/BNP/TSH/INR   Lab Results   Component Value Date    CHOL 227 (H) 09/12/2023    HDL 52 09/12/2023    TRIG 139 09/12/2023    BUN 8.2 09/11/2023     09/11/2023    CO2 25 09/11/2023    Lab Results   Component Value Date    WBC 9.9 09/11/2023    HGB 14.7 09/11/2023    HCT 44.1 09/11/2023    MCV 93 09/11/2023     09/11/2023    Lab Results   Component Value Date    TROPONINI <0.01 07/01/2018    TSH 1.57 09/01/2023          This note has been dictated using voice recognition software. Any grammatical, typographical, or context distortions are unintentional and inherent to the software    Catherine Kumar PA-C

## 2023-09-26 ENCOUNTER — TELEPHONE (OUTPATIENT)
Dept: FAMILY MEDICINE | Facility: CLINIC | Age: 63
End: 2023-09-26
Payer: MEDICARE

## 2023-09-26 NOTE — TELEPHONE ENCOUNTER
Reason for Call:  Appointment Request    Patient requesting this type of appt:  Hospital/ED Follow-Up     Requested provider: Lorena Baeza    Reason patient unable to be scheduled: Not within requested timeframe    When does patient want to be seen/preferred time:  N/A    Comments: Sister/pt instructed to schedule a surgery follow up with primary care. Patient would specifically like to schedule with her primary care provider. Unable to find any available appointments before 11/6/2023. Patient schedule for 11/6/2023 as of right now. Sister manages scheduling for patient and would like a call back if able to schedule patient at a sooner date.    Could we send this information to you in [employer] or would you prefer to receive a phone call?:   Patient would prefer a phone call   Okay to leave a detailed message?: Yes at Other phone number:  [phone]    Call taken on 9/26/2023 at 11:53 AM by Wendy Rollins

## 2023-09-26 NOTE — TELEPHONE ENCOUNTER
Appointments in Next Year      Oct 23, 2023  9:10 AM  (Arrive by 8:55 AM)  ED/Hospital Follow Up with JANA Thomson CNP  [facility] [phone]

## 2023-10-23 ENCOUNTER — LAB (OUTPATIENT)
Dept: FAMILY MEDICINE | Facility: CLINIC | Age: 63
End: 2023-10-23

## 2023-10-23 ENCOUNTER — TELEPHONE (OUTPATIENT)
Dept: NURSING | Facility: CLINIC | Age: 63
End: 2023-10-23

## 2023-10-23 ENCOUNTER — PATIENT OUTREACH (OUTPATIENT)
Dept: CARE COORDINATION | Facility: CLINIC | Age: 63
End: 2023-10-23

## 2023-10-23 ENCOUNTER — OFFICE VISIT (OUTPATIENT)
Dept: FAMILY MEDICINE | Facility: CLINIC | Age: 63
End: 2023-10-23
Payer: MEDICARE

## 2023-10-23 VITALS
HEART RATE: 73 BPM | WEIGHT: 111.1 LBS | RESPIRATION RATE: 15 BRPM | BODY MASS INDEX: 21.81 KG/M2 | SYSTOLIC BLOOD PRESSURE: 132 MMHG | HEIGHT: 60 IN | TEMPERATURE: 98.2 F | DIASTOLIC BLOOD PRESSURE: 82 MMHG | OXYGEN SATURATION: 96 %

## 2023-10-23 DIAGNOSIS — F41.9 ANXIETY: ICD-10-CM

## 2023-10-23 DIAGNOSIS — Z71.6 ENCOUNTER FOR SMOKING CESSATION COUNSELING: ICD-10-CM

## 2023-10-23 DIAGNOSIS — Z12.11 COLON CANCER SCREENING: ICD-10-CM

## 2023-10-23 DIAGNOSIS — E55.9 VITAMIN D DEFICIENCY: ICD-10-CM

## 2023-10-23 DIAGNOSIS — M35.9 AUTOIMMUNE DISEASE (H): ICD-10-CM

## 2023-10-23 DIAGNOSIS — Z71.6 ENCOUNTER FOR SMOKING CESSATION COUNSELING: Primary | ICD-10-CM

## 2023-10-23 DIAGNOSIS — E11.51 TYPE 2 DIABETES MELLITUS WITH DIABETIC PERIPHERAL ANGIOPATHY WITHOUT GANGRENE, WITHOUT LONG-TERM CURRENT USE OF INSULIN (H): ICD-10-CM

## 2023-10-23 DIAGNOSIS — Z12.31 VISIT FOR SCREENING MAMMOGRAM: ICD-10-CM

## 2023-10-23 DIAGNOSIS — I25.10 ATHEROSCLEROSIS OF CORONARY ARTERY OF NATIVE HEART WITHOUT ANGINA PECTORIS, UNSPECIFIED VESSEL OR LESION TYPE: ICD-10-CM

## 2023-10-23 DIAGNOSIS — F51.01 PRIMARY INSOMNIA: Primary | ICD-10-CM

## 2023-10-23 PROBLEM — M54.50 LOWER BACK PAIN: Status: ACTIVE | Noted: 2023-10-23

## 2023-10-23 PROBLEM — Z45.010 PACEMAKER BATTERY DEPLETION: Status: ACTIVE | Noted: 2018-12-19

## 2023-10-23 PROBLEM — Z95.0 CARDIAC PACEMAKER IN SITU: Status: ACTIVE | Noted: 2023-10-23

## 2023-10-23 PROBLEM — H91.93 BILATERAL HEARING LOSS: Status: ACTIVE | Noted: 2017-06-01

## 2023-10-23 PROBLEM — K12.0 RECURRENT APHTHOUS ULCER: Status: ACTIVE | Noted: 2023-10-23

## 2023-10-23 PROBLEM — E53.9 VITAMIN B DEFICIENCY: Status: ACTIVE | Noted: 2017-12-04

## 2023-10-23 PROBLEM — F17.200 TOBACCO DEPENDENCE SYNDROME: Status: ACTIVE | Noted: 2017-06-14

## 2023-10-23 PROBLEM — Z91.09 OTHER ALLERGY, OTHER THAN TO MEDICINAL AGENTS: Status: ACTIVE | Noted: 2023-10-23

## 2023-10-23 PROBLEM — I20.89 ANGINA AT REST (H): Status: ACTIVE | Noted: 2018-06-30

## 2023-10-23 PROBLEM — Z86.0100 HISTORY OF COLONIC POLYPS: Status: ACTIVE | Noted: 2017-06-01

## 2023-10-23 PROBLEM — Q23.81 BICUSPID AORTIC VALVE: Status: ACTIVE | Noted: 2018-01-15

## 2023-10-23 PROBLEM — M26.609 TEMPOROMANDIBULAR JOINT DISORDER: Status: ACTIVE | Noted: 2023-10-23

## 2023-10-23 PROBLEM — K31.84 GASTROPARESIS: Status: ACTIVE | Noted: 2017-06-04

## 2023-10-23 LAB — VIT D+METAB SERPL-MCNC: 18 NG/ML (ref 20–50)

## 2023-10-23 PROCEDURE — 99406 BEHAV CHNG SMOKING 3-10 MIN: CPT | Performed by: NURSE PRACTITIONER

## 2023-10-23 PROCEDURE — 82306 VITAMIN D 25 HYDROXY: CPT | Performed by: NURSE PRACTITIONER

## 2023-10-23 PROCEDURE — 36415 COLL VENOUS BLD VENIPUNCTURE: CPT | Performed by: NURSE PRACTITIONER

## 2023-10-23 PROCEDURE — 99215 OFFICE O/P EST HI 40 MIN: CPT | Mod: 25 | Performed by: NURSE PRACTITIONER

## 2023-10-23 RX ORDER — RESPIRATORY SYNCYTIAL VIRUS VACCINE 120MCG/0.5
0.5 KIT INTRAMUSCULAR ONCE
Qty: 1 EACH | Refills: 0 | Status: CANCELLED | OUTPATIENT
Start: 2023-10-23 | End: 2023-10-23

## 2023-10-23 RX ORDER — TRAZODONE HYDROCHLORIDE 50 MG/1
50-100 TABLET, FILM COATED ORAL AT BEDTIME
Qty: 60 TABLET | Refills: 3 | Status: SHIPPED | OUTPATIENT
Start: 2023-10-23 | End: 2024-07-26

## 2023-10-23 ASSESSMENT — ASTHMA QUESTIONNAIRES
QUESTION_2 LAST FOUR WEEKS HOW OFTEN HAVE YOU HAD SHORTNESS OF BREATH: MORE THAN ONCE A DAY
QUESTION_1 LAST FOUR WEEKS HOW MUCH OF THE TIME DID YOUR ASTHMA KEEP YOU FROM GETTING AS MUCH DONE AT WORK, SCHOOL OR AT HOME: NONE OF THE TIME
ACT_TOTALSCORE: 16
QUESTION_3 LAST FOUR WEEKS HOW OFTEN DID YOUR ASTHMA SYMPTOMS (WHEEZING, COUGHING, SHORTNESS OF BREATH, CHEST TIGHTNESS OR PAIN) WAKE YOU UP AT NIGHT OR EARLIER THAN USUAL IN THE MORNING: ONCE OR TWICE
QUESTION_4 LAST FOUR WEEKS HOW OFTEN HAVE YOU USED YOUR RESCUE INHALER OR NEBULIZER MEDICATION (SUCH AS ALBUTEROL): ONE OR TWO TIMES PER DAY
ACT_TOTALSCORE: 16
QUESTION_5 LAST FOUR WEEKS HOW WOULD YOU RATE YOUR ASTHMA CONTROL: WELL CONTROLLED

## 2023-10-23 ASSESSMENT — PAIN SCALES - GENERAL: PAINLEVEL: SEVERE PAIN (7)

## 2023-10-23 NOTE — TELEPHONE ENCOUNTER
Pt returning call to clinic;    Messages of today reviewed with Pt.    Pt states she is willing to try the nasal spray.      Gloria No RN, Nurse Advisor 2:49 PM 10/23/2023

## 2023-10-23 NOTE — COMMUNITY RESOURCES LIST (ENGLISH)
10/23/2023   [facility]  N/A  For questions about this resource list or additional care needs, please contact your primary care clinic or care manager.  Phone: [phone]   Email: N/A   Address: [address] [place] [id number]   Hours: N/A        Financial Stability       Rent and mortgage payment assistance  1  [facility] Distance: 1.5 miles      [address] [place] [address]  Language: English, Vincentian  Hours: Mon 9:30 AM - 11:30 AM , Tue 1:30 PM - 7:30 PM , Thu 9:00 AM - 7:00 PM , Fri 9:30 AM - 11:30 AM   Phone: (942) 374-8879 Email: info@[employer] Website: http://[employer]/     2  [facility] Distance: 4.36 miles      In-Person, Phone/[address] [address]  Language: English, Vincentian  Hours: Mon - Thu 8:00 AM - 4:00 PM  Fees: Free   Phone: (829) 896-8110 Email: center@saintCounts include 234 beds at the [facility] Website: https://www.saintandrews.[employer]          Food and Nutrition       Food pantry  3  [facility] - Food Shelf Distance: 1.5 miles      [facility]   [address] [place] [address]  Language: English, Vincentian  Hours: Mon 9:30 AM - 11:30 AM , Wed 9:30 AM - 11:30 AM , Thu 1:30 PM - 6:30 PM , Fri 9:30 AM - 11:30 AM  Fees: Free   Phone: (690) 842-9352 Email: info@[employer] Website: http://[employer]/     4  [place] [facility] Shelf Distance: 2.82 miles      In-Person, Pickup   [address] [place] [address]  Language: English  Hours: Mon - Thu 9:00 AM - 10:00 AM  Fees: Free   Phone: (157) 451-3608 Email: communication@[employer].org Website: http://www.[facility].org     SNAP application assistance  5  [facility] Economic Support - [place] Distance: 2.89 miles      In-Person, Phone/Virtual   [address] [address], [place] [address]  Language: English  Hours: Mon - Fri 8:00 AM - 4:30 PM  Fees: Free   Phone: (851)  270-5139 Email: jamey@[facility]. Website: https://www.[facility]/787/Economic-Support     6  [facility] - Economic Support - [place] Distance: 9.25 miles      In-Person, Phone/Virtual   [address] [place] [address]  Language: English  Hours: Mon - Fri 8:00 AM - 4:30 PM  Fees: Free   Phone: (979) 712-1278 Email: jamey@[facility] Website: https://www.[facility] [facility]/787/Economic-Support     Soup kitchen or free meals  7  [facility] - Family retirement - Thursday Night Community Meal Distance: 4.36 miles      In-Person   [address] [place] [address]  Language: English, Pashto  Hours: Thu 6:00 PM - 7:00 PM  Fees: Free   Phone: (707) 889-7425 Email: center@[employer].org Website: https://www.saintandrews.org     8  [facility] - Homeless Outreach Services Team - Food Assistance Distance: 4.38 miles      Pickup   [address] [place] [address]  Language: English  Hours: Mon - Thu 9:30 AM - 3:30 PM  Fees: Free   Phone: (214) 903-7663 Email: office@[employer] Website: https://www.saintandrewsChar[employer]/community-resource-center/          Transportation       Free or low-cost transportation  9  [facility] Bus Loop - Free or low-cost transportation Distance: 8.43 miles      In-Person   [address] [place] [address]  Language: English  Hours: Mon - Fri 9:00 AM - 5:00 PM  Fees: Free   Phone: (877) 530-2939 Email: info@[employer] Website: https://www.[employer]/     10  [facility]  Office - [facility] - Gas vouchers and transportation assistance - Free or low-cost transportation Distance: 9.3 miles      In-Person, Phone/Virtual   [address] [place] [address]  Language: English  Hours: Mon - Fri 8:00 AM - 12:00 PM , Mon - Fri 1:00 PM - 4:00 PM  Fees: Free   Phone: (585) 500-3698 Email:  mo@[facility] Website: https://[facility].org/[facility]/social-services-office-[place]/     Transportation to medical appointments  11  [facility] Distance: 1.29 miles      In-Person   [address] [place] [address]  Language: English  Hours: Mon - Fri 9:00 AM - 4:00 PM  Fees: Free   Phone: (325) 606-1128 Email: contact@[facility] Website: http://[facility].org     12  [facility] Ride Distance: 12.1 miles      In-Person   [address] [address]  Language: English  Hours: Mon - Thu 6:00 AM - 6:00 PM , Fri 6:00 AM - 5:00 PM  Fees: Insurance, Self Pay   Phone: (507) 626-2390 Email: office@[employer] Website: https://www.[employer]/          Important Numbers & Websites       Emergency Services   911  [facility] Services   311  Poison Control   (385) 886-8998  Suicide Prevention Lifeline   (984) 831-3178 (TALK)  Child Abuse Hotline   (983) 866-9090 (4-A-Child)  Sexual Assault Hotline   (840) 958-6514 (HOPE)  National Runaway Safeline   (579) 462-8264 (RUNAWAY)  All-Options Talkline   (949) 448-6350  Substance Abuse Referral   (822) 645-9817 (HELP)

## 2023-10-23 NOTE — PROGRESS NOTES
Clinic Care Coordination Contact  Community Health Worker Initial Outreach    CHW Initial Information Gathering:  Referral Source: PCP  Preferred Hospital: [facility]  [phone]  Current living arrangement:: I live in a private home with family  Type of residence:: Town home  Community Resources: None  Supplies Currently Used at Home: Diabetic Supplies  Equipment Currently Used at Home: cane, straight  Informal Support system:: Family  No PCP office visit in Past Year: No  Transportation means:: Family  CHW Additional Questions  MyChart active?: Yes  Patient sent Social Determinants of Health questionnaire?: No (having difficulty)    Patient accepts CC: Yes. Patient scheduled for assessment with CCC TIM Brantley on 10/26/23 at 9AM. Patient noted desire to discuss transportation resources.     CHW Note:  CHW contacted patient regarding a referral that was placed for CCC. CHW introduced self and role of CCC.  Patient shared she would like assistance exploring transportation resources at this time. Patient shared she would also like to explore financial resources but is unsure of what is available and if she would qualify.  CHW scheduled patient with CCC TIM Brantley on 10/26/23 at 9AM to discuss transportation resources and exploring what financial resources are available.   Patient was grateful for the outreach call today and assistance navigating resources.       Order Questions    Question Answer   Reason for Referral: Utilization of Services Concern   Clinical Staff have discussed the Care Coordination Referral with the patient and/or caregiver: Yes   Additional Information: patient needs transportation         Qing Lamar  Community Health Worker   [facility] Care Coordination  [facility]   [email or web address]  Office: [phone]

## 2023-10-23 NOTE — PROGRESS NOTES
Assessment and Plan:     Primary insomnia  Discussed with sleep hygiene.  We will start trazodone nightly.  Educated on medications and side effects.  She is to avoid taking this with other sedatives.  - traZODone (DESYREL) 50 MG tablet  Dispense: 60 tablet; Refill: 3    Anxiety  She is not interested in medication.  Will refer to counseling.  - Adult Mental Health  Referral    Atherosclerosis of coronary artery of native heart without angina pectoris, unspecified vessel or lesion type  Patient is seeing cardiology.  Will refer to care coordination to assist with transportation and cardiac rehab.  She continues aspirin and Plavix.  She has nitroglycerin to use as needed.  - Primary Care - Care Coordination Referral    Vitamin D deficiency  She has a history of vitamin D deficiency and is not taking supplementation.  She complains of fatigue.  - Vitamin D Deficiency  - Vitamin D Deficiency    Autoimmune disease (H24)  We will assist patient with establishing with rheumatology at [facility].  - Adult Rheumatology  Referral    Type 2 diabetes mellitus with diabetic peripheral angiopathy without gangrene, without long-term current use of insulin (H)  This is diet controlled.  She is due for an eye exam.  - Adult Eye  Referral    Encounter for smoking cessation counseling  I spent 5 minutes with the patient discussing smoking cessation options.  Patient is ready to quit smoking.  Provided prescription for nicotine inhaler, use as directed.  Educated on its indications and side effects.  I encouraged follow-up via MyChart in 1 month to assess for improvement.  - nicotine (NICOTROL) 10 MG inhaler  Dispense: 6 each; Refill: 11    Visit for screening mammogram  - MA SCREENING DIGITAL BILAT - Future  (s+30)    Colon cancer screening  - DIDIER([employer])    She declines influenza, COVID, RSV, pneumococcal, shingles, tetanus vaccines.    43 minutes spent by me on the date of the encounter doing  chart review, history and exam, documentation and further activities per the note      Subjective:     Kim is a 63 year old female presenting to the clinic for follow-up status post cardiac evaluation.  Patient has multiple comorbidities including asthma, GERD, hyperlipidemia, anxiety, PTSD, CAD, PAD, type 2 diabetes, autoimmune disease, dissociative disorder, depression patient underwent coronary angiogram after abnormal nuclear medicine stress test.  This revealed severe in-stent restenosis of mid LAD stents, D2 stenosis, distal LAD stenosis, proximal stenosis of RCA most all treated with PTCA.  PTCA of RCA was unsuccessful, stenting was not performed due to LAD collaterals to nondominant vessel.  ECHO/Stress test showed an EF of 60-65%.  Patient has noticed some improvement with her dyspnea.  She does experience intermittent chest pain which has required use of nitroglycerin.  She is currently taking ASA 81 mg and Plavix 75 mg for 1 month.  She has been unable to complete cardiac rehab due to lack of transportation.  She has dyslipidemia and has not tolerated statins in the past.  She is currently taking rosuvastatin 40 mg daily.  She has a history of type 2 diabetes which is diet controlled.  A1c is 6.2%.  Patient continues to smoke 1 pack/day.  She is ready to quit.  She has tried Chantix, Wellbutrin, patches, gum with no relief.  She does smoke cannabis regularly.  She is trying to consume a healthy diet and eliminate processed foods.  She admits to added stress as she lives with her mother whom she describes as a narcissist.  She wakes up frequently at night.  She has tried melatonin with no relief.  She is tearful while speaking of her situation would like to see a counselor.  She feels anxious.  She denies thoughts of suicide.  She continues to experience fatigue.  She has a history of vitamin D deficiency.  Last vitamin D was 9 on 3/13/2023.  She is not currently taking supplementation.  There have been  concerns for autoimmune disease in the past.  She would like to establish with a rheumatologist in the [facility].    Reviewof Systems: A complete 14 point review of systems was obtained and is negative or as stated in the history of present illness.    Social History     Socioeconomic History    Marital status:      Spouse name: Not on file    Number of children: Not on file    Years of education: Not on file    Highest education level: Not on file   Occupational History    Not on file   Tobacco Use    Smoking status: Every Day     Packs/day: 1.00     Years: 45.00     Additional pack years: 0.00     Total pack years: 45.00     Types: Cigarettes     Passive exposure: Current    Smokeless tobacco: Never   Vaping Use    Vaping Use: Some days    Substances: Nicotine, THC, CBD    Devices: Disposable, Pre-filled or refillable cartridge, Refillable tank, Pre-filled pod   Substance and Sexual Activity    Alcohol use: No    Drug use: Yes     Types: Marijuana    Sexual activity: Not on file   Other Topics Concern    Not on file   Social History Narrative    Not on file     Social Determinants of Health     Financial Resource Strain: Low Risk  (10/23/2023)    Financial Resource Strain     Within the past 12 months, have you or your family members you live with been unable to get utilities (heat, electricity) when it was really needed?: No   Food Insecurity: High Risk (10/23/2023)    Food Insecurity     Within the past 12 months, did you worry that your food would run out before you got money to buy more?: Yes     Within the past 12 months, did the food you bought just not last and you didn t have money to get more?: Yes   Transportation Needs: High Risk (10/23/2023)    Transportation Needs     Within the past 12 months, has lack of transportation kept you from medical appointments, getting your medicines, non-medical meetings or appointments, work, or from getting things that you need?: Yes   Physical Activity: Not on file    Stress: Not on file   Social Connections: Not on file   Interpersonal Safety: Low Risk  (10/23/2023)    Interpersonal Safety     Do you feel physically and emotionally safe where you currently live?: Yes     Within the past 12 months, have you been hit, slapped, kicked or otherwise physically hurt by someone?: No     Within the past 12 months, have you been humiliated or emotionally abused in other ways by your partner or ex-partner?: No   Housing Stability: High Risk (10/23/2023)    Housing Stability     Do you have housing? : Yes     Are you worried about losing your housing?: Yes       Active Ambulatory Problems     Diagnosis Date Noted    Sprain of lumbar region 09/08/2006    Mild intermittent asthma with exacerbation 11/10/2006    Esophageal reflux 11/10/2006    Hyperlipidemia 11/10/2006    Attention deficit disorder 11/10/2006    Chronic rhinitis 11/10/2006    Dizziness and giddiness 12/11/2006    Adenomatous polyp of colon 11/19/2013    Anxiety 11/10/2015    Asthma 04/06/2010    PTSD (post-traumatic stress disorder) 01/01/2006    Coronary atherosclerosis 04/06/2010    PAD (peripheral artery disease) (H24) 12/29/2010    Statin intolerance 08/13/2018    Type 2 diabetes mellitus without complication, without long-term current use of insulin (H) 09/11/2023    ROSARIO (dyspnea on exertion) 09/12/2023    Abnormal cardiovascular stress test 09/12/2023    Status post coronary angiogram 09/12/2023    Status post percutaneous transluminal coronary angioplasty 09/12/2023    Angina at rest 06/30/2018    Arm pain 04/06/2010    Autoimmune disease (H24) 06/30/2018    Bicuspid aortic valve 01/15/2018    Bilateral hearing loss 06/01/2017    Cardiac pacemaker in situ 10/23/2023    Contusion of head 10/24/2013    Dissociative disorder or reaction 06/15/2005    Gastroparesis 06/04/2017    General patient noncompliance 09/26/2015    History of colonic polyps 06/01/2017    Insomnia disorder related to known organic factor 07/02/2014  "   Lipid disorder 04/06/2010    Chronic back pain 04/06/2010    Lower back pain 10/23/2023    Major depressive disorder, recurrent episode, mild (H24) 11/24/2015    Mouth pain 08/05/2015    New daily persistent headache 06/18/2014    Other allergy, other than to medicinal agents 10/23/2023    Pacemaker battery depletion 12/19/2018    Pain, dental 08/12/2014    Recurrent aphthous ulcer 10/23/2023    Somatic symptom disorder, persistent, moderate 09/24/2014    Stomatitis and mucositis 03/24/2016    Temporomandibular joint disorder 10/23/2023    Tobacco dependence syndrome 06/14/2017    Type 2 diabetes mellitus with diabetic peripheral angiopathy without gangrene, without long-term current use of insulin (H) 06/02/2020    Upper respiratory infection 12/29/2010    Vasovagal syncope 04/06/2010    Vitamin B deficiency 12/04/2017    Vitamin D deficiency 10/23/2023     Resolved Ambulatory Problems     Diagnosis Date Noted    Moderate depressed bipolar I disorder (H) 11/10/2006    Refractory migraine without aura 02/05/2007    COSTOCHONDRITIS 03/20/2007    History of cardiac pacemaker in situ 06/04/2017    Bipolar affective disorder (H) 12/29/2010     Past Medical History:   Diagnosis Date    Dysphagia     Enlarged tongue     Myalgia     Polyarthralgia     Sicca (H24)        Family History   Problem Relation Age of Onset    Hypertension Mother     Thyroid Disease Mother     Alcoholism Mother     Heart Disease Father     Alcoholism Father     Diabetes Sister     Alzheimer Disease Maternal Grandmother     Heart Disease Maternal Grandfather     Cerebrovascular Disease Paternal Grandmother     C.A.D. No family hx of        Objective:     /82   Pulse 73   Temp 98.2  F (36.8  C)   Resp 15   Ht 1.511 m (4' 11.5\")   Wt 50.4 kg (111 lb 1.6 oz)   LMP  (LMP Unknown)   SpO2 96%   BMI 22.06 kg/m      Patient is alert, in no obvious distress.   Skin: Warm, dry.    Lungs:  Clear to auscultation. Respirations even and " unlabored.  No wheezing or rales noted.   Heart:  Regular rate and rhythm.  No murmurs, S3, S4, gallops, or rubs.    Musculoskeletal: No edema is present in bilateral lower extremities.

## 2023-10-23 NOTE — TELEPHONE ENCOUNTER
I sent a prescription to the pharmacy.  She will use the nasal spray to replace cigarettes.  Then, she will gradually wean off of the nasal spray.  Thanks.

## 2023-10-23 NOTE — COMMUNITY RESOURCES LIST (ENGLISH)
10/23/2023   [facility]  N/A  For questions about this resource list or additional care needs, please contact your primary care clinic or care manager.  Phone: [phone]   Email: N/A   Address: [address] [place] [id number]   Hours: N/A        Financial Stability       Rent and mortgage payment assistance  1  [facility] Distance: 1.5 miles      [address] [place] [address]  Language: English, Sierra Leonean  Hours: Mon 9:30 AM - 11:30 AM , Tue 1:30 PM - 7:30 PM , Thu 9:00 AM - 7:00 PM , Fri 9:30 AM - 11:30 AM   Phone: (991) 329-9051 Email: info@[employer] Website: http://[employer]/     2  [facility] Distance: 4.36 miles      In-Person, Phone/[address] [address]  Language: English, [place]an  Hours: Mon - Thu 8:00 AM - 4:00 PM  Fees: Free   Phone: (980) 688-1183 Email: center@[facility] Website: https://www.[facility]s.[employer]          Food and Nutrition       Food pantry  3  [facility] - Food Shelf Distance: 1.5 miles      [facility]   [address] [place] [address]  Language: English, [place] Leonean  Hours: Mon 9:30 AM - 11:30 AM , Wed 9:30 AM - 11:30 AM , Thu 1:30 PM - 6:30 PM , Fri 9:30 AM - 11:30 AM  Fees: Free   Phone: (291) 346-1903 Email: info@[employer] Website: http://[employer]/     4  [facility] Distance: 2.82 miles      In-Person, Pickup   [address] [place] [address]  Language: English  Hours: Mon - Thu 9:00 AM - 10:00 AM  Fees: Free   Phone: (837) 165-9390 Email: communication@[employer].org Website: http://www.[facility].org     SNAP application assistance  5  [facility] Economic Support - [place] Distance: 2.89 miles      In-Person, Phone/Virtual   [address], [place] [address]  Language: English  Hours: Mon - Fri 8:00 AM - 4:30 PM  Fees: Free   Phone: (812)  338-5325 Email: jamey@[facility]. Website: https://www.[facility]/787/Economic-Support     6  [facility] - Economic Support - [place] Distance: 9.25 miles      In-Person, Phone/Virtual   [address] [place] [address]  Language: English  Hours: Mon - Fri 8:00 AM - 4:30 PM  Fees: Free   Phone: (736) 416-9458 Email: jamey@[facility] Website: https://www.[facility]/787/Economic-Support     Soup kitchen or free meals  7  [facility] - Family snf - Thursday Night Community Meal Distance: 4.36 miles      In-Person   [address] [place] [address]  Language: English, Macedonian  Hours: Thu 6:00 PM - 7:00 PM  Fees: Free   Phone: (809) 325-2966 Email: center@[facility].org Website: https://www.[facility]s.org     8  [facility] - Homeless Outreach Services Team - Food Assistance Distance: 4.38 miles      Pickup   [address] [place] [address]  Language: English  Hours: Mon - Thu 9:30 AM - 3:30 PM  Fees: Free   Phone: (982) 390-7791 Email: office@[facility] Website: https://www.[facility]/community-resource-center/          Transportation       Free or low-cost transportation  9  [facility] Bus Loop - Free or low-cost transportation Distance: 8.43 miles      In-Person   [address] [place] [address]  Language: English  Hours: Mon - Fri 9:00 AM - 5:00 PM  Fees: Free   Phone: (169) 730-9460 Email: info@[employer] Website: https://www.[employer]/     10  [facility]  Office - [facility] - Gas vouchers and transportation assistance - Free or low-cost transportation Distance: 9.3 miles      In-Person, Phone/Virtual   [address] [place] [address]  Language: English  Hours: Mon - Fri 8:00 AM - 12:00 PM , Mon - Fri 1:00 PM - 4:00 PM  Fees: Free   Phone: (665) 123-1993 Email:  mo@[facility] Website: https://[facility].org/[facility]/social-services-office-[place]/     Transportation to medical appointments  11  [facility] Distance: 1.29 miles      In-Person   [address] [place] [address]  Language: English  Hours: Mon - Fri 9:00 AM - 4:00 PM  Fees: Free   Phone: (553) 912-1327 Email: contact@[facility] access hospital.[facility] Website: http://Critical access hospital.org     12  [facility] Distance: 12.1 miles      In-Person   [address] [address]  Language: English  Hours: Mon - Thu 6:00 AM - 6:00 PM , Fri 6:00 AM - 5:00 PM  Fees: Insurance, Self Pay   Phone: (514) 341-4820 Email: office@[employer] Website: https://www.[facility]/          Important Numbers & Websites       Emergency Services   911  [facility] Services   311  Poison Control   (957) 600-4696  Suicide Prevention Lifeline   (869) 317-8609 (TALK)  Child Abuse Hotline   (228) 342-7883 (4-A-Child)  Sexual Assault Hotline   (755) 387-8773 (HOPE)  National Runaway Safeline   (117) 423-4984 (RUNAWAY)  All-Options Talkline   (489) 837-2803  Substance Abuse Referral   (392) 467-7974 (HELP)

## 2023-10-24 DIAGNOSIS — E55.9 VITAMIN D DEFICIENCY: Primary | ICD-10-CM

## 2023-10-24 RX ORDER — ERGOCALCIFEROL 1.25 MG/1
50000 CAPSULE, LIQUID FILLED ORAL WEEKLY
Qty: 12 CAPSULE | Refills: 0 | Status: SHIPPED | OUTPATIENT
Start: 2023-10-24

## 2023-10-26 ENCOUNTER — PATIENT OUTREACH (OUTPATIENT)
Dept: NURSING | Facility: CLINIC | Age: 63
End: 2023-10-26
Payer: MEDICARE

## 2023-10-26 ASSESSMENT — ACTIVITIES OF DAILY LIVING (ADL): DEPENDENT_IADLS:: INDEPENDENT

## 2023-10-26 NOTE — PROGRESS NOTES
Clinic Care Coordination Contact  Clinic Care Coordination Contact  OUTREACH    Referral Information:  Referral Source: PCP    Primary Diagnosis: Psychosocial    Chief Complaint   Patient presents with    Clinic Care Coordination - Initial        Universal Utilization: appropriate  Clinic Utilization  Difficulty keeping appointments:: No  Compliance Concerns: No  No-Show Concerns: No  No PCP office visit in Past Year: No  Utilization      No Show Count (past year)  0             ED Visits  0             Hospital Admissions  1                    Current as of: 10/26/2023  4:55 AM                Clinical Concerns:  Current Medical Concerns:  63 year old, smoker, trying to quit using nasal spray.  Heart issues.  Ongoing cough.      Current Behavioral Concerns: looking forward to working with therapist to address her desires to be more independent.      Education Provided to patient: reviewed role of care coordination.  Resources for transportation, dental and eye clinics, [facility], housing support.    Pain  Pain (GOAL):: No  Health Maintenance Reviewed: Due/Overdue   Health Maintenance Due   Topic Date Due    NICOTINE/TOBACCO CESSATION COUNSELING Q 1 YR  Never done    DIABETIC FOOT EXAM  Never done    ASTHMA ACTION PLAN  Never done    ADVANCE CARE PLANNING  Never done    DEPRESSION ACTION PLAN  Never done    EYE EXAM  Never done    COVID-19 Vaccine (1) Never done    COLORECTAL CANCER SCREENING  Never done    MEDICARE ANNUAL WELLNESS VISIT  Never done    ZOSTER IMMUNIZATION (1 of 2) Never done    Pneumococcal Vaccine: Pediatrics (0 to 5 Years) and At-Risk Patients (6 to 64 Years) (2 - PCV) 12/16/2012    MAMMO SCREENING  11/05/2016    DTAP/TDAP/TD IMMUNIZATION (4 - Td or Tdap) 11/12/2018    RSV VACCINE 60+ (1 - 1-dose 60+ series) Never done    INFLUENZA VACCINE (1) 09/01/2023       Clinical Pathway: None    Medication Management:  Medication review status: Medications reviewed and no changes reported per patient.         No concerns, taking as prescribed.      Functional Status:  Dependent ADLs:: Independent, Ambulation-cane  Dependent IADLs:: Independent  Bed or wheelchair confined:: No  Mobility Status: Independent w/Device  Fallen 2 or more times in the past year?: No  Any fall with injury in the past year?: No    Living Situation:  Current living arrangement:: I live in a private home with family  Type of residence:: Town home    Lifestyle & Psychosocial Needs:    Social Determinants of Health     Food Insecurity: High Risk (10/23/2023)    Food Insecurity     Within the past 12 months, did you worry that your food would run out before you got money to buy more?: Yes     Within the past 12 months, did the food you bought just not last and you didn t have money to get more?: Yes   Depression: At risk (7/11/2023)    PHQ-2     PHQ-2 Score: 3   Housing Stability: High Risk (10/23/2023)    Housing Stability     Do you have housing? : Yes     Are you worried about losing your housing?: Yes   Tobacco Use: High Risk (10/23/2023)    Patient History     Smoking Tobacco Use: Every Day     Smokeless Tobacco Use: Never     Passive Exposure: Current   Financial Resource Strain: Low Risk  (10/23/2023)    Financial Resource Strain     Within the past 12 months, have you or your family members you live with been unable to get utilities (heat, electricity) when it was really needed?: No   Alcohol Use: Not on file   Transportation Needs: High Risk (10/23/2023)    Transportation Needs     Within the past 12 months, has lack of transportation kept you from medical appointments, getting your medicines, non-medical meetings or appointments, work, or from getting things that you need?: Yes   Physical Activity: Not on file   Interpersonal Safety: Low Risk  (10/23/2023)    Interpersonal Safety     Do you feel physically and emotionally safe where you currently live?: Yes     Within the past 12 months, have you been hit, slapped, kicked or otherwise  physically hurt by someone?: No     Within the past 12 months, have you been humiliated or emotionally abused in other ways by your partner or ex-partner?: No   Stress: Not on file   Social Connections: Not on file     Diet:: Other (vegan)  Inadequate nutrition (GOAL):: No  Tube Feeding: No  Inadequate activity/exercise (GOAL):: No  Significant changes in sleep pattern (GOAL): No  Transportation means:: Family, Regular car     Congregation or spiritual beliefs that impact treatment:: No  Mental health DX:: Yes  Mental health DX how managed:: Medication, Outpatient Counseling  Mental health management concern (GOAL):: No  Chemical Dependency Status: No Current Concerns  Informal Support system:: Family      Lives with her mom in her mom's town home. Her father  a year ago and patient was his hospice caregiver. Her mom is independent, but patient helps with some tasks like painting the deck.  Patient's sister is her mom's best friend.  She has been on CADI waiver in the past, but doesn't want to get onto the program again.  She did use medical rides in the past and will call [facility] to see if that would work for her as she needs rides to her appts.  She worked with a  in the past, but doesn't know what happened to it. She wants to find her own apartment.  She gets a small pension, and income from Social Security, and a small amount of SNAP.  Her mom does the grocery shopping and buys things that patient would prefer to not eat.  She prefers fresh produce. Is interested in [facility] and application was completed.  Patient doesn't do well with computers applications, etc.  She would like her care plan mailed to her.  Has upcoming appt with therapist and is pleased with that. Wants to focus on health, but is going to be careful to not over schedule.       Resources and Interventions:  Current Resources:      Community Resources: [facility]  Supplies Currently Used at Home: Diabetic  Supplies  Equipment Currently Used at Home: cane, michael  Employment Status: disabled         Advance Care Plan/Directive  Advanced Care Plans/Directives on file:: No  Discussed with patient/caregiver:: Other (Comment) (has HCD and will complete)    Referrals Placed: Transportation, Other (housing support, [facility], dental)         Care Plan:  Care Plan: Transportation       Problem: Lack of transportation       Goal: Establish reliable transportation       Start Date: 10/26/2023 Expected End Date: 3/30/2024    Priority: High    Note:     Barriers: none noted.  Strengths: wants to be independent.   Patient expressed understanding of goal: yes  Action steps to achieve this goal:  1. I will call [employer] to set up rides.   2. I will work with [employer] to answer any concerns or questions.   3. I will report progress towards this goal at scheduled outreach telephone calls from the CCC team.                                Care Plan: Food Insecurity       Problem: Unable to prepare meals               Problem: Reliable food source       Long-Range Goal: Establish Options for Affordable Food Sources       Start Date: 10/26/2023 Expected End Date: 10/25/2024    Priority: High    Note:     Barriers: getting to options for food, mom buys prepared foods, financial concerns.   Strengths: motivated to eat healthy foods.   Patient expressed understanding of goal: yes  Action steps to achieve this goal:  1. I will review [employer] [facility] packet when it arrives at my home.  2. I will utilize [facility]/[employer] [facility] All as I can.   3. I will report progress towards this goal at scheduled outreach telephone calls from the CCC team.                                Care Plan: Health Maintenance       Problem: Health Maintenance Due or Overdue       Long-Range Goal: Become up-to-date with health maintenance visit(s)       Start Date: 10/26/2023 Expected End Date: 10/25/2024    Priority: Medium    Note:     Barriers: some trouble with completing tasks due  to ADHD.   Strengths: motivated.  Patient expressed understanding of goal: yes  Action steps to achieve this goal:  1. I will set up eye and dental exams.  2. I will attend appointments as scheduled.   3. I will report progress towards this goal at scheduled outreach telephone calls from the [facility] team.                                  Care Plan: General       Problem: HP GENERAL PROBLEM       Long-Range Goal: I will be independent.       Start Date: 10/26/2023 Expected End Date: 10/25/2024    Priority: High    Note:     Barriers: financial concerns, waiting lists for housing.   Strengths: motivated.   Patient expressed understanding of goal: yes  Action steps to achieve this goal:  1. I will work with housing support specialists to get subsidized housing.   2. I will complete any forms needed to get on waiting lists.   3. I will report progress towards this goal at scheduled outreach telephone calls from the CCC team.                                    Patient/Caregiver understanding: enrolled in care coordination.     Called [facility] to see if they could accept referral for housing support.     Outreach Frequency: monthly, more frequently as needed  Future Appointments                In 1 week Juan J Jacome LGSW [facility], [facility]    In 1 week JN HCC REMOTE DEVICE CHECK FROM HOME [facility], [facility]    In 2 weeks MPNU EMG TECH [facility] Neurology [facility], [facility]    In 3 weeks Juan J Jacome LGSW [facility] & Addiction [facility], [facility]    In 1 month Jere Garrido MD [facility], [facility]    In 7 months Kathy Velez PA-C [facility], [facility]            Plan: [facility] SW will continue to monitor, support patient with current goals and will be available to assist as needs arise. [facility] CHW will reach out to  patient on a monthly basis to discuss progression of goals.      [facility] SW will perform Chart Review in 45 days.

## 2023-10-26 NOTE — LETTER
[facility]  Patient Centered Plan of Care  About Me:        Patient Name:  Kim Correa    YOB: 1960  Age:         63 year old   Kenyatta MRN:    [id number] Telephone Information:  Home Phone [phone]   Mobile [phone]       Address:  [address] [place] [address] Email address:  [email or web address]      Emergency Contact(s)    Name Relationship Lgl Grd Work Phone Home Phone Mobile Phone   1. ZAHRAA LAZO Father    [phone]   2. PING PINEDA Other   [phone]            Primary language:  English     needed? No   [employer]:  [phone] op. 1  Other communication barriers:Access to technology    Preferred Method of Communication:     Current living arrangement: I live in a private home with family    Mobility Status/ Medical Equipment: Independent w/Device        Health Maintenance  Health Maintenance Reviewed: Due/Overdue   Health Maintenance Due   Topic Date Due    NICOTINE/TOBACCO CESSATION COUNSELING Q 1 YR  Never done    DIABETIC FOOT EXAM  Never done    ASTHMA ACTION PLAN  Never done    ADVANCE CARE PLANNING  Never done    DEPRESSION ACTION PLAN  Never done    EYE EXAM  Never done    COVID-19 Vaccine (1) Never done    COLORECTAL CANCER SCREENING  Never done    MEDICARE ANNUAL WELLNESS VISIT  Never done    ZOSTER IMMUNIZATION (1 of 2) Never done    Pneumococcal Vaccine: Pediatrics (0 to 5 Years) and At-Risk Patients (6 to 64 Years) (2 - PCV) 12/16/2012    MAMMO SCREENING  11/05/2016    DTAP/TDAP/TD IMMUNIZATION (4 - Td or Tdap) 11/12/2018    RSV VACCINE 60+ (1 - 1-dose 60+ series) Never done    INFLUENZA VACCINE (1) 09/01/2023           My Access Plan  Medical Emergency 911   Primary Clinic Line [facility] [phone]   24 Hour Appointment Line [phone] or  [address] [place] [address] [phone]) (toll-free)   24 Hour Nurse Line [phone] (toll-free)   Preferred Urgent Care [facility]  [place], [phone]     Preferred Hospital [facility]  [phone]     Preferred Pharmacy [id number] - [place], [place] - [address]     Behavioral Health Crisis Line The National Suicide Prevention Lifeline at 1-285.818.3839 or Text/Call 538           My Care Team Members  Patient Care Team         Relationship Specialty Notifications Start End    Lorena Baeza APRN CNP PCP - General Family Practice  10/2/18     Phone: [phone] Fax: [phone]         [address] [place] [address]    Lorena Baeza APRN CNP Assigned PCP   7/15/23     Phone: [phone] Fax: [phone]         [address] [facility] [address]    Jere Garrido MD Assigned Heart and Vascular Provider   8/5/23     Phone: [phone] Fax: [phone] [address] [facility] [address] [facility] [address]    Rudolph Person MD MD Neurology  8/10/23     Phone: [phone] Fax: [phone]         [address] [facility] [address]    Rudolph Person MD Assigned Neuroscience Provider   8/19/23     Phone: [phone] Fax: [phone]         [address] [facility] [address]    Karon Cleary LSW Lead Care Coordinator Primary Care - [facility] Admissions 10/23/23     Phone: [phone]         Kathy Velez PA-C Physician Assistant Rheumatology  10/24/23     Phone: [phone] Fax: [phone] [address] [facility] [address]    Qing Lamar, MA Community Health Worker Primary Care - [facility] Admissions 10/26/23                 My Care Plans  Self Management and Treatment Plan    Care Plan  Care Plan: Transportation       Problem: Lack of transportation       Goal: Establish reliable transportation       Start Date: 10/26/2023 Expected End Date: 3/30/2024    Priority: High    Note:     Barriers: none noted.  Strengths: wants to be independent.   Patient expressed understanding of goal: yes  Action steps to achieve this goal:  1. I will call [employer] to set up  rides.   2. I will work with [employer] to answer any concerns or questions.   3. I will report progress towards this goal at scheduled outreach telephone calls from the CCC team.                                Care Plan: Food Insecurity       Problem: Unable to prepare meals               Problem: Reliable food source       Long-Range Goal: Establish Options for Affordable Food Sources       Start Date: 10/26/2023 Expected End Date: 10/25/2024    Priority: High    Note:     Barriers: getting to options for food, mom buys prepared foods, financial concerns.   Strengths: motivated to eat healthy foods.   Patient expressed understanding of goal: yes  Action steps to achieve this goal:  1. I will review [employer] [facility] packet when it arrives at my home.  2. I will utilize [employer] [facility] [facility] [employer] as I can.   3. I will report progress towards this goal at scheduled outreach telephone calls from the CCC team.                                Care Plan: Health Maintenance       Problem: Health Maintenance Due or Overdue       Long-Range Goal: Become up-to-date with health maintenance visit(s)       Start Date: 10/26/2023 Expected End Date: 10/25/2024    Priority: Medium    Note:     Barriers: some trouble with completing tasks due to ADHD.   Strengths: motivated.  Patient expressed understanding of goal: yes  Action steps to achieve this goal:  1. I will set up eye and dental exams.  2. I will attend appointments as scheduled.   3. I will report progress towards this goal at scheduled outreach telephone calls from the CCC team.                                  Care Plan: General       Problem: HP GENERAL PROBLEM       Long-Range Goal: I will be independent.       Start Date: 10/26/2023 Expected End Date: 10/25/2024    Priority: High    Note:     Barriers: financial concerns, waiting lists for housing.   Strengths: motivated.   Patient expressed understanding of goal: yes  Action steps to achieve this goal:  1. I will work with housing  support specialists to get subsidized housing.   2. I will complete any forms needed to get on waiting lists.   3. I will report progress towards this goal at scheduled outreach telephone calls from the CCC team.                                  Advance Care Plans/Directives:   Advanced Care Plan/Directives on file:   No    Discussed with patient/caregiver(s):   Other (Comment) (has HCD and will complete)             My Medical and Care Information  Problem List   Patient Active Problem List   Diagnosis    Sprain of lumbar region    Mild intermittent asthma with exacerbation    Esophageal reflux    Hyperlipidemia    Attention deficit disorder    Chronic rhinitis    Dizziness and giddiness    Adenomatous polyp of colon    Anxiety    Asthma    PTSD (post-traumatic stress disorder)    Coronary atherosclerosis    PAD (peripheral artery disease) (H24)    Statin intolerance    Type 2 diabetes mellitus without complication, without long-term current use of insulin (H)    ROSARIO (dyspnea on exertion)    Abnormal cardiovascular stress test    Status post coronary angiogram    Status post percutaneous transluminal coronary angioplasty    Angina at rest    Arm pain    Autoimmune disease (H24)    Bicuspid aortic valve    Bilateral hearing loss    Cardiac pacemaker in situ    Contusion of head    Dissociative disorder or reaction    Gastroparesis    General patient noncompliance    History of colonic polyps    Insomnia disorder related to known organic factor    Lipid disorder    Chronic back pain    Lower back pain    Major depressive disorder, recurrent episode, mild (H24)    Mouth pain    New daily persistent headache    Other allergy, other than to medicinal agents    Pacemaker battery depletion    Pain, dental    Recurrent aphthous ulcer    Somatic symptom disorder, persistent, moderate    Stomatitis and mucositis    Temporomandibular joint disorder    Tobacco dependence syndrome    Type 2 diabetes mellitus with diabetic  "peripheral angiopathy without gangrene, without long-term current use of insulin (H)    Upper respiratory infection    Vasovagal syncope    Vitamin B deficiency    Vitamin D deficiency      Current Medications and Allergies:    Current Outpatient Medications   Medication Instructions    aspirin 81 mg, Oral, DAILY, Start tomorrow.    blood glucose monitoring (CONTOUR NEXT MONITOR W/DEVICE KIT) meter device kit 1 each, Does not apply    clopidogrel (PLAVIX) 75 mg, Oral, DAILY    cyanocobalamin (CYANOCOBALAMIN) 1,000 mcg, Intramuscular, EVERY 7 DAYS    EPINEPHrine (ANY BX GENERIC EQUIV) 0.3 MG/0.3ML injection 2-pack Inject 0.3 mL (0.3 mg) intramuscularly once as needed for up to 1 dose.*    FLOVENT  MCG/ACT inhaler INHALE 1 PUFF BY MOUTH TWO TIMES DAILY    insulin syringe-needle U-100 (30G X 1/2\" 1 ML) 30G X 1/2\" 1 ML miscellaneous Use 1 syringes daily or as directed. For b12    nicotine (NICOTROL) 10 MG/ML SOLN inhalation solution 1 spray, Nasal, EVERY 1 HOUR PRN    nitroGLYcerin (NITROLINGUAL) 0.4 MG/SPRAY spray PLACE 1 SPRAY (0.4 MG) UNDER TONGUE EVERY 5 MINUTES AS NEEDED FOR CHEST PAIN FOR UP TO 3 DOSES. CALL 911 IF NO RELIEF AFTER FIRST SPRAY*    rosuvastatin (CRESTOR) 40 mg, Oral, DAILY    traZODone (DESYREL)  mg, Oral, AT BEDTIME    VENTOLIN  (90 Base) MCG/ACT inhaler INHALE 1-2 PUFFS BY MOUTH EVERY 4 HOURS AS NEEDED FOR WHEEZING.*    vitamin B-12 (CYANOCOBALAMIN) 1,000 mcg, Oral    vitamin D2 (ERGOCALCIFEROL) 50,000 Units, Oral, WEEKLY         Care Coordination Start Date: 10/23/2023   Frequency of Care Coordination: monthly, more frequently as needed     Form Last Updated: 10/26/2023       "

## 2023-10-26 NOTE — LETTER
[facility] CARE COORDINATION  [employer]  October 26, 2023    Kim Correa  [address] [place] [id number]      Dear Kim,    I am a clinic care coordinator who works with JANA Thomson CNP with the [facility]. I wanted to thank you for spending the time to talk with me.  Below is a description of clinic care coordination and how I can further assist you.       The clinic care coordination team is made up of a registered nurse, , financial resource worker and community health worker who understand the health care system. The goal of clinic care coordination is to help you manage your health and improve access to the health care system. Our team works alongside your provider to assist you in determining your health and social needs. We can help you obtain health care and community resources, providing you with necessary information and education. We can work with you through any barriers and develop a care plan that helps coordinate and strengthen the communication between you and your care team.  Our services are voluntary and are offered without charge to you personally.    Please feel free to contact me with any questions or concerns regarding care coordination and what we can offer.      We are focused on providing you with the highest-quality healthcare experience possible.    Sincerely,     Karon Cleary,   [facility]  [phone]      Enclosed: I have enclosed a copy of the Patient Centered Plan of Care. This has helpful information and goals that we have talked about. Please keep this in an easy to access place to use as needed.

## 2023-11-03 ENCOUNTER — VIRTUAL VISIT (OUTPATIENT)
Dept: BEHAVIORAL HEALTH | Facility: CLINIC | Age: 63
End: 2023-11-03
Attending: NURSE PRACTITIONER
Payer: MEDICARE

## 2023-11-03 DIAGNOSIS — F41.9 ANXIETY: ICD-10-CM

## 2023-11-03 DIAGNOSIS — F41.1 GAD (GENERALIZED ANXIETY DISORDER): Primary | ICD-10-CM

## 2023-11-03 PROCEDURE — 90837 PSYTX W PT 60 MINUTES: CPT | Mod: 95

## 2023-11-03 ASSESSMENT — PATIENT HEALTH QUESTIONNAIRE - PHQ9
5. POOR APPETITE OR OVEREATING: SEVERAL DAYS
SUM OF ALL RESPONSES TO PHQ QUESTIONS 1-9: 11

## 2023-11-03 ASSESSMENT — COLUMBIA-SUICIDE SEVERITY RATING SCALE - C-SSRS
2. HAVE YOU ACTUALLY HAD ANY THOUGHTS OF KILLING YOURSELF?: NO
REASONS FOR IDEATION LIFETIME: MOSTLY TO END OR STOP THE PAIN (YOU COULDN'T GO ON LIVING WITH THE PAIN OR HOW YOU WERE FEELING)
TOTAL  NUMBER OF ACTUAL ATTEMPTS LIFETIME: 1
3. HAVE YOU BEEN THINKING ABOUT HOW YOU MIGHT KILL YOURSELF?: YES
5. HAVE YOU STARTED TO WORK OUT OR WORKED OUT THE DETAILS OF HOW TO KILL YOURSELF? DO YOU INTEND TO CARRY OUT THIS PLAN?: YES
4. HAVE YOU HAD THESE THOUGHTS AND HAD SOME INTENTION OF ACTING ON THEM?: NO
4. HAVE YOU HAD THESE THOUGHTS AND HAD SOME INTENTION OF ACTING ON THEM?: YES
2. HAVE YOU ACTUALLY HAD ANY THOUGHTS OF KILLING YOURSELF?: YES
ATTEMPT PAST THREE MONTHS: NO
1. IN THE PAST MONTH, HAVE YOU WISHED YOU WERE DEAD OR WISHED YOU COULD GO TO SLEEP AND NOT WAKE UP?: NO
5. HAVE YOU STARTED TO WORK OUT OR WORKED OUT THE DETAILS OF HOW TO KILL YOURSELF? DO YOU INTEND TO CARRY OUT THIS PLAN?: NO
ATTEMPT LIFETIME: YES
1. HAVE YOU WISHED YOU WERE DEAD OR WISHED YOU COULD GO TO SLEEP AND NOT WAKE UP?: YES

## 2023-11-03 ASSESSMENT — ANXIETY QUESTIONNAIRES
2. NOT BEING ABLE TO STOP OR CONTROL WORRYING: NOT AT ALL
GAD7 TOTAL SCORE: 9
6. BECOMING EASILY ANNOYED OR IRRITABLE: SEVERAL DAYS
GAD7 TOTAL SCORE: 9
5. BEING SO RESTLESS THAT IT IS HARD TO SIT STILL: NEARLY EVERY DAY
3. WORRYING TOO MUCH ABOUT DIFFERENT THINGS: SEVERAL DAYS
1. FEELING NERVOUS, ANXIOUS, OR ON EDGE: NEARLY EVERY DAY
7. FEELING AFRAID AS IF SOMETHING AWFUL MIGHT HAPPEN: NOT AT ALL

## 2023-11-03 NOTE — PROGRESS NOTES
"    [facility]   Mental Health & Addiction Services     Progress Note - Initial Visit    Patient  Name:  Kim Correa Date: 11/3/2023         Service Type: Individual     Visit Start Time: 1pm  Visit End Time: 1:55pm    Visit #: 1    Attendees: Client attended alone    Service Modality:  Phone Visit:      Provider verified identity through the following two step process.  Patient provided:  Patient  and Patient address    Telephone Visit: The patient's condition can be safely assessed and treated via synchronous audio telemedicine encounter.      Reason for Audio Telemedicine Visit: Patient has requested telehealth visit    Originating Site (Patient Location): Patient's home    Distant Site (Provider Location): Provider Remote Setting- Home Office    Consent:  The patient/guardian has verbally consented to:     1. The potential risks and benefits of telemedicine (telephone visit) versus in person care;    The patient has been notified of the following:      \"We have found that certain health care needs can be provided without the need for a face to face visit.  This service lets us provide the care you need with a phone conversation.       I will have full access to your [facility] medical record during this entire phone call.   I will be taking notes for your medical record.      Since this is like an office visit, we will bill your insurance company for this service.       There are potential benefits and risks of telephone visits (e.g. limits to patient confidentiality) that differ from in-person visits.?Confidentiality still applies for telephone services, and nobody will record the visit.  It is important to be in a quiet, private space that is free of distractions (including cell phone or other devices) during the visit.??      If during the course of the call I believe a telephone visit is not appropriate, you will not be charged for this service\"     Consent has been obtained " for this service by care team member: Yes        DATA:  Extended Session (53+ minutes): PROLONGED SERVICE IN THE OUTPATIENT SETTING REQUIRING DIRECT (FACE-TO-FACE) PATIENT CONTACT BEYOND THE USUAL SERVICE:    - Patient's presenting concerns require more intensive intervention than could be completed within the usual service  Interactive Complexity: No  Crisis: No      Presenting Concerns/  Current Stressors:   Today, patient and therapist started the diagnostic assessment, but were unable to complete due to time constraints.  Therapist assessed for risk and safety - patient denied any current SI/SIB.  Safety plan created due to previous suicide attempt in 2015.  Patient and therapist will continue with the DA during next session.  Patient will utilize safety plan as needed.  Should there be an increase in frequency or severity of symptoms related to SI/SIB, patient will call [phone] or go to local ED for evaluation.        ASSESSMENT:  Mental Status Assessment:  Appearance:   NA   Eye Contact:   NA   Psychomotor Behavior: NA   Attitude:   Cooperative  Friendly  Orientation:   All  Speech   Rate / Production: Normal/ Responsive   Volume:  Normal   Mood:    Normal  Affect:    Appropriate   Thought Content:  Clear   Thought Form:  Coherent  Tangential   Insight:    Good       Safety Issues and Plan for Safety and Risk Management:   [place] Suicide Severity Rating Scale (Lifetime/Recent)      11/3/2023     2:00 PM   [place] Suicide Severity Rating (Lifetime/Recent)   Q1 Wish to be Dead (Lifetime) Y   1. Wish to be Dead (Past 1 Month) N   Q2 Non-Specific Active Suicidal Thoughts (Lifetime) Y   2. Non-Specific Active Suicidal Thoughts (Past 1 Month) N   3. Active Suicidal Ideation with any Methods (Not Plan) Without Intent to Act (Lifetime) Y   Q3 Active Suicidal Ideation with any Methods (Not Plan) Without Intent to Act (Past 1 Month) N   Q4 Active Suicidal Ideation with Some Intent to Act, Without Specific Plan  "(Lifetime) Y   4. Active Suicidal Ideation with Some Intent to Act, Without Specific Plan (Past 1 Month) N   Q5 Active Suicidal Ideation with Specific Plan and Intent (Lifetime) Y   Active Suicidal Ideation with Specific Plan and Intent Description (Lifetime) Patient reports suicide attempt in 2015.  Patient overdosed on medication and alcohol.  Patient also slit her writsts.  Patient reports that her son brought her to the hospital where she was admitted to an inpatient unit.  Since this attempt, patient reports that she has \"changed my mind\" about suicidal thoughts and has not experienced SI/SIB since.  Patient contracted for safety and reports no current safety concerns related to SI/SIB.   5. Active Suicidal Ideation with Specific Plan and Intent (Past 1 Month) N   Most Severe Ideation Rating (Lifetime) 5   Frequency (Lifetime) 3   Duration (Lifetime) 3   Controllability (Lifetime) 4   Deterrents (Lifetime) 4   Reasons for Ideation (Lifetime) 4   Actual Attempt (Lifetime) Y   Total Number of Actual Attempts (Lifetime) 1   Actual Attempt Description (Lifetime) 2015 patient attempted suicide by overdosing on pills / alcohol as well as cutting her wrists.   Actual Attempt (Past 3 Months) N   Has subject engaged in non-suicidal self-injurious behavior? (Lifetime) N   Calculated C-SSRS Risk Score (Lifetime/Recent) Moderate Risk     Patient denies current fears or concerns for personal safety.  Patient denies current or recent suicidal ideation or behaviors.  Patient denies current or recent homicidal ideation or behaviors.  Patient denies current or recent self injurious behavior or ideation.  Patient denies other safety concerns.  A safety and risk management plan has been developed including: Patient consented to co-developed safety plan.  Safety and risk management plan was completed - see below.  Patient agreed to use safety plan should any safety concerns arise.  A copy was given to the patient.  Patient reports " there are firearms in the house. The firearms are secured in a locked space.     Diagnostic Criteria:  Generalized Anxiety Disorder  A. Excessive anxiety and worry about a number of events or activities (such as work or school performance).   B. The person finds it difficult to control the worry.  C. Select 3 or more symptoms (required for diagnosis). Only one item is required in children.   - Restlessness or feeling keyed up or on edge.    - Being easily fatigued.    - Difficulty concentrating or mind going blank.    - Irritability.    - Muscle tension.   D. The focus of the anxiety and worry is not confined to features of an Axis I disorder.  E. The anxiety, worry, or physical symptoms cause clinically significant distress or impairment in social, occupational, or other important areas of functioning.   F. The disturbance is not due to the direct physiological effects of a substance (e.g., a drug of abuse, a medication) or a general medical condition (e.g., hyperthyroidism) and does not occur exclusively during a Mood Disorder, a Psychotic Disorder, or a Pervasive Developmental Disorder.      DSM5 Diagnoses: (Sustained by DSM5 Criteria Listed Above)  Diagnoses: 300.02 (F41.1) Generalized Anxiety Disorder, R/O MDD, PTSD  Psychosocial & Contextual Factors: currently lives with her mother, her father passed away last September, hx of therapy, multiple health related concerns    Intervention:   Completed through review of safety issues and safety interventions and Completed Safety plan  Collateral Reports Completed:  Not Applicable      PLAN: (Homework, other):  1. Provider will continue Diagnostic Assessment.  Patient was given the following to do until next session:  think about goals of therapy    2. Provider recommended the following referrals: none.      3.  Suicide Risk and Safety Concerns were assessed for Kim Correa.    Patient meets the following risk assessment and triage:   Moderate Risk is identified  when the patient has one of the following:  Suicidal behavior more than three months ago ( C-SSRS Suicidal Behavior Lifetime)    The following has been recommended:  Complete/Review/Update Safety Plan    Safety Plan:  Adult Short Safety Plan:   Name: Kim Correa  YOB: 1960  Date: November 3, 2023   My primary care provider: Lorena Baeza  My prescriber: Lorena Baeza     My Triggers:  medical health, thinking about trauma, relational challenges     Additional People, Places, and Things that I can access for support: My cat, Jose Eduardo LEVIN    Good Control  1. I feel good  2. No suicidal thoughts   3. Can work, sleep and play      Action Steps  1. Self-care: balanced meals, exercising, sleep practices, etc.  2. Take your medications as prescribed.  3. Continue meetings with therapist and prescriber.  4.  Do the healthy things that I enjoy.             YELLOW  Getting Worse  I have ANY of these:  1. I do not feel good  2. Difficulty Concentrating  3. Sleep is changing  4. Increase/Change in my thoughts to hurt self and/or others, but I can still manage and not act on it.   5. Not taking care of self.               Action Steps (in addition to the above):  1. Inform your therapist and psychiatric prescriber/PCP.  2. Keep taking your medications as prescribed.    3. Turn to people you can ask for help.  4. Use internal coping strategies -see below.  5. Create safe environment: keep firearms in locked safe, lock and limit medications and notify friends/family of increase in symptoms             RED  Get Help  If I have ANY of these:  1. Current and uncontrollable thoughts and/or behaviors to hurt self and/or others.      Actions to manage my safety  1. Contact your emergency person Evelyn Aguilar (Daughter)   [phone]   2. Call or Text [phone]  3. Call my crisis team- [facility] [phone]  3. Or Call [phone] or go to the emergency room right away          My Internal Coping Strategies  include the following:  color, chew gum, play with my pet, and use my coping skills    [End for Brief Safety Plan]     Safety Concerns  How To Identify Situations That Make Your Mental Health Worse:  Triggers are things that make your mental health worse.  Look at the list below to help you find your triggers and what you can do about them.     1. Identify Early Warning Signs:    Sometimes symptoms return, even when people do their best to stay well. Symptoms can develop over a short period of time with little or no warning, but most of the time they emerge gradually over several weeks.  Early warning signs are changes that people experience when a relapse is starting. Some early warning signs are common and others are not as common.   Common Early Warning Signs:    Feeling like not being around other people and Urges to harm self     2. Identify action steps to take when warning signs are noticed:    Taking Action- It is important to take action if you are experiencing early warning signs of a relapse.  The faster you act, the more likely it is that you can avoid a full relapse.  It is helpful to identify several specific ways to cope with symptoms.      The following is my list of symptoms and coping strategies that I can use when they are present:    Symptom Coping Strategies   Anxiety -Talk with someone in your support system and let him or her know how you are feeling.  -Use relaxation techniques such as deep breathing or imagery.  -Use positive affirmations to counteract negative self-talk such as  I am learning to let go of worry.    Depression - Schedule your day; include activities you have to do and activities you enjoy doing.  - Get some exercise - walk, run, bike, or swim.  - Give yourself credit for even the smallest things you get done.   Sleep Difficulties   - Go to sleep at the same time every day.  - Do something relaxing before bed, such as drinking herbal tea or listening to music.  - Avoid having  discussions about upsetting topics before going to bed.   Delusions   - Distract yourself from the disturbing thought by doing something that requires your attention such as a puzzle.  - Check out your beliefs by talking to someone you trust.    Hallucinations   - Use headphones to listen to music.  - Tell voices to  stop  or say to yourself,  I am safe.   - Ignore the hallucinations as much as possible; focus on other things.   Concentration Difficulties - Minimize distractions so there is only one thing for you to focus on at a time.    - Ask the person you are having a conversation with to slow down or repeat things you are unsure of.               KRYSTAL Menendez  November 3, 2023    This note has been reviewed and I agree with the plan of care. This note is co-signed by TANYA Pina, [facility] Hospital[facility], Supervisor, on: 11/3/2023

## 2023-11-09 ENCOUNTER — OFFICE VISIT (OUTPATIENT)
Dept: NEUROLOGY | Facility: CLINIC | Age: 63
End: 2023-11-09
Attending: PSYCHIATRY & NEUROLOGY
Payer: MEDICARE

## 2023-11-09 DIAGNOSIS — R29.898 BILATERAL LEG WEAKNESS: ICD-10-CM

## 2023-11-09 DIAGNOSIS — G62.9 NEUROPATHY: ICD-10-CM

## 2023-11-09 DIAGNOSIS — E53.8 LOW SERUM VITAMIN B12: ICD-10-CM

## 2023-11-09 DIAGNOSIS — G25.0 ESSENTIAL TREMOR: ICD-10-CM

## 2023-11-09 DIAGNOSIS — R26.81 UNSTEADY GAIT: ICD-10-CM

## 2023-11-09 PROCEDURE — 95886 MUSC TEST DONE W/N TEST COMP: CPT | Mod: RT | Performed by: PSYCHIATRY & NEUROLOGY

## 2023-11-09 PROCEDURE — 95910 NRV CNDJ TEST 7-8 STUDIES: CPT | Performed by: PSYCHIATRY & NEUROLOGY

## 2023-11-09 NOTE — LETTER
11/9/2023         [facility]: [facility]  [address] [facility] [facility] [address]        Dear Colleague,    Thank you for referring your patient, Kim Correa, to the [facility] [place]. Please see a copy of my visit note below.    See procedure note for EMG report      Again, thank you for allowing me to participate in the care of your patient.        Sincerely,        Alonso Rayo MD

## 2023-11-09 NOTE — PROCEDURES
ELECTROMYOGRAPHY (EMG) REPORT       [facility] NEUROLOGY [place] [address] [place] [address]  Tel: [phone]  Fax: [phone]  [email or web address]     Kim Correa,  1960, MRN [id number]  PCP: Lorena Baeza  Date: 2023     Principal Diagnosis:    Unsteady gait  Low serum vitamin B12  Neuropathy  Bilateral leg weakness  Essential tremor     Height: 4 feet 11 inch  Reason for referral: Evaluate bilateral lowers. c/o walking difficulty, pain, tingling, weakness in both legs/feet > 2 years.  Right = Left. Diabetic > 5 years.      Motor NCS      Nerve / Sites Lat Amp Dist Dov    ms mV cm m/s   R Peroneal - EDB      Ankle 4.27 2.3 8       Fib head 9.95 2.3 26 46      Pop fossa 12.34 2.3 11 46   L Peroneal - EDB      Ankle 3.91 3.4 8       Fib head 9.84 3.4 27 45      Pop fossa 12.29 3.4 11 45   R Tibial - AH      Ankle 4.84 8.1 8       Pop fossa 11.98 7.6 34 48   L Tibial - AH      Ankle 5.26 7.4 8       Pop fossa 12.45 6.8 34 47       F  Wave      Nerve Fmin    ms   R Tibial - AH 48.80   L Tibial - AH 48.39       Sensory NCS      Nerve / Sites Onset Lat Pk Lat Amp.2-3 Dist Dov    ms ms  V cm m/s   R Sural - Ankle (Calf)      Calf 2.97 3.75 18.8 14 47   L Sural - Ankle (Calf)      Calf 2.92 3.49 13.7 14 48   R Superficial peroneal - Ankle      Lat leg 2.86 3.44 25.8 12 42   L Superficial peroneal - Ankle      Lat leg 2.60 3.44 13.9 12 46       EMG Summary Table     Spontaneous MUAP Rcmt Note   Muscle Fib PSW Fasc IA # Amp Dur PPP Rate Type   R. Gluteus medius None None None N N N N N N N   R. Gluteus ajay 1+ 1+ None N N N N N N N   R. L3 paraspinal None None None N N N N N N N   R. L4 paraspinal None None None N N N N N N N   R. L5 paraspinal None None None N N N N N N N   R. S1 paraspinal 1+ 1+ None N N N N N N N   R. Biceps femoris (short head) 1+ 1+ None N N N N N N N   R. Adductor dexter None None None N N N N N N N   R. Quadriceps None None None N N N N N N N   R.  Tibialis anterior 1+ 1+ None N N N N N N N   R. Peroneus longus 1+ 1+ None N N N N N N N   R. Gastrocnemius (Medial head) 1+ 1+ None N N N N N N N   R. Gastrocnemius (Lateral head) 1+ 1+ None N N N N N N N   R. Tibialis posterior 1+ 1+ None N N N N N N N   L. Adductor dexter None None None N N N N N N N   L. Quadriceps None None None N N N N N N N   L. Tibialis anterior 1+ 1+ None N N N N N N N   L. Peroneus longus 1+ 1+ None N N N N N N N   L. Gastrocnemius (Medial head) 1+ 1+ None N N N N N N N   L. Gastrocnemius (Lateral head) 1+ 1+ None N N N N N N N   L. Tibialis posterior 1+ 1+ None N N N N N N N        Summary: Nerve conduction and EMG study of bilateral lower extremities shows:  Normal bilateral peroneal and tibial distal motor latencies, amplitudes and conduction velocities.  Normal bilateral tibial F latencies  Normal bilateral sural and superficial peroneal SNAP  Needle exam showed changes as above    Impression:   This is a abnormal nerve conduction and EMG study of bilateral lower extremities that suggests bilateral multilevel radiculopathy involving bilateral L5 and S1 nerve root.  Clinical correlation is recommended.      Alonso Rayo MD  [facility]  (Formerly, [facility]      This note was dictated using voice recognition software.  Any grammatical or context distortions are unintentional and inherent to the software.

## 2023-11-13 ENCOUNTER — ANCILLARY PROCEDURE (OUTPATIENT)
Dept: CARDIOLOGY | Facility: CLINIC | Age: 63
End: 2023-11-13
Attending: INTERNAL MEDICINE
Payer: MEDICARE

## 2023-11-13 DIAGNOSIS — I49.5 SICK SINUS SYNDROME (H): ICD-10-CM

## 2023-11-13 DIAGNOSIS — Z95.0 PACEMAKER: ICD-10-CM

## 2023-11-13 PROCEDURE — 93294 REM INTERROG EVL PM/LDLS PM: CPT | Performed by: INTERNAL MEDICINE

## 2023-11-13 PROCEDURE — 93296 REM INTERROG EVL PM/IDS: CPT | Performed by: INTERNAL MEDICINE

## 2023-11-14 LAB
MDC_IDC_LEAD_CONNECTION_STATUS: NORMAL
MDC_IDC_LEAD_CONNECTION_STATUS: NORMAL
MDC_IDC_LEAD_IMPLANT_DT: NORMAL
MDC_IDC_LEAD_IMPLANT_DT: NORMAL
MDC_IDC_LEAD_LOCATION: NORMAL
MDC_IDC_LEAD_LOCATION: NORMAL
MDC_IDC_LEAD_LOCATION_DETAIL_1: NORMAL
MDC_IDC_LEAD_LOCATION_DETAIL_1: NORMAL
MDC_IDC_LEAD_MFG: NORMAL
MDC_IDC_LEAD_MFG: NORMAL
MDC_IDC_LEAD_MODEL: NORMAL
MDC_IDC_LEAD_MODEL: NORMAL
MDC_IDC_LEAD_POLARITY_TYPE: NORMAL
MDC_IDC_LEAD_POLARITY_TYPE: NORMAL
MDC_IDC_LEAD_SERIAL: NORMAL
MDC_IDC_LEAD_SERIAL: NORMAL
MDC_IDC_MSMT_BATTERY_DTM: NORMAL
MDC_IDC_MSMT_BATTERY_REMAINING_LONGEVITY: 109 MO
MDC_IDC_MSMT_BATTERY_RRT_TRIGGER: 2.62
MDC_IDC_MSMT_BATTERY_STATUS: NORMAL
MDC_IDC_MSMT_BATTERY_VOLTAGE: 3 V
MDC_IDC_MSMT_LEADCHNL_RA_IMPEDANCE_VALUE: 513 OHM
MDC_IDC_MSMT_LEADCHNL_RA_IMPEDANCE_VALUE: 551 OHM
MDC_IDC_MSMT_LEADCHNL_RA_PACING_THRESHOLD_AMPLITUDE: 0.62 V
MDC_IDC_MSMT_LEADCHNL_RA_PACING_THRESHOLD_PULSEWIDTH: 0.4 MS
MDC_IDC_MSMT_LEADCHNL_RA_SENSING_INTR_AMPL: 2 MV
MDC_IDC_MSMT_LEADCHNL_RA_SENSING_INTR_AMPL: 2 MV
MDC_IDC_MSMT_LEADCHNL_RV_IMPEDANCE_VALUE: 342 OHM
MDC_IDC_MSMT_LEADCHNL_RV_IMPEDANCE_VALUE: 475 OHM
MDC_IDC_MSMT_LEADCHNL_RV_PACING_THRESHOLD_AMPLITUDE: 2.5 V
MDC_IDC_MSMT_LEADCHNL_RV_PACING_THRESHOLD_PULSEWIDTH: 0.4 MS
MDC_IDC_MSMT_LEADCHNL_RV_SENSING_INTR_AMPL: 2.62 MV
MDC_IDC_MSMT_LEADCHNL_RV_SENSING_INTR_AMPL: 2.62 MV
MDC_IDC_PG_IMPLANT_DTM: NORMAL
MDC_IDC_PG_MFG: NORMAL
MDC_IDC_PG_MODEL: NORMAL
MDC_IDC_PG_SERIAL: NORMAL
MDC_IDC_PG_TYPE: NORMAL
MDC_IDC_SESS_CLINIC_NAME: NORMAL
MDC_IDC_SESS_DTM: NORMAL
MDC_IDC_SESS_TYPE: NORMAL
MDC_IDC_SET_BRADY_AT_MODE_SWITCH_RATE: 171 {BEATS}/MIN
MDC_IDC_SET_BRADY_HYSTRATE: NORMAL
MDC_IDC_SET_BRADY_LOWRATE: 60 {BEATS}/MIN
MDC_IDC_SET_BRADY_MAX_SENSOR_RATE: 130 {BEATS}/MIN
MDC_IDC_SET_BRADY_MAX_TRACKING_RATE: 130 {BEATS}/MIN
MDC_IDC_SET_BRADY_MODE: NORMAL
MDC_IDC_SET_BRADY_PAV_DELAY_LOW: 300 MS
MDC_IDC_SET_BRADY_SAV_DELAY_LOW: 250 MS
MDC_IDC_SET_LEADCHNL_RA_PACING_AMPLITUDE: 1.5 V
MDC_IDC_SET_LEADCHNL_RA_PACING_ANODE_ELECTRODE_1: NORMAL
MDC_IDC_SET_LEADCHNL_RA_PACING_ANODE_LOCATION_1: NORMAL
MDC_IDC_SET_LEADCHNL_RA_PACING_CAPTURE_MODE: NORMAL
MDC_IDC_SET_LEADCHNL_RA_PACING_CATHODE_ELECTRODE_1: NORMAL
MDC_IDC_SET_LEADCHNL_RA_PACING_CATHODE_LOCATION_1: NORMAL
MDC_IDC_SET_LEADCHNL_RA_PACING_POLARITY: NORMAL
MDC_IDC_SET_LEADCHNL_RA_PACING_PULSEWIDTH: 0.4 MS
MDC_IDC_SET_LEADCHNL_RA_SENSING_ANODE_ELECTRODE_1: NORMAL
MDC_IDC_SET_LEADCHNL_RA_SENSING_ANODE_LOCATION_1: NORMAL
MDC_IDC_SET_LEADCHNL_RA_SENSING_CATHODE_ELECTRODE_1: NORMAL
MDC_IDC_SET_LEADCHNL_RA_SENSING_CATHODE_LOCATION_1: NORMAL
MDC_IDC_SET_LEADCHNL_RA_SENSING_POLARITY: NORMAL
MDC_IDC_SET_LEADCHNL_RA_SENSING_SENSITIVITY: 0.3 MV
MDC_IDC_SET_LEADCHNL_RV_PACING_AMPLITUDE: 3.5 V
MDC_IDC_SET_LEADCHNL_RV_PACING_ANODE_ELECTRODE_1: NORMAL
MDC_IDC_SET_LEADCHNL_RV_PACING_ANODE_LOCATION_1: NORMAL
MDC_IDC_SET_LEADCHNL_RV_PACING_CAPTURE_MODE: NORMAL
MDC_IDC_SET_LEADCHNL_RV_PACING_CATHODE_ELECTRODE_1: NORMAL
MDC_IDC_SET_LEADCHNL_RV_PACING_CATHODE_LOCATION_1: NORMAL
MDC_IDC_SET_LEADCHNL_RV_PACING_POLARITY: NORMAL
MDC_IDC_SET_LEADCHNL_RV_PACING_PULSEWIDTH: 1 MS
MDC_IDC_SET_LEADCHNL_RV_SENSING_ANODE_ELECTRODE_1: NORMAL
MDC_IDC_SET_LEADCHNL_RV_SENSING_ANODE_LOCATION_1: NORMAL
MDC_IDC_SET_LEADCHNL_RV_SENSING_CATHODE_ELECTRODE_1: NORMAL
MDC_IDC_SET_LEADCHNL_RV_SENSING_CATHODE_LOCATION_1: NORMAL
MDC_IDC_SET_LEADCHNL_RV_SENSING_POLARITY: NORMAL
MDC_IDC_SET_LEADCHNL_RV_SENSING_SENSITIVITY: 0.9 MV
MDC_IDC_SET_ZONE_DETECTION_INTERVAL: 350 MS
MDC_IDC_SET_ZONE_DETECTION_INTERVAL: 400 MS
MDC_IDC_SET_ZONE_STATUS: NORMAL
MDC_IDC_SET_ZONE_STATUS: NORMAL
MDC_IDC_SET_ZONE_TYPE: NORMAL
MDC_IDC_SET_ZONE_VENDOR_TYPE: NORMAL
MDC_IDC_STAT_AT_BURDEN_PERCENT: 0 %
MDC_IDC_STAT_AT_DTM_END: NORMAL
MDC_IDC_STAT_AT_DTM_START: NORMAL
MDC_IDC_STAT_BRADY_AP_VP_PERCENT: 0.52 %
MDC_IDC_STAT_BRADY_AP_VS_PERCENT: 80.67 %
MDC_IDC_STAT_BRADY_AS_VP_PERCENT: 0.01 %
MDC_IDC_STAT_BRADY_AS_VS_PERCENT: 18.8 %
MDC_IDC_STAT_BRADY_DTM_END: NORMAL
MDC_IDC_STAT_BRADY_DTM_START: NORMAL
MDC_IDC_STAT_BRADY_RA_PERCENT_PACED: 81.21 %
MDC_IDC_STAT_BRADY_RV_PERCENT_PACED: 0.53 %
MDC_IDC_STAT_EPISODE_RECENT_COUNT: 0
MDC_IDC_STAT_EPISODE_RECENT_COUNT_DTM_END: NORMAL
MDC_IDC_STAT_EPISODE_RECENT_COUNT_DTM_START: NORMAL
MDC_IDC_STAT_EPISODE_TOTAL_COUNT: 0
MDC_IDC_STAT_EPISODE_TOTAL_COUNT: 5
MDC_IDC_STAT_EPISODE_TOTAL_COUNT: 5
MDC_IDC_STAT_EPISODE_TOTAL_COUNT_DTM_END: NORMAL
MDC_IDC_STAT_EPISODE_TOTAL_COUNT_DTM_START: NORMAL
MDC_IDC_STAT_EPISODE_TYPE: NORMAL
MDC_IDC_STAT_TACHYTHERAPY_RECENT_DTM_END: NORMAL
MDC_IDC_STAT_TACHYTHERAPY_RECENT_DTM_START: NORMAL
MDC_IDC_STAT_TACHYTHERAPY_TOTAL_DTM_END: NORMAL
MDC_IDC_STAT_TACHYTHERAPY_TOTAL_DTM_START: NORMAL

## 2023-11-22 ENCOUNTER — PATIENT OUTREACH (OUTPATIENT)
Dept: CARE COORDINATION | Facility: CLINIC | Age: 63
End: 2023-11-22
Payer: MEDICARE

## 2023-11-26 ENCOUNTER — HEALTH MAINTENANCE LETTER (OUTPATIENT)
Age: 63
End: 2023-11-26

## 2023-11-28 ENCOUNTER — PATIENT OUTREACH (OUTPATIENT)
Dept: CARE COORDINATION | Facility: CLINIC | Age: 63
End: 2023-11-28
Payer: MEDICARE

## 2023-11-28 NOTE — PROGRESS NOTES
Clinic Care Coordination Contact  Community Health Worker Follow Up    Care Gaps:     Health Maintenance Due   Topic Date Due    NICOTINE/TOBACCO CESSATION COUNSELING Q 1 YR  Never done    DIABETIC FOOT EXAM  Never done    ASTHMA ACTION PLAN  Never done    ADVANCE CARE PLANNING  Never done    DEPRESSION ACTION PLAN  Never done    EYE EXAM  Never done    COVID-19 Vaccine (1) Never done    COLORECTAL CANCER SCREENING  Never done    MEDICARE ANNUAL WELLNESS VISIT  Never done    ZOSTER IMMUNIZATION (1 of 2) Never done    Pneumococcal Vaccine: Pediatrics (0 to 5 Years) and At-Risk Patients (6 to 64 Years) (2 - PCV) 12/16/2012    MAMMO SCREENING  11/05/2016    DTAP/TDAP/TD IMMUNIZATION (4 - Td or Tdap) 11/12/2018    RSV VACCINE (Pregnancy & 60+) (1 - 1-dose 60+ series) Never done    INFLUENZA VACCINE (1) 09/01/2023       Postponed to next PCP appointment.     Care Plan:   Care Plan: Transportation       Problem: Lack of transportation       Goal: Establish reliable transportation       Start Date: 10/26/2023 Expected End Date: 3/30/2024    Priority: High    Note:     Barriers: none noted.  Strengths: wants to be independent.   Patient expressed understanding of goal: yes  Action steps to achieve this goal:  1. I will call [employer] to set up rides.   2. I will work with [employer] to answer any concerns or questions.   3. I will report progress towards this goal at scheduled outreach telephone calls from the CCC team.                                Care Plan: Food Insecurity       Problem: Unable to prepare meals               Problem: Reliable food source       Long-Range Goal: Establish Options for Affordable Food Sources       Start Date: 10/26/2023 Expected End Date: 10/25/2024    Priority: High    Note:     Barriers: getting to options for food, mom buys prepared foods, financial concerns.   Strengths: motivated to eat healthy foods.   Patient expressed understanding of goal: yes  Action steps to achieve this goal:  1. I will  review [employer] packet when it arrives at my home.  2. I will utilize [employer] as I can.   3. I will report progress towards this goal at scheduled outreach telephone calls from the CCC team.                                Care Plan: Health Maintenance       Problem: Health Maintenance Due or Overdue       Long-Range Goal: Become up-to-date with health maintenance visit(s)       Start Date: 10/26/2023 Expected End Date: 10/25/2024    Priority: Medium    Note:     Barriers: some trouble with completing tasks due to ADHD.   Strengths: motivated.  Patient expressed understanding of goal: yes  Action steps to achieve this goal:  1. I will set up eye and dental exams.  2. I will attend appointments as scheduled.   3. I will report progress towards this goal at scheduled outreach telephone calls from the CCC team.                                  Care Plan: General       Problem: HP GENERAL PROBLEM       Long-Range Goal: I will be independent.       Start Date: 10/26/2023 Expected End Date: 10/25/2024    Priority: High    Note:     Barriers: financial concerns, waiting lists for housing.   Strengths: motivated.   Patient expressed understanding of goal: yes  Action steps to achieve this goal:  1. I will work with housing support specialists to get subsidized housing.   2. I will complete any forms needed to get on waiting lists.   3. I will report progress towards this goal at scheduled outreach telephone calls from the CCC team.                                    Intervention and Education during outreach: CHW gave patient contact information and encouraged patient to reach out with any questions or concerns.    CHW Note:  CHW contacted patient to review/update CCC goals.  Patient shared she has had a difficult few weeks and stated she did not want to discuss at this time.   Patient requested CHW call back at a later date to review/update CCC goals.  CHW encouraged patient to reach out to [facility] if she is in  need of additional support or resources. CHW will update CCC goals at next scheduled outreach.    CHW Plan: CHW will continue to support patient with goals through routine scheduled outreach. CHW will outreach in one month on 12/28/23.      Qing Lamar  Community Health Worker   [facility]  Clinic Care Coordination  [facility]   [email or web address]  Office: [phone]

## 2023-12-05 ENCOUNTER — VIRTUAL VISIT (OUTPATIENT)
Dept: PSYCHOLOGY | Facility: CLINIC | Age: 63
End: 2023-12-05
Payer: MEDICARE

## 2023-12-05 DIAGNOSIS — F43.22 ADJUSTMENT DISORDER WITH ANXIETY: Primary | ICD-10-CM

## 2023-12-05 PROCEDURE — 90834 PSYTX W PT 45 MINUTES: CPT | Mod: 95 | Performed by: SOCIAL WORKER

## 2023-12-05 NOTE — PROGRESS NOTES
"    [facility]                                     Progress Note    Patient Name: Kim Correa  Date: 12/5/2023         Service Type: Individual      Session Start Time: 1004  Session End Time: 1052     Session Length: 38-52    Session #: 1 transfer DA not completed    Attendees: Client    Service Modality:  Phone Visit:      Provider verified identity through the following two step process.  Patient provided:  Patient was verified at admission/transfer    Telephone Visit: The patient's condition can be safely assessed and treated via synchronous audio telemedicine encounter.      Reason for Audio Telemedicine Visit: Patient has requested telehealth visit    Originating Site (Patient Location): Patient's home    Distant Site (Provider Location): Provider Remote Setting- Home Office    Consent:  The patient/guardian has verbally consented to:     1. The potential risks and benefits of telemedicine (telephone visit) versus in person care;    The patient has been notified of the following:      \"We have found that certain health care needs can be provided without the need for a face to face visit.  This service lets us provide the care you need with a phone conversation.       I will have full access to your [facility] medical record during this entire phone call.   I will be taking notes for your medical record.      Since this is like an office visit, we will bill your insurance company for this service.       There are potential benefits and risks of telephone visits (e.g. limits to patient confidentiality) that differ from in-person visits.?Confidentiality still applies for telephone services, and nobody will record the visit.  It is important to be in a quiet, private space that is free of distractions (including cell phone or other devices) during the visit.??      If during the course of the call I believe a telephone visit is not appropriate, you will not be charged for this " "service\"     Consent has been obtained for this service by care team member: Yes     DATA  Interactive Complexity: No  Crisis: No        Progress Since Last Session (Related to Symptoms / Goals / Homework):   Symptoms: Worsening per patient     Homework:  first meeting      Episode of Care Goals: No improvement - CONTEMPLATION (Considering change and yet undecided); Intervened by assessing the negative and positive thinking (ambivalence) about behavior change     Current / Ongoing Stressors and Concerns:   Health, living situation, money, frustrations, trauma     Treatment Objective(s) Addressed in This Session:     Need to finish DA     Intervention:   Motivational Interviewing    MI Intervention: Co-Developed Goal: accept help as it comes, Expressed Empathy/Understanding, Supported Autonomy, Collaboration, Evocation, and Permission to raise concern or advise     Change Talk Expressed by the Patient: Ability to change Reasons to change    Provider Response to Change Talk: R - Reflected patient's change talk    Assessments completed prior to visit:  The following assessments were completed by patient for this visit:  PHQ9:       7/11/2023     4:02 PM 11/3/2023    12:57 PM   PHQ-9 SCORE   PHQ-9 Total Score MyChart 1 (Minimal depression)    PHQ-9 Total Score 1 11     GAD7:       11/3/2023    12:57 PM   ALBIN-7 SCORE   Total Score 9     PROMIS 10-Global Health (all questions and answers displayed):       11/3/2023    12:57 PM   PROMIS 10   In general, would you say your health is: 2   In general, would you say your quality of life is: 2   In general, how would you rate your physical health? 1   In general, how would you rate your mental health, including your mood and your ability to think? 4   In general, how would you rate your satisfaction with your social activities and relationships? 1   In general, please rate how well you carry out your usual social activities and roles. (This includes activities at home, at work " "and in your community, and responsibilities as a parent, child, spouse, employee, friend, etc.) 2   To what extent are you able to carry out your everyday physical activities such as walking, climbing stairs, carrying groceries, or moving a chair? 2   In the past 7 days, how often have you been bothered by emotional problems such as feeling anxious, depressed, or irritable? 4   In the past 7 days, how would you rate your fatigue on average? 3   In the past 7 days, how would you rate your pain on average, where 0 means no pain, and 10 means worst imaginable pain? 7   Global Mental Health Score 9   Global Physical Health Score 8   PROMIS TOTAL - SUBSCORES 17     [place] Suicide Severity Rating Scale (Lifetime/Recent)      11/3/2023     2:00 PM   [place] Suicide Severity Rating (Lifetime/Recent)   Q1 Wish to be Dead (Lifetime) Y   1. Wish to be Dead (Past 1 Month) N   Q2 Non-Specific Active Suicidal Thoughts (Lifetime) Y   2. Non-Specific Active Suicidal Thoughts (Past 1 Month) N   3. Active Suicidal Ideation with any Methods (Not Plan) Without Intent to Act (Lifetime) Y   Q3 Active Suicidal Ideation with any Methods (Not Plan) Without Intent to Act (Past 1 Month) N   Q4 Active Suicidal Ideation with Some Intent to Act, Without Specific Plan (Lifetime) Y   4. Active Suicidal Ideation with Some Intent to Act, Without Specific Plan (Past 1 Month) N   Q5 Active Suicidal Ideation with Specific Plan and Intent (Lifetime) Y   Active Suicidal Ideation with Specific Plan and Intent Description (Lifetime) Patient reports suicide attempt in 2015.  Patient overdosed on medication and alcohol.  Patient also slit her writsts.  Patient reports that her son brought her to the hospital where she was admitted to an inpatient unit.  Since this attempt, patient reports that she has \"changed my mind\" about suicidal thoughts and has not experienced SI/SIB since.  Patient contracted for safety and reports no current safety concerns " related to SI/SIB.   5. Active Suicidal Ideation with Specific Plan and Intent (Past 1 Month) N   Most Severe Ideation Rating (Lifetime) 5   Frequency (Lifetime) 3   Duration (Lifetime) 3   Controllability (Lifetime) 4   Deterrents (Lifetime) 4   Reasons for Ideation (Lifetime) 4   Actual Attempt (Lifetime) Y   Total Number of Actual Attempts (Lifetime) 1   Actual Attempt Description (Lifetime) 2015 patient attempted suicide by overdosing on pills / alcohol as well as cutting her wrists.   Actual Attempt (Past 3 Months) N   Has subject engaged in non-suicidal self-injurious behavior? (Lifetime) N   Calculated C-SSRS Risk Score (Lifetime/Recent) Moderate Risk         ASSESSMENT: Current Emotional / Mental Status (status of significant symptoms):   Risk status (Self / Other harm or suicidal ideation)   Patient denies current fears or concerns for personal safety.   Patient denies current or recent suicidal ideation or behaviors.   Patient denies current or recent homicidal ideation or behaviors.   Patient denies current or recent self injurious behavior or ideation.   Patient denies other safety concerns.   Patient reports there has been no change in risk factors since their last session.     Patient reports there has been no change in protective factors since their last session.     A safety and risk management plan has been developed including: Patient consented to co-developed safety plan on 11/3/2023 with previous therapist.  Safety and risk management plan was reviewed.   Patient agreed to use safety plan should any safety concerns arise.  A copy was made available to the patient.     Appearance:   Appropriate  phone session    Eye Contact:   phone session    Psychomotor Behavior: Normal  phone session    Attitude:   Guarded  Indifferent   Orientation:   All   Speech    Rate / Production: Emotional Talkative    Volume:  Normal    Mood:    Angry  Anxious  Sad  Grieving   Affect:    Labile  Worrisome    Thought  Content:  Clear    Thought Form:  Coherent    Insight:    Impaired     Medication Review:   No changes to current psychiatric medication(s)     Medication Compliance:   Yes     Changes in Health Issues:   Yes: chronic health issues      Chemical Use Review:   Substance Use: Chemical use reviewed, no active concerns identified      Tobacco Use: No change in amount of tobacco use since last session.  Not reassessed at this time    Diagnosis:  1. Adjustment disorder with anxiety        Collateral Reports Completed:   Not Applicable    PLAN: (Patient Tasks / Therapist Tasks / Other)  To continue DA and assess what type of level of care would patient benefit from        Libby MICHELEMedardo Lala, [facility]  12/5/2023       Safety Plan:  Adult Short Safety Plan:   Name: Kim Correa  YOB: 1960  Date: November 3, 2023   My primary care provider: Lorena Baeza  My prescriber: Lorena Baeza       My Triggers:  medical health, thinking about trauma, relational challenges       Additional People, Places, and Things that I can access for support: My cat, Jose Eduardo                      GREEN     Good Control  1. I feel good  2. No suicidal thoughts   3. Can work, sleep and play        Action Steps  1. Self-care: balanced meals, exercising, sleep practices, etc.  2. Take your medications as prescribed.  3. Continue meetings with therapist and prescriber.  4.  Do the healthy things that I enjoy.                YELLOW  Getting Worse  I have ANY of these:  1. I do not feel good  2. Difficulty Concentrating  3. Sleep is changing  4. Increase/Change in my thoughts to hurt self and/or others, but I can still manage and not act on it.   5. Not taking care of self.                Action Steps (in addition to the above):  1. Inform your therapist and psychiatric prescriber/PCP.  2. Keep taking your medications as prescribed.    3. Turn to people you can ask for help.  4. Use internal coping strategies -see below.  5. Create safe  environment: keep firearms in locked safe, lock and limit medications and notify friends/family of increase in symptoms                RED  Get Help  If I have ANY of these:  1. Current and uncontrollable thoughts and/or behaviors to hurt self and/or others.        Actions to manage my safety  1. Contact your emergency person Evelyn Aguilar ([place])   [phone]   2. Call or Text [phone]  3. Call my crisis team- [facility] [phone]  3. Or Call 911 or go to the emergency room right away            My Internal Coping Strategies include the following:  color, chew gum, play with my pet, and use my coping skills     [End for Brief Safety Plan]      Safety Concerns  How To Identify Situations That Make Your Mental Health Worse:  Triggers are things that make your mental health worse.  Look at the list below to help you find your triggers and what you can do about them.      1. Identify Early Warning Signs:     Sometimes symptoms return, even when people do their best to stay well. Symptoms can develop over a short period of time with little or no warning, but most of the time they emerge gradually over several weeks.  Early warning signs are changes that people experience when a relapse is starting. Some early warning signs are common and others are not as common.   Common Early Warning Signs:    Feeling like not being around other people and Urges to harm self          2. Identify action steps to take when warning signs are noticed:     Taking Action- It is important to take action if you are experiencing early warning signs of a relapse.  The faster you act, the more likely it is that you can avoid a full relapse.  It is helpful to identify several specific ways to cope with symptoms.       The following is my list of symptoms and coping strategies that I can use when they are present:     Symptom Coping Strategies   Anxiety -Talk with someone in your support system and let him or her know how you are  feeling.  -Use relaxation techniques such as deep breathing or imagery.  -Use positive affirmations to counteract negative self-talk such as  I am learning to let go of worry.    Depression - Schedule your day; include activities you have to do and activities you enjoy doing.  - Get some exercise - walk, run, bike, or swim.  - Give yourself credit for even the smallest things you get done.   Sleep Difficulties    - Go to sleep at the same time every day.  - Do something relaxing before bed, such as drinking herbal tea or listening to music.  - Avoid having discussions about upsetting topics before going to bed.   Delusions    - Distract yourself from the disturbing thought by doing something that requires your attention such as a puzzle.  - Check out your beliefs by talking to someone you trust.    Hallucinations    - Use headphones to listen to music.  - Tell voices to  stop  or say to yourself,  I am safe.   - Ignore the hallucinations as much as possible; focus on other things.   Concentration Difficulties - Minimize distractions so there is only one thing for you to focus on at a time.    - Ask the person you are having a conversation with to slow down or repeat things you are unsure of.                              KRYSTAL Menendez             November 3, 2023     This note has been reviewed and I agree with the plan of care. This note is co-signed by TANYA Pina, [facility], Supervisor, on: 11/3/2023

## 2023-12-06 ENCOUNTER — PATIENT OUTREACH (OUTPATIENT)
Dept: CARE COORDINATION | Facility: CLINIC | Age: 63
End: 2023-12-06

## 2023-12-06 NOTE — PROGRESS NOTES
Clinic Care Coordination Contact  Care Coordination Clinician Chart Review    Situation: Patient chart reviewed by Care Coordinator.       Background: Care Coordination Program started: 10/23/2023. Initial assessment completed and patient-centered care plan(s) were developed with participation from patient. Lead CC handed patient off to CHW for continued outreaches.       Assessment: Per chart review, patient outreach completed by CC CHW on 11-28-23.  Patient is actively working to accomplish goal(s). Patient's goal(s) appropriate and relevant at this time. Patient is not due for updated Plan of Care.  Assessments will be completed annually or as needed/with change of patient status.      Care Plan: Transportation       Problem: Lack of transportation       Goal: Establish reliable transportation       Start Date: 10/26/2023 Expected End Date: 3/30/2024    Priority: High    Note:     Barriers: none noted.  Strengths: wants to be independent.   Patient expressed understanding of goal: yes  Action steps to achieve this goal:  1. I will call [employer] to set up rides.   2. I will work with [employer] to answer any concerns or questions.   3. I will report progress towards this goal at scheduled outreach telephone calls from the CCC team.                                Care Plan: Food Insecurity       Problem: Unable to prepare meals               Problem: Reliable food source       Long-Range Goal: Establish Options for Affordable Food Sources       Start Date: 10/26/2023 Expected End Date: 10/25/2024    Priority: High    Note:     Barriers: getting to options for food, mom buys prepared foods, financial concerns.   Strengths: motivated to eat healthy foods.   Patient expressed understanding of goal: yes  Action steps to achieve this goal:  1. I will review [employer] packet when it arrives at my home.  2. I will utilize [employer] as I can.   3. I will report progress towards this goal at scheduled outreach telephone  calls from the CCC team.                                Care Plan: Health Maintenance       Problem: Health Maintenance Due or Overdue       Long-Range Goal: Become up-to-date with health maintenance visit(s)       Start Date: 10/26/2023 Expected End Date: 10/25/2024    Priority: Medium    Note:     Barriers: some trouble with completing tasks due to ADHD.   Strengths: motivated.  Patient expressed understanding of goal: yes  Action steps to achieve this goal:  1. I will set up eye and dental exams.  2. I will attend appointments as scheduled.   3. I will report progress towards this goal at scheduled outreach telephone calls from the CCC team.                                  Care Plan: General       Problem: HP GENERAL PROBLEM       Long-Range Goal: I will be independent.       Start Date: 10/26/2023 Expected End Date: 10/25/2024    Priority: High    Note:     Barriers: financial concerns, waiting lists for housing.   Strengths: motivated.   Patient expressed understanding of goal: yes  Action steps to achieve this goal:  1. I will work with housing support specialists to get subsidized housing.   2. I will complete any forms needed to get on waiting lists.   3. I will report progress towards this goal at scheduled outreach telephone calls from the CCC team.                                       Plan/Recommendations: The patient will continue working with Care Coordination to achieve goal(s) as above. CHW will continue outreaches at minimum every 30 days and will involve Lead CC as needed or if patient is ready to move to Maintenance. Lead CC will continue to monitor CHW outreaches and patient's progress to goal(s) every 6 weeks.     Plan of Care updated and sent to patient: No, not due.    [place], [facility], for Karon Cleary, [facility] Care Coordination  [phone]

## 2023-12-28 ENCOUNTER — PATIENT OUTREACH (OUTPATIENT)
Dept: CARE COORDINATION | Facility: CLINIC | Age: 63
End: 2023-12-28
Payer: MEDICARE

## 2023-12-28 NOTE — PROGRESS NOTES
Clinic Care Coordination Contact  [facility]/Voicemail    Clinical Data: Care Coordinator Outreach    Outreach Documentation Number of Outreach Attempt   12/28/2023  11:51 AM 1       Left message on patient's voicemail with call back information and requested return call.    Plan:Care Coordinator will try to reach patient again in 10 business days or on 1/10/24.      Qing Lamar  Community Health Worker   [facility] Care Coordination  [facility]   [email or web address]  Office: [phone]

## 2024-01-07 ENCOUNTER — ANCILLARY PROCEDURE (OUTPATIENT)
Dept: CARDIOLOGY | Facility: CLINIC | Age: 64
End: 2024-01-07
Attending: INTERNAL MEDICINE
Payer: MEDICARE

## 2024-01-07 DIAGNOSIS — Z95.0 PACEMAKER: ICD-10-CM

## 2024-01-07 DIAGNOSIS — I49.5 SICK SINUS SYNDROME (H): ICD-10-CM

## 2024-01-08 ENCOUNTER — TELEPHONE (OUTPATIENT)
Dept: CARDIOLOGY | Facility: CLINIC | Age: 64
End: 2024-01-08
Payer: MEDICARE

## 2024-01-08 ENCOUNTER — PATIENT OUTREACH (OUTPATIENT)
Dept: CARE COORDINATION | Facility: CLINIC | Age: 64
End: 2024-01-08
Payer: MEDICARE

## 2024-01-08 LAB
MDC_IDC_EPISODE_DTM: NORMAL
MDC_IDC_EPISODE_DURATION: 8 S
MDC_IDC_EPISODE_ID: 11
MDC_IDC_EPISODE_TYPE: NORMAL
MDC_IDC_LEAD_CONNECTION_STATUS: NORMAL
MDC_IDC_LEAD_CONNECTION_STATUS: NORMAL
MDC_IDC_LEAD_IMPLANT_DT: NORMAL
MDC_IDC_LEAD_IMPLANT_DT: NORMAL
MDC_IDC_LEAD_LOCATION: NORMAL
MDC_IDC_LEAD_LOCATION: NORMAL
MDC_IDC_LEAD_LOCATION_DETAIL_1: NORMAL
MDC_IDC_LEAD_LOCATION_DETAIL_1: NORMAL
MDC_IDC_LEAD_MFG: NORMAL
MDC_IDC_LEAD_MFG: NORMAL
MDC_IDC_LEAD_MODEL: NORMAL
MDC_IDC_LEAD_MODEL: NORMAL
MDC_IDC_LEAD_POLARITY_TYPE: NORMAL
MDC_IDC_LEAD_POLARITY_TYPE: NORMAL
MDC_IDC_LEAD_SERIAL: NORMAL
MDC_IDC_LEAD_SERIAL: NORMAL
MDC_IDC_MSMT_BATTERY_DTM: NORMAL
MDC_IDC_MSMT_BATTERY_REMAINING_LONGEVITY: 107 MO
MDC_IDC_MSMT_BATTERY_RRT_TRIGGER: 2.62
MDC_IDC_MSMT_BATTERY_STATUS: NORMAL
MDC_IDC_MSMT_BATTERY_VOLTAGE: 3 V
MDC_IDC_MSMT_LEADCHNL_RA_IMPEDANCE_VALUE: 494 OHM
MDC_IDC_MSMT_LEADCHNL_RA_IMPEDANCE_VALUE: 532 OHM
MDC_IDC_MSMT_LEADCHNL_RA_PACING_THRESHOLD_AMPLITUDE: 0.62 V
MDC_IDC_MSMT_LEADCHNL_RA_PACING_THRESHOLD_PULSEWIDTH: 0.4 MS
MDC_IDC_MSMT_LEADCHNL_RA_SENSING_INTR_AMPL: 1.38 MV
MDC_IDC_MSMT_LEADCHNL_RA_SENSING_INTR_AMPL: 1.38 MV
MDC_IDC_MSMT_LEADCHNL_RV_IMPEDANCE_VALUE: 361 OHM
MDC_IDC_MSMT_LEADCHNL_RV_IMPEDANCE_VALUE: 494 OHM
MDC_IDC_MSMT_LEADCHNL_RV_PACING_THRESHOLD_AMPLITUDE: 2.5 V
MDC_IDC_MSMT_LEADCHNL_RV_PACING_THRESHOLD_PULSEWIDTH: 0.4 MS
MDC_IDC_MSMT_LEADCHNL_RV_SENSING_INTR_AMPL: 2 MV
MDC_IDC_MSMT_LEADCHNL_RV_SENSING_INTR_AMPL: 2 MV
MDC_IDC_PG_IMPLANT_DTM: NORMAL
MDC_IDC_PG_MFG: NORMAL
MDC_IDC_PG_MODEL: NORMAL
MDC_IDC_PG_SERIAL: NORMAL
MDC_IDC_PG_TYPE: NORMAL
MDC_IDC_SESS_CLINIC_NAME: NORMAL
MDC_IDC_SESS_DTM: NORMAL
MDC_IDC_SESS_TYPE: NORMAL
MDC_IDC_SET_BRADY_AT_MODE_SWITCH_RATE: 171 {BEATS}/MIN
MDC_IDC_SET_BRADY_HYSTRATE: NORMAL
MDC_IDC_SET_BRADY_LOWRATE: 60 {BEATS}/MIN
MDC_IDC_SET_BRADY_MAX_SENSOR_RATE: 130 {BEATS}/MIN
MDC_IDC_SET_BRADY_MAX_TRACKING_RATE: 130 {BEATS}/MIN
MDC_IDC_SET_BRADY_MODE: NORMAL
MDC_IDC_SET_BRADY_PAV_DELAY_LOW: 300 MS
MDC_IDC_SET_BRADY_SAV_DELAY_LOW: 250 MS
MDC_IDC_SET_LEADCHNL_RA_PACING_AMPLITUDE: 1.5 V
MDC_IDC_SET_LEADCHNL_RA_PACING_ANODE_ELECTRODE_1: NORMAL
MDC_IDC_SET_LEADCHNL_RA_PACING_ANODE_LOCATION_1: NORMAL
MDC_IDC_SET_LEADCHNL_RA_PACING_CAPTURE_MODE: NORMAL
MDC_IDC_SET_LEADCHNL_RA_PACING_CATHODE_ELECTRODE_1: NORMAL
MDC_IDC_SET_LEADCHNL_RA_PACING_CATHODE_LOCATION_1: NORMAL
MDC_IDC_SET_LEADCHNL_RA_PACING_POLARITY: NORMAL
MDC_IDC_SET_LEADCHNL_RA_PACING_PULSEWIDTH: 0.4 MS
MDC_IDC_SET_LEADCHNL_RA_SENSING_ANODE_ELECTRODE_1: NORMAL
MDC_IDC_SET_LEADCHNL_RA_SENSING_ANODE_LOCATION_1: NORMAL
MDC_IDC_SET_LEADCHNL_RA_SENSING_CATHODE_ELECTRODE_1: NORMAL
MDC_IDC_SET_LEADCHNL_RA_SENSING_CATHODE_LOCATION_1: NORMAL
MDC_IDC_SET_LEADCHNL_RA_SENSING_POLARITY: NORMAL
MDC_IDC_SET_LEADCHNL_RA_SENSING_SENSITIVITY: 0.3 MV
MDC_IDC_SET_LEADCHNL_RV_PACING_AMPLITUDE: 3.5 V
MDC_IDC_SET_LEADCHNL_RV_PACING_ANODE_ELECTRODE_1: NORMAL
MDC_IDC_SET_LEADCHNL_RV_PACING_ANODE_LOCATION_1: NORMAL
MDC_IDC_SET_LEADCHNL_RV_PACING_CAPTURE_MODE: NORMAL
MDC_IDC_SET_LEADCHNL_RV_PACING_CATHODE_ELECTRODE_1: NORMAL
MDC_IDC_SET_LEADCHNL_RV_PACING_CATHODE_LOCATION_1: NORMAL
MDC_IDC_SET_LEADCHNL_RV_PACING_POLARITY: NORMAL
MDC_IDC_SET_LEADCHNL_RV_PACING_PULSEWIDTH: 1 MS
MDC_IDC_SET_LEADCHNL_RV_SENSING_ANODE_ELECTRODE_1: NORMAL
MDC_IDC_SET_LEADCHNL_RV_SENSING_ANODE_LOCATION_1: NORMAL
MDC_IDC_SET_LEADCHNL_RV_SENSING_CATHODE_ELECTRODE_1: NORMAL
MDC_IDC_SET_LEADCHNL_RV_SENSING_CATHODE_LOCATION_1: NORMAL
MDC_IDC_SET_LEADCHNL_RV_SENSING_POLARITY: NORMAL
MDC_IDC_SET_LEADCHNL_RV_SENSING_SENSITIVITY: 0.9 MV
MDC_IDC_SET_ZONE_DETECTION_INTERVAL: 350 MS
MDC_IDC_SET_ZONE_DETECTION_INTERVAL: 400 MS
MDC_IDC_SET_ZONE_STATUS: NORMAL
MDC_IDC_SET_ZONE_STATUS: NORMAL
MDC_IDC_SET_ZONE_TYPE: NORMAL
MDC_IDC_SET_ZONE_VENDOR_TYPE: NORMAL
MDC_IDC_STAT_AT_BURDEN_PERCENT: 0 %
MDC_IDC_STAT_AT_DTM_END: NORMAL
MDC_IDC_STAT_AT_DTM_START: NORMAL
MDC_IDC_STAT_BRADY_AP_VP_PERCENT: 2.07 %
MDC_IDC_STAT_BRADY_AP_VS_PERCENT: 86.56 %
MDC_IDC_STAT_BRADY_AS_VP_PERCENT: 0.02 %
MDC_IDC_STAT_BRADY_AS_VS_PERCENT: 11.36 %
MDC_IDC_STAT_BRADY_DTM_END: NORMAL
MDC_IDC_STAT_BRADY_DTM_START: NORMAL
MDC_IDC_STAT_BRADY_RA_PERCENT_PACED: 88.66 %
MDC_IDC_STAT_BRADY_RV_PERCENT_PACED: 2.09 %
MDC_IDC_STAT_EPISODE_RECENT_COUNT: 0
MDC_IDC_STAT_EPISODE_RECENT_COUNT: 1
MDC_IDC_STAT_EPISODE_RECENT_COUNT_DTM_END: NORMAL
MDC_IDC_STAT_EPISODE_RECENT_COUNT_DTM_START: NORMAL
MDC_IDC_STAT_EPISODE_TOTAL_COUNT: 0
MDC_IDC_STAT_EPISODE_TOTAL_COUNT: 0
MDC_IDC_STAT_EPISODE_TOTAL_COUNT: 1
MDC_IDC_STAT_EPISODE_TOTAL_COUNT: 5
MDC_IDC_STAT_EPISODE_TOTAL_COUNT: 5
MDC_IDC_STAT_EPISODE_TOTAL_COUNT_DTM_END: NORMAL
MDC_IDC_STAT_EPISODE_TOTAL_COUNT_DTM_START: NORMAL
MDC_IDC_STAT_EPISODE_TYPE: NORMAL
MDC_IDC_STAT_TACHYTHERAPY_RECENT_DTM_END: NORMAL
MDC_IDC_STAT_TACHYTHERAPY_RECENT_DTM_START: NORMAL
MDC_IDC_STAT_TACHYTHERAPY_TOTAL_DTM_END: NORMAL
MDC_IDC_STAT_TACHYTHERAPY_TOTAL_DTM_START: NORMAL

## 2024-01-08 NOTE — PROGRESS NOTES
Clinic Care Coordination Contact  Follow Up Progress Note      Assessment: Patient was in [facility] and was discharged to home and will be getting a home visit from [facility] paramedic.  She just did her nebulizer and is not having any shortness of breath.  She is taking Lasix and took off 5-6 pounds in the hospital.  She hadn't been feeling well for several weeks and her mom took her to ED.  She did not realize how bad she was feeling.  She slept well last night.  She tried to get nicotine patches at the hospital pharmacy which told her it was not covered.  She has smoked since getting home, but not much.  Suggested she check with her usual pharmacy and her insurance should cover patches.  Gave her [employer] [place] phone number as well.  She thinks that her sister now realizes that she needs help and will drive her to appts. She is going to give her sister the phone number to schedule neurology, cardiology and primary care. Her mom is going to go with her to appts.  She did have therapy appts and will be setting them up again.  She had to cancel in December due to being sick.  She has not used [employer] yet, but will talk to family about getting food there.  Her sister also has CHF. No other concerns.     Care Gaps:    Health Maintenance Due   Topic Date Due    NICOTINE/TOBACCO CESSATION COUNSELING Q 1 YR  Never done    DIABETIC FOOT EXAM  Never done    ASTHMA ACTION PLAN  Never done    ADVANCE CARE PLANNING  Never done    DEPRESSION ACTION PLAN  Never done    EYE EXAM  Never done    COVID-19 Vaccine (1) Never done    COLORECTAL CANCER SCREENING  Never done    MEDICARE ANNUAL WELLNESS VISIT  Never done    ZOSTER IMMUNIZATION (1 of 2) Never done    Pneumococcal Vaccine: Pediatrics (0 to 5 Years) and At-Risk Patients (6 to 64 Years) (2 of 2 - PCV) 12/16/2012    MAMMO SCREENING  11/05/2016    DTAP/TDAP/TD IMMUNIZATION (4 - Td or Tdap) 11/12/2018    RSV VACCINE (Pregnancy & 60+) (1 - 1-dose 60+ series) Never  done    INFLUENZA VACCINE (1) 09/01/2023       Postponed to next pcp appt     Care Plans  Care Plan: Transportation       Problem: Lack of transportation       Goal: Establish reliable transportation       Start Date: 10/26/2023 Expected End Date: 3/30/2024    This Visit's Progress: 20%    Priority: High    Note:     Barriers: none noted.  Strengths: wants to be independent.   Patient expressed understanding of goal: yes  Action steps to achieve this goal:  1. I will call [employer] to set up rides.   2. I will work with [employer] to answer any concerns or questions.   3. I will report progress towards this goal at scheduled outreach telephone calls from the [facility] team.    1-8 Sister will drive her to [facility]                            Care Plan: Food Insecurity       Problem: Unable to prepare meals               Problem: Reliable food source       Long-Range Goal: Establish Options for Affordable Food Sources       Start Date: 10/26/2023 Expected End Date: 10/25/2024    This Visit's Progress: 10%    Priority: High    Note:     Barriers: getting to options for food, mom buys prepared foods, financial concerns.   Strengths: motivated to eat healthy foods.   Patient expressed understanding of goal: yes  Action steps to achieve this goal:  1. I will review [employer] packet when it arrives at my home.  2. I will utilize [employer] as I can.   3. I will report progress towards this goal at scheduled outreach telephone calls from the [facility] team.    1-8 will talk to family                            Care Plan: Health Maintenance       Problem: Health Maintenance Due or Overdue       Long-Range Goal: Become up-to-date with health maintenance visit(s)       Start Date: 10/26/2023 Expected End Date: 10/25/2024    This Visit's Progress: 10%    Priority: Medium    Note:     Barriers: some trouble with completing tasks due to ADHD.   Strengths: motivated.  Patient expressed understanding of goal: yes  Action steps to achieve this  goal:  1. I will set up eye and dental exams.  2. I will attend appointments as scheduled.   3. I will report progress towards this goal at scheduled outreach telephone calls from the [facility] team.                                  Care Plan: General       Problem: HP GENERAL PROBLEM       Long-Range Goal: I will be independent.       Start Date: 10/26/2023 Expected End Date: 10/25/2024    This Visit's Progress: 10%    Priority: High    Note:     Barriers: financial concerns, waiting lists for housing.   Strengths: motivated.   Patient expressed understanding of goal: yes  Action steps to achieve this goal:  1. I will work with housing support specialists to get subsidized housing.   2. I will complete any forms needed to get on waiting lists.   3. I will report progress towards this goal at scheduled outreach telephone calls from the [facility] team.                                    Intervention/Education provided during outreach: support.      Outreach Frequency: monthly, more frequently as needed    Plan:   [facility] SW will continue to monitor, support patient with current goals and will be available to assist as needs arise. [facility] CHW will reach out to patient on a monthly basis to discuss progression of goals.      [facility] SW will perform Chart Review in 45 days.       [facility]/Voicemail    Clinical Data: Care Coordinator Outreach    Outreach Documentation Number of Outreach Attempt   12/28/2023  11:51 AM 1   1/8/2024   9:24 AM 1       Left message on patient's voicemail with call back information and requested return call. Patient discharged from hospital      Plan:  Care Coordinator will try to reach patient again in 1-2 business days.    Karon Cleary,   [facility]  [phone]    Clinic Care Coordination Contact  [facility]: Post-Discharge Note  SITUATION                                                      Admission:    Admission Date: 01/05/24   Reason for Admission: SOB  Discharge:   Discharge  Date: 01/07/24  Discharge Diagnosis: CHF    BACKGROUND                                                      Per hospital discharge summary and inpatient provider notes:  1-5-2024 to 1-7-2024 at [facility]  SOB (shortness of breath) (Primary Dx);  Congestive heart failure, unspecified HF chronicity, unspecified heart failure type (HRC);  Acute on chronic diastolic heart failure (HRC)  Discharge Disposition: Home     ASSESSMENT      Enrollment  Outreach Frequency: monthly, more frequently as needed    Discharge Assessment  How are you doing now that you are home?: better, slept well.  How are your symptoms? (Red Flag symptoms escalate to triage hotline per guidelines): Improved  Do you feel your condition is stable enough to be safe at home until your provider visit?: Yes  Does the patient have their discharge instructions? : Yes  Does the patient have questions regarding their discharge instructions? : No  Were you started on any new medications or were there changes to any of your previous medications? : Yes  Does the patient have all of their medications?: No (see comment) (nicotine patch was not filled at pharmacy)  Do you have questions regarding any of your medications? : No  Do you have all of your needed medical supplies or equipment (DME)?  (i.e. oxygen tank, CPAP, cane, etc.): Yes  Discharge follow-up appointment scheduled within 14 calendar days? : No  Is patient agreeable to assistance with scheduling? : No (sister will call as she will drive her.)         Post-op (Clinicians Only)  Did the patient have surgery or a procedure: No  Fever: No  Chills: No  Eating & Drinking: eating and drinking without complaints/concerns  Bowel Function: normal  Urinary Status: voiding without complaint/concerns      PLAN                                                      Outpatient Plan:  Will schedule appts and attend.    Future Appointments   Date Time Provider Department Center   2/23/2024 12:00 AM MANUEL [facility]  DEVICE CHECK FROM HOME [facility]   5/23/2024 11:00 AM Kathy Velez PA-C [facility]         For any urgent concerns, please contact our 24 hour nurse triage line: [phone] ([phone])         KINGSLEY Krishnamurthy

## 2024-01-08 NOTE — TELEPHONE ENCOUNTER
Encounter Type: Alert remote pacemaker transmission for VT episode. Courtesy check.   Device: Medtronic Steffi.    Pacing % /Programmed: AP 88%,  2% at DDDR 60/130 ppm.   Lead(s): Stable.  Battery longevity: 8yrs, 11mo estimated.    Presenting: Sinus and AP-VS 90 bpm.   Atrial high rates: since 11/23/23; none detected.  Anticoagulant:  None.   Ventricular High rates: since 11/12/23; One ventricular high rate episode on 1/5/24 3:00PM, appears to be NSVT 20bts 175-180 bpm, duration 6 seconds.   Comments: Normal device function.   Plan: Routed to device RN for review. NEMESIO Hastings, Device Specialist    Brief NSVT noted on 1/5/24, Last EF 70% per abnormal stress test 9/1/23 (Angio done 9/12/23) Reviewed with patient. States she thinks she was actually in the hospital at [facility] for fluid overload, states she lost about 6-7 pounds of fluid and just got back home today.     Encouraged to make follow up appt with Dr. Garrido since she had to cancel back in December. Patient agrees and is going to have her sister help with appts. Denies any other concerns at this time.     Jessica Peguero RN

## 2024-01-10 ENCOUNTER — TELEPHONE (OUTPATIENT)
Dept: FAMILY MEDICINE | Facility: CLINIC | Age: 64
End: 2024-01-10
Payer: MEDICARE

## 2024-01-10 NOTE — TELEPHONE ENCOUNTER
Reason for call:  Other     Patient called regarding (reason for call): call back    Additional comments: Patient is calling and stating that she gain some weight 4 lbs and cna't loose it and was suppose to call her provider if this happens. Please advise and call patient back with update please and thank you.    Phone number to reach patient:  Home number on file [phone] (home)    Best Time:  any    Can we leave a detailed message on this number?  YES

## 2024-01-11 ENCOUNTER — NURSE TRIAGE (OUTPATIENT)
Dept: CARDIOLOGY | Facility: CLINIC | Age: 64
End: 2024-01-11

## 2024-01-11 ENCOUNTER — TRANSFERRED RECORDS (OUTPATIENT)
Dept: HEALTH INFORMATION MANAGEMENT | Facility: CLINIC | Age: 64
End: 2024-01-11

## 2024-01-11 NOTE — TELEPHONE ENCOUNTER
I called patient and relayed message from Lorena. I transferred the patient to cardiology for further assistance.

## 2024-01-11 NOTE — TELEPHONE ENCOUNTER
Teresa Arnett PA-C  You30 minutes ago (11:32 AM)     RO  She also had a recent PCI with balloon angioplasty in September so there is concern for another in-stent restenosis.      Teresa Arnett PA-C  You47 minutes ago (11:15 AM)     RO  I would favor her being evaluated in the ED.  It does not appear that they did an echocardiogram or chest CT to rule out any PE.  From her last echo in September she does not look that she had any signs of heart failure.  If the Lasix does not seem to be helping I would recommend being evaluated for further causes             ==Called patient and updated on recommendations from Teresa. She was advised to go to the ED for more thorough evaluation of symptoms, not come for clinic visit. This was repeated twice.  Writer stated that we will cancel her appt for her. She will present to her local ER for evaluation. -[facility]

## 2024-01-11 NOTE — TELEPHONE ENCOUNTER
"Called Kim to address concerns of weight gain. Chart reviewed regarding recent hospitalization at [facility] with acute heart failure. She was discharged home on 20 mg of Furosemide daily. She could not personally confirm this dose as she was not near her pill bottles.    With our conversation, she sounds short of breath with conversation. She admits to baseline shortness of breath to some degree due to hx of COPD, but the \"catch\" in her breath is new. She denies leg swelling or abdominal bloating. She was unable to sleep last night due to feeling more short of breath. She sat up for most the night. Originally writer was going to recommend that she come in for visit with [facility] later today or HF [facility]. With conversation, writer wonders if ER would be better. Pt was slightly frustrated with conversation as she states \"this is the 4th time I have had to explain my symptoms\". Writer tried to explain that diuretic therapy is new since she was last seen in September, so we are just trying to determine where best to send this and how to help her. Dr. Umaña is out of the office today.       Tuesday 1/9 and weighed her and she weighed 103. Then yesterday she weighed 107.4 @ 8:30 am,   10:49 am  108 lbs  7:36 pm  106.6 lbs  Last night before bed 108.4  This morning 106.2             Teresa,   In the absence of Dr. Garrido, could you please assist? This patient is scheduled to see you in a couple of weeks and was recently hospitalized at [facility] for acute heart failure- discharged on 20 mg of Lasix + steroids for COPD exacerbation as well. She called in with above weights and concern. She sounded pretty short of breath on the phone and hoarse. She did not sleep well last night. Would you provide any recommendations or ED for eval? Weight is not significantly up, but her shortness of breath was more concerning on the phone.  Thanks,  Isauro   "

## 2024-01-11 NOTE — TELEPHONE ENCOUNTER
"1. REASON FOR CALL or QUESTION: \"What is your reason for calling today?\" or \"How can I best help you?\" or \"What question do you have that I can help answer?\"    Patient spoke to Triage with concern of weight gain. She states she was discharged from [facility] a few days ago due to CHF. She was told if she had gained 3lbs to call. She says her weight has been going up. She provided weights - on Tuesday she was 103lb, Wednesday .4lb, at 10:40am 108lb, 6pm 109lb. This morning she is 106.2lb. She states she is currently taking lasix (not on med list and couldn't provide the dose). She takes this in the morning but took two yesterday one in AM and one PM. She also has been having labored breathing which is not new for her and is constant. No cardiac complaints. She also has Emphysema. Will route to the nurses in [place] to further review/follow-up with patient. She was last seen with Catherine Aldana PA-C.   "

## 2024-01-12 ENCOUNTER — PATIENT OUTREACH (OUTPATIENT)
Dept: CARE COORDINATION | Facility: CLINIC | Age: 64
End: 2024-01-12
Payer: MEDICARE

## 2024-01-12 NOTE — PROGRESS NOTES
Clinic Care Coordination Contact  Follow Up Progress Note      Assessment: Was in ED yesterday due to weight gain.  She is taking her meds and her weight was good this morning.  Wants to change upcoming cardiology appt as she did not have a good experience with this person at a previous appt.  Her sister set up the appt so that is how she got scheduled with her.  She will call to change providers.  This person said she was obese, which she is not. This person also recommended an injection into her stomach and she felt pressured.      Care Gaps:    Health Maintenance Due   Topic Date Due    NICOTINE/TOBACCO CESSATION COUNSELING Q 1 YR  Never done    DIABETIC FOOT EXAM  Never done    ASTHMA ACTION PLAN  Never done    ADVANCE CARE PLANNING  Never done    DEPRESSION ACTION PLAN  Never done    EYE EXAM  Never done    COVID-19 Vaccine (1) Never done    COLORECTAL CANCER SCREENING  Never done    MEDICARE ANNUAL WELLNESS VISIT  Never done    ZOSTER IMMUNIZATION (1 of 2) Never done    Pneumococcal Vaccine: Pediatrics (0 to 5 Years) and At-Risk Patients (6 to 64 Years) (2 of 2 - PCV) 12/16/2012    MAMMO SCREENING  11/05/2016    DTAP/TDAP/TD IMMUNIZATION (4 - Td or Tdap) 11/12/2018    RSV VACCINE (Pregnancy & 60+) (1 - 1-dose 60+ series) Never done    INFLUENZA VACCINE (1) 09/01/2023       Scheduled 2-5-2024      Care Plans  Care Plan: Transportation       Problem: Lack of transportation       Goal: Establish reliable transportation       Start Date: 10/26/2023 Expected End Date: 3/30/2024    This Visit's Progress: 20%    Priority: High    Note:     Barriers: none noted.  Strengths: wants to be independent.   Patient expressed understanding of goal: yes  Action steps to achieve this goal:  1. I will call [employer] to set up rides.   2. I will work with [employer] to answer any concerns or questions.   3. I will report progress towards this goal at scheduled outreach telephone calls from the [facility] team.    1-8 Sister will drive her to  appts                            Care Plan: Food Insecurity       Problem: Unable to prepare meals               Problem: Reliable food source       Long-Range Goal: Establish Options for Affordable Food Sources       Start Date: 10/26/2023 Expected End Date: 10/25/2024    This Visit's Progress: 10%    Priority: High    Note:     Barriers: getting to options for food, mom buys prepared foods, financial concerns.   Strengths: motivated to eat healthy foods.   Patient expressed understanding of goal: yes  Action steps to achieve this goal:  1. I will review [employer] packet when it arrives at my home.  2. I will utilize [employer] as I can.   3. I will report progress towards this goal at scheduled outreach telephone calls from the [facility] team.    1-8 will talk to family                            Care Plan: Health Maintenance       Problem: Health Maintenance Due or Overdue       Long-Range Goal: Become up-to-date with health maintenance visit(s)       Start Date: 10/26/2023 Expected End Date: 10/25/2024    This Visit's Progress: 10%    Priority: Medium    Note:     Barriers: some trouble with completing tasks due to ADHD.   Strengths: motivated.  Patient expressed understanding of goal: yes  Action steps to achieve this goal:  1. I will set up eye and dental exams.  2. I will attend appointments as scheduled.   3. I will report progress towards this goal at scheduled outreach telephone calls from the [facility] team.                                  Care Plan: General       Problem: HP GENERAL PROBLEM       Long-Range Goal: I will be independent.       Start Date: 10/26/2023 Expected End Date: 10/25/2024    This Visit's Progress: 10%    Priority: High    Note:     Barriers: financial concerns, waiting lists for housing.   Strengths: motivated.   Patient expressed understanding of goal: yes  Action steps to achieve this goal:  1. I will work with housing support specialists to get subsidized housing.   2. I will  complete any forms needed to get on waiting lists.   3. I will report progress towards this goal at scheduled outreach telephone calls from the [facility] team.                                    Intervention/Education provided during outreach: support.     Outreach Frequency: monthly, more frequently as needed      Plan:   [facility] SW will continue to monitor, support patient with current goals and will be available to assist as needs arise. [facility] CHW will reach out to patient on a monthly basis to discuss progression of goals.      [facility] SW will perform Chart Review in 45 days.       [facility]/Voicemail    Clinical Data: Care Coordinator Outreach    Outreach Documentation Number of Outreach Attempt   12/28/2023  11:51 AM 1   1/8/2024   9:24 AM 1   1/12/2024   1:47 PM 1       Left message on patient's voicemail with call back information and requested return call.    Plan:Care Coordinator will try to reach patient again in 3-5 business days.    Karon Cleary,   [facility]  [phone]

## 2024-01-12 NOTE — LETTER
[facility] CARE COORDINATION  [facility]    January 25, 2024        Kim Correa  [address] [facility] [facility] [address]          Dear Kim,     Jay is an updated Complex Care Plan for your continued enrollment in Care Coordination. Please let us know if you have additional questions, concerns, or goals that we can assist with.    Sincerely,    Karon Cleary,   [facility]  [phone]        [facility]  Patient Centered Plan of Care  About Me:        Patient Name:  Kim Correa    YOB: 1960  Age:         63 year old   [place] MRN:    [id number] Telephone Information:  Home Phone [phone]   Mobile [phone]       Address:  [address] [place] [address] Email address:  [email or web address]      Emergency Contact(s)    Name Relationship Lgl Grd Work Phone Home Phone Mobile Phone   1. BRETT GALE Daughter    [phone]   2. PING PINEDA Other    [phone]   3. POLO LAZO* Mother   [phone]            Primary language:  English     needed? No   [place] [employer]:  [phone] op. 1  Other communication barriers:Access to technology    Preferred Method of Communication:     Current living arrangement: I live in a private home with family    Mobility Status/ Medical Equipment: Independent w/Device        Health Maintenance  Health Maintenance Reviewed: Due/Overdue   Health Maintenance Due   Topic Date Due    NICOTINE/TOBACCO CESSATION COUNSELING Q 1 YR  Never done    DIABETIC FOOT EXAM  Never done    ASTHMA ACTION PLAN  Never done    ADVANCE CARE PLANNING  Never done    DEPRESSION ACTION PLAN  Never done    EYE EXAM  Never done    COVID-19 Vaccine (1) Never done    COLORECTAL CANCER SCREENING  Never done    MEDICARE ANNUAL WELLNESS VISIT  Never done    ZOSTER IMMUNIZATION (1 of 2) Never done    Pneumococcal Vaccine: Pediatrics (0 to 5 Years) and At-Risk Patients (6 to 64 Years) (2 of 2 - PCV)  12/16/2012    MAMMO SCREENING  11/05/2016    DTAP/TDAP/TD IMMUNIZATION (4 - Td or Tdap) 11/12/2018    RSV VACCINE (Pregnancy & 60+) (1 - 1-dose 60+ series) Never done    INFLUENZA VACCINE (1) 09/01/2023           My Access Plan  Medical Emergency 911   Primary Clinic Line [facility] - [phone]   24 Hour Appointment Line [phone] or  [phone]) (toll-free)   24 Hour Nurse Line [phone] (toll-free)   Preferred Urgent Care [facility] [phone]     Preferred Hospital [facility]  [phone]     Preferred Pharmacy [id number] - [facility] [address] [facility]     Behavioral Health Crisis Line The National Suicide Prevention Lifeline at 1-394.489.4449 or Text/Call 251           My Care Team Members  Patient Care Team         Relationship Specialty Notifications Start End    Lorena Baeza APRN CNP PCP - General Family Practice  10/2/18     Phone: [phone] Fax: [phone]         [address] [place] [address]    Lorena Baeza APRN CNP Assigned PCP   7/15/23     Phone: [phone] Fax: [phone]         [address] [facility] [address]    Rudolph Person MD MD Neurology  8/10/23     Phone: [phone] Fax: [phone]         [address] [facility] [address]    Karon Cleary LSW Lead Care Coordinator Primary Care - CC Admissions 10/23/23     Phone: [phone]         Kathy Velez PA-C Physician Assistant Rheumatology  10/24/23     Phone: [phone] Fax: [phone] [address] [facility] [address]    Qing Lamar, CHW Community Health Worker Primary Care - CC Admissions 10/26/23     Alonso Rayo MD Assigned Neuroscience Provider   11/11/23     Phone: [phone] Fax: [phone] [address] [facility] [address]    Avelino Garrido MD Assigned Heart and Vascular Provider   12/2/23     Phone: [phone] Fax:  [phone]         [address] [facility] [address] [facility] [address]    Teresa Arnett PA-C Physician Assistant Physician Assistant - Medical  1/10/24     Phone: [phone] Fax: [phone] [address] [facility] [address]    Teresa Arnett PA-C Physician Assistant Physician Assistant - Medical  1/24/24     Phone: [phone] Fax: [phone] [address] [facility] [address]                My Care Plans  Self Management and Treatment Plan    Care Plan  Care Plan: Transportation       Problem: Lack of transportation       Goal: Establish reliable transportation       Start Date: 10/26/2023 Expected End Date: 3/30/2024    This Visit's Progress: 20%    Priority: High    Note:     Barriers: none noted.  Strengths: wants to be independent.   Patient expressed understanding of goal: yes  Action steps to achieve this goal:  1. I will call [employer] to set up rides.   2. I will work with [employer] to answer any concerns or questions.   3. I will report progress towards this goal at scheduled outreach telephone calls from the [facility] team.    1-8 Sister will drive her to [employer]                            Care Plan: Food Insecurity       Problem: Unable to prepare meals               Problem: Reliable food source       Long-Range Goal: Establish Options for Affordable Food Sources       Start Date: 10/26/2023 Expected End Date: 10/25/2024    This Visit's Progress: 10%    Priority: High    Note:     Barriers: getting to options for food, mom buys prepared foods, financial concerns.   Strengths: motivated to eat healthy foods.   Patient expressed understanding of goal: yes  Action steps to achieve this goal:  1. I will review [employer] packet when it arrives at my home.  2. I will utilize [employer] All as I can.   3. I will report progress towards this goal at scheduled outreach telephone calls from the [facility] team.    1-8 will talk to family                            Care Plan: Health Maintenance       Problem:  Health Maintenance Due or Overdue       Long-Range Goal: Become up-to-date with health maintenance visit(s)       Start Date: 10/26/2023 Expected End Date: 10/25/2024    This Visit's Progress: 10%    Priority: Medium    Note:     Barriers: some trouble with completing tasks due to ADHD.   Strengths: motivated.  Patient expressed understanding of goal: yes  Action steps to achieve this goal:  1. I will set up eye and dental exams.  2. I will attend appointments as scheduled.   3. I will report progress towards this goal at scheduled outreach telephone calls from the [facility] team.                                  Care Plan: General       Problem: HP GENERAL PROBLEM       Long-Range Goal: I will be independent.       Start Date: 10/26/2023 Expected End Date: 10/25/2024    This Visit's Progress: 10%    Priority: High    Note:     Barriers: financial concerns, waiting lists for housing.   Strengths: motivated.   Patient expressed understanding of goal: yes  Action steps to achieve this goal:  1. I will work with housing support specialists to get subsidized housing.   2. I will complete any forms needed to get on waiting lists.   3. I will report progress towards this goal at scheduled outreach telephone calls from the [facility] team.                                  Advance Care Plans/Directives:   Advanced Care Plan/Directives on file:   No    Discussed with patient/caregiver(s):   Other (Comment) (has HCD and will complete)             My Medical and Care Information  Problem List   Patient Active Problem List   Diagnosis    Sprain of lumbar region    Mild intermittent asthma with exacerbation    Esophageal reflux    Hyperlipidemia    Attention deficit disorder    Chronic rhinitis    Dizziness and giddiness    Adenomatous polyp of colon    Anxiety    Asthma    PTSD (post-traumatic stress disorder)    Coronary atherosclerosis    PAD (peripheral artery disease) (H24)    Statin intolerance    Type 2 diabetes mellitus without  "complication, without long-term current use of insulin (H)    ROSARIO (dyspnea on exertion)    Abnormal cardiovascular stress test    Status post coronary angiogram    Status post percutaneous transluminal coronary angioplasty    Angina at rest    Arm pain    Autoimmune disease (H24)    Bicuspid aortic valve    Bilateral hearing loss    Cardiac pacemaker in situ    Contusion of head    Dissociative disorder or reaction    Gastroparesis    General patient noncompliance    History of colonic polyps    Insomnia disorder related to known organic factor    Lipid disorder    Chronic back pain    Lower back pain    Major depressive disorder, recurrent episode, mild (H24)    Mouth pain    New daily persistent headache    Other allergy, other than to medicinal agents    Pacemaker battery depletion    Pain, dental    Recurrent aphthous ulcer    Somatic symptom disorder, persistent, moderate    Stomatitis and mucositis    Temporomandibular joint disorder    Tobacco dependence syndrome    Type 2 diabetes mellitus with diabetic peripheral angiopathy without gangrene, without long-term current use of insulin (H)    Upper respiratory infection    Vasovagal syncope    Vitamin B deficiency    Vitamin D deficiency      Current Medications and Allergies:    Current Outpatient Medications   Medication Instructions    aspirin 81 mg, Oral, DAILY, Start tomorrow.    blood glucose monitoring (CONTOUR NEXT MONITOR W/DEVICE KIT) meter device kit 1 each, Does not apply    clopidogrel (PLAVIX) 75 mg, Oral, DAILY    cyanocobalamin (CYANOCOBALAMIN) 1,000 mcg, Intramuscular, EVERY 7 DAYS    EPINEPHrine (ANY BX GENERIC EQUIV) 0.3 MG/0.3ML injection 2-pack Inject 0.3 mL (0.3 mg) intramuscularly once as needed for up to 1 dose.*    FLOVENT  MCG/ACT inhaler INHALE 1 PUFF BY MOUTH TWO TIMES DAILY    insulin syringe-needle U-100 (30G X 1/2\" 1 ML) 30G X 1/2\" 1 ML miscellaneous Use 1 syringes daily or as directed. For b12    nicotine (NICOTROL) 10 " MG/ML SOLN inhalation solution 1 spray, Nasal, EVERY 1 HOUR PRN    nitroGLYcerin (NITROLINGUAL) 0.4 MG/SPRAY spray PLACE 1 SPRAY (0.4 MG) UNDER TONGUE EVERY 5 MINUTES AS NEEDED FOR CHEST PAIN FOR UP TO 3 DOSES. CALL 911 IF NO RELIEF AFTER FIRST SPRAY*    rosuvastatin (CRESTOR) 40 mg, Oral, DAILY    traZODone (DESYREL)  mg, Oral, AT BEDTIME    VENTOLIN  (90 Base) MCG/ACT inhaler INHALE 1-2 PUFFS BY MOUTH EVERY 4 HOURS AS NEEDED FOR WHEEZING.*    vitamin B-12 (CYANOCOBALAMIN) 1,000 mcg, Oral    vitamin D2 (ERGOCALCIFEROL) 50,000 Units, Oral, WEEKLY         Care Coordination Start Date: 10/23/2023   Frequency of Care Coordination: monthly, more frequently as needed     Form Last Updated: 01/25/2024

## 2024-02-05 ENCOUNTER — OFFICE VISIT (OUTPATIENT)
Dept: FAMILY MEDICINE | Facility: CLINIC | Age: 64
End: 2024-02-05
Payer: MEDICARE

## 2024-02-05 VITALS
DIASTOLIC BLOOD PRESSURE: 82 MMHG | TEMPERATURE: 97.8 F | SYSTOLIC BLOOD PRESSURE: 138 MMHG | HEART RATE: 80 BPM | OXYGEN SATURATION: 97 % | HEIGHT: 60 IN | WEIGHT: 106 LBS | RESPIRATION RATE: 15 BRPM | BODY MASS INDEX: 20.81 KG/M2

## 2024-02-05 DIAGNOSIS — F98.8 ATTENTION DEFICIT DISORDER, UNSPECIFIED HYPERACTIVITY PRESENCE: ICD-10-CM

## 2024-02-05 DIAGNOSIS — F43.10 PTSD (POST-TRAUMATIC STRESS DISORDER): ICD-10-CM

## 2024-02-05 DIAGNOSIS — F44.9 DISSOCIATIVE DISORDER OR REACTION: ICD-10-CM

## 2024-02-05 DIAGNOSIS — G47.00 INSOMNIA, UNSPECIFIED TYPE: ICD-10-CM

## 2024-02-05 DIAGNOSIS — Z71.6 ENCOUNTER FOR SMOKING CESSATION COUNSELING: ICD-10-CM

## 2024-02-05 DIAGNOSIS — E11.51 TYPE 2 DIABETES MELLITUS WITH DIABETIC PERIPHERAL ANGIOPATHY WITHOUT GANGRENE, WITHOUT LONG-TERM CURRENT USE OF INSULIN (H): ICD-10-CM

## 2024-02-05 DIAGNOSIS — J45.30 MILD PERSISTENT ASTHMA WITHOUT COMPLICATION: ICD-10-CM

## 2024-02-05 DIAGNOSIS — I50.33 ACUTE ON CHRONIC DIASTOLIC HEART FAILURE (H): Primary | ICD-10-CM

## 2024-02-05 DIAGNOSIS — F17.210 NICOTINE DEPENDENCE, CIGARETTES, UNCOMPLICATED: ICD-10-CM

## 2024-02-05 PROCEDURE — 99406 BEHAV CHNG SMOKING 3-10 MIN: CPT | Performed by: NURSE PRACTITIONER

## 2024-02-05 PROCEDURE — 99215 OFFICE O/P EST HI 40 MIN: CPT | Mod: 25 | Performed by: NURSE PRACTITIONER

## 2024-02-05 RX ORDER — NICOTINE 21 MG/24HR
1 PATCH, TRANSDERMAL 24 HOURS TRANSDERMAL EVERY 24 HOURS
Qty: 30 PATCH | Refills: 0 | Status: SHIPPED | OUTPATIENT
Start: 2024-02-05 | End: 2024-07-26

## 2024-02-05 RX ORDER — IPRATROPIUM BROMIDE AND ALBUTEROL SULFATE 2.5; .5 MG/3ML; MG/3ML
3 SOLUTION RESPIRATORY (INHALATION)
COMMUNITY
Start: 2024-01-07 | End: 2024-02-05

## 2024-02-05 RX ORDER — FUROSEMIDE 20 MG
20 TABLET ORAL
COMMUNITY
Start: 2024-01-07 | End: 2024-02-10

## 2024-02-05 RX ORDER — IPRATROPIUM BROMIDE AND ALBUTEROL SULFATE 2.5; .5 MG/3ML; MG/3ML
3 SOLUTION RESPIRATORY (INHALATION) EVERY 4 HOURS PRN
Qty: 90 ML | Refills: 0 | Status: SHIPPED | OUTPATIENT
Start: 2024-02-05

## 2024-02-05 ASSESSMENT — ASTHMA QUESTIONNAIRES
QUESTION_2 LAST FOUR WEEKS HOW OFTEN HAVE YOU HAD SHORTNESS OF BREATH: MORE THAN ONCE A DAY
QUESTION_3 LAST FOUR WEEKS HOW OFTEN DID YOUR ASTHMA SYMPTOMS (WHEEZING, COUGHING, SHORTNESS OF BREATH, CHEST TIGHTNESS OR PAIN) WAKE YOU UP AT NIGHT OR EARLIER THAN USUAL IN THE MORNING: NOT AT ALL
QUESTION_4 LAST FOUR WEEKS HOW OFTEN HAVE YOU USED YOUR RESCUE INHALER OR NEBULIZER MEDICATION (SUCH AS ALBUTEROL): THREE OR MORE TIMES PER DAY
QUESTION_3 LAST FOUR WEEKS HOW OFTEN DID YOUR ASTHMA SYMPTOMS (WHEEZING, COUGHING, SHORTNESS OF BREATH, CHEST TIGHTNESS OR PAIN) WAKE YOU UP AT NIGHT OR EARLIER THAN USUAL IN THE MORNING: NOT AT ALL
QUESTION_2 LAST FOUR WEEKS HOW OFTEN HAVE YOU HAD SHORTNESS OF BREATH: MORE THAN ONCE A DAY
QUESTION_1 LAST FOUR WEEKS HOW MUCH OF THE TIME DID YOUR ASTHMA KEEP YOU FROM GETTING AS MUCH DONE AT WORK, SCHOOL OR AT HOME: MOST OF THE TIME
QUESTION_1 LAST FOUR WEEKS HOW MUCH OF THE TIME DID YOUR ASTHMA KEEP YOU FROM GETTING AS MUCH DONE AT WORK, SCHOOL OR AT HOME: SOME OF THE TIME
QUESTION_4 LAST FOUR WEEKS HOW OFTEN HAVE YOU USED YOUR RESCUE INHALER OR NEBULIZER MEDICATION (SUCH AS ALBUTEROL): THREE OR MORE TIMES PER DAY
ACT_TOTALSCORE: 14
ACT_TOTALSCORE: 14
QUESTION_5 LAST FOUR WEEKS HOW WOULD YOU RATE YOUR ASTHMA CONTROL: WELL CONTROLLED
QUESTION_5 LAST FOUR WEEKS HOW WOULD YOU RATE YOUR ASTHMA CONTROL: WELL CONTROLLED
ACT_TOTALSCORE: 13

## 2024-02-05 ASSESSMENT — PAIN SCALES - GENERAL: PAINLEVEL: MODERATE PAIN (5)

## 2024-02-05 ASSESSMENT — PATIENT HEALTH QUESTIONNAIRE - PHQ9
10. IF YOU CHECKED OFF ANY PROBLEMS, HOW DIFFICULT HAVE THESE PROBLEMS MADE IT FOR YOU TO DO YOUR WORK, TAKE CARE OF THINGS AT HOME, OR GET ALONG WITH OTHER PEOPLE: NOT DIFFICULT AT ALL
SUM OF ALL RESPONSES TO PHQ QUESTIONS 1-9: 0
SUM OF ALL RESPONSES TO PHQ QUESTIONS 1-9: 0

## 2024-02-05 NOTE — PROGRESS NOTES
Hospital Follow-up Visit:    Hospital/Nursing Home/IP Rehab Facility:  [facility]  Date of Admission: 1/5/24  Date of Discharge: 1/7/24  Reason(s) for Admission: SOB (shortness of breath     Was your hospitalization related to COVID-19? No   Problems taking medications regularly:  None  Medication changes since discharge: None  Problems adhering to non-medication therapy:  None    Summary of hospitalization:   Diagnostic Tests/Treatments reviewed.  Follow up needed: none  Other Healthcare Providers Involved in Patient s Care:         Care Coordination  Update since discharge: improved.         Plan of care communicated with patient           Answers submitted by the patient for this visit:  Patient Health Questionnaire (Submitted on 2/5/2024)  If you checked off any problems, how difficult have these problems made it for you to do your work, take care of things at home, or get along with other people?: Not difficult at all  PHQ9 TOTAL SCORE: 0    Assessment and Plan:     Acute on chronic diastolic heart failure (H)  Patient will continue to monitor weight closely.  Patient continues furosemide 20 mg daily.  May increase to 2 tablets when she notices 4 to 5 pound weight gain.  She will follow-up with cardiology.    Type 2 diabetes mellitus with diabetic peripheral angiopathy without gangrene, without long-term current use of insulin (H)  This is controlled.  - blood glucose (NO BRAND SPECIFIED) test strip  Dispense: 300 strip; Refill: 3    Insomnia, unspecified type  Discussed good sleep hygiene.  Will refer to sleep center for further evaluation.  - Adult Sleep Eval & Management  Referral    Mild persistent asthma without complication  She continues Flovent daily and albuterol as needed.  - ipratropium - albuterol 0.5 mg/2.5 mg/3 mL (DUONEB) 0.5-2.5 (3) MG/3ML neb solution  Dispense: 90 mL; Refill: 0    Nicotine dependence, cigarettes, uncomplicated  Encounter for smoking cessation  counseling  I spent 5 minutes with the patient discussing smoking cessation options.  Will start nicotine patches, use as directed.  Educated on its indications and side effects.  [facility]  - nicotine (NICODERM CQ) 21 MG/24HR 24 hr patch  Dispense: 30 patch; Refill: 0  - nicotine (NICODERM CQ) 14 MG/24HR 24 hr patch  Dispense: 30 patch; Refill: 0  - nicotine (NICODERM CQ) 7 MG/24HR 24 hr patch  Dispense: 30 patch; Refill: 0    Attention deficit disorder, unspecified hyperactivity presence  PTSD (post-traumatic stress disorder)  Dissociative disorder or reaction  Will refer to psychiatry due to complex psychiatric history.  - Adult Mental Health  Referral    42 minutes spent by me on the date of the encounter doing chart review, history and exam, documentation and further activities per the note      Subjective:     Kim is a 63 year old female presenting to the clinic for follow-up on hospitalization.  Patient has a past medical history of CAD with multiple prior PCI's, asthma, pacemaker, smoking, PTSD, dissociative disorder, bipolar disorder.  She presented to the ER on 1/5/24 with shortness of breath which had worsened over 1 month.  Patient was diagnosed with acute on chronic CHF diastolic dysfunction.  Troponins were negative.  D-dimer was minimally elevated.  She was negative for viral infections including COVID, influenza, RSV.  Echocardiogram showed no acute changes.  She was diuresed with IV Lasix at 20 mg twice daily.  She was discharged with Lasix 20 mg daily.  Patient was seen in the ER again on 1/11/2024 with concerns of weight gain and onset of shortness of breath.  EKG showed paced rhythm with right bundle branch block morphology.  Chest x-ray showed the following:    IMPRESSION: Stable left pectoral transvenous pacemaker with lead wire tips in right atrium and right ventricle. Coronary stents. Cardiac silhouette size is at the upper limits of normal.  Calcified atherosclerotic changes of the thoracic aorta. New, increased density within the left inferior hemithorax suggesting a new, small left pleural effusion. Airspace changes within the left lung base likely representing atelectasis. Pulmonary vascular distribution is within normal limits. No interstitial changes to suggest pulmonary edema.     Patient was discharged with instruction to increase furosemide to 2 tablets daily if her weight is consistently 4 to 5 pounds over her goal.  Patient presents today stating she is feeling better.  She does monitor her weight daily.  She has not been checking her blood pressure at home.  She did not take her furosemide this morning.  She denies chest tightness, chest pain, edema.  She requests refill of albuterol nebulizer.  She is history of asthma and suspected COPD.  She is ready to quit smoking and would like to use the nicotine patches.  She is smoking 1 pack/day.  She has type 2 diabetes which is diet controlled.  She request test strips today.  Patient suffers from insomnia.  She has both difficulty falling asleep and staying asleep.  She has found no relief with trazodone, CBD, cannabis.  She experiences night terrors and suffers from chronic pain.  Patient is interested in seeing psychiatry for her mental health.  She denies thoughts of suicide.  She does not feel supported.  She is living with her mother and has had arguments with both her mother and sister.    Reviewof Systems: A complete 14 point review of systems was obtained and is negative or as stated in the history of present illness.    Social History     Socioeconomic History    Marital status:      Spouse name: Not on file    Number of children: Not on file    Years of education: Not on file    Highest education level: Not on file   Occupational History    Not on file   Tobacco Use    Smoking status: Every Day     Packs/day: 1.00     Years: 45.00     Additional pack years: 0.00     Total pack  years: 45.00     Types: Cigarettes     Passive exposure: Current    Smokeless tobacco: Never   Vaping Use    Vaping Use: Some days    Substances: Nicotine, THC, CBD    Devices: Disposable, Pre-filled or refillable cartridge, Refillable tank, Pre-filled pod   Substance and Sexual Activity    Alcohol use: No    Drug use: Yes     Types: Marijuana    Sexual activity: Not on file   Other Topics Concern    Not on file   Social History Narrative    Not on file     Social Determinants of Health     Financial Resource Strain: Low Risk  (2/5/2024)    Financial Resource Strain     Within the past 12 months, have you or your family members you live with been unable to get utilities (heat, electricity) when it was really needed?: No   Food Insecurity: Low Risk  (2/5/2024)    Food Insecurity     Within the past 12 months, did you worry that your food would run out before you got money to buy more?: No     Within the past 12 months, did the food you bought just not last and you didn t have money to get more?: No   Transportation Needs: Low Risk  (2/5/2024)    Transportation Needs     Within the past 12 months, has lack of transportation kept you from medical appointments, getting your medicines, non-medical meetings or appointments, work, or from getting things that you need?: No   Physical Activity: Not on file   Stress: Not on file   Social Connections: Not on file   Interpersonal Safety: Low Risk  (10/23/2023)    Interpersonal Safety     Do you feel physically and emotionally safe where you currently live?: Yes     Within the past 12 months, have you been hit, slapped, kicked or otherwise physically hurt by someone?: No     Within the past 12 months, have you been humiliated or emotionally abused in other ways by your partner or ex-partner?: No   Housing Stability: Low Risk  (2/5/2024)    Housing Stability     Do you have housing? : Yes     Are you worried about losing your housing?: No       Active Ambulatory Problems      Diagnosis Date Noted    Sprain of lumbar region 09/08/2006    Mild intermittent asthma with exacerbation 11/10/2006    Esophageal reflux 11/10/2006    Hyperlipidemia 11/10/2006    Attention deficit disorder 11/10/2006    Chronic rhinitis 11/10/2006    Dizziness and giddiness 12/11/2006    Adenomatous polyp of colon 11/19/2013    Anxiety 11/10/2015    Asthma 04/06/2010    PTSD (post-traumatic stress disorder) 01/01/2006    Coronary atherosclerosis 04/06/2010    PAD (peripheral artery disease) (H24) 12/29/2010    Statin intolerance 08/13/2018    Type 2 diabetes mellitus without complication, without long-term current use of insulin (H) 09/11/2023    ROSARIO (dyspnea on exertion) 09/12/2023    Abnormal cardiovascular stress test 09/12/2023    Status post coronary angiogram 09/12/2023    Status post percutaneous transluminal coronary angioplasty 09/12/2023    Angina at rest 06/30/2018    Arm pain 04/06/2010    Autoimmune disease (H24) 06/30/2018    Bicuspid aortic valve 01/15/2018    Bilateral hearing loss 06/01/2017    Cardiac pacemaker in situ 10/23/2023    Contusion of head 10/24/2013    Dissociative disorder or reaction 06/15/2005    Gastroparesis 06/04/2017    General patient noncompliance 09/26/2015    History of colonic polyps 06/01/2017    Insomnia disorder related to known organic factor 07/02/2014    Lipid disorder 04/06/2010    Chronic back pain 04/06/2010    Lower back pain 10/23/2023    Major depressive disorder, recurrent episode, mild (H24) 11/24/2015    Mouth pain 08/05/2015    New daily persistent headache 06/18/2014    Other allergy, other than to medicinal agents 10/23/2023    Pacemaker battery depletion 12/19/2018    Pain, dental 08/12/2014    Recurrent aphthous ulcer 10/23/2023    Somatic symptom disorder, persistent, moderate 09/24/2014    Stomatitis and mucositis 03/24/2016    Temporomandibular joint disorder 10/23/2023    Tobacco dependence syndrome 06/14/2017    Type 2 diabetes mellitus with  "diabetic peripheral angiopathy without gangrene, without long-term current use of insulin (H) 06/02/2020    Upper respiratory infection 12/29/2010    Vasovagal syncope 04/06/2010    Vitamin B deficiency 12/04/2017    Vitamin D deficiency 10/23/2023    Acute on chronic diastolic heart failure (H) 01/05/2024     Resolved Ambulatory Problems     Diagnosis Date Noted    Moderate depressed bipolar I disorder (H) 11/10/2006    Refractory migraine without aura 02/05/2007    COSTOCHONDRITIS 03/20/2007    History of cardiac pacemaker in situ 06/04/2017    Bipolar affective disorder (H) 12/29/2010     Past Medical History:   Diagnosis Date    Dysphagia     Enlarged tongue     Myalgia     Polyarthralgia     Sicca (H24)        Family History   Problem Relation Age of Onset    Hypertension Mother     Thyroid Disease Mother     Alcoholism Mother     Heart Disease Father     Alcoholism Father     Diabetes Sister     Alzheimer Disease Maternal Grandmother     Heart Disease Maternal Grandfather     Cerebrovascular Disease Paternal Grandmother     C.A.D. No family hx of        Objective:     /82   Pulse 80   Temp 97.8  F (36.6  C)   Resp 15   Ht 1.511 m (4' 11.5\")   Wt 48.1 kg (106 lb)   LMP  (LMP Unknown)   SpO2 97%   Breastfeeding No   BMI 21.05 kg/m      Patient is alert, in no obvious distress.   Skin: Warm, dry.    Lungs:  Clear to auscultation. Respirations even and unlabored.  No wheezing or rales noted.   Heart:  Regular rate and rhythm.  No murmurs, S3, S4, gallops, or rubs.    Musculoskeletal: No edema is present bilateral lower extremities.              "

## 2024-02-08 ENCOUNTER — PATIENT OUTREACH (OUTPATIENT)
Dept: CARE COORDINATION | Facility: CLINIC | Age: 64
End: 2024-02-08
Payer: MEDICARE

## 2024-02-08 NOTE — PROGRESS NOTES
HEART CARE ENCOUNTER NOTE      [facility]  [phone]      Assessment/Recommendations   Assessment:   Coronary artery disease: Angiogram 9/12/2023 showed severe in-stent restenosis of mid LAD stents, D2 stenosis, distal LAD stenosis, proximal stenosis of RCA most all treated with PTCA.  PTCA of RCA was unsuccessful given 99% stenosed calcific fibrotic lesion.  Stenting of the LAD was not performed due to collaterals and it being a nondominant vessel.  Was on dual antiplatelet therapy for 1 month, now on aspirin 81 mg daily.  Patient did not attend cardiac rehab.  Patient continues to endorse nonexertional atypical angina that last for 20 to 30 seconds occurring maybe weekly.  Patient notes that this is nothing worse or new and that it has been present for years.  Dyslipidemia: Is on rosuvastatin 40 mg daily.  Most recent LDL showed 147.  In September there was discussion with patient about starting PCSK9 inhibitor which patient declined.  Patient continues to decline this.  I explained the importance of lowering her cholesterol given the significant plaque buildup in her heart.  Acute on chronic diastolic heart failure: Patient notes her shortness of breath is improving and denies any lower extremity edema.  Notes her weights have been stable typically 103-104 lbs on her home scale.  Patient does weigh herself daily.  Trying to limit processed foods high in sodium.  Patient takes Lasix 20 mg daily. Reviewed BMP 1/11/24 which was stable  Sick sinus syndrome/neurogenic syncope: Status post permanent pacemaker:  Diabetes mellitus type 2: Last hemoglobin A1c 6.1.  Not currently on any medications.  Tobacco use: Just got NRT covered by insurance so plan on quitting soon. Patient is ready to quit.   Elevated blood pressure without diagnosis of hypertension: Patient's blood pressure was elevated in clinic at 150/70.  Has had history of some higher readings in the past but lately seems  well-controlled.  4 days ago at her PCP was 138/82, in October 132/82, September 120/48.  Patient notes that a paramedic comes to her house once a week and checks her blood pressure.  Encouraged her to write this down as well as getting a blood pressure cuff to monitor on her own.      Plan:   Continue current medications.   Recommend low sodium diet and continue monitoring weight.  Patient will let me know if she has a 2-3 lbs weight gain overnight or 5 lbs in a week  Recommend purchasing blood pressure cuff and monitoring BP 1-2 hours after eating when resting for 10-15 minutes.  Also encourage patient to write down the readings when paramedic comes weekly  Encouraged smoking cessation-patient notes that the nicotine replacement patches were recently covered by insurance and she is ready to quit.  I had a discussion about her high cholesterol and the recommendation for PCSK9 inhibitor.  Patient continues to decline this.  Recommended continuing trying to increase activity duration  Follow up with Dr. Garrido 6/4/24      Follow up 6/4/2024 with Dr. Garrido.    The longitudinal plan of care for Kim Correa was addressed during this visit. Due to the added complexity in care, I will continue to support Kim in the subsequent management of this condition(s) and with the ongoing continuity of care of this condition(s).      History of Present Illness/Subjective    HPI: Kim Correa is a 63 year old female with PMHx of coronary artery disease, dyslipidemia, sick sinus syndrome status post permanent pacemaker, HFpEF presents for hospital follow-up.    Patient presented to the ED 1/5/2024 with shortness of breath.  Patient had been noting that shortness of breath was worsening over the past month.  Patient was noting she can only make it a few steps without stopping to catch her breath.  Was also noting chest pain/pressure with exertion which improved with rest that occasionally radiates to her neck.  Will have  episodes of diaphoresis when her symptoms are more progressed.  Patient had been noting she cannot lay flat without getting extremely short of breath.  Was diuresed with IV Lasix 20 mg twice daily and symptomatically improved a lot.  On exam patient was found to have coarse bilateral breath sounds with occasional wheezes and was given DuoNebs and short burst of prednisone which also helped symptoms.  Patient had continued to smoke and smoking cessation had been discussed in the hospital and discharged with nicotine patch.  Patient was discharged on 1/7/24.  Patient had called 1/10/2024 noting a 4 pound weight gain and extreme shortness of breath.  Presented to the ED 1/11/2024 who had recommended increasing her Lasix for 3 days.  Also obtain chest x-ray that showed a new increased density in the left inferior hemothorax suggesting a new small left pleural effusion.      Patient notes that she has been doing well since the hospital and feels that her breathing is starting to improve.  Notes her weights have been stable around 103 to 104 pounds on her home scale.  She is trying to cut back on processed foods and stick to a low-sodium diet.  Patient feels that her energy level is improving.  Patient continues to endorse some angina that last 20 to 30 seconds at a time and is nonexertional.  Patient notes that occurs weekly and has been occurring for many years.  Patient denies having any severe episodes of needing to take nitroglycerin since the angiogram.  Patient notes going for walks outside in the driveway and walking up 5 stairs at a time and that her breathing has been improving with these activities.   She denies fatigue, orthopnea, PND, palpitations, abdominal fullness/bloating, and lower extremity edema.        Echocardiogram 1/6/2024 results:   1. Echo  1/6/2024, 11:07:04 AM.     2. Sinus rhythm during study.     3. Normal LV size and systolic function, 3D EF 68%.     4. Normal RV size and systolic function.      5. Can not rule out bicuspid aortic valve with fusion of the left and right coronary cusp. grossly normal function.     6. Pacemaker lead noted in right ventricle.     7. The aortic root measures mildly dilated at 3.4 cm with an index of 2.4 cm per m2.     8. Compared to the prior study from 2011, the current study reveals no significant change.     Chest x-ray 1/11/2024:  IMPRESSION: Stable left pectoral transvenous pacemaker with lead wire tips in right atrium and right ventricle. Coronary stents. Cardiac silhouette size is at the upper limits of normal. Calcified atherosclerotic changes of the thoracic aorta. New, increased density within the left inferior hemithorax suggesting a new, small left pleural effusion. Airspace changes within the left lung base likely representing atelectasis. Pulmonary vascular distribution is within normal limits. No interstitial changes to suggest pulmonary edema.     Coronary angiogram 9/12/2023:    Mid LAD lesion is 90% stenosed.    2nd Diag lesion is 90% stenosed.    1st LPL lesion is 20% stenosed.    Ost LAD lesion is 40% stenosed.    Prox RCA lesion is 99% stenosed.    Prox Cx to Mid Cx lesion is 40% stenosed.    Prox LAD to Mid LAD lesion is 40% stenosed.    Dist LAD lesion is 80% stenosed.     1.  Severe diffuse in-stent restenosis of the mid LAD stents.  The proximal stent edge appears smaller in caliber than the mid stent zone.  2.  Severe proximal stenosis of nondominant right coronary artery  3. Moderately severe stenosis of distal LAD beyond mid LAD stents, successfully treated with conventional PTCA.  4.  Successful PTCA of mid LAD stents; PTCA of ostium of jailed diagonal with persistence of normal flow.  Apical segment antegrade flow has been restored.  5.  Unsuccessful PTCA attempt of proximal right coronary artery.  Do not need to reattempt as long as LAD does not develop restenosis within the stent or in the distal segment.  Restenting would be a possible  option at that point.  6.  Continue Plavix x1 month    Remote device check 1/8/2024:  Encounter Type: Alert remote pacemaker transmission for VT episode. Courtesy check.   Device: [employer] Nucla.    Pacing % /Programmed: AP 88%,  2% at DDDR 60/130 ppm.   Lead(s): Stable.  Battery longevity: 8yrs, 11mo estimated.    Presenting: Sinus and AP-VS 90 bpm.   Atrial high rates: since 11/23/23; none detected.  Anticoagulant:  None.   Ventricular High rates: since 11/12/23; One ventricular high rate episode on 1/5/24 3:00PM, appears to be NSVT 20bts 175-180 bpm, duration 6 seconds.   Comments: Normal device function.   Plan: Routed to device RN for review. NEMESIO Hastings, Device Specialist. ADD: NSVT noted, patient was inpatient at [facility] at that time for HF exacerbation. See notes in Epic. Overdue for OV with Dr. Garrido. Janie NARVAEZ         Physical Examination  Review of Systems   Vitals: BP (!) 150/70 (BP Location: Left arm, Patient Position: Sitting, Cuff Size: Adult Regular)   Pulse 66   Resp 16   Wt 49.4 kg (109 lb)   LMP  (LMP Unknown)   BMI 21.65 kg/m    BMI= Body mass index is 21.65 kg/m .  Wt Readings from Last 3 Encounters:   02/09/24 49.4 kg (109 lb)   02/05/24 48.1 kg (106 lb)   10/23/23 50.4 kg (111 lb 1.6 oz)           ENT/Mouth: membranes moist, no oral lesions or bleeding gums.      EYES:  no scleral icterus, normal conjunctivae       Chest/Lungs:   lungs are clear to auscultation, no rales or wheezing,  equal chest wall expansion    Cardiovascular:   Regular. Normal first and second heart sounds with no murmurs, rubs, or gallops; the carotid, radial  pulses are intact, no edema bilaterally        Extremities: no cyanosis or clubbing   Skin: no xanthelasma, warm.    Neurologic: no tremors     Psychiatric: alert and oriented x3, calm        Please refer above for cardiac ROS details.        Medical History  Surgical History Family History Social History   Past Medical History:   Diagnosis Date     Dysphagia     Enlarged tongue     Myalgia     Polyarthralgia     Sicca (H24)      Past Surgical History:   Procedure Laterality Date    APPENDECTOMY      CARDIAC CATHETERIZATION      CORONARY ANGIOPLASTY      CORONARY STENT PLACEMENT      CV CORONARY ANGIOGRAM N/A 9/12/2023    Procedure: Coronary Angiogram;  Surgeon: Arnie Queen MD;  Location: [facility] CATH LAB CV    CV LEFT HEART CATH N/A 9/12/2023    Procedure: Left Heart Catheterization;  Surgeon: Arnie Queen MD;  Location: [facility] CATH [facility] CV    CV PCI N/A 9/12/2023    Procedure: Percutaneous Coronary Intervention;  Surgeon: rAnie Queen MD;  Location: [facility] CV    HC REVISE MEDIAN N/CARPAL TUNNEL SURG      Description: Neuroplasty Decompression Median Nerve At Carpal Tunnel;  Recorded: 09/19/2008;    IMPLANT PACEMAKER      INSERT / REPLACE / REMOVE PACEMAKER      IR MISCELLANEOUS PROCEDURE  2/2/2009    DC VAGINAL HYSTERECTOMY,UTERUS 250 GMS/<      Description: Vaginal Hysterectomy;  Recorded: 12/16/2011;    SURGICAL HISTORY OF -       vag hyst    SURGICAL HISTORY OF -       carpal tunnel bilateral    SURGICAL HISTORY OF -       arm surgery right side.      ZZC APPENDECTOMY      Description: Appendectomy;  Recorded: 09/19/2008;  Comments: and ovarian cyst     Family History   Problem Relation Age of Onset    Hypertension Mother     Thyroid Disease Mother     Alcoholism Mother     Heart Disease Father     Alcoholism Father     Diabetes Sister     Alzheimer Disease Maternal Grandmother     Heart Disease Maternal Grandfather     Cerebrovascular Disease Paternal Grandmother     C.A.D. No family hx of         Social History     Socioeconomic History    Marital status:      Spouse name: Not on file    Number of children: Not on file    Years of education: Not on file    Highest education level: Not on file   Occupational History    Not on file   Tobacco Use    Smoking status: Every Day     Packs/day: 1.00     Years: 45.00      Additional pack years: 0.00     Total pack years: 45.00     Types: Cigarettes     Passive exposure: Current    Smokeless tobacco: Never   Vaping Use    Vaping Use: Some days    Substances: Nicotine, THC, CBD    Devices: Disposable, Pre-filled or refillable cartridge, Refillable tank, Pre-filled pod   Substance and Sexual Activity    Alcohol use: No    Drug use: Yes     Types: Marijuana    Sexual activity: Not on file   Other Topics Concern    Not on file   Social History Narrative    Not on file     Social Determinants of Health     Financial Resource Strain: Low Risk  (2/5/2024)    Financial Resource Strain     Within the past 12 months, have you or your family members you live with been unable to get utilities (heat, electricity) when it was really needed?: No   Food Insecurity: Low Risk  (2/5/2024)    Food Insecurity     Within the past 12 months, did you worry that your food would run out before you got money to buy more?: No     Within the past 12 months, did the food you bought just not last and you didn t have money to get more?: No   Transportation Needs: Low Risk  (2/5/2024)    Transportation Needs     Within the past 12 months, has lack of transportation kept you from medical appointments, getting your medicines, non-medical meetings or appointments, work, or from getting things that you need?: No   Physical Activity: Not on file   Stress: Not on file   Social Connections: Not on file   Interpersonal Safety: Low Risk  (10/23/2023)    Interpersonal Safety     Do you feel physically and emotionally safe where you currently live?: Yes     Within the past 12 months, have you been hit, slapped, kicked or otherwise physically hurt by someone?: No     Within the past 12 months, have you been humiliated or emotionally abused in other ways by your partner or ex-partner?: No   Housing Stability: Low Risk  (2/5/2024)    Housing Stability     Do you have housing? : Yes     Are you worried about losing your housing?:  "No           Medications  Allergies   Current Outpatient Medications   Medication Sig Dispense Refill    aspirin 81 MG EC tablet Take 1 tablet (81 mg) by mouth daily Start tomorrow. 30 tablet 3    blood glucose (CONTOUR NEXT TEST) test strip Use to test blood sugar 2 times daily or as directed. 200 strip 1    blood glucose (NO BRAND SPECIFIED) test strip Use to test blood sugar 2 times daily or as directed. 300 strip 3    blood glucose monitoring (CONTOUR NEXT MONITOR W/DEVICE KIT) meter device kit 1 each      clopidogrel (PLAVIX) 75 MG tablet Take 1 tablet (75 mg) by mouth daily 30 tablet 0    cyanocobalamin (CYANOCOBALAMIN) 1000 MCG/ML injection Inject 1 mL (1,000 mcg) into the muscle every 7 days 12 mL 1    EPINEPHrine (ANY BX GENERIC EQUIV) 0.3 MG/0.3ML injection 2-pack Inject 0.3 mL (0.3 mg) intramuscularly once as needed for up to 1 dose.*      FLOVENT  MCG/ACT inhaler INHALE 1 PUFF BY MOUTH TWO TIMES DAILY 12 g 12    furosemide (LASIX) 20 MG tablet Take 20 mg by mouth      insulin syringe-needle U-100 (30G X 1/2\" 1 ML) 30G X 1/2\" 1 ML miscellaneous Use 1 syringes daily or as directed. For b12 12 each 0    ipratropium - albuterol 0.5 mg/2.5 mg/3 mL (DUONEB) 0.5-2.5 (3) MG/3ML neb solution Inhale 1 vial (3 mLs) into the lungs every 4 hours as needed for shortness of breath, wheezing or cough 90 mL 0    nicotine (NICODERM CQ) 14 MG/24HR 24 hr patch Place 1 patch onto the skin every 24 hours 30 patch 0    nicotine (NICODERM CQ) 21 MG/24HR 24 hr patch Place 1 patch onto the skin every 24 hours 30 patch 0    nicotine (NICODERM CQ) 7 MG/24HR 24 hr patch Place 1 patch onto the skin every 24 hours 30 patch 0    nitroGLYcerin (NITROLINGUAL) 0.4 MG/SPRAY spray PLACE 1 SPRAY (0.4 MG) UNDER TONGUE EVERY 5 MINUTES AS NEEDED FOR CHEST PAIN FOR UP TO 3 DOSES. CALL 911 IF NO RELIEF AFTER FIRST SPRAY*      rosuvastatin (CRESTOR) 40 MG tablet Take 1 tablet (40 mg) by mouth daily 90 tablet 3    traZODone (DESYREL) 50 MG " "tablet Take 1-2 tablets ( mg) by mouth at bedtime 60 tablet 3    VENTOLIN  (90 Base) MCG/ACT inhaler INHALE 1-2 PUFFS BY MOUTH EVERY 4 HOURS AS NEEDED FOR WHEEZING.*      vitamin B-12 (CYANOCOBALAMIN) 1000 MCG tablet Take 1,000 mcg by mouth      vitamin D2 (ERGOCALCIFEROL) 75782 units (1250 mcg) capsule Take 1 capsule (50,000 Units) by mouth once a week 12 capsule 0       Allergies   Allergen Reactions    Bee Venom Anaphylaxis and Unknown     unknown  Unknown    unknown  Unknown    Indomethacin Diarrhea and Hives     Stomach pain, sweats, shakes, not good combo with heart meds either.      Sumatriptan Anaphylaxis, Other (See Comments) and Unknown     unknown  Throat closing      Imitrex [Sumatriptan Succinate]     Levonorgestrel-Ethinyl Estrad Other (See Comments)    Sulfa Antibiotics     Gabapentin Anxiety and Nausea and Vomiting    Vancomycin Itching          Lab Results    Chemistry/lipid CBC Cardiac Enzymes/BNP/TSH/INR   Recent Labs   Lab Test 09/12/23  0656   CHOL 227*   HDL 52   *   TRIG 139     Recent Labs   Lab Test 09/12/23  0656 09/11/23  1305 08/26/16  1158   * 180* 187*     Recent Labs   Lab Test 09/11/23  1305      POTASSIUM 4.2   CHLORIDE 104   CO2 25   GLC 88   BUN 8.2   CR 0.74   GFRESTIMATED 90   MANISHA 9.6     Recent Labs   Lab Test 09/11/23  1305 09/24/19  1424 03/15/19  1413   CR 0.74 0.74 0.7     Recent Labs   Lab Test 09/11/23  1305 09/24/19  1424 01/28/19  1036   A1C 6.2* 6.6* 6.3*          Recent Labs   Lab Test 09/11/23  1305   WBC 9.9   HGB 14.7   HCT 44.1   MCV 93        Recent Labs   Lab Test 09/11/23  1305 12/31/18  0746 12/28/18  0852   HGB 14.7 14.0 13.9    Recent Labs   Lab Test 07/01/18  0706 07/01/18  0053 06/30/18  1613   TROPONINI <0.01 <0.01 <0.01     No results for input(s): \"BNP\", \"NTBNPI\", \"NTBNP\" in the last 43341 hours.  Recent Labs   Lab Test 09/01/23  1029   TSH 1.57     No results for input(s): \"INR\" in the last 78152 hours. "       Teresa Arnett PA-C

## 2024-02-08 NOTE — PROGRESS NOTES
Clinic Care Coordination Contact  Community Health Worker Follow Up    Care Gaps:     Health Maintenance Due   Topic Date Due    NICOTINE/TOBACCO CESSATION COUNSELING Q 1 YR  Never done    HF ACTION PLAN  Never done    DIABETIC FOOT EXAM  Never done    ASTHMA ACTION PLAN  Never done    ADVANCE CARE PLANNING  Never done    DEPRESSION ACTION PLAN  Never done    EYE EXAM  Never done    COVID-19 Vaccine (1) Never done    COLORECTAL CANCER SCREENING  Never done    MEDICARE ANNUAL WELLNESS VISIT  Never done    ZOSTER IMMUNIZATION (1 of 2) Never done    Pneumococcal Vaccine: Pediatrics (0 to 5 Years) and At-Risk Patients (6 to 64 Years) (2 of 2 - PCV) 12/16/2012    MAMMO SCREENING  11/05/2016    DTAP/TDAP/TD IMMUNIZATION (4 - Td or Tdap) 11/12/2018    ALT  01/28/2020    RSV VACCINE (Pregnancy & 60+) (1 - 1-dose 60+ series) Never done    INFLUENZA VACCINE (1) 09/01/2023       Postponed to next PCP appointment.     Care Plan:   Care Plan: Transportation       Problem: Lack of transportation       Goal: Establish reliable transportation       Start Date: 10/26/2023 Expected End Date: 3/30/2024    This Visit's Progress: On Hold Recent Progress: 20%    Priority: High    Note:     Barriers: none noted.  Strengths: wants to be independent.   Patient expressed understanding of goal: yes  Action steps to achieve this goal:  1. I will call [employer] to set up rides.   2. I will work with [employer] to answer any concerns or questions.   3. I will report progress towards this goal at scheduled outreach telephone calls from the CCC team.    Discussed 2/8/24                            Care Plan: Food Insecurity       Problem: Unable to prepare meals               Problem: Reliable food source       Long-Range Goal: Establish Options for Affordable Food Sources       Start Date: 10/26/2023 Expected End Date: 10/25/2024    This Visit's Progress: 10% Recent Progress: 10%    Priority: High    Note:     Barriers: getting to options for food, mom buys  prepared foods, financial concerns.   Strengths: motivated to eat healthy foods.   Patient expressed understanding of goal: yes  Action steps to achieve this goal:  1. I will review [employer] packet when it arrives at my home.  2. I will utilize [employer]are [employer] All as I can.   3. I will report progress towards this goal at scheduled outreach telephone calls from the CCC team.    Discussed 2/8/24                            Care Plan: Health Maintenance       Problem: Health Maintenance Due or Overdue       Long-Range Goal: Become up-to-date with health maintenance visit(s)       Start Date: 10/26/2023 Expected End Date: 10/25/2024    This Visit's Progress: 20% Recent Progress: 10%    Priority: Medium    Note:     Barriers: some trouble with completing tasks due to ADHD.   Strengths: motivated.  Patient expressed understanding of goal: yes  Action steps to achieve this goal:  1. I will set up eye and dental exams.  2. I will attend appointments as scheduled.   3. I will report progress towards this goal at scheduled outreach telephone calls from the CCC team.    Discussed 2/8/24                            Care Plan: General       Problem: HP GENERAL PROBLEM       Long-Range Goal: I will be independent.       Start Date: 10/26/2023 Expected End Date: 10/25/2024    This Visit's Progress: 20% Recent Progress: 10%    Priority: High    Note:     Barriers: financial concerns, waiting lists for housing.   Strengths: motivated.   Patient expressed understanding of goal: yes  Action steps to achieve this goal:  1. I will work with housing support specialists to get subsidized housing.   2. I will complete any forms needed to get on waiting lists.   3. I will report progress towards this goal at scheduled outreach telephone calls from the CCC team.    Discussed 2/8/24                              Intervention and Education during outreach: CHW gave patient contact information and encouraged patient to reach out with any  questions or concerns.    CHW Note:  CHW contacted patient to review/update CCC goals.  Patient reported she has been taking her prescription medications as directed and attending her medical appointments. Patient shared she has family members assisting with her appointments which has been helpful.  Patient shared her sister has been assisting with her transportation needs. Patient shared she has not contacted the transportation resources and plans to wait until they are needed. With permission from patient CHW put goal surrounding transportation resources on hold as patients sister is willing to assist with transportation at this time. Patient will update [facility] team if/when she would like to proceed with transportation resources.  Patient reported she has not received the [employer] or [employer] resources that [facility] had sent. Patient requested a copy of these resources be sent via mail for her to review. CHW will send message to [facility] regarding food resources. CHW will follow up at next scheduled outreach.  Patient denied any other needs or concerns at this time but will reach out to CCC as needed.     CHW Plan: CHW will continue to support patient with goals through routine scheduled outreach. CHW will outreach in one month on 3/8/24.      Qing Lamar  Community Health Worker   [facility]  Clinic Care Coordination  [facility]   [email or web address]  Office: [phone]

## 2024-02-09 ENCOUNTER — OFFICE VISIT (OUTPATIENT)
Dept: CARDIOLOGY | Facility: CLINIC | Age: 64
End: 2024-02-09
Payer: MEDICARE

## 2024-02-09 ENCOUNTER — TELEPHONE (OUTPATIENT)
Dept: BEHAVIORAL HEALTH | Facility: CLINIC | Age: 64
End: 2024-02-09

## 2024-02-09 VITALS
WEIGHT: 109 LBS | DIASTOLIC BLOOD PRESSURE: 70 MMHG | RESPIRATION RATE: 16 BRPM | SYSTOLIC BLOOD PRESSURE: 150 MMHG | HEART RATE: 66 BPM | BODY MASS INDEX: 21.65 KG/M2

## 2024-02-09 DIAGNOSIS — I25.118 ATHEROSCLEROSIS OF NATIVE CORONARY ARTERY OF NATIVE HEART WITH STABLE ANGINA PECTORIS (H): Primary | ICD-10-CM

## 2024-02-09 DIAGNOSIS — E11.9 TYPE 2 DIABETES MELLITUS WITHOUT COMPLICATION, WITHOUT LONG-TERM CURRENT USE OF INSULIN (H): ICD-10-CM

## 2024-02-09 DIAGNOSIS — I50.33 ACUTE ON CHRONIC DIASTOLIC HEART FAILURE (H): ICD-10-CM

## 2024-02-09 DIAGNOSIS — E78.2 MIXED HYPERLIPIDEMIA: ICD-10-CM

## 2024-02-09 DIAGNOSIS — Z95.0 CARDIAC PACEMAKER IN SITU: ICD-10-CM

## 2024-02-09 PROCEDURE — 99214 OFFICE O/P EST MOD 30 MIN: CPT | Performed by: STUDENT IN AN ORGANIZED HEALTH CARE EDUCATION/TRAINING PROGRAM

## 2024-02-09 PROCEDURE — G2211 COMPLEX E/M VISIT ADD ON: HCPCS | Performed by: STUDENT IN AN ORGANIZED HEALTH CARE EDUCATION/TRAINING PROGRAM

## 2024-02-09 NOTE — PATIENT INSTRUCTIONS
Kim Correa,    It was a pleasure to see you today at the [facility].     My recommendations after this visit include:    - Continue current medications.   - Recommend low sodium diet and continue monitoring weight. Let me know if you have a 2-3 lbs weight gain overnight or 5 lbs in a week  - Recommend purchasing blood pressure cuff and monitoring BP 1-2 hours after eating when you've been resting for 10-15 minutes. Write down the readings when paramedic comes weekly  - Encourage smoking cessation  - Let me know if you change your mind on the injectable cholesterol medication  - Continue trying to slowly increase activity  - Follow up with Dr. Garrido 6/4/24    - Please call [phone], if you have any questions or concerns      Teresa Arnett PA-C

## 2024-02-09 NOTE — LETTER
2/9/2024    Lorena Baeza, APRN CNP  [address] [place] [address]    RE: Kim Correa       Dear Colleague,     I had the pleasure of seeing Kim Correa in the [facility].    HEART CARE ENCOUNTER NOTE      [facility]  [phone]      Assessment/Recommendations   Assessment:   Coronary artery disease: Angiogram 9/12/2023 showed severe in-stent restenosis of mid LAD stents, D2 stenosis, distal LAD stenosis, proximal stenosis of RCA most all treated with PTCA.  PTCA of RCA was unsuccessful given 99% stenosed calcific fibrotic lesion.  Stenting of the LAD was not performed due to collaterals and it being a nondominant vessel.  Was on dual antiplatelet therapy for 1 month, now on aspirin 81 mg daily.  Patient did not attend cardiac rehab.  Patient continues to endorse nonexertional atypical angina that last for 20 to 30 seconds occurring maybe weekly.  Patient notes that this is nothing worse or new and that it has been present for years.  Dyslipidemia: Is on rosuvastatin 40 mg daily.  Most recent LDL showed 147.  In September there was discussion with patient about starting PCSK9 inhibitor which patient declined.  Patient continues to decline this.  I explained the importance of lowering her cholesterol given the significant plaque buildup in her heart.  Acute on chronic diastolic heart failure: Patient notes her shortness of breath is improving and denies any lower extremity edema.  Notes her weights have been stable typically 103-104 lbs on her home scale.  Patient does weigh herself daily.  Trying to limit processed foods high in sodium.  Patient takes Lasix 20 mg daily. Reviewed BMP 1/11/24 which was stable  Sick sinus syndrome/neurogenic syncope: Status post permanent pacemaker:  Diabetes mellitus type 2: Last hemoglobin A1c 6.1.  Not currently on any medications.  Tobacco use: Just got NRT covered by insurance so plan on quitting soon. Patient is ready to quit.    Elevated blood pressure without diagnosis of hypertension: Patient's blood pressure was elevated in clinic at 150/70.  Has had history of some higher readings in the past but lately seems well-controlled.  4 days ago at her PCP was 138/82, in October 132/82, September 120/48.  Patient notes that a paramedic comes to her house once a week and checks her blood pressure.  Encouraged her to write this down as well as getting a blood pressure cuff to monitor on her own.      Plan:   Continue current medications.   Recommend low sodium diet and continue monitoring weight.  Patient will let me know if she has a 2-3 lbs weight gain overnight or 5 lbs in a week  Recommend purchasing blood pressure cuff and monitoring BP 1-2 hours after eating when resting for 10-15 minutes.  Also encourage patient to write down the readings when paramedic comes weekly  Encouraged smoking cessation-patient notes that the nicotine replacement patches were recently covered by insurance and she is ready to quit.  I had a discussion about her high cholesterol and the recommendation for PCSK9 inhibitor.  Patient continues to decline this.  Recommended continuing trying to increase activity duration  Follow up with Dr. Garrido 6/4/24      Follow up 6/4/2024 with Dr. Garrido.    The longitudinal plan of care for Kim Correa was addressed during this visit. Due to the added complexity in care, I will continue to support Kim in the subsequent management of this condition(s) and with the ongoing continuity of care of this condition(s).      History of Present Illness/Subjective    HPI: Kim Correa is a 63 year old female with PMHx of coronary artery disease, dyslipidemia, sick sinus syndrome status post permanent pacemaker, HFpEF presents for hospital follow-up.    Patient presented to the ED 1/5/2024 with shortness of breath.  Patient had been noting that shortness of breath was worsening over the past month.  Patient was noting she can  only make it a few steps without stopping to catch her breath.  Was also noting chest pain/pressure with exertion which improved with rest that occasionally radiates to her neck.  Will have episodes of diaphoresis when her symptoms are more progressed.  Patient had been noting she cannot lay flat without getting extremely short of breath.  Was diuresed with IV Lasix 20 mg twice daily and symptomatically improved a lot.  On exam patient was found to have coarse bilateral breath sounds with occasional wheezes and was given DuoNebs and short burst of prednisone which also helped symptoms.  Patient had continued to smoke and smoking cessation had been discussed in the hospital and discharged with nicotine patch.  Patient was discharged on 1/7/24.  Patient had called 1/10/2024 noting a 4 pound weight gain and extreme shortness of breath.  Presented to the ED 1/11/2024 who had recommended increasing her Lasix for 3 days.  Also obtain chest x-ray that showed a new increased density in the left inferior hemothorax suggesting a new small left pleural effusion.      Patient notes that she has been doing well since the hospital and feels that her breathing is starting to improve.  Notes her weights have been stable around 103 to 104 pounds on her home scale.  She is trying to cut back on processed foods and stick to a low-sodium diet.  Patient feels that her energy level is improving.  Patient continues to endorse some angina that last 20 to 30 seconds at a time and is nonexertional.  Patient notes that occurs weekly and has been occurring for many years.  Patient denies having any severe episodes of needing to take nitroglycerin since the angiogram.  Patient notes going for walks outside in the driveway and walking up 5 stairs at a time and that her breathing has been improving with these activities.   She denies fatigue, orthopnea, PND, palpitations, abdominal fullness/bloating, and lower extremity edema.         Echocardiogram 1/6/2024 results:   1. Echo  1/6/2024, 11:07:04 AM.     2. Sinus rhythm during study.     3. Normal LV size and systolic function, 3D EF 68%.     4. Normal RV size and systolic function.     5. Can not rule out bicuspid aortic valve with fusion of the left and right coronary cusp. grossly normal function.     6. Pacemaker lead noted in right ventricle.     7. The aortic root measures mildly dilated at 3.4 cm with an index of 2.4 cm per m2.     8. Compared to the prior study from 2011, the current study reveals no significant change.     Chest x-ray 1/11/2024:  IMPRESSION: Stable left pectoral transvenous pacemaker with lead wire tips in right atrium and right ventricle. Coronary stents. Cardiac silhouette size is at the upper limits of normal. Calcified atherosclerotic changes of the thoracic aorta. New, increased density within the left inferior hemithorax suggesting a new, small left pleural effusion. Airspace changes within the left lung base likely representing atelectasis. Pulmonary vascular distribution is within normal limits. No interstitial changes to suggest pulmonary edema.     Coronary angiogram 9/12/2023:    Mid LAD lesion is 90% stenosed.    2nd Diag lesion is 90% stenosed.    1st LPL lesion is 20% stenosed.    Ost LAD lesion is 40% stenosed.    Prox RCA lesion is 99% stenosed.    Prox Cx to Mid Cx lesion is 40% stenosed.    Prox LAD to Mid LAD lesion is 40% stenosed.    Dist LAD lesion is 80% stenosed.     1.  Severe diffuse in-stent restenosis of the mid LAD stents.  The proximal stent edge appears smaller in caliber than the mid stent zone.  2.  Severe proximal stenosis of nondominant right coronary artery  3. Moderately severe stenosis of distal LAD beyond mid LAD stents, successfully treated with conventional PTCA.  4.  Successful PTCA of mid LAD stents; PTCA of ostium of jailed diagonal with persistence of normal flow.  Apical segment antegrade flow has been restored.  5.   Unsuccessful PTCA attempt of proximal right coronary artery.  Do not need to reattempt as long as LAD does not develop restenosis within the stent or in the distal segment.  Restenting would be a possible option at that point.  6.  Continue Plavix x1 month    Remote device check 1/8/2024:  Encounter Type: Alert remote pacemaker transmission for VT episode. Courtesy check.   Device: Medtronic Steffi.    Pacing % /Programmed: AP 88%,  2% at DDDR 60/130 ppm.   Lead(s): Stable.  Battery longevity: 8yrs, 11mo estimated.    Presenting: Sinus and AP-VS 90 bpm.   Atrial high rates: since 11/23/23; none detected.  Anticoagulant:  None.   Ventricular High rates: since 11/12/23; One ventricular high rate episode on 1/5/24 3:00PM, appears to be NSVT 20bts 175-180 bpm, duration 6 seconds.   Comments: Normal device function.   Plan: Routed to device RN for review. NEMESIO Hastings, Device Specialist. ADD: NSVT noted, patient was inpatient at [facility] at that time for HF exacerbation. See notes in Epic. Overdue for OV with Dr. Garrido. Janie NARVAEZ         Physical Examination  Review of Systems   Vitals: BP (!) 150/70 (BP Location: Left arm, Patient Position: Sitting, Cuff Size: Adult Regular)   Pulse 66   Resp 16   Wt 49.4 kg (109 lb)   LMP  (LMP Unknown)   BMI 21.65 kg/m    BMI= Body mass index is 21.65 kg/m .  Wt Readings from Last 3 Encounters:   02/09/24 49.4 kg (109 lb)   02/05/24 48.1 kg (106 lb)   10/23/23 50.4 kg (111 lb 1.6 oz)           ENT/Mouth: membranes moist, no oral lesions or bleeding gums.      EYES:  no scleral icterus, normal conjunctivae       Chest/Lungs:   lungs are clear to auscultation, no rales or wheezing,  equal chest wall expansion    Cardiovascular:   Regular. Normal first and second heart sounds with no murmurs, rubs, or gallops; the carotid, radial  pulses are intact, no edema bilaterally        Extremities: no cyanosis or clubbing   Skin: no xanthelasma, warm.    Neurologic: no tremors      Psychiatric: alert and oriented x3, calm        Please refer above for cardiac ROS details.        Medical History  Surgical History Family History Social History   Past Medical History:   Diagnosis Date    Dysphagia     Enlarged tongue     Myalgia     Polyarthralgia     Sicca (H24)      Past Surgical History:   Procedure Laterality Date    APPENDECTOMY      CARDIAC CATHETERIZATION      CORONARY ANGIOPLASTY      CORONARY STENT PLACEMENT      CV CORONARY ANGIOGRAM N/A 9/12/2023    Procedure: Coronary Angiogram;  Surgeon: Arnie Queen MD;  Location: [facility] CATH LAB CV    CV LEFT HEART CATH N/A 9/12/2023    Procedure: Left Heart Catheterization;  Surgeon: Arnie Queen MD;  Location: [facility] CATH LAB CV    CV PCI N/A 9/12/2023    Procedure: Percutaneous Coronary Intervention;  Surgeon: Arnie Queen MD;  Location: [facility] CATH LAB CV    HC REVISE MEDIAN N/CARPAL TUNNEL SURG      Description: Neuroplasty Decompression Median Nerve At Carpal Tunnel;  Recorded: 09/19/2008;    IMPLANT PACEMAKER      INSERT / REPLACE / REMOVE PACEMAKER      IR MISCELLANEOUS PROCEDURE  2/2/2009    DC VAGINAL HYSTERECTOMY,UTERUS 250 GMS/<      Description: Vaginal Hysterectomy;  Recorded: 12/16/2011;    SURGICAL HISTORY OF -       vag hyst    SURGICAL HISTORY OF -       carpal tunnel bilateral    SURGICAL HISTORY OF -       arm surgery right side.      ZZ APPENDECTOMY      Description: Appendectomy;  Recorded: 09/19/2008;  Comments: and ovarian cyst     Family History   Problem Relation Age of Onset    Hypertension Mother     Thyroid Disease Mother     Alcoholism Mother     Heart Disease Father     Alcoholism Father     Diabetes Sister     Alzheimer Disease Maternal Grandmother     Heart Disease Maternal Grandfather     Cerebrovascular Disease Paternal Grandmother     C.A.D. No family hx of         Social History     Socioeconomic History    Marital status:      Spouse name: Not on file    Number of children: Not on  file    Years of education: Not on file    Highest education level: Not on file   Occupational History    Not on file   Tobacco Use    Smoking status: Every Day     Packs/day: 1.00     Years: 45.00     Additional pack years: 0.00     Total pack years: 45.00     Types: Cigarettes     Passive exposure: Current    Smokeless tobacco: Never   Vaping Use    Vaping Use: Some days    Substances: Nicotine, THC, CBD    Devices: Disposable, Pre-filled or refillable cartridge, Refillable tank, Pre-filled pod   Substance and Sexual Activity    Alcohol use: No    Drug use: Yes     Types: Marijuana    Sexual activity: Not on file   Other Topics Concern    Not on file   Social History Narrative    Not on file     Social Determinants of Health     Financial Resource Strain: Low Risk  (2/5/2024)    Financial Resource Strain     Within the past 12 months, have you or your family members you live with been unable to get utilities (heat, electricity) when it was really needed?: No   Food Insecurity: Low Risk  (2/5/2024)    Food Insecurity     Within the past 12 months, did you worry that your food would run out before you got money to buy more?: No     Within the past 12 months, did the food you bought just not last and you didn t have money to get more?: No   Transportation Needs: Low Risk  (2/5/2024)    Transportation Needs     Within the past 12 months, has lack of transportation kept you from medical appointments, getting your medicines, non-medical meetings or appointments, work, or from getting things that you need?: No   Physical Activity: Not on file   Stress: Not on file   Social Connections: Not on file   Interpersonal Safety: Low Risk  (10/23/2023)    Interpersonal Safety     Do you feel physically and emotionally safe where you currently live?: Yes     Within the past 12 months, have you been hit, slapped, kicked or otherwise physically hurt by someone?: No     Within the past 12 months, have you been humiliated or  "emotionally abused in other ways by your partner or ex-partner?: No   Housing Stability: Low Risk  (2/5/2024)    Housing Stability     Do you have housing? : Yes     Are you worried about losing your housing?: No           Medications  Allergies   Current Outpatient Medications   Medication Sig Dispense Refill    aspirin 81 MG EC tablet Take 1 tablet (81 mg) by mouth daily Start tomorrow. 30 tablet 3    blood glucose (CONTOUR NEXT TEST) test strip Use to test blood sugar 2 times daily or as directed. 200 strip 1    blood glucose (NO BRAND SPECIFIED) test strip Use to test blood sugar 2 times daily or as directed. 300 strip 3    blood glucose monitoring (CONTOUR NEXT MONITOR W/DEVICE KIT) meter device kit 1 each      clopidogrel (PLAVIX) 75 MG tablet Take 1 tablet (75 mg) by mouth daily 30 tablet 0    cyanocobalamin (CYANOCOBALAMIN) 1000 MCG/ML injection Inject 1 mL (1,000 mcg) into the muscle every 7 days 12 mL 1    EPINEPHrine (ANY BX GENERIC EQUIV) 0.3 MG/0.3ML injection 2-pack Inject 0.3 mL (0.3 mg) intramuscularly once as needed for up to 1 dose.*      FLOVENT  MCG/ACT inhaler INHALE 1 PUFF BY MOUTH TWO TIMES DAILY 12 g 12    furosemide (LASIX) 20 MG tablet Take 20 mg by mouth      insulin syringe-needle U-100 (30G X 1/2\" 1 ML) 30G X 1/2\" 1 ML miscellaneous Use 1 syringes daily or as directed. For b12 12 each 0    ipratropium - albuterol 0.5 mg/2.5 mg/3 mL (DUONEB) 0.5-2.5 (3) MG/3ML neb solution Inhale 1 vial (3 mLs) into the lungs every 4 hours as needed for shortness of breath, wheezing or cough 90 mL 0    nicotine (NICODERM CQ) 14 MG/24HR 24 hr patch Place 1 patch onto the skin every 24 hours 30 patch 0    nicotine (NICODERM CQ) 21 MG/24HR 24 hr patch Place 1 patch onto the skin every 24 hours 30 patch 0    nicotine (NICODERM CQ) 7 MG/24HR 24 hr patch Place 1 patch onto the skin every 24 hours 30 patch 0    nitroGLYcerin (NITROLINGUAL) 0.4 MG/SPRAY spray PLACE 1 SPRAY (0.4 MG) UNDER TONGUE EVERY 5 " MINUTES AS NEEDED FOR CHEST PAIN FOR UP TO 3 DOSES. CALL 911 IF NO RELIEF AFTER FIRST SPRAY*      rosuvastatin (CRESTOR) 40 MG tablet Take 1 tablet (40 mg) by mouth daily 90 tablet 3    traZODone (DESYREL) 50 MG tablet Take 1-2 tablets ( mg) by mouth at bedtime 60 tablet 3    VENTOLIN  (90 Base) MCG/ACT inhaler INHALE 1-2 PUFFS BY MOUTH EVERY 4 HOURS AS NEEDED FOR WHEEZING.*      vitamin B-12 (CYANOCOBALAMIN) 1000 MCG tablet Take 1,000 mcg by mouth      vitamin D2 (ERGOCALCIFEROL) 24922 units (1250 mcg) capsule Take 1 capsule (50,000 Units) by mouth once a week 12 capsule 0       Allergies   Allergen Reactions    Bee Venom Anaphylaxis and Unknown     unknown  Unknown    unknown  Unknown    Indomethacin Diarrhea and Hives     Stomach pain, sweats, shakes, not good combo with heart meds either.      Sumatriptan Anaphylaxis, Other (See Comments) and Unknown     unknown  Throat closing      Imitrex [Sumatriptan Succinate]     Levonorgestrel-Ethinyl Estrad Other (See Comments)    Sulfa Antibiotics     Gabapentin Anxiety and Nausea and Vomiting    Vancomycin Itching          Lab Results    Chemistry/lipid CBC Cardiac Enzymes/BNP/TSH/INR   Recent Labs   Lab Test 09/12/23  0656   CHOL 227*   HDL 52   *   TRIG 139     Recent Labs   Lab Test 09/12/23  0656 09/11/23  1305 08/26/16  1158   * 180* 187*     Recent Labs   Lab Test 09/11/23  1305      POTASSIUM 4.2   CHLORIDE 104   CO2 25   GLC 88   BUN 8.2   CR 0.74   GFRESTIMATED 90   MANISHA 9.6     Recent Labs   Lab Test 09/11/23  1305 09/24/19  1424 03/15/19  1413   CR 0.74 0.74 0.7     Recent Labs   Lab Test 09/11/23  1305 09/24/19  1424 01/28/19  1036   A1C 6.2* 6.6* 6.3*          Recent Labs   Lab Test 09/11/23  1305   WBC 9.9   HGB 14.7   HCT 44.1   MCV 93        Recent Labs   Lab Test 09/11/23  1305 12/31/18  0746 12/28/18  0852   HGB 14.7 14.0 13.9    Recent Labs   Lab Test 07/01/18  0706 07/01/18  0053 06/30/18  1613   TROPONINI <0.01  "<0.01 <0.01     No results for input(s): \"BNP\", \"NTBNPI\", \"NTBNP\" in the last 29719 hours.  Recent Labs   Lab Test 09/01/23  1029   TSH 1.57     No results for input(s): \"INR\" in the last 91255 hours.       Teresa Arnett PA-C                Thank you for allowing me to participate in the care of your patient.      Sincerely,     Teresa Arnett PA-C     [facility]  cc:   Avelino Garrido MD  [address]  [address]  [place] [address]      "

## 2024-02-09 NOTE — TELEPHONE ENCOUNTER
First attempt to reach patient regarding Transition Clinic Referral.  Spoke to patient regarding Transition Clinic referral.    Scheduled patient for Transition Clinic services on 3/12/2024. Tracker form completed.    Tiny Borrero  Transition Clinic Coordinator  02/09/24 11:18 AM    -----------------  [facility]  Transition Clinic Referral  [place]      Please Check Type of Referral Requested:      ____THERAPY: The Transition clinic is able to schedule patients without current medical insurance; these patient will be referred to our Social Work Care Coordinator for Medical Insurance             Assistance. We are open for referral for psychotherapy. Patient is referred from:  Outpatient Intake      __X__MEDICATION:   Referrals for Medication are ONLY accepted from the following areas (select): Other..... STANDARD PRIORITY                                       Suboxone and Opioid Management Referrals are automatically denied.  TC Psychiatry cannot see patient without active medical insurance.  Next level of psychiatry care must be within 12 months.  TC Psychiatry cannot accept patient with next level of care scheduled with PCP.  The transition clinic cannot follow patients who are on a restricted recipient program.    ___ Long-Acting Injection (TORO)?    Is referred patient receiving a long-acting injection (TORO)?  If YES, provide the following:    Name and dose of Medication: Unknown  Date Injection was last administered to patient: Unknown  Next Long- Acting injection Due Date: Unknown    Is referred patient transferring from [facility]?  If YES, provide the following:    ___  TORO will be receiving TORO [facility] [place]  ___  TORO will be administered per an IRTS or elsewhere in the community       GUARDIAN: If your patient is not their own Guardian, please provide the following:    Guardian Name:  Guardian Contact Information (Phone & Email)  :  Guardian Address:    FOSTER CARE PROVIDER: If your patient lives at a Licensed Foster Care, please provide the following:    Foster Provider:  Foster Provider Contact Information (Phone & Email):  Foster Provider Address:    Referring Provider Contact Name: Lorena BaezaJANA CNP; Phone Number: [phone]    Reason for Transition Clinic Referral: Per referral, pt has a complicated mental health history. First available appt through [facility] is in August.    Next Level of Care Patient Will Be Transitioned To:  Psychiatry  Provider(s) Karen Moncada  [address]  Date/Time 8/15/24 @1:15 PM    What Would Be Helpful from the Transition Clinic: Per referral, pt has a complicated mental health history. First available appt through [facility] is in August.     Needs: NO    Does Patient Have Access to Technology: Per pt, she cannot do virtual visits and requested in person.    Patient E-mail Address: [email or web address]    Current Patient Phone Number: [phone]; [phone]    Clinician Gender Preference (if applicable): NO    Patient location preference: In person    [facility]      (Master Form: Updated 11/28/2023)

## 2024-02-10 ENCOUNTER — NURSE TRIAGE (OUTPATIENT)
Dept: NURSING | Facility: CLINIC | Age: 64
End: 2024-02-10
Payer: MEDICARE

## 2024-02-10 DIAGNOSIS — I50.33 ACUTE ON CHRONIC DIASTOLIC HEART FAILURE (H): Primary | ICD-10-CM

## 2024-02-10 RX ORDER — FUROSEMIDE 20 MG
20 TABLET ORAL DAILY
Qty: 90 TABLET | Refills: 3 | Status: SHIPPED | OUTPATIENT
Start: 2024-02-10

## 2024-02-10 NOTE — TELEPHONE ENCOUNTER
"Patient calling to request refill of Furosemide medication. Prescription is classed as \"historical\".  Patient states her PCP was going to send over refills at her last appointment on 2/5/2024, but there is no record of that in chart  Asked patient if she can get a 3-day emergency refill from her pharmacy to get her through the weekend, but patient stated \"I'll just go to the ED. Thank you\" and hung up the phone.     Routing refill request to clinic.    Jocelyne Agrawal RN  02/10/24 9:43 AM  [facility] Nurse Advisor    Reason for Disposition   [1] Prescription refill request for ESSENTIAL medicine (i.e., likelihood of harm to patient if not taken) AND [2] triager unable to refill per department policy    Protocols used: Medication Refill and Renewal Call-A-AH    "

## 2024-02-10 NOTE — TELEPHONE ENCOUNTER
Patient calling to request refills of Furosemide. Would like RX sent to to [facility] in [place].      Jocelyne Agrawal RN  02/10/24 9:48 AM  [facility] Nurse Advisor

## 2024-02-14 ENCOUNTER — PATIENT OUTREACH (OUTPATIENT)
Dept: CARE COORDINATION | Facility: CLINIC | Age: 64
End: 2024-02-14
Payer: MEDICARE

## 2024-02-14 NOTE — PROGRESS NOTES
Care Coordination Clinician Chart Review    Situation: Patient chart reviewed by Care Coordinator.       Background: Care Coordination Program started: 10/23/2023. Initial assessment completed and patient-centered care plan(s) were developed with participation from patient. Lead CC handed patient off to CHW for continued outreaches.       Assessment: Per chart review, patient outreach completed by CC CHW on 2-7-2024.  Patient is actively working to accomplish goal(s). Patient's goal(s) appropriate and relevant at this time. Patient is not due for updated Plan of Care.  Assessments will be completed annually or as needed/with change of patient status.    Sent message to [employer] and they will send the information to patient per request by CHW from patient.       Care Plan: Transportation       Problem: Lack of transportation       Goal: Establish reliable transportation       Start Date: 10/26/2023 Expected End Date: 3/30/2024    This Visit's Progress: On Hold Recent Progress: 20%    Priority: High    Note:     Barriers: none noted.  Strengths: wants to be independent.   Patient expressed understanding of goal: yes  Action steps to achieve this goal:  1. I will call [employer] to set up rides.   2. I will work with [employer] to answer any concerns or questions.   3. I will report progress towards this goal at scheduled outreach telephone calls from the CCC team.    Discussed 2/8/24                            Care Plan: Food Insecurity       Problem: Unable to prepare meals               Problem: Reliable food source       Long-Range Goal: Establish Options for Affordable Food Sources       Start Date: 10/26/2023 Expected End Date: 10/25/2024    This Visit's Progress: 20% Recent Progress: 10%    Priority: High    Note:     Barriers: getting to options for food, mom buys prepared foods, financial concerns.   Strengths: motivated to eat healthy foods.   Patient expressed understanding of goal: yes  Action steps to achieve this  goal:  1. I will review [employer] packet when it arrives at my home.  2. I will utilize [employer] as I can.   3. I will report progress towards this goal at scheduled outreach telephone calls from the CCC team.    Discussed 2/8/24                            Care Plan: Health Maintenance       Problem: Health Maintenance Due or Overdue       Long-Range Goal: Become up-to-date with health maintenance visit(s)       Start Date: 10/26/2023 Expected End Date: 10/25/2024    This Visit's Progress: 20% Recent Progress: 10%    Priority: Medium    Note:     Barriers: some trouble with completing tasks due to ADHD.   Strengths: motivated.  Patient expressed understanding of goal: yes  Action steps to achieve this goal:  1. I will set up eye and dental exams.  2. I will attend appointments as scheduled.   3. I will report progress towards this goal at scheduled outreach telephone calls from the CCC team.    Discussed 2/8/24                            Care Plan: General       Problem: HP GENERAL PROBLEM       Long-Range Goal: I will be independent.       Start Date: 10/26/2023 Expected End Date: 10/25/2024    This Visit's Progress: 20% Recent Progress: 10%    Priority: High    Note:     Barriers: financial concerns, waiting lists for housing.   Strengths: motivated.   Patient expressed understanding of goal: yes  Action steps to achieve this goal:  1. I will work with housing support specialists to get subsidized housing.   2. I will complete any forms needed to get on waiting lists.   3. I will report progress towards this goal at scheduled outreach telephone calls from the CCC team.    Discussed 2/8/24                                 Plan/Recommendations: The patient will continue working with Care Coordination to achieve goal(s) as above. CHW will continue outreaches at minimum every 30 days and will involve Lead CC as needed or if patient is ready to move to Maintenance. Lead CC will continue to monitor CHW  outreaches and patient's progress to goal(s) every 6 weeks.     Plan of Care updated and sent to patient: No

## 2024-02-23 ENCOUNTER — ANCILLARY PROCEDURE (OUTPATIENT)
Dept: CARDIOLOGY | Facility: CLINIC | Age: 64
End: 2024-02-23
Attending: INTERNAL MEDICINE
Payer: MEDICARE

## 2024-02-23 DIAGNOSIS — I49.5 SICK SINUS SYNDROME (H): ICD-10-CM

## 2024-02-23 DIAGNOSIS — Z95.0 PACEMAKER: ICD-10-CM

## 2024-02-28 LAB
MDC_IDC_EPISODE_DTM: NORMAL
MDC_IDC_EPISODE_DURATION: 2 S
MDC_IDC_EPISODE_ID: 12
MDC_IDC_EPISODE_TYPE: NORMAL
MDC_IDC_LEAD_CONNECTION_STATUS: NORMAL
MDC_IDC_LEAD_CONNECTION_STATUS: NORMAL
MDC_IDC_LEAD_IMPLANT_DT: NORMAL
MDC_IDC_LEAD_IMPLANT_DT: NORMAL
MDC_IDC_LEAD_LOCATION: NORMAL
MDC_IDC_LEAD_LOCATION: NORMAL
MDC_IDC_LEAD_LOCATION_DETAIL_1: NORMAL
MDC_IDC_LEAD_LOCATION_DETAIL_1: NORMAL
MDC_IDC_LEAD_MFG: NORMAL
MDC_IDC_LEAD_MFG: NORMAL
MDC_IDC_LEAD_MODEL: NORMAL
MDC_IDC_LEAD_MODEL: NORMAL
MDC_IDC_LEAD_POLARITY_TYPE: NORMAL
MDC_IDC_LEAD_POLARITY_TYPE: NORMAL
MDC_IDC_LEAD_SERIAL: NORMAL
MDC_IDC_LEAD_SERIAL: NORMAL
MDC_IDC_MSMT_BATTERY_DTM: NORMAL
MDC_IDC_MSMT_BATTERY_REMAINING_LONGEVITY: 106 MO
MDC_IDC_MSMT_BATTERY_RRT_TRIGGER: 2.62
MDC_IDC_MSMT_BATTERY_STATUS: NORMAL
MDC_IDC_MSMT_BATTERY_VOLTAGE: 2.99 V
MDC_IDC_MSMT_LEADCHNL_RA_IMPEDANCE_VALUE: 513 OHM
MDC_IDC_MSMT_LEADCHNL_RA_IMPEDANCE_VALUE: 551 OHM
MDC_IDC_MSMT_LEADCHNL_RA_PACING_THRESHOLD_AMPLITUDE: 0.5 V
MDC_IDC_MSMT_LEADCHNL_RA_PACING_THRESHOLD_PULSEWIDTH: 0.4 MS
MDC_IDC_MSMT_LEADCHNL_RA_SENSING_INTR_AMPL: 1.62 MV
MDC_IDC_MSMT_LEADCHNL_RA_SENSING_INTR_AMPL: 1.62 MV
MDC_IDC_MSMT_LEADCHNL_RV_IMPEDANCE_VALUE: 380 OHM
MDC_IDC_MSMT_LEADCHNL_RV_IMPEDANCE_VALUE: 513 OHM
MDC_IDC_MSMT_LEADCHNL_RV_PACING_THRESHOLD_AMPLITUDE: 2.5 V
MDC_IDC_MSMT_LEADCHNL_RV_PACING_THRESHOLD_PULSEWIDTH: 0.4 MS
MDC_IDC_MSMT_LEADCHNL_RV_SENSING_INTR_AMPL: 2.25 MV
MDC_IDC_MSMT_LEADCHNL_RV_SENSING_INTR_AMPL: 2.25 MV
MDC_IDC_PG_IMPLANT_DTM: NORMAL
MDC_IDC_PG_MFG: NORMAL
MDC_IDC_PG_MODEL: NORMAL
MDC_IDC_PG_SERIAL: NORMAL
MDC_IDC_PG_TYPE: NORMAL
MDC_IDC_SESS_CLINIC_NAME: NORMAL
MDC_IDC_SESS_DTM: NORMAL
MDC_IDC_SESS_TYPE: NORMAL
MDC_IDC_SET_BRADY_AT_MODE_SWITCH_RATE: 171 {BEATS}/MIN
MDC_IDC_SET_BRADY_HYSTRATE: NORMAL
MDC_IDC_SET_BRADY_LOWRATE: 60 {BEATS}/MIN
MDC_IDC_SET_BRADY_MAX_SENSOR_RATE: 130 {BEATS}/MIN
MDC_IDC_SET_BRADY_MAX_TRACKING_RATE: 130 {BEATS}/MIN
MDC_IDC_SET_BRADY_MODE: NORMAL
MDC_IDC_SET_BRADY_PAV_DELAY_LOW: 300 MS
MDC_IDC_SET_BRADY_SAV_DELAY_LOW: 250 MS
MDC_IDC_SET_LEADCHNL_RA_PACING_AMPLITUDE: 1.5 V
MDC_IDC_SET_LEADCHNL_RA_PACING_ANODE_ELECTRODE_1: NORMAL
MDC_IDC_SET_LEADCHNL_RA_PACING_ANODE_LOCATION_1: NORMAL
MDC_IDC_SET_LEADCHNL_RA_PACING_CAPTURE_MODE: NORMAL
MDC_IDC_SET_LEADCHNL_RA_PACING_CATHODE_ELECTRODE_1: NORMAL
MDC_IDC_SET_LEADCHNL_RA_PACING_CATHODE_LOCATION_1: NORMAL
MDC_IDC_SET_LEADCHNL_RA_PACING_POLARITY: NORMAL
MDC_IDC_SET_LEADCHNL_RA_PACING_PULSEWIDTH: 0.4 MS
MDC_IDC_SET_LEADCHNL_RA_SENSING_ANODE_ELECTRODE_1: NORMAL
MDC_IDC_SET_LEADCHNL_RA_SENSING_ANODE_LOCATION_1: NORMAL
MDC_IDC_SET_LEADCHNL_RA_SENSING_CATHODE_ELECTRODE_1: NORMAL
MDC_IDC_SET_LEADCHNL_RA_SENSING_CATHODE_LOCATION_1: NORMAL
MDC_IDC_SET_LEADCHNL_RA_SENSING_POLARITY: NORMAL
MDC_IDC_SET_LEADCHNL_RA_SENSING_SENSITIVITY: 0.3 MV
MDC_IDC_SET_LEADCHNL_RV_PACING_AMPLITUDE: 3.5 V
MDC_IDC_SET_LEADCHNL_RV_PACING_ANODE_ELECTRODE_1: NORMAL
MDC_IDC_SET_LEADCHNL_RV_PACING_ANODE_LOCATION_1: NORMAL
MDC_IDC_SET_LEADCHNL_RV_PACING_CAPTURE_MODE: NORMAL
MDC_IDC_SET_LEADCHNL_RV_PACING_CATHODE_ELECTRODE_1: NORMAL
MDC_IDC_SET_LEADCHNL_RV_PACING_CATHODE_LOCATION_1: NORMAL
MDC_IDC_SET_LEADCHNL_RV_PACING_POLARITY: NORMAL
MDC_IDC_SET_LEADCHNL_RV_PACING_PULSEWIDTH: 1 MS
MDC_IDC_SET_LEADCHNL_RV_SENSING_ANODE_ELECTRODE_1: NORMAL
MDC_IDC_SET_LEADCHNL_RV_SENSING_ANODE_LOCATION_1: NORMAL
MDC_IDC_SET_LEADCHNL_RV_SENSING_CATHODE_ELECTRODE_1: NORMAL
MDC_IDC_SET_LEADCHNL_RV_SENSING_CATHODE_LOCATION_1: NORMAL
MDC_IDC_SET_LEADCHNL_RV_SENSING_POLARITY: NORMAL
MDC_IDC_SET_LEADCHNL_RV_SENSING_SENSITIVITY: 0.9 MV
MDC_IDC_SET_ZONE_DETECTION_INTERVAL: 350 MS
MDC_IDC_SET_ZONE_DETECTION_INTERVAL: 400 MS
MDC_IDC_SET_ZONE_STATUS: NORMAL
MDC_IDC_SET_ZONE_STATUS: NORMAL
MDC_IDC_SET_ZONE_TYPE: NORMAL
MDC_IDC_SET_ZONE_VENDOR_TYPE: NORMAL
MDC_IDC_STAT_AT_BURDEN_PERCENT: 0 %
MDC_IDC_STAT_AT_DTM_END: NORMAL
MDC_IDC_STAT_AT_DTM_START: NORMAL
MDC_IDC_STAT_BRADY_AP_VP_PERCENT: 0.28 %
MDC_IDC_STAT_BRADY_AP_VS_PERCENT: 65.4 %
MDC_IDC_STAT_BRADY_AS_VP_PERCENT: 0.01 %
MDC_IDC_STAT_BRADY_AS_VS_PERCENT: 34.31 %
MDC_IDC_STAT_BRADY_DTM_END: NORMAL
MDC_IDC_STAT_BRADY_DTM_START: NORMAL
MDC_IDC_STAT_BRADY_RA_PERCENT_PACED: 65.74 %
MDC_IDC_STAT_BRADY_RV_PERCENT_PACED: 0.29 %
MDC_IDC_STAT_EPISODE_RECENT_COUNT: 0
MDC_IDC_STAT_EPISODE_RECENT_COUNT: 1
MDC_IDC_STAT_EPISODE_RECENT_COUNT_DTM_END: NORMAL
MDC_IDC_STAT_EPISODE_RECENT_COUNT_DTM_START: NORMAL
MDC_IDC_STAT_EPISODE_TOTAL_COUNT: 0
MDC_IDC_STAT_EPISODE_TOTAL_COUNT: 0
MDC_IDC_STAT_EPISODE_TOTAL_COUNT: 1
MDC_IDC_STAT_EPISODE_TOTAL_COUNT: 5
MDC_IDC_STAT_EPISODE_TOTAL_COUNT: 6
MDC_IDC_STAT_EPISODE_TOTAL_COUNT_DTM_END: NORMAL
MDC_IDC_STAT_EPISODE_TOTAL_COUNT_DTM_START: NORMAL
MDC_IDC_STAT_EPISODE_TYPE: NORMAL
MDC_IDC_STAT_TACHYTHERAPY_RECENT_DTM_END: NORMAL
MDC_IDC_STAT_TACHYTHERAPY_RECENT_DTM_START: NORMAL
MDC_IDC_STAT_TACHYTHERAPY_TOTAL_DTM_END: NORMAL
MDC_IDC_STAT_TACHYTHERAPY_TOTAL_DTM_START: NORMAL

## 2024-02-28 PROCEDURE — 93296 REM INTERROG EVL PM/IDS: CPT | Performed by: INTERNAL MEDICINE

## 2024-02-28 PROCEDURE — 93294 REM INTERROG EVL PM/LDLS PM: CPT | Performed by: INTERNAL MEDICINE

## 2024-03-08 ENCOUNTER — PATIENT OUTREACH (OUTPATIENT)
Dept: CARE COORDINATION | Facility: CLINIC | Age: 64
End: 2024-03-08
Payer: MEDICARE

## 2024-03-08 NOTE — PROGRESS NOTES
Clinic Care Coordination Contact  [facility]/Voicemail    Clinical Data: Care Coordinator Outreach    Outreach Documentation Number of Outreach Attempt   3/8/2024   3:12 PM 1       Left message on patient's voicemail with call back information and requested return call.    Plan: Care Coordinator will try to reach patient again in 10 business days or on 3/20/24.      Qing Lamar  Community Health Worker   [facility] Care Coordination  [facility]   [email or web address]  Office: [phone]

## 2024-03-21 ENCOUNTER — PATIENT OUTREACH (OUTPATIENT)
Dept: CARE COORDINATION | Facility: CLINIC | Age: 64
End: 2024-03-21
Payer: MEDICARE

## 2024-03-21 ENCOUNTER — TELEPHONE (OUTPATIENT)
Dept: CARDIOLOGY | Facility: CLINIC | Age: 64
End: 2024-03-21
Payer: MEDICARE

## 2024-03-21 NOTE — LETTER
[facility] CARE [facility]ORDIN[facility]  [facility]   March 21, 2024    Kim Correa  [address] [id number]      Dear Kim,    I have been attempting to reach you since our last contact. I would like to continue to work with you and provide any additional support you may need on achieving your health care related goals. I would appreciate if you would give me a call at [phone] to let me know if you would like to continue working together. I know that there are many things that can affect our ability to communicate and I hope we can continue to work together.    All of us at the [facility] are invested in your health and are here to assist you in meeting your goals.     Sincerely,    Qing Lamar  Community Health Worker   [facility] Care Coordination  [facility]   [email or web address]  Office: [phone]

## 2024-03-21 NOTE — TELEPHONE ENCOUNTER
[place] Community Measures Blood Pressure [place] reviewed.  Patients recent blood pressure is outside of guideline parameters.  Called pt to review, no answer.  Left voicemail message asking patient to check their blood pressure using a home blood pressure cuff or by going to a [facility].  Patient instructed to then call [phone] (East) and leave a message with their name, date of birth, and blood pressure reading that was completed within the last 24 hours and where it was completed.  Will await call back for further review.      Last Blood Pressure: 150/70  Last Heart Rate: 66  Date: 2/9/24  Location: [facility] Cardiology      DE Kohler

## 2024-03-21 NOTE — PROGRESS NOTES
Clinic Care Coordination Contact  [facility]/Voicemail    Clinical Data: Care Coordinator Outreach    Outreach Documentation Number of Outreach Attempt   3/8/2024   3:12 PM 1   3/21/2024  10:52 AM 2       Left message on patient's voicemail with call back information and requested return call.    Plan: Care Coordinator will send unable to contact letter with care coordinator contact information via [employer]. Care Coordinator will perform chart review in 3 weeks on 4/12/24.      Qing Lamar  Community Health Worker   [facility] Care Coordination  [facility]   [email or web address]  Office: [phone]

## 2024-03-25 NOTE — TELEPHONE ENCOUNTER
Pt returned call and stated she is unable to give us any BP readings as she does not have a monitor at home.

## 2024-03-28 ENCOUNTER — PATIENT OUTREACH (OUTPATIENT)
Dept: CARE COORDINATION | Facility: CLINIC | Age: 64
End: 2024-03-28
Payer: MEDICARE

## 2024-03-28 NOTE — PROGRESS NOTES
Care Coordination Clinician Chart Review    Situation: Patient chart reviewed by Care Coordinator.       Background: Care Coordination Program started: 10/23/2023. Initial assessment completed and patient-centered care plan(s) were developed with participation from patient. Lead CC handed patient off to CHW for continued outreaches.       Assessment: Per chart review, patient outreach completed by CC CHW on 3- leaving VM x2.  Patient is actively working to accomplish goal(s). Patient's goal(s) appropriate and relevant at this time. Patient is not due for updated Plan of Care.  Assessments will be completed annually or as needed/with change of patient status.      Care Plan: Transportation       Problem: Lack of transportation       Goal: Establish reliable transportation       Start Date: 10/26/2023 Expected End Date: 3/30/2024    This Visit's Progress: On Hold Recent Progress: 20%    Priority: High    Note:     Barriers: none noted.  Strengths: wants to be independent.   Patient expressed understanding of goal: yes  Action steps to achieve this goal:  1. I will call [employer] to set up rides.   2. I will work with [employer] to answer any concerns or questions.   3. I will report progress towards this goal at scheduled outreach telephone calls from the CCC team.    Discussed 2/8/24                            Care Plan: Food Insecurity       Problem: Unable to prepare meals               Problem: Reliable food source       Long-Range Goal: Establish Options for Affordable Food Sources       Start Date: 10/26/2023 Expected End Date: 10/25/2024    This Visit's Progress: 20% Recent Progress: 10%    Priority: High    Note:     Barriers: getting to options for food, mom buys prepared foods, financial concerns.   Strengths: motivated to eat healthy foods.   Patient expressed understanding of goal: yes  Action steps to achieve this goal:  1. I will review [employer] packet when it arrives at my home.  2. I will utilize [employer] as I can.   3. I will report progress towards this goal at scheduled outreach telephone calls from the CCC team.    Discussed 2/8/24                            Care Plan: Health Maintenance       Problem: Health Maintenance Due or Overdue       Long-Range Goal: Become up-to-date with health maintenance visit(s)       Start Date: 10/26/2023 Expected End Date: 10/25/2024    This Visit's Progress: 20% Recent Progress: 10%    Priority: Medium    Note:     Barriers: some trouble with completing tasks due to ADHD.   Strengths: motivated.  Patient expressed understanding of goal: yes  Action steps to achieve this goal:  1. I will set up eye and dental exams.  2. I will attend appointments as scheduled.   3. I will report progress towards this goal at scheduled outreach telephone calls from the CCC team.    Discussed 2/8/24                            Care Plan: General       Problem: HP GENERAL PROBLEM       Long-Range Goal: I will be independent.       Start Date: 10/26/2023 Expected End Date: 10/25/2024    This Visit's Progress: 20% Recent Progress: 10%    Priority: High    Note:     Barriers: financial concerns, waiting lists for housing.   Strengths: motivated.   Patient expressed understanding of goal: yes  Action steps to achieve this goal:  1. I will work with housing support specialists to get subsidized housing.   2. I will complete any forms needed to get on waiting lists.   3. I will report progress towards this goal at scheduled outreach telephone calls from the CCC team.    Discussed 2/8/24                                 Plan/Recommendations: The patient will continue working with Care Coordination to achieve goal(s) as above. CHW will continue outreaches at minimum every 30 days and will involve Lead CC as needed or if patient is ready to move to Maintenance. Lead CC will continue to monitor CHW outreaches and patient's progress to goal(s) every 6 weeks.     Plan of Care updated and sent to  patient: No

## 2024-04-14 ENCOUNTER — HEALTH MAINTENANCE LETTER (OUTPATIENT)
Age: 64
End: 2024-04-14

## 2024-04-15 ENCOUNTER — PATIENT OUTREACH (OUTPATIENT)
Dept: CARE COORDINATION | Facility: CLINIC | Age: 64
End: 2024-04-15
Payer: MEDICARE

## 2024-04-15 NOTE — LETTER
[facility]ARE [facility]ORDIN[facility]  [facility]    April 15, 2024    Kim Correa  [facility] [place] [facility] [id number]      Dear Kim,    I have been unsuccessful in reaching you since our last contact. At this time the Care Coordination team will make no further attempts to reach you, however this does not change your ability to continue receiving care from your providers at your primary care clinic. If you need additional support from a care coordinator in the future please contact Karon at [phone].    All of us at [facility] are invested in your health and are here to assist you in meeting your goals.     Sincerely,    Karon Cleary,   [facility]  [phone]

## 2024-04-15 NOTE — PROGRESS NOTES
Contact   Chart Review     Situation: Patient chart reviewed by .    Background: enrolled in care coordination.     Assessment: has been unreachable.     Plan/Recommendations: closing to care coordination, letter sent.      Karon Cleary,   [facility]  [phone]

## 2024-05-07 ENCOUNTER — OFFICE VISIT (OUTPATIENT)
Dept: NEUROLOGY | Facility: CLINIC | Age: 64
End: 2024-05-07
Payer: MEDICARE

## 2024-05-07 VITALS
HEART RATE: 80 BPM | BODY MASS INDEX: 20.85 KG/M2 | RESPIRATION RATE: 18 BRPM | DIASTOLIC BLOOD PRESSURE: 92 MMHG | SYSTOLIC BLOOD PRESSURE: 132 MMHG | WEIGHT: 105 LBS

## 2024-05-07 DIAGNOSIS — G25.0 ESSENTIAL TREMOR: ICD-10-CM

## 2024-05-07 DIAGNOSIS — M54.16 LUMBAR RADICULOPATHY: ICD-10-CM

## 2024-05-07 DIAGNOSIS — E53.8 LOW SERUM VITAMIN B12: ICD-10-CM

## 2024-05-07 DIAGNOSIS — M54.2 NECK PAIN: ICD-10-CM

## 2024-05-07 DIAGNOSIS — R29.898 BILATERAL LEG WEAKNESS: ICD-10-CM

## 2024-05-07 DIAGNOSIS — G62.9 NEUROPATHY: ICD-10-CM

## 2024-05-07 DIAGNOSIS — R26.81 UNSTEADY GAIT: Primary | ICD-10-CM

## 2024-05-07 PROCEDURE — G2211 COMPLEX E/M VISIT ADD ON: HCPCS | Performed by: PSYCHIATRY & NEUROLOGY

## 2024-05-07 PROCEDURE — 99215 OFFICE O/P EST HI 40 MIN: CPT | Performed by: PSYCHIATRY & NEUROLOGY

## 2024-05-07 RX ORDER — CYANOCOBALAMIN 1000 UG/ML
1 INJECTION, SOLUTION INTRAMUSCULAR; SUBCUTANEOUS
Qty: 12 ML | Refills: 1 | Status: SHIPPED | OUTPATIENT
Start: 2024-05-07

## 2024-05-07 RX ORDER — SYRINGE-NEEDLE,INSULIN,0.5 ML 27GX1/2"
SYRINGE, EMPTY DISPOSABLE MISCELLANEOUS
Qty: 12 EACH | Refills: 0 | Status: SHIPPED | OUTPATIENT
Start: 2024-05-07

## 2024-05-07 RX ORDER — PREDNISONE 20 MG/1
60 TABLET ORAL DAILY
Qty: 18 TABLET | Refills: 0 | Status: SHIPPED | OUTPATIENT
Start: 2024-05-07 | End: 2024-05-13

## 2024-05-07 NOTE — PROGRESS NOTES
NEUROLOGY OUTPATIENT PROGRESS NOTE   May 7, 2024     CHIEF COMPLAINT/REASON FOR VISIT/REASON FOR CONSULT  Patient presents with:  Follow Up    REASON FOR CONSULTATION- Gait difficulty    REFERRAL SOURCE  Dr. Lorena Baeza   Dr. Lorena Baeza    HISTORY OF PRESENT ILLNESS  Kim Correa is a 63 year old female seen today for evaluation of gait difficulty.  She previously had a lot of symptoms and seen by multiple providers.  There was concerns about Parkinson's disease 7 years ago but then she was lost to follow-up because of transportation issues and loss of insurance.    7 years ago she was having difficulty with motor skills.  Since then her symptoms have progressed.  She has some shaking and jerking of her upper extremities.  This can happen at rest and is worse with activity.  She further complains that she walks funny with her legs tilted inward.  This complaint of balance issues as well.  Has had multiple concussions in the past.  Does occasionally shuffle her feet.  She does complain of some stiffness in her arms and legs.  She does have cervical disc herniation issues as well.  Does have a diagnosis of diet-controlled diabetes.  Does complain of some numbness in her feet at times.  Also has headaches.  Does complain of some vertigo at times.  Her B12 level has chronically been low and she is on oral supplement without benefit.  A1c is 6.6.    She has been on Risperdal in the past.  No other antipsychotic use.    5/7/24  Patient returns today  1.  She did the B12 injections that have been found any benefit though her exam is improved and after a lot of discussion it was felt that the B12 injections have significantly helped her.  She is walking better compared to before.  Leg weakness is also improved.  Has also had an episode of CHF exacerbation and emphysema.  Is working with cardiology and is doing cardiac rehab.  2.  Her EMG did show lumbar radiculopathy.  She does complain of back pain also has neck  pain.  This: Of some shooting pain down the legs.  3.  Has had some difficulty with her appointments.    Previous history is reviewed and this is unchanged.    PAST MEDICAL/SURGICAL HISTORY  Past Medical History:   Diagnosis Date    Dysphagia     Enlarged tongue     Myalgia     Polyarthralgia     Sicca (H24)      Patient Active Problem List   Diagnosis    Sprain of lumbar region    Mild intermittent asthma with exacerbation    Esophageal reflux    Hyperlipidemia    Attention deficit disorder    Chronic rhinitis    Dizziness and giddiness    Adenomatous polyp of colon    Anxiety    Asthma    PTSD (post-traumatic stress disorder)    Coronary atherosclerosis    PAD (peripheral artery disease) (H24)    Statin intolerance    Type 2 diabetes mellitus without complication, without long-term current use of insulin (H)    ROSARIO (dyspnea on exertion)    Abnormal cardiovascular stress test    Status post coronary angiogram    Status post percutaneous transluminal coronary angioplasty    Angina at rest (H24)    Arm pain    Autoimmune disease (H24)    Bicuspid aortic valve    Bilateral hearing loss    Cardiac pacemaker in situ    Contusion of head    Dissociative disorder or reaction    Gastroparesis    General patient noncompliance    History of colonic polyps    Insomnia disorder related to known organic factor    Lipid disorder    Chronic back pain    Lower back pain    Major depressive disorder, recurrent episode, mild (H24)    Mouth pain    New daily persistent headache    Other allergy, other than to medicinal agents    Pacemaker battery depletion    Pain, dental    Recurrent aphthous ulcer    Somatic symptom disorder, persistent, moderate    Stomatitis and mucositis    Temporomandibular joint disorder    Tobacco dependence syndrome    Type 2 diabetes mellitus with diabetic peripheral angiopathy without gangrene, without long-term current use of insulin (H)    Upper respiratory infection    Vasovagal syncope    Vitamin B  deficiency    Vitamin D deficiency    Acute on chronic diastolic heart failure (H)   Significant for high cholesterol, diabetes, migraines, tremors, mental illness, arthritis, vision loss.  Questionable diagnosis of Sjogren's.  Has not followed up with rheumatology.    FAMILY HISTORY  Family History   Problem Relation Age of Onset    Hypertension Mother     Thyroid Disease Mother     Alcoholism Mother     Heart Disease Father     Alcoholism Father     Diabetes Sister     Alzheimer Disease Maternal Grandmother     Heart Disease Maternal Grandfather     Cerebrovascular Disease Paternal Grandmother     C.A.D. No family hx of    Positive for Parkinson's disease in multiple uncles and father.    SOCIAL HISTORY  Social History     Tobacco Use    Smoking status: Every Day     Current packs/day: 1.00     Average packs/day: 1 pack/day for 45.0 years (45.0 ttl pk-yrs)     Types: Cigarettes     Passive exposure: Current    Smokeless tobacco: Never   Vaping Use    Vaping status: Some Days    Substances: Nicotine, THC, CBD    Devices: Disposable, Pre-filled or refillable cartridge, Refillable tank, Pre-filled pod   Substance Use Topics    Alcohol use: No    Drug use: Yes     Types: Marijuana       SYSTEMS REVIEW  Twelve-system ROS was done and other than the HPI this was negative/positive for neck pain, back pain, arm and leg pain, joint pain, numbness/tingling, weakness/paralysis, difficulty walking, falling, balance/coordination problems, movement disorder, bladder symptoms, dizziness, speech disturbance, difficulty swallowing, ringing in ears, hearing loss, vision symptoms, sleepiness during the day, sleeping problems, headaches, memory loss, anxiety, chest pain, cardiac/heart problems, stomach pain, respiratory problems, skin changes, weight gain, appetite problems.  No new concerns/issues.    MEDICATIONS  Current Outpatient Medications   Medication Sig Dispense Refill    aspirin 81 MG EC tablet Take 1 tablet (81 mg) by  "mouth daily Start tomorrow. 30 tablet 3    blood glucose (CONTOUR NEXT TEST) test strip Use to test blood sugar 2 times daily or as directed. 200 strip 1    blood glucose (NO BRAND SPECIFIED) test strip Use to test blood sugar 2 times daily or as directed. 300 strip 3    blood glucose monitoring (CONTOUR NEXT MONITOR W/DEVICE KIT) meter device kit 1 each      cyanocobalamin (CYANOCOBALAMIN) 1000 MCG/ML injection Inject 1 mL (1,000 mcg) into the muscle every 30 days 12 mL 1    EPINEPHrine (ANY BX GENERIC EQUIV) 0.3 MG/0.3ML injection 2-pack Inject 0.3 mL (0.3 mg) intramuscularly once as needed for up to 1 dose.*      FLOVENT  MCG/ACT inhaler INHALE 1 PUFF BY MOUTH TWO TIMES DAILY 12 g 12    furosemide (LASIX) 20 MG tablet Take 1 tablet (20 mg) by mouth daily 90 tablet 3    insulin syringe-needle U-100 (30G X 1/2\" 1 ML) 30G X 1/2\" 1 ML miscellaneous Use 1 syringes daily or as directed. For b12 12 each 0    ipratropium - albuterol 0.5 mg/2.5 mg/3 mL (DUONEB) 0.5-2.5 (3) MG/3ML neb solution Inhale 1 vial (3 mLs) into the lungs every 4 hours as needed for shortness of breath, wheezing or cough 90 mL 0    nicotine (NICODERM CQ) 14 MG/24HR 24 hr patch Place 1 patch onto the skin every 24 hours 30 patch 0    nicotine (NICODERM CQ) 21 MG/24HR 24 hr patch Place 1 patch onto the skin every 24 hours 30 patch 0    nicotine (NICODERM CQ) 7 MG/24HR 24 hr patch Place 1 patch onto the skin every 24 hours 30 patch 0    nitroGLYcerin (NITROLINGUAL) 0.4 MG/SPRAY spray PLACE 1 SPRAY (0.4 MG) UNDER TONGUE EVERY 5 MINUTES AS NEEDED FOR CHEST PAIN FOR UP TO 3 DOSES. CALL 911 IF NO RELIEF AFTER FIRST SPRAY*      predniSONE (DELTASONE) 20 MG tablet Take 3 tablets (60 mg) by mouth daily for 6 days Take in AM with food. 18 tablet 0    rosuvastatin (CRESTOR) 40 MG tablet Take 1 tablet (40 mg) by mouth daily 90 tablet 3    traZODone (DESYREL) 50 MG tablet Take 1-2 tablets ( mg) by mouth at bedtime 60 tablet 3    VENTOLIN  (90 " Base) MCG/ACT inhaler INHALE 1-2 PUFFS BY MOUTH EVERY 4 HOURS AS NEEDED FOR WHEEZING.*      vitamin B-12 (CYANOCOBALAMIN) 1000 MCG tablet Take 1,000 mcg by mouth      vitamin D2 (ERGOCALCIFEROL) 74625 units (1250 mcg) capsule Take 1 capsule (50,000 Units) by mouth once a week 12 capsule 0     No current facility-administered medications for this visit.        PHYSICAL EXAMINATION  VITALS: BP (!) 132/92   Pulse 80   Resp 18   Wt 47.6 kg (105 lb)   LMP  (LMP Unknown)   BMI 20.85 kg/m    GENERAL: Healthy appearing, alert, no acute distress, normal habitus.  CARDIOVASCULAR: Extremities warm and well perfused. Pulses present.   NEUROLOGICAL:  Patient is awake and oriented to self, place and time.  Attention span is normal.  Memory is grossly intact.  Language is fluent and follows commands appropriately.  Appropriate fund of knowledge. Cranial nerves 2-12 are intact. There is no pronator drift.  Motor exam shows 5/5 strength in all extremities except bilateral lower extremity hip flexion weakness and bilateral knee extension weakness.  Now improved.  Tone is symmetric bilaterally in upper and lower extremities.  Reflexes are symmetric and absent in upper extremities and lower extremities. Sensory exam is grossly intact to light touch, pin prick and vibration with decreased pinprick and vibratory sense in the feet-improved.  Finger to nose and heel to shin is without dysmetria.  Romberg is negative.  Gait is slightly wide-based with bilateral decreased arm swing.  No major difficulty with walking.  No major tremor noted today.    DIAGNOSTICS  MRI  CONCLUSION:  1.  Mild to moderate burden of nonspecific T2 prolongation in the supratentorial parenchyma. Mild burden of T2 prolongation in the crossing fibers of the tiffany. The findings may be due to microvascular disease given the patient's smoking history.  2.  The pattern is not classic for demyelination but areas of chronic demyelination are not entirely excluded.  3.   No mass, hemorrhage or stroke.  4.  Incidental left parietal intraosseous hemangioma.    EMG- [facility] 2017      EEG-2019      RELEVANT LABS  2023 labs  B12 173  A1c 6.6  CBC and BMP noncontributory    OUTSIDE RECORDS  Outside referral notes and chart notes were reviewed and pertinent information has been summarized (in addition to the HPI):-      Notes from 2019 from Dr. Bateman were reviewed.  Patient had normal gait.  Was diagnosed to have a B12 deficiency and was put on a B12 supplement.    EMG- Dr. Rayo  Impression:   This is a abnormal nerve conduction and EMG study of bilateral lower extremities that suggests bilateral multilevel radiculopathy involving bilateral L5 and S1 nerve root.  Clinical correlation is recommended.      LABS  Component      Latest Ref Rng 9/1/2023  10:29 AM   Albumin Fraction      3.7 - 5.1 g/dL 4.3    Alpha 1 Fraction      0.2 - 0.4 g/dL 0.3    Alpha 2 Fraction      0.5 - 0.9 g/dL 0.8    Beta Fraction      0.6 - 1.0 g/dL 0.7    Gamma Fraction      0.7 - 1.6 g/dL 0.7    Monoclonal Peak      <=0.0 g/dL 0.0    ELP Interpretation: Essentially normal electrophoretic pattern. No obvious monoclonal proteins seen. Pathologic significance requires clinical correlation. KARRI Haider M.D., Ph.D., Pathologist ().    Vitamin B1 Whole Blood Level      70 - 180 nmol/L 155    TSH      0.30 - 4.20 uIU/mL 1.57    Immunofixation ELP No monoclonal protein seen on immunofixation. Pathologic significance requires clinical correlation.  KARRI Haider M.D., Ph.D., Pathologist ()    Ceruloplasmin      20 - 60 mg/dL 27    Copper      80.0 - 155.0 ug/dL 113.1    Vitamin B6      20.0 - 125.0 nmol/L 20.3    Total Protein Serum for ELP      6.4 - 8.3 g/dL 6.9          IMPRESSION/REPORT/PLAN  Low serum vitamin B12  Unsteady gait  Rule out neuropathy  Bilateral leg weakness  Essential tremor  Suspected lumbar radiculopathy    This is a 63 year old female with multiple issues.  Previous  "work-up in 2019 was negative.    1.  Unsteady gait:-Exam does not show any evidence of Parkinson's disease.  Could be related to her left hip issues and knee issues.  Could be related to other neurological issues.  This is now improved with B12 supplementation and will monitor.    2.  Tremor:-This is suggestive of essential tremor.  Not present on exam today.  No evidence of Parkinson's disease on exam.  Will hold off on medications.      3.  Exam does show decreased vibratory and pinprick sensation in the feet.  I suspect she does have a neuropathy.  EMG was negative for neuropathy.  B12 was low and B12 supplementations actually helped resolve the symptoms.  Will check a B12 level.  Continue B12 supplementation.  Blood work was negative for other causes of neuropathy.     4.  She does have bilateral hip flexion and knee extension weakness bilaterally.  Not improved on exam with B12 supplementation.  Discussed about ordering MRIs again but will hold off for right now.    5.  Vitamin B12 injections are helping and will continue.  Check B12 level.    6.  EMG suggested lumbar radiculopathy.  Will put her on steroids and set her up with physical therapy.    I can see her back in 6 months.    -     cyanocobalamin (CYANOCOBALAMIN) 1000 MCG/ML injection; Inject 1 mL (1,000 mcg) into the muscle every 30 days  -     insulin syringe-needle U-100 (30G X 1/2\" 1 ML) 30G X 1/2\" 1 ML miscellaneous; Use 1 syringes daily or as directed. For b12  -     Vitamin B12; Future  -     predniSONE (DELTASONE) 20 MG tablet; Take 3 tablets (60 mg) by mouth daily for 6 days Take in AM with food.  -     Physical Therapy  Referral; Future    Return in about 6 months (around 11/7/2024) for In-Clinic Visit (must), After testing.    Over 45 minutes were spent coordinating the care for the patient on the day of the encounter.  This includes previsit, during visit and post visit activities as documented above.  Counseling patient.  Multiple " prior problems reviewed/addressed.  Prescription management.  Multiple test reviewed.  (Activities include but not inclusive of reviewing chart, reviewing outside records, reviewing labs and imaging study results as well as the images, patient visit time including getting history and exam,  use if applicable, review of test results with the patient and coming up with a plan in a shared model, counseling patient and family, education and answering patient questions, EMR , EMR diagnosis entry and problem list management, medication reconciliation and prescription management if applicable, paperwork if applicable, printing documents and documentation of the visit activities.)        Rudolph Person MD  Neurologist  [facility]  Tel:- [phone]    This note was dictated using voice recognition software.  Any grammatical or context distortions are unintentional and inherent to the software.

## 2024-05-07 NOTE — LETTER
5/7/2024         RE: [facility] Correa  [address] [facility] [address]        Dear Colleague,    Thank you for referring your patient, Kim Correa, to the [facility]. Please see a copy of my visit note below.    NEUROLOGY OUTPATIENT PROGRESS NOTE   May 7, 2024     CHIEF COMPLAINT/REASON FOR VISIT/REASON FOR CONSULT  Patient presents with:  Follow Up    REASON FOR CONSULTATION- Gait difficulty    REFERRAL SOURCE  Dr. Lorena Baeza  CC Dr. Lorena Baeza    HISTORY OF PRESENT ILLNESS  Kim Correa is a 63 year old female seen today for evaluation of gait difficulty.  She previously had a lot of symptoms and seen by multiple providers.  There was concerns about Parkinson's disease 7 years ago but then she was lost to follow-up because of transportation issues and loss of insurance.    7 years ago she was having difficulty with motor skills.  Since then her symptoms have progressed.  She has some shaking and jerking of her upper extremities.  This can happen at rest and is worse with activity.  She further complains that she walks funny with her legs tilted inward.  This complaint of balance issues as well.  Has had multiple concussions in the past.  Does occasionally shuffle her feet.  She does complain of some stiffness in her arms and legs.  She does have cervical disc herniation issues as well.  Does have a diagnosis of diet-controlled diabetes.  Does complain of some numbness in her feet at times.  Also has headaches.  Does complain of some vertigo at times.  Her B12 level has chronically been low and she is on oral supplement without benefit.  A1c is 6.6.    She has been on Risperdal in the past.  No other antipsychotic use.    5/7/24  Patient returns today  1.  She did the B12 injections that have been found any benefit though her exam is improved and after a lot of discussion it was felt that the B12 injections have significantly helped her.  She is walking better  compared to before.  Leg weakness is also improved.  Has also had an episode of CHF exacerbation and emphysema.  Is working with cardiology and is doing cardiac rehab.  2.  Her EMG did show lumbar radiculopathy.  She does complain of back pain also has neck pain.  This: Of some shooting pain down the legs.  3.  Has had some difficulty with her appointments.    Previous history is reviewed and this is unchanged.    PAST MEDICAL/SURGICAL HISTORY  Past Medical History:   Diagnosis Date     Dysphagia      Enlarged tongue      Myalgia      Polyarthralgia      Sicca (H24)      Patient Active Problem List   Diagnosis     Sprain of lumbar region     Mild intermittent asthma with exacerbation     Esophageal reflux     Hyperlipidemia     Attention deficit disorder     Chronic rhinitis     Dizziness and giddiness     Adenomatous polyp of colon     Anxiety     Asthma     PTSD (post-traumatic stress disorder)     Coronary atherosclerosis     PAD (peripheral artery disease) (H24)     Statin intolerance     Type 2 diabetes mellitus without complication, without long-term current use of insulin (H)     ROSARIO (dyspnea on exertion)     Abnormal cardiovascular stress test     Status post coronary angiogram     Status post percutaneous transluminal coronary angioplasty     Angina at rest (H24)     Arm pain     Autoimmune disease (H24)     Bicuspid aortic valve     Bilateral hearing loss     Cardiac pacemaker in situ     Contusion of head     Dissociative disorder or reaction     Gastroparesis     General patient noncompliance     History of colonic polyps     Insomnia disorder related to known organic factor     Lipid disorder     Chronic back pain     Lower back pain     Major depressive disorder, recurrent episode, mild (H24)     Mouth pain     New daily persistent headache     Other allergy, other than to medicinal agents     Pacemaker battery depletion     Pain, dental     Recurrent aphthous ulcer     Somatic symptom disorder,  persistent, moderate     Stomatitis and mucositis     Temporomandibular joint disorder     Tobacco dependence syndrome     Type 2 diabetes mellitus with diabetic peripheral angiopathy without gangrene, without long-term current use of insulin (H)     Upper respiratory infection     Vasovagal syncope     Vitamin B deficiency     Vitamin D deficiency     Acute on chronic diastolic heart failure (H)   Significant for high cholesterol, diabetes, migraines, tremors, mental illness, arthritis, vision loss.  Questionable diagnosis of Sjogren's.  Has not followed up with rheumatology.    FAMILY HISTORY  Family History   Problem Relation Age of Onset     Hypertension Mother      Thyroid Disease Mother      Alcoholism Mother      Heart Disease Father      Alcoholism Father      Diabetes Sister      Alzheimer Disease Maternal Grandmother      Heart Disease Maternal Grandfather      Cerebrovascular Disease Paternal Grandmother      C.A.D. No family hx of    Positive for Parkinson's disease in multiple uncles and father.    SOCIAL HISTORY  Social History     Tobacco Use     Smoking status: Every Day     Current packs/day: 1.00     Average packs/day: 1 pack/day for 45.0 years (45.0 ttl pk-yrs)     Types: Cigarettes     Passive exposure: Current     Smokeless tobacco: Never   Vaping Use     Vaping status: Some Days     Substances: Nicotine, THC, CBD     Devices: Disposable, Pre-filled or refillable cartridge, Refillable tank, Pre-filled pod   Substance Use Topics     Alcohol use: No     Drug use: Yes     Types: Marijuana       SYSTEMS REVIEW  Twelve-system ROS was done and other than the HPI this was negative/positive for neck pain, back pain, arm and leg pain, joint pain, numbness/tingling, weakness/paralysis, difficulty walking, falling, balance/coordination problems, movement disorder, bladder symptoms, dizziness, speech disturbance, difficulty swallowing, ringing in ears, hearing loss, vision symptoms, sleepiness during the  "day, sleeping problems, headaches, memory loss, anxiety, chest pain, cardiac/heart problems, stomach pain, respiratory problems, skin changes, weight gain, appetite problems.  No new concerns/issues.    MEDICATIONS  Current Outpatient Medications   Medication Sig Dispense Refill     aspirin 81 MG EC tablet Take 1 tablet (81 mg) by mouth daily Start tomorrow. 30 tablet 3     blood glucose (CONTOUR NEXT TEST) test strip Use to test blood sugar 2 times daily or as directed. 200 strip 1     blood glucose (NO BRAND SPECIFIED) test strip Use to test blood sugar 2 times daily or as directed. 300 strip 3     blood glucose monitoring (CONTOUR NEXT MONITOR W/DEVICE KIT) meter device kit 1 each       cyanocobalamin (CYANOCOBALAMIN) 1000 MCG/ML injection Inject 1 mL (1,000 mcg) into the muscle every 30 days 12 mL 1     EPINEPHrine (ANY BX GENERIC EQUIV) 0.3 MG/0.3ML injection 2-pack Inject 0.3 mL (0.3 mg) intramuscularly once as needed for up to 1 dose.*       FLOVENT  MCG/ACT inhaler INHALE 1 PUFF BY MOUTH TWO TIMES DAILY 12 g 12     furosemide (LASIX) 20 MG tablet Take 1 tablet (20 mg) by mouth daily 90 tablet 3     insulin syringe-needle U-100 (30G X 1/2\" 1 ML) 30G X 1/2\" 1 ML miscellaneous Use 1 syringes daily or as directed. For b12 12 each 0     ipratropium - albuterol 0.5 mg/2.5 mg/3 mL (DUONEB) 0.5-2.5 (3) MG/3ML neb solution Inhale 1 vial (3 mLs) into the lungs every 4 hours as needed for shortness of breath, wheezing or cough 90 mL 0     nicotine (NICODERM CQ) 14 MG/24HR 24 hr patch Place 1 patch onto the skin every 24 hours 30 patch 0     nicotine (NICODERM CQ) 21 MG/24HR 24 hr patch Place 1 patch onto the skin every 24 hours 30 patch 0     nicotine (NICODERM CQ) 7 MG/24HR 24 hr patch Place 1 patch onto the skin every 24 hours 30 patch 0     nitroGLYcerin (NITROLINGUAL) 0.4 MG/SPRAY spray PLACE 1 SPRAY (0.4 MG) UNDER TONGUE EVERY 5 MINUTES AS NEEDED FOR CHEST PAIN FOR UP TO 3 DOSES. CALL 911 IF NO RELIEF AFTER " FIRST SPRAY*       predniSONE (DELTASONE) 20 MG tablet Take 3 tablets (60 mg) by mouth daily for 6 days Take in AM with food. 18 tablet 0     rosuvastatin (CRESTOR) 40 MG tablet Take 1 tablet (40 mg) by mouth daily 90 tablet 3     traZODone (DESYREL) 50 MG tablet Take 1-2 tablets ( mg) by mouth at bedtime 60 tablet 3     VENTOLIN  (90 Base) MCG/ACT inhaler INHALE 1-2 PUFFS BY MOUTH EVERY 4 HOURS AS NEEDED FOR WHEEZING.*       vitamin B-12 (CYANOCOBALAMIN) 1000 MCG tablet Take 1,000 mcg by mouth       vitamin D2 (ERGOCALCIFEROL) 17296 units (1250 mcg) capsule Take 1 capsule (50,000 Units) by mouth once a week 12 capsule 0     No current facility-administered medications for this visit.        PHYSICAL EXAMINATION  VITALS: BP (!) 132/92   Pulse 80   Resp 18   Wt 47.6 kg (105 lb)   LMP  (LMP Unknown)   BMI 20.85 kg/m    GENERAL: Healthy appearing, alert, no acute distress, normal habitus.  CARDIOVASCULAR: Extremities warm and well perfused. Pulses present.   NEUROLOGICAL:  Patient is awake and oriented to self, place and time.  Attention span is normal.  Memory is grossly intact.  Language is fluent and follows commands appropriately.  Appropriate fund of knowledge. Cranial nerves 2-12 are intact. There is no pronator drift.  Motor exam shows 5/5 strength in all extremities except bilateral lower extremity hip flexion weakness and bilateral knee extension weakness.  Now improved.  Tone is symmetric bilaterally in upper and lower extremities.  Reflexes are symmetric and absent in upper extremities and lower extremities. Sensory exam is grossly intact to light touch, pin prick and vibration with decreased pinprick and vibratory sense in the feet-improved.  Finger to nose and heel to shin is without dysmetria.  Romberg is negative.  Gait is slightly wide-based with bilateral decreased arm swing.  No major difficulty with walking.  No major tremor noted today.    DIAGNOSTICS  MRI  CONCLUSION:  1.  Mild to  moderate burden of nonspecific T2 prolongation in the supratentorial parenchyma. Mild burden of T2 prolongation in the crossing fibers of the tiffany. The findings may be due to microvascular disease given the patient's smoking history.  2.  The pattern is not classic for demyelination but areas of chronic demyelination are not entirely excluded.  3.  No mass, hemorrhage or stroke.  4.  Incidental left parietal intraosseous hemangioma.    EMG- [facility] 2017      EEG-2019      RELEVANT LABS  2023 labs  B12 173  A1c 6.6  CBC and BMP noncontributory    OUTSIDE RECORDS  Outside referral notes and chart notes were reviewed and pertinent information has been summarized (in addition to the HPI):-      Notes from 2019 from Dr. Bateman were reviewed.  Patient had normal gait.  Was diagnosed to have a B12 deficiency and was put on a B12 supplement.    EMG- Dr. Rayo  Impression:   This is a abnormal nerve conduction and EMG study of bilateral lower extremities that suggests bilateral multilevel radiculopathy involving bilateral L5 and S1 nerve root.  Clinical correlation is recommended.      LABS  Component      Latest Ref Rng 9/1/2023  10:29 AM   Albumin Fraction      3.7 - 5.1 g/dL 4.3    Alpha 1 Fraction      0.2 - 0.4 g/dL 0.3    Alpha 2 Fraction      0.5 - 0.9 g/dL 0.8    Beta Fraction      0.6 - 1.0 g/dL 0.7    Gamma Fraction      0.7 - 1.6 g/dL 0.7    Monoclonal Peak      <=0.0 g/dL 0.0    ELP Interpretation: Essentially normal electrophoretic pattern. No obvious monoclonal proteins seen. Pathologic significance requires clinical correlation. KARRI Haider M.D., Ph.D., Pathologist ().    Vitamin B1 Whole Blood Level      70 - 180 nmol/L 155    TSH      0.30 - 4.20 uIU/mL 1.57    Immunofixation ELP No monoclonal protein seen on immunofixation. Pathologic significance requires clinical correlation.  KARRI Haider M.D., Ph.D., Pathologist ()    Ceruloplasmin      20 - 60 mg/dL 27    Copper       "80.0 - 155.0 ug/dL 113.1    Vitamin B6      20.0 - 125.0 nmol/L 20.3    Total Protein Serum for ELP      6.4 - 8.3 g/dL 6.9          IMPRESSION/REPORT/PLAN  Low serum vitamin B12  Unsteady gait  Rule out neuropathy  Bilateral leg weakness  Essential tremor  Suspected lumbar radiculopathy    This is a 63 year old female with multiple issues.  Previous work-up in 2019 was negative.    1.  Unsteady gait:-Exam does not show any evidence of Parkinson's disease.  Could be related to her left hip issues and knee issues.  Could be related to other neurological issues.  This is now improved with B12 supplementation and will monitor.    2.  Tremor:-This is suggestive of essential tremor.  Not present on exam today.  No evidence of Parkinson's disease on exam.  Will hold off on medications.      3.  Exam does show decreased vibratory and pinprick sensation in the feet.  I suspect she does have a neuropathy.  EMG was negative for neuropathy.  B12 was low and B12 supplementations actually helped resolve the symptoms.  Will check a B12 level.  Continue B12 supplementation.  Blood work was negative for other causes of neuropathy.     4.  She does have bilateral hip flexion and knee extension weakness bilaterally.  Not improved on exam with B12 supplementation.  Discussed about ordering MRIs again but will hold off for right now.    5.  Vitamin B12 injections are helping and will continue.  Check B12 level.    6.  EMG suggested lumbar radiculopathy.  Will put her on steroids and set her up with physical therapy.    I can see her back in 6 months.    -     cyanocobalamin (CYANOCOBALAMIN) 1000 MCG/ML injection; Inject 1 mL (1,000 mcg) into the muscle every 30 days  -     insulin syringe-needle U-100 (30G X 1/2\" 1 ML) 30G X 1/2\" 1 ML miscellaneous; Use 1 syringes daily or as directed. For b12  -     Vitamin B12; Future  -     predniSONE (DELTASONE) 20 MG tablet; Take 3 tablets (60 mg) by mouth daily for 6 days Take in AM with food.  - "     Physical Therapy  Referral; Future    Return in about 6 months (around 11/7/2024) for In-Clinic Visit (must), After testing.    Over 45 minutes were spent coordinating the care for the patient on the day of the encounter.  This includes previsit, during visit and post visit activities as documented above.  Counseling patient.  Multiple prior problems reviewed/addressed.  Prescription management.  Multiple test reviewed.  (Activities include but not inclusive of reviewing chart, reviewing outside records, reviewing labs and imaging study results as well as the images, patient visit time including getting history and exam,  use if applicable, review of test results with the patient and coming up with a plan in a shared model, counseling patient and family, education and answering patient questions, EMR , EMR diagnosis entry and problem list management, medication reconciliation and prescription management if applicable, paperwork if applicable, printing documents and documentation of the visit activities.)        Rudolph Person MD  Neurologist  [facility]  Tel:- [phone]    This note was dictated using voice recognition software.  Any grammatical or context distortions are unintentional and inherent to the software.      Again, thank you for allowing me to participate in the care of your patient.        Sincerely,        Rudolph Person MD

## 2024-05-23 ENCOUNTER — OFFICE VISIT (OUTPATIENT)
Dept: RHEUMATOLOGY | Facility: CLINIC | Age: 64
End: 2024-05-23
Attending: NURSE PRACTITIONER
Payer: MEDICARE

## 2024-05-23 ENCOUNTER — HOSPITAL ENCOUNTER (OUTPATIENT)
Dept: GENERAL RADIOLOGY | Facility: HOSPITAL | Age: 64
Discharge: HOME | End: 2024-05-23
Attending: PHYSICIAN ASSISTANT
Payer: MEDICARE

## 2024-05-23 ENCOUNTER — LAB (OUTPATIENT)
Dept: LAB | Facility: CLINIC | Age: 64
End: 2024-05-23
Payer: MEDICARE

## 2024-05-23 VITALS
DIASTOLIC BLOOD PRESSURE: 74 MMHG | HEART RATE: 71 BPM | OXYGEN SATURATION: 99 % | SYSTOLIC BLOOD PRESSURE: 123 MMHG | BODY MASS INDEX: 21.45 KG/M2 | WEIGHT: 108 LBS

## 2024-05-23 DIAGNOSIS — R68.2 DRY MOUTH: ICD-10-CM

## 2024-05-23 DIAGNOSIS — E11.51 TYPE 2 DIABETES MELLITUS WITH DIABETIC PERIPHERAL ANGIOPATHY WITHOUT GANGRENE, WITHOUT LONG-TERM CURRENT USE OF INSULIN (H): Primary | ICD-10-CM

## 2024-05-23 DIAGNOSIS — M25.50 POLYARTHRALGIA: ICD-10-CM

## 2024-05-23 DIAGNOSIS — E53.8 LOW SERUM VITAMIN B12: ICD-10-CM

## 2024-05-23 DIAGNOSIS — M25.50 POLYARTHRALGIA: Primary | ICD-10-CM

## 2024-05-23 DIAGNOSIS — E78.5 HYPERLIPIDEMIA: ICD-10-CM

## 2024-05-23 LAB
ALT SERPL W P-5'-P-CCNC: 9 U/L (ref 0–50)
ANION GAP SERPL CALCULATED.3IONS-SCNC: 13 MMOL/L (ref 7–15)
BUN SERPL-MCNC: 9.8 MG/DL (ref 8–23)
CALCIUM SERPL-MCNC: 9.8 MG/DL (ref 8.8–10.2)
CHLORIDE SERPL-SCNC: 99 MMOL/L (ref 98–107)
CREAT SERPL-MCNC: 0.62 MG/DL (ref 0.51–0.95)
CRP SERPL-MCNC: 97.5 MG/L
DEPRECATED HCO3 PLAS-SCNC: 26 MMOL/L (ref 22–29)
EGFRCR SERPLBLD CKD-EPI 2021: >90 ML/MIN/1.73M2
ERYTHROCYTE [SEDIMENTATION RATE] IN BLOOD BY WESTERGREN METHOD: 82 MM/HR (ref 0–30)
GLUCOSE SERPL-MCNC: 141 MG/DL (ref 70–99)
HBA1C MFR BLD: 6.6 % (ref 0–5.6)
POTASSIUM SERPL-SCNC: 4 MMOL/L (ref 3.4–5.3)
RHEUMATOID FACT SERPL-ACNC: <10 IU/ML
SODIUM SERPL-SCNC: 138 MMOL/L (ref 135–145)
VIT B12 SERPL-MCNC: 512 PG/ML (ref 232–1245)

## 2024-05-23 PROCEDURE — 82607 VITAMIN B-12: CPT

## 2024-05-23 PROCEDURE — 99204 OFFICE O/P NEW MOD 45 MIN: CPT | Performed by: PHYSICIAN ASSISTANT

## 2024-05-23 PROCEDURE — 73130 X-RAY EXAM OF HAND: CPT | Mod: 50

## 2024-05-23 PROCEDURE — 84460 ALANINE AMINO (ALT) (SGPT): CPT

## 2024-05-23 PROCEDURE — 86038 ANTINUCLEAR ANTIBODIES: CPT

## 2024-05-23 PROCEDURE — 86039 ANTINUCLEAR ANTIBODIES (ANA): CPT

## 2024-05-23 PROCEDURE — 86431 RHEUMATOID FACTOR QUANT: CPT

## 2024-05-23 PROCEDURE — 86140 C-REACTIVE PROTEIN: CPT

## 2024-05-23 PROCEDURE — 86235 NUCLEAR ANTIGEN ANTIBODY: CPT

## 2024-05-23 PROCEDURE — 85652 RBC SED RATE AUTOMATED: CPT

## 2024-05-23 PROCEDURE — 73560 X-RAY EXAM OF KNEE 1 OR 2: CPT | Mod: 50

## 2024-05-23 PROCEDURE — 36415 COLL VENOUS BLD VENIPUNCTURE: CPT

## 2024-05-23 PROCEDURE — 80048 BASIC METABOLIC PNL TOTAL CA: CPT

## 2024-05-23 PROCEDURE — 83036 HEMOGLOBIN GLYCOSYLATED A1C: CPT

## 2024-05-23 PROCEDURE — 86200 CCP ANTIBODY: CPT

## 2024-05-23 NOTE — PROGRESS NOTES
Shea Perez Patient Age: 70 year old  MESSAGE:   Patient is scheduled for a pre-procedure Covid test on 3/15/22.  There is no order for this test.  Please place an order so that it is available when patient arrives for her appointment.     WEIGHT AND HEIGHT:   Wt Readings from Last 1 Encounters:   01/05/22 65.3 kg (144 lb)     Ht Readings from Last 1 Encounters:   01/05/22 5' 4\" (1.626 m)     BMI Readings from Last 1 Encounters:   01/05/22 24.72 kg/m²       ALLERGIES:  Bactrim ds and Sulfamethoxazole-trimethoprim  Current Outpatient Medications   Medication   • fluocinolone (Synalar) 0.025 % ointment   • fluocinonide (LIDEX) 0.05 % topical solution   • celecoxib (CeleBREX) 200 MG capsule   • levothyroxine 50 MCG tablet   • simvastatin (ZOCOR) 20 MG tablet   • traZODone (DESYREL) 100 MG tablet   • lisinopril (ZESTRIL) 10 MG tablet   • hydrochlorothiazide (HYDRODIURIL) 25 MG tablet   • raloxifene (EVISTA) 60 MG tablet   • budesonide (PULMICORT) 0.5 MG/2ML nebulizer suspension   • levocetirizine (XYZAL) 5 MG tablet   • fluticasone (FLONASE) 50 MCG/ACT nasal spray   • erythromycin (ILOTYCIN) ophthalmic ointment   • Omega-3 Fatty Acids (FISH OIL) 1000 MG capsule   • Magnesium 500 MG Tab   • Calcium Carbonate-Vitamin D 600-200 MG-UNIT Cap     No current facility-administered medications for this visit.     PHARMACY to use: N/A          Pharmacy preference(s) on file:   [facility] - [place], [place] - [address]  [address] [address]  Phone: [phone] Fax: [phone]    [facility] [address] [place] [facility] - [place], [place] - [address]  [address]  [facility] [address]  Phone: [phone] Fax: [phone]      CALL BACK INFO: Ok to leave response (including medical information) on answering machine  ROUTING: Patient's physician/staff        PCP: Sara Martin MD         INS: Payor: [facility] MEDICARE / Plan: CHOICE PPO MED ADVANTAGE / Product Type: PPO MISC   PATIENT ADDRESS:  [address]  Rheumatology Clinic Visit  [facility]  [place] Richardson     Kim Correa MRN# [id number]   YOB: 1960 Age: 63 year old   Date of Visit: 05/23/2024  Primary care provider: Lorena Baeza          Assessment and Plan:     1.  Polyarthralgia  2.  Dry mouth    Patient presents today for an initial evaluation.  She has been seen by rheumatology in the past.  Last visit was in 2019.  At that time there was no evidence of an autoimmune disorder.  Per patient she states that she was diagnosed with some autoimmune disorder at the [facility].  I did review with the [facility] notes back to 2017 and was not able to see a diagnosis of an autoimmune disorder.  Her biggest issue has been her mouth.  She has a lot of issues with her tongue which disrupts her swallowing.  She reports a significant amount of mucus as well.  She does get joint pain particularly in her hands and in her knees.  Physical examination today did not show any active synovitis or dactylitis.  No knee effusions.  No mucositis or skin rashes noted.  At this time would recommend redoing the autoimmune workup.  Will check her inflammatory markers as well as the VITOR, CCP antibody, SSA, SSB, and rheumatoid factor.  Will also get x-rays of her knees and of her hands.  I will contact the patient with the results of the evaluation once it is complete.    Plan:     Schedule follow-up with Kathy Velez PA-C as needed   Imaging: xray hands and knees  Labs: VITOR, CCP antibody, Rheumatoid Factor, CRP, Sed Rate, SSA, SSB    [place] Richardson  Rheumatology         History of Present Illness:   Kim Correa presents for evaluation of joint pain.     Rheumatological history:  Provider(s): , Stephie Hobson  Last office visit: 2019  Pertinent lab history:   -- Minor salivary gland lip biopsy - 8/2017 - Focus score Zero.   -- Ventral tongue lesion biopsy - 12/2017 - No malignancy or dysplasia.   -- Elevated sed rate - 90 -  "6/2017 ; in 1/2019 - 15 mm/hr  -- VITOR - 0.2, RF - < 15, Neg AntiCCP, SSA/SSB, MPO/Pr-3  -- Low Vitamin D - 8.4 mg/dl - 3/2019      She has had a lot of issues with her mouth. She has seen a dentist and was told that she needs an oral pathologist. For years, under her tongue and the inside of mouth cheek is caught. She states that it is dry thick mucous. She states that if she bends over for any length of time mucous will drain out of her nose. She states that she tries to maintain good hydration throughout. She has seen ENT in the past and was told by them that there is nothing that they can do to help with her tongue symptoms. She states that she does have anxiety and is a mouth breather, but feels this is more than just from that. She has a hx of a sore under her tongue and that used to break loose and her tongue would \"let loose\". She states that she occasionally will get skin rashes. She gets bumps, can happen on her scalp, neck, belly and pelvic area. She has something that starts with \"m\" that she uses to help. She has joint pain in her knees and hands. She gets pain in her hips. She has livedo reticularis on her legs, particularly on her knees. She reports having a history of psoriasis that resolved after going tanning. She gets low grade temps, around . She has a pacemaker that does allow her to have MRIs.          Review of Systems:     Constitutional: negative  Skin: negative  Eyes: negative  Ears/Nose/Throat: negative  Respiratory: No shortness of breath, dyspnea on exertion, cough, or hemoptysis  Cardiovascular: negative  Gastrointestinal: negative  Genitourinary: negative  Musculoskeletal: as above  Neurologic: negative  Psychiatric: negative  Hematologic/Lymphatic/Immunologic: negative  Endocrine: negative         Active Problem List:     Patient Active Problem List    Diagnosis Date Noted    Acute on chronic diastolic heart failure (H) 01/05/2024     Priority: Medium    Cardiac pacemaker in situ " [address] [place] [address]    10/23/2023     Priority: Medium     Formatting of this note might be different from the original. Medtronic [id number] Steffi XT DR MRI DOI: 12/31/2018      Lower back pain 10/23/2023     Priority: Medium     Formatting of this note might be different from the original. Created by Conversion      Other allergy, other than to medicinal agents 10/23/2023     Priority: Medium     Formatting of this note might be different from the original. Created by Conversion      Recurrent aphthous ulcer 10/23/2023     Priority: Medium     Formatting of this note might be different from the original. Created by Conversion      Temporomandibular joint disorder 10/23/2023     Priority: Medium     Formatting of this note might be different from the original. Created by Conversion Replacement Utility updated for latest IMO load      Vitamin D deficiency 10/23/2023     Priority: Medium     Formatting of this note might be different from the original. Created by Conversion Replacement Utility updated for latest IMO load      ROSARIO (dyspnea on exertion) 09/12/2023     Priority: Medium    Abnormal cardiovascular stress test 09/12/2023     Priority: Medium    Status post coronary angiogram 09/12/2023     Priority: Medium    Status post percutaneous transluminal coronary angioplasty 09/12/2023     Priority: Medium    Type 2 diabetes mellitus without complication, without long-term current use of insulin (H) 09/11/2023     Priority: Medium    Type 2 diabetes mellitus with diabetic peripheral angiopathy without gangrene, without long-term current use of insulin (H) 06/02/2020     Priority: Medium    Pacemaker battery depletion 12/19/2018     Priority: Medium     Formatting of this note might be different from the original. Added automatically from request for surgery [id number]      Statin intolerance 08/13/2018     Priority: Medium    Angina at rest (H24) 06/30/2018     Priority: Medium    Autoimmune disease (H24) 06/30/2018     Priority: Medium      Formatting of this note might be different from the original. She indicates she has been treated at [facility] for some type of autoimmune disease.  More specific details not available.  Prednisone recommended but she has not taken it for over a month (June 30, 2018); chronically elevated sedimentation rate      Bicuspid aortic valve 01/15/2018     Priority: Medium    Vitamin B deficiency 12/04/2017     Priority: Medium    Tobacco dependence syndrome 06/14/2017     Priority: Medium     per external records      Gastroparesis 06/04/2017     Priority: Medium    Bilateral hearing loss 06/01/2017     Priority: Medium    History of colonic polyps 06/01/2017     Priority: Medium    Stomatitis and mucositis 03/24/2016     Priority: Medium    Major depressive disorder, recurrent episode, mild (H24) 11/24/2015     Priority: Medium    Anxiety 11/10/2015     Priority: Medium    General patient noncompliance 09/26/2015     Priority: Medium    Mouth pain 08/05/2015     Priority: Medium    Somatic symptom disorder, persistent, moderate 09/24/2014     Priority: Medium    Pain, dental 08/12/2014     Priority: Medium    Insomnia disorder related to known organic factor 07/02/2014     Priority: Medium     Formatting of this note might be different from the original. Severe head pain preventing restorative sleep      New daily persistent headache 06/18/2014     Priority: Medium    Adenomatous polyp of colon 11/19/2013     Priority: Medium    Contusion of head 10/24/2013     Priority: Medium    PAD (peripheral artery disease) (H24) 12/29/2010     Priority: Medium     Formatting of this note might be different from the original.  Created by Conversion  Peripheral Vascular Disease (PVD)  per external records      Upper respiratory infection 12/29/2010     Priority: Medium    Asthma 04/06/2010     Priority: Medium    Coronary atherosclerosis 04/06/2010     Priority: Medium     Coronary Artery Disease (CAD) NOS  Formatting of this note  might be different from the original.  LAD stenting 2007, 2009      Arm pain 04/06/2010     Priority: Medium    Lipid disorder 04/06/2010     Priority: Medium    Chronic back pain 04/06/2010     Priority: Medium    Vasovagal syncope 04/06/2010     Priority: Medium     Formatting of this note might be different from the original. Overview: History of neurocardiogenic syncope Formatting of this note might be different from the original. History of neurocardiogenic syncope Formatting of this note might be different from the original. neurocardiogenic Formatting of this note might be different from the original. History of neurocardiogenic syncope      Dizziness and giddiness 12/11/2006     Priority: Medium    Mild intermittent asthma with exacerbation 11/10/2006     Priority: Medium     Qvar and albuterol, usually 2-3 days/week.      Esophageal reflux 11/10/2006     Priority: Medium    Hyperlipidemia 11/10/2006     Priority: Medium     Problem list name updated by automated process. Provider to review      Attention deficit disorder 11/10/2006     Priority: Medium     Problem list name updated by automated process. Provider to review      Chronic rhinitis 11/10/2006     Priority: Medium     Zyrtec daily      Sprain of lumbar region 09/08/2006     Priority: Medium    PTSD (post-traumatic stress disorder) 01/01/2006     Priority: Medium    Dissociative disorder or reaction 06/15/2005     Priority: Medium     Formatting of this note might be different from the original. Epic              Past Medical History:     Past Medical History:   Diagnosis Date    Dysphagia     Enlarged tongue     Myalgia     Polyarthralgia     Sicca (H24)      Past Surgical History:   Procedure Laterality Date    APPENDECTOMY      CARDIAC CATHETERIZATION      CORONARY ANGIOPLASTY      CORONARY STENT PLACEMENT      CV CORONARY ANGIOGRAM N/A 9/12/2023    Procedure: Coronary Angiogram;  Surgeon: Arnie Queen MD;  Location: [facility] CATH LAB CV     CV LEFT HEART CATH N/A 9/12/2023    Procedure: Left Heart Catheterization;  Surgeon: Arnie Queen MD;  Location: [facility] CATH LAB CV    CV PCI N/A 9/12/2023    Procedure: Percutaneous Coronary Intervention;  Surgeon: Arnie Queen MD;  Location: [facility] CATH LAB CV    HC REVISE MEDIAN N/CARPAL TUNNEL SURG      Description: Neuroplasty Decompression Median Nerve At Carpal Tunnel;  Recorded: 09/19/2008;    IMPLANT PACEMAKER      INSERT / REPLACE / REMOVE PACEMAKER      IR MISCELLANEOUS PROCEDURE  2/2/2009    RI VAGINAL HYSTERECTOMY,UTERUS 250 GMS/<      Description: Vaginal Hysterectomy;  Recorded: 12/16/2011;    SURGICAL HISTORY OF -       vag hyst    SURGICAL HISTORY OF -       carpal tunnel bilateral    SURGICAL HISTORY OF -       arm surgery right side.      ZZC APPENDECTOMY      Description: Appendectomy;  Recorded: 09/19/2008;  Comments: and ovarian cyst            Social History:     Social History     Socioeconomic History    Marital status:      Spouse name: Not on file    Number of children: Not on file    Years of education: Not on file    Highest education level: Not on file   Occupational History    Not on file   Tobacco Use    Smoking status: Every Day     Current packs/day: 1.00     Average packs/day: 1 pack/day for 45.0 years (45.0 ttl pk-yrs)     Types: Cigarettes     Passive exposure: Current    Smokeless tobacco: Never   Vaping Use    Vaping status: Some Days    Substances: Nicotine, THC, CBD    Devices: Disposable, Pre-filled or refillable cartridge, Refillable tank, Pre-filled pod   Substance and Sexual Activity    Alcohol use: No    Drug use: Yes     Types: Marijuana    Sexual activity: Not on file   Other Topics Concern    Not on file   Social History Narrative    Not on file     Social Determinants of Health     Financial Resource Strain: Low Risk  (2/5/2024)    Financial Resource Strain     Within the past 12 months, have you or your family members you live with been  unable to get utilities (heat, electricity) when it was really needed?: No   Food Insecurity: Low Risk  (2/5/2024)    Food Insecurity     Within the past 12 months, did you worry that your food would run out before you got money to buy more?: No     Within the past 12 months, did the food you bought just not last and you didn t have money to get more?: No   Transportation Needs: Low Risk  (2/5/2024)    Transportation Needs     Within the past 12 months, has lack of transportation kept you from medical appointments, getting your medicines, non-medical meetings or appointments, work, or from getting things that you need?: No   Physical Activity: Not on file   Stress: Not on file   Social Connections: Not on file   Interpersonal Safety: Unknown (1/5/2024)    [facility]    Humiliation, Afraid, Rape, and Kick questionnaire     Fear of Current or Ex-Partner: Not on file     Emotionally Abused: Not on file     Physically Abused: No     Sexually Abused: No   Housing Stability: Low Risk  (2/5/2024)    Housing Stability     Do you have housing? : Yes     Are you worried about losing your housing?: No          Family History:     Family History   Problem Relation Age of Onset    Hypertension Mother     Thyroid Disease Mother     Alcoholism Mother     Heart Disease Father     Alcoholism Father     Diabetes Sister     Alzheimer Disease Maternal Grandmother     Heart Disease Maternal Grandfather     Cerebrovascular Disease Paternal Grandmother     C.A.D. No family hx of             Allergies:     Allergies   Allergen Reactions    Bee Venom Anaphylaxis and Unknown     unknown  Unknown    unknown  Unknown    Indomethacin Diarrhea and Hives     Stomach pain, sweats, shakes, not good combo with heart meds either.      Sumatriptan Anaphylaxis, Other (See Comments) and Unknown     unknown  Throat closing      Imitrex [Sumatriptan Succinate]     Levonorgestrel-Ethinyl Estrad Other (See Comments)     "Sulfa Antibiotics     Gabapentin Anxiety and Nausea and Vomiting    Vancomycin Itching            Medications:     Current Outpatient Medications   Medication Sig Dispense Refill    aspirin 81 MG EC tablet Take 1 tablet (81 mg) by mouth daily Start tomorrow. 30 tablet 3    blood glucose (CONTOUR NEXT TEST) test strip Use to test blood sugar 2 times daily or as directed. 200 strip 1    blood glucose (NO BRAND SPECIFIED) test strip Use to test blood sugar 2 times daily or as directed. 300 strip 3    blood glucose monitoring (CONTOUR NEXT MONITOR W/DEVICE KIT) meter device kit 1 each      cyanocobalamin (CYANOCOBALAMIN) 1000 MCG/ML injection Inject 1 mL (1,000 mcg) into the muscle every 30 days 12 mL 1    EPINEPHrine (ANY BX GENERIC EQUIV) 0.3 MG/0.3ML injection 2-pack Inject 0.3 mL (0.3 mg) intramuscularly once as needed for up to 1 dose.*      FLOVENT  MCG/ACT inhaler INHALE 1 PUFF BY MOUTH TWO TIMES DAILY 12 g 12    furosemide (LASIX) 20 MG tablet Take 1 tablet (20 mg) by mouth daily 90 tablet 3    insulin syringe-needle U-100 (30G X 1/2\" 1 ML) 30G X 1/2\" 1 ML miscellaneous Use 1 syringes daily or as directed. For b12 12 each 0    ipratropium - albuterol 0.5 mg/2.5 mg/3 mL (DUONEB) 0.5-2.5 (3) MG/3ML neb solution Inhale 1 vial (3 mLs) into the lungs every 4 hours as needed for shortness of breath, wheezing or cough 90 mL 0    nitroGLYcerin (NITROLINGUAL) 0.4 MG/SPRAY spray PLACE 1 SPRAY (0.4 MG) UNDER TONGUE EVERY 5 MINUTES AS NEEDED FOR CHEST PAIN FOR UP TO 3 DOSES. CALL 911 IF NO RELIEF AFTER FIRST SPRAY*      rosuvastatin (CRESTOR) 40 MG tablet Take 1 tablet (40 mg) by mouth daily 90 tablet 3    traZODone (DESYREL) 50 MG tablet Take 1-2 tablets ( mg) by mouth at bedtime 60 tablet 3    VENTOLIN  (90 Base) MCG/ACT inhaler INHALE 1-2 PUFFS BY MOUTH EVERY 4 HOURS AS NEEDED FOR WHEEZING.*      vitamin B-12 (CYANOCOBALAMIN) 1000 MCG tablet Take 1,000 mcg by mouth      vitamin D2 (ERGOCALCIFEROL) " 98148 units (1250 mcg) capsule Take 1 capsule (50,000 Units) by mouth once a week 12 capsule 0    nicotine (NICODERM CQ) 14 MG/24HR 24 hr patch Place 1 patch onto the skin every 24 hours (Patient not taking: Reported on 5/23/2024) 30 patch 0    nicotine (NICODERM CQ) 21 MG/24HR 24 hr patch Place 1 patch onto the skin every 24 hours (Patient not taking: Reported on 5/23/2024) 30 patch 0    nicotine (NICODERM CQ) 7 MG/24HR 24 hr patch Place 1 patch onto the skin every 24 hours (Patient not taking: Reported on 5/23/2024) 30 patch 0            Physical Exam:   Blood pressure 123/74, pulse 71, weight 49 kg (108 lb), SpO2 99%, not currently breastfeeding.  Wt Readings from Last 6 Encounters:   05/23/24 49 kg (108 lb)   05/07/24 47.6 kg (105 lb)   02/09/24 49.4 kg (109 lb)   02/05/24 48.1 kg (106 lb)   10/23/23 50.4 kg (111 lb 1.6 oz)   09/21/23 49.9 kg (110 lb)     Constitutional: well-developed, appearing stated age; cooperative  Eyes: nl PERRLA, conjunctiva, sclera  ENT: nl external ears, nose, hearing, lips, teeth, gums, throat. No mucositis. No mucous membrane lesions, decreased saliva pool  Neck: no mass or thyroid enlargement  Resp: no shortness of breath with normal conversation  Lymph: no cervical, supraclavicular or epitrochlear nodes  MS: No active synovitis or altered joint anatomy. Full joint ROM. Normal  strength. No dactylitis,  tenosynovitis, enthesopathy.   Skin: no nail pitting, alopecia, rash, nodules or lesions.   Psych: nl judgement, orientation, memory, affect.           Data:   Imaging:  No recent joint imaging     Laboratory:  No recent rheum labs

## 2024-05-23 NOTE — PATIENT INSTRUCTIONS
After Visit Instructions:     Thank you for coming to [facility] for your care. It is my goal to partner with you to help you reach your optimal state of health.       Plan:     Schedule follow-up with Kathy Velez PA-C as needed   Imaging: xray hands and knees  Labs: VITOR, CCP antibody, Rheumatoid Factor, CRP, Sed Rate, SSA, SSB      Kathy Velez PA-C  [facility]  [facility]    Contact information: [facility]  Clinic Number:  [phone]  Please call or send a zintin message with any questions about your care

## 2024-05-24 ENCOUNTER — TELEPHONE (OUTPATIENT)
Dept: MRI IMAGING | Facility: HOSPITAL | Age: 64
End: 2024-05-24

## 2024-05-24 LAB
ANA PAT SER IF-IMP: ABNORMAL
ANA SER QL IF: ABNORMAL
ANA TITR SER IF: ABNORMAL {TITER}
CCP AB SER IA-ACNC: <0.4 U/ML
ENA SS-A AB SER IA-ACNC: <0.5 U/ML
ENA SS-A AB SER IA-ACNC: NEGATIVE
ENA SS-B IGG SER IA-ACNC: <0.6 U/ML
ENA SS-B IGG SER IA-ACNC: NEGATIVE

## 2024-05-24 NOTE — TELEPHONE ENCOUNTER
Is the implanted device safe for MRI Exam? Yes  Is this device 3T compatible? Yes  Device Type: Pacemaker      Device Information: Medtronic, PPM [place] (D)      Cardiology Orders for Device Programming     -- Yes -- The patient has a MRI conditional pulse generator and leads from the same     -- Yes -- The pulse generator and leads have been implanted for at least 6 weeks. (Does not apply to [employer] devices)    -- Yes-- The device is implanted in the right or left pectoral region    -- Yes -- There are not any additional active cardiac devices, abandoned leads, lead extenders or adapters    -- Yes -- The device lead impedance measurements are within the normal range per  guidelines    -- Yes -- If the patient is pacemaker dependent the thresholds are less than or equal to 2.0V @ 0.4ms.    -- Yes -- RA and RV leads are programmed to bipolar pacing operation or pacing off. If no, CONTACT THE DEVICE REP FOR PROGRAMMING     Date of last Remote Device check: 2/23/2024  Results of last Device check:  1.   Right atrium impedance: 551  2.   Right ventricle impedance: 513  3.   Left ventricle impedance: n/a     4.   Right atrium threshold: 0.5 V @ 0.4 ms  5.   Right ventricle threshold: 2.5 V @ 0.4 ms  6.   Left ventricle threshold: n/a     Device programming during the scan guidelines   Pacing Mode (check one): Use the recommended pacing mode per Medtronic MRI Access Application  Pacing Rate: 80  bpm or >intrinsic   If remote reprogramming is applicable, please contact the Rep to assist           Norman Morrison RN

## 2024-05-24 NOTE — TELEPHONE ENCOUNTER
Reason for call: MR Device Safety Clearance    Please create a MR Device Safety order  Other is reporting patient has Pacemaker  Type of MR exam: MR Lumbar Spine w/o Contrast and MR Cervical Spine w/o Contrast

## 2024-05-30 ENCOUNTER — ANCILLARY PROCEDURE (OUTPATIENT)
Dept: CARDIOLOGY | Facility: CLINIC | Age: 64
End: 2024-05-30
Attending: INTERNAL MEDICINE
Payer: MEDICARE

## 2024-05-30 DIAGNOSIS — I49.5 SICK SINUS SYNDROME (H): ICD-10-CM

## 2024-05-30 DIAGNOSIS — Z95.0 PACEMAKER: ICD-10-CM

## 2024-06-03 LAB
MDC_IDC_LEAD_CONNECTION_STATUS: NORMAL
MDC_IDC_LEAD_CONNECTION_STATUS: NORMAL
MDC_IDC_LEAD_IMPLANT_DT: NORMAL
MDC_IDC_LEAD_IMPLANT_DT: NORMAL
MDC_IDC_LEAD_LOCATION: NORMAL
MDC_IDC_LEAD_LOCATION: NORMAL
MDC_IDC_LEAD_LOCATION_DETAIL_1: NORMAL
MDC_IDC_LEAD_LOCATION_DETAIL_1: NORMAL
MDC_IDC_LEAD_MFG: NORMAL
MDC_IDC_LEAD_MFG: NORMAL
MDC_IDC_LEAD_MODEL: NORMAL
MDC_IDC_LEAD_MODEL: NORMAL
MDC_IDC_LEAD_POLARITY_TYPE: NORMAL
MDC_IDC_LEAD_POLARITY_TYPE: NORMAL
MDC_IDC_LEAD_SERIAL: NORMAL
MDC_IDC_LEAD_SERIAL: NORMAL
MDC_IDC_MSMT_BATTERY_DTM: NORMAL
MDC_IDC_MSMT_BATTERY_REMAINING_LONGEVITY: 99 MO
MDC_IDC_MSMT_BATTERY_RRT_TRIGGER: 2.62
MDC_IDC_MSMT_BATTERY_STATUS: NORMAL
MDC_IDC_MSMT_BATTERY_VOLTAGE: 2.99 V
MDC_IDC_MSMT_LEADCHNL_RA_IMPEDANCE_VALUE: 532 OHM
MDC_IDC_MSMT_LEADCHNL_RA_IMPEDANCE_VALUE: 570 OHM
MDC_IDC_MSMT_LEADCHNL_RA_PACING_THRESHOLD_AMPLITUDE: 0.62 V
MDC_IDC_MSMT_LEADCHNL_RA_PACING_THRESHOLD_PULSEWIDTH: 0.4 MS
MDC_IDC_MSMT_LEADCHNL_RA_SENSING_INTR_AMPL: 2.5 MV
MDC_IDC_MSMT_LEADCHNL_RA_SENSING_INTR_AMPL: 2.5 MV
MDC_IDC_MSMT_LEADCHNL_RV_IMPEDANCE_VALUE: 418 OHM
MDC_IDC_MSMT_LEADCHNL_RV_IMPEDANCE_VALUE: 532 OHM
MDC_IDC_MSMT_LEADCHNL_RV_PACING_THRESHOLD_AMPLITUDE: 2.5 V
MDC_IDC_MSMT_LEADCHNL_RV_PACING_THRESHOLD_PULSEWIDTH: 0.4 MS
MDC_IDC_MSMT_LEADCHNL_RV_SENSING_INTR_AMPL: 3.38 MV
MDC_IDC_MSMT_LEADCHNL_RV_SENSING_INTR_AMPL: 3.38 MV
MDC_IDC_PG_IMPLANT_DTM: NORMAL
MDC_IDC_PG_MFG: NORMAL
MDC_IDC_PG_MODEL: NORMAL
MDC_IDC_PG_SERIAL: NORMAL
MDC_IDC_PG_TYPE: NORMAL
MDC_IDC_SESS_CLINIC_NAME: NORMAL
MDC_IDC_SESS_DTM: NORMAL
MDC_IDC_SESS_TYPE: NORMAL
MDC_IDC_SET_BRADY_AT_MODE_SWITCH_RATE: 171 {BEATS}/MIN
MDC_IDC_SET_BRADY_HYSTRATE: NORMAL
MDC_IDC_SET_BRADY_LOWRATE: 60 {BEATS}/MIN
MDC_IDC_SET_BRADY_MAX_SENSOR_RATE: 130 {BEATS}/MIN
MDC_IDC_SET_BRADY_MAX_TRACKING_RATE: 130 {BEATS}/MIN
MDC_IDC_SET_BRADY_MODE: NORMAL
MDC_IDC_SET_BRADY_PAV_DELAY_LOW: 300 MS
MDC_IDC_SET_BRADY_SAV_DELAY_LOW: 250 MS
MDC_IDC_SET_LEADCHNL_RA_PACING_AMPLITUDE: 2 V
MDC_IDC_SET_LEADCHNL_RA_PACING_ANODE_ELECTRODE_1: NORMAL
MDC_IDC_SET_LEADCHNL_RA_PACING_ANODE_LOCATION_1: NORMAL
MDC_IDC_SET_LEADCHNL_RA_PACING_CAPTURE_MODE: NORMAL
MDC_IDC_SET_LEADCHNL_RA_PACING_CATHODE_ELECTRODE_1: NORMAL
MDC_IDC_SET_LEADCHNL_RA_PACING_CATHODE_LOCATION_1: NORMAL
MDC_IDC_SET_LEADCHNL_RA_PACING_POLARITY: NORMAL
MDC_IDC_SET_LEADCHNL_RA_PACING_PULSEWIDTH: 0.4 MS
MDC_IDC_SET_LEADCHNL_RA_SENSING_ANODE_ELECTRODE_1: NORMAL
MDC_IDC_SET_LEADCHNL_RA_SENSING_ANODE_LOCATION_1: NORMAL
MDC_IDC_SET_LEADCHNL_RA_SENSING_CATHODE_ELECTRODE_1: NORMAL
MDC_IDC_SET_LEADCHNL_RA_SENSING_CATHODE_LOCATION_1: NORMAL
MDC_IDC_SET_LEADCHNL_RA_SENSING_POLARITY: NORMAL
MDC_IDC_SET_LEADCHNL_RA_SENSING_SENSITIVITY: 0.3 MV
MDC_IDC_SET_LEADCHNL_RV_PACING_AMPLITUDE: 3.5 V
MDC_IDC_SET_LEADCHNL_RV_PACING_ANODE_ELECTRODE_1: NORMAL
MDC_IDC_SET_LEADCHNL_RV_PACING_ANODE_LOCATION_1: NORMAL
MDC_IDC_SET_LEADCHNL_RV_PACING_CAPTURE_MODE: NORMAL
MDC_IDC_SET_LEADCHNL_RV_PACING_CATHODE_ELECTRODE_1: NORMAL
MDC_IDC_SET_LEADCHNL_RV_PACING_CATHODE_LOCATION_1: NORMAL
MDC_IDC_SET_LEADCHNL_RV_PACING_POLARITY: NORMAL
MDC_IDC_SET_LEADCHNL_RV_PACING_PULSEWIDTH: 1 MS
MDC_IDC_SET_LEADCHNL_RV_SENSING_ANODE_ELECTRODE_1: NORMAL
MDC_IDC_SET_LEADCHNL_RV_SENSING_ANODE_LOCATION_1: NORMAL
MDC_IDC_SET_LEADCHNL_RV_SENSING_CATHODE_ELECTRODE_1: NORMAL
MDC_IDC_SET_LEADCHNL_RV_SENSING_CATHODE_LOCATION_1: NORMAL
MDC_IDC_SET_LEADCHNL_RV_SENSING_POLARITY: NORMAL
MDC_IDC_SET_LEADCHNL_RV_SENSING_SENSITIVITY: 0.9 MV
MDC_IDC_SET_ZONE_DETECTION_INTERVAL: 350 MS
MDC_IDC_SET_ZONE_DETECTION_INTERVAL: 400 MS
MDC_IDC_SET_ZONE_STATUS: NORMAL
MDC_IDC_SET_ZONE_STATUS: NORMAL
MDC_IDC_SET_ZONE_TYPE: NORMAL
MDC_IDC_SET_ZONE_VENDOR_TYPE: NORMAL
MDC_IDC_STAT_AT_BURDEN_PERCENT: 0 %
MDC_IDC_STAT_AT_DTM_END: NORMAL
MDC_IDC_STAT_AT_DTM_START: NORMAL
MDC_IDC_STAT_BRADY_AP_VP_PERCENT: 1.16 %
MDC_IDC_STAT_BRADY_AP_VS_PERCENT: 86.13 %
MDC_IDC_STAT_BRADY_AS_VP_PERCENT: 0.02 %
MDC_IDC_STAT_BRADY_AS_VS_PERCENT: 12.69 %
MDC_IDC_STAT_BRADY_DTM_END: NORMAL
MDC_IDC_STAT_BRADY_DTM_START: NORMAL
MDC_IDC_STAT_BRADY_RA_PERCENT_PACED: 87.32 %
MDC_IDC_STAT_BRADY_RV_PERCENT_PACED: 1.18 %
MDC_IDC_STAT_EPISODE_RECENT_COUNT: 0
MDC_IDC_STAT_EPISODE_RECENT_COUNT_DTM_END: NORMAL
MDC_IDC_STAT_EPISODE_RECENT_COUNT_DTM_START: NORMAL
MDC_IDC_STAT_EPISODE_TOTAL_COUNT: 0
MDC_IDC_STAT_EPISODE_TOTAL_COUNT: 0
MDC_IDC_STAT_EPISODE_TOTAL_COUNT: 1
MDC_IDC_STAT_EPISODE_TOTAL_COUNT: 5
MDC_IDC_STAT_EPISODE_TOTAL_COUNT: 6
MDC_IDC_STAT_EPISODE_TOTAL_COUNT_DTM_END: NORMAL
MDC_IDC_STAT_EPISODE_TOTAL_COUNT_DTM_START: NORMAL
MDC_IDC_STAT_EPISODE_TYPE: NORMAL
MDC_IDC_STAT_TACHYTHERAPY_RECENT_DTM_END: NORMAL
MDC_IDC_STAT_TACHYTHERAPY_RECENT_DTM_START: NORMAL
MDC_IDC_STAT_TACHYTHERAPY_TOTAL_DTM_END: NORMAL
MDC_IDC_STAT_TACHYTHERAPY_TOTAL_DTM_START: NORMAL

## 2024-06-03 PROCEDURE — 93294 REM INTERROG EVL PM/LDLS PM: CPT | Performed by: INTERNAL MEDICINE

## 2024-06-03 PROCEDURE — 93296 REM INTERROG EVL PM/IDS: CPT | Performed by: INTERNAL MEDICINE

## 2024-06-04 ENCOUNTER — OFFICE VISIT (OUTPATIENT)
Dept: CARDIOLOGY | Facility: CLINIC | Age: 64
End: 2024-06-04
Payer: MEDICARE

## 2024-06-04 VITALS
DIASTOLIC BLOOD PRESSURE: 73 MMHG | SYSTOLIC BLOOD PRESSURE: 154 MMHG | HEART RATE: 70 BPM | WEIGHT: 107 LBS | OXYGEN SATURATION: 98 % | RESPIRATION RATE: 16 BRPM | BODY MASS INDEX: 21.25 KG/M2

## 2024-06-04 DIAGNOSIS — R06.09 DOE (DYSPNEA ON EXERTION): Primary | ICD-10-CM

## 2024-06-04 DIAGNOSIS — I25.10 ATHEROSCLEROSIS OF CORONARY ARTERY OF NATIVE HEART WITHOUT ANGINA PECTORIS, UNSPECIFIED VESSEL OR LESION TYPE: ICD-10-CM

## 2024-06-04 LAB
ANION GAP SERPL CALCULATED.3IONS-SCNC: 13 MMOL/L (ref 7–15)
BUN SERPL-MCNC: 7.9 MG/DL (ref 8–23)
CALCIUM SERPL-MCNC: 9.3 MG/DL (ref 8.8–10.2)
CHLORIDE SERPL-SCNC: 100 MMOL/L (ref 98–107)
CREAT SERPL-MCNC: 0.76 MG/DL (ref 0.51–0.95)
DEPRECATED HCO3 PLAS-SCNC: 27 MMOL/L (ref 22–29)
EGFRCR SERPLBLD CKD-EPI 2021: 88 ML/MIN/1.73M2
GLUCOSE SERPL-MCNC: 121 MG/DL (ref 70–99)
NT-PROBNP SERPL-MCNC: 260 PG/ML (ref 0–900)
POTASSIUM SERPL-SCNC: 3.8 MMOL/L (ref 3.4–5.3)
SODIUM SERPL-SCNC: 140 MMOL/L (ref 135–145)

## 2024-06-04 PROCEDURE — 80048 BASIC METABOLIC PNL TOTAL CA: CPT | Performed by: INTERNAL MEDICINE

## 2024-06-04 PROCEDURE — 99214 OFFICE O/P EST MOD 30 MIN: CPT | Performed by: INTERNAL MEDICINE

## 2024-06-04 PROCEDURE — 83880 ASSAY OF NATRIURETIC PEPTIDE: CPT | Performed by: INTERNAL MEDICINE

## 2024-06-04 PROCEDURE — 36415 COLL VENOUS BLD VENIPUNCTURE: CPT | Performed by: INTERNAL MEDICINE

## 2024-06-04 RX ORDER — HYDROXYZINE PAMOATE 25 MG/1
25 CAPSULE ORAL
COMMUNITY
Start: 2024-05-31

## 2024-06-04 RX ORDER — CLOPIDOGREL BISULFATE 75 MG/1
75 TABLET ORAL DAILY
COMMUNITY
Start: 2024-06-01 | End: 2024-08-29

## 2024-06-04 NOTE — PROGRESS NOTES
Cardiology Progress Note     Assessment:  Coronary artery disease, multivessel history of remote LAD stenting in 2012, restenosis of LAD stent status post reintervention to LAD and diagonal in September 2023, chronic subtotal occlusion of small nondominant RCA/ failed attempt of PCI in September 2023, no clear-cut angina  Chronic dyspnea without overt fluid overload, suspect combination of severe COPD and possibly heart failure with preserved ejection fraction  Recent CVA ischemic with intracranial stenosis, left-sided weakness, no evidence of arrhythmias during hospitalization or pacemaker follow-up  Permanent pacemaker for neurogenic syncope, normal function  Hypercholesterolemia , suboptimal control questionable compliance with atorvastatin  Bipolar disorder  COPD with predominant emphysema  Mild pulmonary hypertension    Plan:  Check BNP and basic metabolic panel today and adjust diuretics accordingly.  I stressed the importance of compliance with atorvastatin in order to prevent the progression of coronary artery disease.  We spoke about importance of complete tobacco cessation.    Follow-up based on the results of BNP    Subjective:   This is 63 year old female who comes in today today for follow-up visit.  I have not seen her since follow-up last year.  She was having increasing chest pain and shortness of breath at that time.  She had abnormal stress test and underwent coronary angiogram.  She had long creation of stenosis of LAD and new diagonal stenosis.  Those lesions were treated with improvement of this degree of stenosis.  She also had small RCA proximal stenosis which was not amendable to intervention.  In January she returned to [facility] of increasing shortness of breath.  She was treated with diuretics for presumed heart failure.  Echocardiogram showed normal systolic function there was no troponin elevation.  In May of this year she developed acute left-sided weakness.  [facility]  She was found to have intracranial stenosis.  She was treated in conservative manner.  Today she reports no chest pain but she does feel short of breath fairly easily.  She still has residual left-sided weakness.  Her speech has improved and she has no slurring and longer.    Review of Systems:   Negative other than history of present illness    Objective:   BP (!) 154/73 (BP Location: Right arm, Patient Position: Sitting, Cuff Size: Adult Regular)   Pulse 70   Resp 16   Wt 48.5 kg (107 lb)   LMP  (LMP Unknown)   SpO2 98%   BMI 21.25 kg/m    Physical Exam:  GENERAL: no distress  NECK: Elevated JVD  LUNGS: Decreased breath sounds  CARDIAC: regular rhythm, S1 & S2 normal.  No heaves, thrills, gallops or murmurs.  ABDOMEN: flat, negative hepatosplenomegaly, soft and non-tender.  EXTREMITIES: No evidence of cyanosis, clubbing or edema.    Current Outpatient Medications   Medication Sig Dispense Refill    aspirin 81 MG EC tablet Take 1 tablet (81 mg) by mouth daily Start tomorrow. 30 tablet 3    blood glucose (CONTOUR NEXT TEST) test strip Use to test blood sugar 2 times daily or as directed. 200 strip 1    blood glucose (NO BRAND SPECIFIED) test strip Use to test blood sugar 2 times daily or as directed. 300 strip 3    blood glucose monitoring (CONTOUR NEXT MONITOR W/DEVICE KIT) meter device kit 1 each      clopidogrel (PLAVIX) 75 MG tablet Take 75 mg by mouth daily      cyanocobalamin (CYANOCOBALAMIN) 1000 MCG/ML injection Inject 1 mL (1,000 mcg) into the muscle every 30 days 12 mL 1    FLOVENT  MCG/ACT inhaler INHALE 1 PUFF BY MOUTH TWO TIMES DAILY 12 g 12    furosemide (LASIX) 20 MG tablet Take 1 tablet (20 mg) by mouth daily 90 tablet 3    hydrOXYzine ha (VISTARIL) 25 MG capsule Take 25 mg by mouth      insulin syringe-needle U-100 (30G X 1/2\" 1 ML) 30G X 1/2\" 1 ML miscellaneous Use 1 syringes daily or as directed. For b12 12 each 0    ipratropium - albuterol 0.5 mg/2.5 mg/3 mL (DUONEB) " 0.5-2.5 (3) MG/3ML neb solution Inhale 1 vial (3 mLs) into the lungs every 4 hours as needed for shortness of breath, wheezing or cough 90 mL 0    rosuvastatin (CRESTOR) 40 MG tablet Take 1 tablet (40 mg) by mouth daily 90 tablet 3    VENTOLIN  (90 Base) MCG/ACT inhaler INHALE 1-2 PUFFS BY MOUTH EVERY 4 HOURS AS NEEDED FOR WHEEZING.*      vitamin B-12 (CYANOCOBALAMIN) 1000 MCG tablet Take 1,000 mcg by mouth daily      EPINEPHrine (ANY BX GENERIC EQUIV) 0.3 MG/0.3ML injection 2-pack Inject 0.3 mL (0.3 mg) intramuscularly once as needed for up to 1 dose.*      nicotine (NICODERM CQ) 14 MG/24HR 24 hr patch Place 1 patch onto the skin every 24 hours (Patient not taking: Reported on 5/23/2024) 30 patch 0    nicotine (NICODERM CQ) 21 MG/24HR 24 hr patch Place 1 patch onto the skin every 24 hours (Patient not taking: Reported on 5/23/2024) 30 patch 0    nicotine (NICODERM CQ) 7 MG/24HR 24 hr patch Place 1 patch onto the skin every 24 hours (Patient not taking: Reported on 5/23/2024) 30 patch 0    nitroGLYcerin (NITROLINGUAL) 0.4 MG/SPRAY spray PLACE 1 SPRAY (0.4 MG) UNDER TONGUE EVERY 5 MINUTES AS NEEDED FOR CHEST PAIN FOR UP TO 3 DOSES. CALL 911 IF NO RELIEF AFTER FIRST SPRAY*      traZODone (DESYREL) 50 MG tablet Take 1-2 tablets ( mg) by mouth at bedtime (Patient not taking: Reported on 6/4/2024) 60 tablet 3    vitamin D2 (ERGOCALCIFEROL) 27849 units (1250 mcg) capsule Take 1 capsule (50,000 Units) by mouth once a week 12 capsule 0       Cardiographics:    Pacemaker interrogation 5/31/2024  Device: Medtronic Steffi (D)   Pacing % /Programmed: AP 87%,  1 % @ DDDR 60/130 bpm   Lead(s): Stable measurements and trends.   Battery longevity: 8 years, 3 months estimated   Presenting: APVS, ASVS 78 bpm  Atrial high rates: Since 2/23/2024 none.   Anticoagulant: None.   Ventricular High rates: Since 2/23/2024 none.   Comments: Normal device function. MRI SureScan on 5/30/2024     Coronary angio: 2012  95% proximal  RCA, mild to moderate diffuse less than 30% disease elsewhere    September 2023  1.  Severe diffuse in-stent restenosis of the mid LAD stents.  The proximal stent edge appears smaller in caliber than the mid stent zone.  2.  Severe proximal stenosis of nondominant right coronary artery  3. Moderately severe stenosis of distal LAD beyond mid LAD stents, successfully treated with conventional PTCA.  4.  Successful PTCA of mid LAD stents; PTCA of ostium of jailed diagonal with persistence of normal flow.  Apical segment antegrade flow has been restored.  5.  Unsuccessful PTCA attempt of proximal right coronary artery.  Do not need to reattempt as long as LAD does not develop restenosis within the stent or in the distal segment.  Restenting would be a possible option at that point.  6.  Continue Plavix x1 month     Echo: May 2024 at [facility]     1. Sinus rhythm during study.     2. Normal LV size with mildly increased wall thickness. Calculated biplane LVEF 66%. No regional wall motion abnormalities. (Normal function). Grade 1 diastolic dysfunction.     3. Normal RV size and systolic function.     4. No significant valvular abnormalities.     5. The estimated pulmonary artery systolic pressure is 38 mmHg.     6. Negative bubble study.      Stress test: September 2023    1.The nuclear stress test is abnormal.    2.Negative pharmacological regadenoson ECG for ischemia.    3.There is a small area of mild ischemia in the distal inferoseptal segment(s) of the left ventricle.  No scar seen.    4.The left ventricular ejection fraction at stress is greater than 70%.    5.The patient is at a low risk of future cardiac ischemic events.    A prior study was conducted on 5/10/2017.  The minimal distal inferior ischemia is new, prior study suggesting basal inferior ischemia was felt to be artifactual.   Lab Results    Chemistry/lipid CBC Cardiac Enzymes/BNP/TSH/INR   Recent Labs   Lab Test 09/12/23  0656   CHOL 227*   HDL 52   LDL  "147*   TRIG 139     Recent Labs   Lab Test 09/12/23  0656 09/11/23  1305 08/26/16  1158   * 180* 187*     Recent Labs   Lab Test 05/23/24  1123      POTASSIUM 4.0   CHLORIDE 99   CO2 26   *   BUN 9.8   CR 0.62   GFRESTIMATED >90   MANISHA 9.8     Recent Labs   Lab Test 05/23/24  1123 09/11/23  1305 09/24/19  1424   CR 0.62 0.74 0.74     Recent Labs   Lab Test 05/23/24  1123 09/11/23  1305 09/24/19  1424   A1C 6.6* 6.2* 6.6*          Recent Labs   Lab Test 09/11/23  1305   WBC 9.9   HGB 14.7   HCT 44.1   MCV 93        Recent Labs   Lab Test 09/11/23  1305 12/31/18  0746 12/28/18  0852   HGB 14.7 14.0 13.9    Recent Labs   Lab Test 07/01/18  0706 07/01/18  0053 06/30/18  1613   TROPONINI <0.01 <0.01 <0.01     No results for input(s): \"BNP\", \"NTBNPI\", \"NTBNP\" in the last 40294 hours.  Recent Labs   Lab Test 09/01/23  1029   TSH 1.57     No results for input(s): \"INR\" in the last 04088 hours.                  "

## 2024-06-04 NOTE — LETTER
6/4/2024    Lorena Baeza, APRN CNP  [address] [place] [address]    RE: Kim Correa       Dear Colleague,     I had the pleasure of seeing Kim Correa in the [facility].      Cardiology Progress Note     Assessment:  Coronary artery disease, multivessel history of remote LAD stenting in 2012, restenosis of LAD stent status post reintervention to LAD and diagonal in September 2023, chronic subtotal occlusion of small nondominant RCA/ failed attempt of PCI in September 2023, no clear-cut angina  Chronic dyspnea without overt fluid overload, suspect combination of severe COPD and possibly heart failure with preserved ejection fraction  Recent CVA ischemic with intracranial stenosis, left-sided weakness, no evidence of arrhythmias during hospitalization or pacemaker follow-up  Permanent pacemaker for neurogenic syncope, normal function  Hypercholesterolemia , suboptimal control questionable compliance with atorvastatin  Bipolar disorder  COPD with predominant emphysema  Mild pulmonary hypertension    Plan:  Check BNP and basic metabolic panel today and adjust diuretics accordingly.  I stressed the importance of compliance with atorvastatin in order to prevent the progression of coronary artery disease.  We spoke about importance of complete tobacco cessation.    Follow-up based on the results of BNP    Subjective:   This is 63 year old female who comes in today today for follow-up visit.  I have not seen her since follow-up last year.  She was having increasing chest pain and shortness of breath at that time.  She had abnormal stress test and underwent coronary angiogram.  She had long creation of stenosis of LAD and new diagonal stenosis.  Those lesions were treated with improvement of this degree of stenosis.  She also had small RCA proximal stenosis which was not amendable to intervention.  In January she returned to [facility] of increasing shortness of breath.  She was  treated with diuretics for presumed heart failure.  Echocardiogram showed normal systolic function there was no troponin elevation.  In May of this year she developed acute left-sided weakness.  She was taken to [facility] again.  She was found to have intracranial stenosis.  She was treated in conservative manner.  Today she reports no chest pain but she does feel short of breath fairly easily.  She still has residual left-sided weakness.  Her speech has improved and she has no slurring and longer.    Review of Systems:   Negative other than history of present illness    Objective:   BP (!) 154/73 (BP Location: Right arm, Patient Position: Sitting, Cuff Size: Adult Regular)   Pulse 70   Resp 16   Wt 48.5 kg (107 lb)   LMP  (LMP Unknown)   SpO2 98%   BMI 21.25 kg/m    Physical Exam:  GENERAL: no distress  NECK: Elevated JVD  LUNGS: Decreased breath sounds  CARDIAC: regular rhythm, S1 & S2 normal.  No heaves, thrills, gallops or murmurs.  ABDOMEN: flat, negative hepatosplenomegaly, soft and non-tender.  EXTREMITIES: No evidence of cyanosis, clubbing or edema.    Current Outpatient Medications   Medication Sig Dispense Refill    aspirin 81 MG EC tablet Take 1 tablet (81 mg) by mouth daily Start tomorrow. 30 tablet 3    blood glucose (CONTOUR NEXT TEST) test strip Use to test blood sugar 2 times daily or as directed. 200 strip 1    blood glucose (NO BRAND SPECIFIED) test strip Use to test blood sugar 2 times daily or as directed. 300 strip 3    blood glucose monitoring (CONTOUR NEXT MONITOR W/DEVICE KIT) meter device kit 1 each      clopidogrel (PLAVIX) 75 MG tablet Take 75 mg by mouth daily      cyanocobalamin (CYANOCOBALAMIN) 1000 MCG/ML injection Inject 1 mL (1,000 mcg) into the muscle every 30 days 12 mL 1    FLOVENT  MCG/ACT inhaler INHALE 1 PUFF BY MOUTH TWO TIMES DAILY 12 g 12    furosemide (LASIX) 20 MG tablet Take 1 tablet (20 mg) by mouth daily 90 tablet 3    hydrOXYzine ha (VISTARIL) 25 MG capsule  "Take 25 mg by mouth      insulin syringe-needle U-100 (30G X 1/2\" 1 ML) 30G X 1/2\" 1 ML miscellaneous Use 1 syringes daily or as directed. For b12 12 each 0    ipratropium - albuterol 0.5 mg/2.5 mg/3 mL (DUONEB) 0.5-2.5 (3) MG/3ML neb solution Inhale 1 vial (3 mLs) into the lungs every 4 hours as needed for shortness of breath, wheezing or cough 90 mL 0    rosuvastatin (CRESTOR) 40 MG tablet Take 1 tablet (40 mg) by mouth daily 90 tablet 3    VENTOLIN  (90 Base) MCG/ACT inhaler INHALE 1-2 PUFFS BY MOUTH EVERY 4 HOURS AS NEEDED FOR WHEEZING.*      vitamin B-12 (CYANOCOBALAMIN) 1000 MCG tablet Take 1,000 mcg by mouth daily      EPINEPHrine (ANY BX GENERIC EQUIV) 0.3 MG/0.3ML injection 2-pack Inject 0.3 mL (0.3 mg) intramuscularly once as needed for up to 1 dose.*      nicotine (NICODERM CQ) 14 MG/24HR 24 hr patch Place 1 patch onto the skin every 24 hours (Patient not taking: Reported on 5/23/2024) 30 patch 0    nicotine (NICODERM CQ) 21 MG/24HR 24 hr patch Place 1 patch onto the skin every 24 hours (Patient not taking: Reported on 5/23/2024) 30 patch 0    nicotine (NICODERM CQ) 7 MG/24HR 24 hr patch Place 1 patch onto the skin every 24 hours (Patient not taking: Reported on 5/23/2024) 30 patch 0    nitroGLYcerin (NITROLINGUAL) 0.4 MG/SPRAY spray PLACE 1 SPRAY (0.4 MG) UNDER TONGUE EVERY 5 MINUTES AS NEEDED FOR CHEST PAIN FOR UP TO 3 DOSES. CALL 911 IF NO RELIEF AFTER FIRST SPRAY*      traZODone (DESYREL) 50 MG tablet Take 1-2 tablets ( mg) by mouth at bedtime (Patient not taking: Reported on 6/4/2024) 60 tablet 3    vitamin D2 (ERGOCALCIFEROL) 51236 units (1250 mcg) capsule Take 1 capsule (50,000 Units) by mouth once a week 12 capsule 0       Cardiographics:    Pacemaker interrogation 5/31/2024  Device: Medtronic Steffi (D)   Pacing % /Programmed: AP 87%,  1 % @ DDDR 60/130 bpm   Lead(s): Stable measurements and trends.   Battery longevity: 8 years, 3 months estimated   Presenting: APVS, ASVS 78 " bpm  Atrial high rates: Since 2/23/2024 none.   Anticoagulant: None.   Ventricular High rates: Since 2/23/2024 none.   Comments: Normal device function. MRI SureScan on 5/30/2024     Coronary angio: 2012  95% proximal RCA, mild to moderate diffuse less than 30% disease elsewhere    September 2023  1.  Severe diffuse in-stent restenosis of the mid LAD stents.  The proximal stent edge appears smaller in caliber than the mid stent zone.  2.  Severe proximal stenosis of nondominant right coronary artery  3. Moderately severe stenosis of distal LAD beyond mid LAD stents, successfully treated with conventional PTCA.  4.  Successful PTCA of mid LAD stents; PTCA of ostium of jailed diagonal with persistence of normal flow.  Apical segment antegrade flow has been restored.  5.  Unsuccessful PTCA attempt of proximal right coronary artery.  Do not need to reattempt as long as LAD does not develop restenosis within the stent or in the distal segment.  Restenting would be a possible option at that point.  6.  Continue Plavix x1 month     Echo: May 2024 at [facility]     1. Sinus rhythm during study.     2. Normal LV size with mildly increased wall thickness. Calculated biplane LVEF 66%. No regional wall motion abnormalities. (Normal function). Grade 1 diastolic dysfunction.     3. Normal RV size and systolic function.     4. No significant valvular abnormalities.     5. The estimated pulmonary artery systolic pressure is 38 mmHg.     6. Negative bubble study.      Stress test: September 2023    1.The nuclear stress test is abnormal.    2.Negative pharmacological regadenoson ECG for ischemia.    3.There is a small area of mild ischemia in the distal inferoseptal segment(s) of the left ventricle.  No scar seen.    4.The left ventricular ejection fraction at stress is greater than 70%.    5.The patient is at a low risk of future cardiac ischemic events.    A prior study was conducted on 5/10/2017.  The minimal distal inferior ischemia  "is new, prior study suggesting basal inferior ischemia was felt to be artifactual.   Lab Results    Chemistry/lipid CBC Cardiac Enzymes/BNP/TSH/INR   Recent Labs   Lab Test 09/12/23  0656   CHOL 227*   HDL 52   *   TRIG 139     Recent Labs   Lab Test 09/12/23  0656 09/11/23  1305 08/26/16  1158   * 180* 187*     Recent Labs   Lab Test 05/23/24  1123      POTASSIUM 4.0   CHLORIDE 99   CO2 26   *   BUN 9.8   CR 0.62   GFRESTIMATED >90   MANISHA 9.8     Recent Labs   Lab Test 05/23/24  1123 09/11/23  1305 09/24/19  1424   CR 0.62 0.74 0.74     Recent Labs   Lab Test 05/23/24  1123 09/11/23  1305 09/24/19  1424   A1C 6.6* 6.2* 6.6*          Recent Labs   Lab Test 09/11/23  1305   WBC 9.9   HGB 14.7   HCT 44.1   MCV 93        Recent Labs   Lab Test 09/11/23  1305 12/31/18  0746 12/28/18  0852   HGB 14.7 14.0 13.9    Recent Labs   Lab Test 07/01/18  0706 07/01/18  0053 06/30/18  1613   TROPONINI <0.01 <0.01 <0.01     No results for input(s): \"BNP\", \"NTBNPI\", \"NTBNP\" in the last 89138 hours.  Recent Labs   Lab Test 09/01/23  1029   TSH 1.57     No results for input(s): \"INR\" in the last 21072 hours.                      Thank you for allowing me to participate in the care of your patient.      Sincerely,     Jere Garrido MD     [facility]  cc:   Avelino Garrido MD  [address]  [address]  [place] [address]      "

## 2024-06-04 NOTE — LETTER
June 5, 2024      Kim Correa  [address] [id number]        Dear ,    We are writing to inform you of your test results.     Avelino Garrido MD  6/4/2024  4:05 PM CDT       BNP is not  elevated to suggest significant fluid overload.  Her shortness of breath is mainly related to emphysema.  Should stay on current dose of furosemide.  Follow-up with me in 6 months       Resulted Orders   Basic metabolic panel   Result Value Ref Range    Sodium 140 135 - 145 mmol/L      Comment:      Reference intervals for this test were updated on 09/26/2023 to more accurately reflect our healthy population. There may be differences in the flagging of prior results with similar values performed with this method. Interpretation of those prior results can be made in the context of the updated reference intervals.     Potassium 3.8 3.4 - 5.3 mmol/L    Chloride 100 98 - 107 mmol/L    Carbon Dioxide (CO2) 27 22 - 29 mmol/L    Anion Gap 13 7 - 15 mmol/L    Urea Nitrogen 7.9 (L) 8.0 - 23.0 mg/dL    Creatinine 0.76 0.51 - 0.95 mg/dL    GFR Estimate 88 >60 mL/min/1.73m2    Calcium 9.3 8.8 - 10.2 mg/dL    Glucose 121 (H) 70 - 99 mg/dL   N terminal pro BNP outpatient   Result Value Ref Range    N Terminal Pro BNP Outpatient 260 0 - 900 pg/mL      Comment:      Reference range shown and results flagged as abnormal are for the outpatient, non acute settings. Establishing a baseline value for each individual patient is useful for follow-up.    Suggested inpatient cut points for confirming diagnosis of CHF in an acute setting are:  >450 pg/mL (age 18 to less than 50)  >900 pg/mL (age 50 to less than 75)  >1800 pg/mL (75 yrs and older)    An inpatient or emergency department NT-proPBNP <300 pg/mL effectively rules out acute CHF, with 99% negative predictive value.           If you have any questions or concerns, please call the clinic at the number listed above.       Sincerely,      Avelino Garrido  MD

## 2024-06-05 ENCOUNTER — TELEPHONE (OUTPATIENT)
Dept: NEUROLOGY | Facility: CLINIC | Age: 64
End: 2024-06-05
Payer: MEDICARE

## 2024-06-05 DIAGNOSIS — Z95.5 S/P DRUG ELUTING CORONARY STENT PLACEMENT: Primary | ICD-10-CM

## 2024-06-05 NOTE — TELEPHONE ENCOUNTER
[facility] Call Center    Phone Message    May a detailed message be left on voicemail: no     Reason for Call: Other: Pt requesting a call back to further discuss recent hospital discharge from [facility] relating to stroke concerns.      Writer added upcoming visit on 11/12 to wait list.     Please advise    Action Taken: Other: Neurology    Travel Screening: Not Applicable

## 2024-06-05 NOTE — TELEPHONE ENCOUNTER
Dr. Person you saw patient 5/7/24 for gait difficulty.     She has since been hospitalized and work up started for acute CVA. See hospital notes through health [facility]/care everywhere.     She would like to follow up with you, she is scheduled in Nov. Is there a sooner option for follow up?     Neeraj Deshpande RN, BSN  [facility]

## 2024-06-06 NOTE — TELEPHONE ENCOUNTER
Called and spoke to pt, appt scheduled on 6/17/24 at 1:50pm with Dr. Person.    Kami Abel LPN on 6/6/2024 at 8:41 AM

## 2024-06-17 ENCOUNTER — OFFICE VISIT (OUTPATIENT)
Dept: NEUROLOGY | Facility: CLINIC | Age: 64
End: 2024-06-17
Payer: MEDICARE

## 2024-06-17 VITALS
DIASTOLIC BLOOD PRESSURE: 75 MMHG | SYSTOLIC BLOOD PRESSURE: 117 MMHG | RESPIRATION RATE: 16 BRPM | HEART RATE: 68 BPM | BODY MASS INDEX: 20.26 KG/M2 | WEIGHT: 102 LBS

## 2024-06-17 DIAGNOSIS — G25.0 ESSENTIAL TREMOR: ICD-10-CM

## 2024-06-17 DIAGNOSIS — E53.8 LOW SERUM VITAMIN B12: ICD-10-CM

## 2024-06-17 DIAGNOSIS — E78.2 MIXED HYPERLIPIDEMIA: ICD-10-CM

## 2024-06-17 DIAGNOSIS — G62.9 NEUROPATHY: ICD-10-CM

## 2024-06-17 DIAGNOSIS — R26.81 UNSTEADY GAIT: Primary | ICD-10-CM

## 2024-06-17 DIAGNOSIS — I63.9 CEREBROVASCULAR ACCIDENT (CVA), UNSPECIFIED MECHANISM (H): ICD-10-CM

## 2024-06-17 PROCEDURE — 99215 OFFICE O/P EST HI 40 MIN: CPT | Performed by: PSYCHIATRY & NEUROLOGY

## 2024-06-17 PROCEDURE — G2211 COMPLEX E/M VISIT ADD ON: HCPCS | Performed by: PSYCHIATRY & NEUROLOGY

## 2024-06-17 NOTE — PROGRESS NOTES
NEUROLOGY OUTPATIENT PROGRESS NOTE   Jun 17, 2024     CHIEF COMPLAINT/REASON FOR VISIT/REASON FOR CONSULT  Patient presents with:  Follow Up    REASON FOR CONSULTATION- Gait difficulty    REFERRAL SOURCE  Dr. Lorena Baeza   Dr. Lorena Baeza    HISTORY OF PRESENT ILLNESS  Kim Correa is a 64 year old female seen today for evaluation of gait difficulty.  She previously had a lot of symptoms and seen by multiple providers.  There was concerns about Parkinson's disease 7 years ago but then she was lost to follow-up because of transportation issues and loss of insurance.    7 years ago she was having difficulty with motor skills.  Since then her symptoms have progressed.  She has some shaking and jerking of her upper extremities.  This can happen at rest and is worse with activity.  She further complains that she walks funny with her legs tilted inward.  This complaint of balance issues as well.  Has had multiple concussions in the past.  Does occasionally shuffle her feet.  She does complain of some stiffness in her arms and legs.  She does have cervical disc herniation issues as well.  Does have a diagnosis of diet-controlled diabetes.  Does complain of some numbness in her feet at times.  Also has headaches.  Does complain of some vertigo at times.  Her B12 level has chronically been low and she is on oral supplement without benefit.  A1c is 6.6.    She has been on Risperdal in the past.  No other antipsychotic use.    5/7/24  Patient returns today  1.  She did the B12 injections that have been found any benefit though her exam is improved and after a lot of discussion it was felt that the B12 injections have significantly helped her.  She is walking better compared to before.  Leg weakness is also improved.  Has also had an episode of CHF exacerbation and emphysema.  Is working with cardiology and is doing cardiac rehab.  2.  Her EMG did show lumbar radiculopathy.  She does complain of back pain also has neck  pain.  This: Of some shooting pain down the legs.  3.  Has had some difficulty with her appointments.    6/17/24  Patient returns today  1.  Since she was last seen she had an event where she had left-sided weakness and was found to have intracranial stroke related to right MCA stenosis.  She has somewhat improved though is at home.  Has not been able to do physical therapy because of lack of transportation.  2.  Her other symptoms are about the same.  B12 level came back normal  3.  Has worked up with cardiology and she does have a pacemaker.  There was some question about increasing her lipid dose that she is already on the maximum dose of the Crestor.  Cardiology notes mention Lipitor.  She does plan to stop smoking though has not completely stop smoking again.    Previous history is reviewed and this is unchanged.    PAST MEDICAL/SURGICAL HISTORY  Past Medical History:   Diagnosis Date    Dysphagia     Enlarged tongue     Myalgia     Polyarthralgia     Sicca (H24)      Patient Active Problem List   Diagnosis    Sprain of lumbar region    Mild intermittent asthma with exacerbation    Esophageal reflux    Hyperlipidemia    Attention deficit disorder    Chronic rhinitis    Dizziness and giddiness    Adenomatous polyp of colon    Anxiety    Asthma    PTSD (post-traumatic stress disorder)    Coronary atherosclerosis    PAD (peripheral artery disease) (H24)    Statin intolerance    Type 2 diabetes mellitus without complication, without long-term current use of insulin (H)    ROSARIO (dyspnea on exertion)    Abnormal cardiovascular stress test    Status post coronary angiogram    Status post percutaneous transluminal coronary angioplasty    Angina at rest (H24)    Arm pain    Autoimmune disease (H24)    Bicuspid aortic valve    Bilateral hearing loss    Cardiac pacemaker in situ    Contusion of head    Dissociative disorder or reaction    Gastroparesis    General patient noncompliance    History of colonic polyps     Insomnia disorder related to known organic factor    Lipid disorder    Chronic back pain    Lower back pain    Major depressive disorder, recurrent episode, mild (H24)    Mouth pain    New daily persistent headache    Other allergy, other than to medicinal agents    Pacemaker battery depletion    Pain, dental    Recurrent aphthous ulcer    Somatic symptom disorder, persistent, moderate    Stomatitis and mucositis    Temporomandibular joint disorder    Tobacco dependence syndrome    Type 2 diabetes mellitus with diabetic peripheral angiopathy without gangrene, without long-term current use of insulin (H)    Upper respiratory infection    Vasovagal syncope    Vitamin B deficiency    Vitamin D deficiency    Acute on chronic diastolic heart failure (H)   Significant for high cholesterol, diabetes, migraines, tremors, mental illness, arthritis, vision loss.  Questionable diagnosis of Sjogren's.  Has not followed up with rheumatology.    FAMILY HISTORY  Family History   Problem Relation Age of Onset    Hypertension Mother     Thyroid Disease Mother     Alcoholism Mother     Heart Disease Father     Alcoholism Father     Diabetes Sister     Alzheimer Disease Maternal Grandmother     Heart Disease Maternal Grandfather     Cerebrovascular Disease Paternal Grandmother     C.A.D. No family hx of    Positive for Parkinson's disease in multiple uncles and father.    SOCIAL HISTORY  Social History     Tobacco Use    Smoking status: Every Day     Current packs/day: 1.00     Average packs/day: 1 pack/day for 45.0 years (45.0 ttl pk-yrs)     Types: Cigarettes     Passive exposure: Current    Smokeless tobacco: Never   Vaping Use    Vaping status: Some Days    Substances: Nicotine, THC, CBD    Devices: Disposable, Pre-filled or refillable cartridge, Refillable tank, Pre-filled pod   Substance Use Topics    Alcohol use: No    Drug use: Yes     Types: Marijuana       SYSTEMS REVIEW  Twelve-system ROS was done and other than the HPI  "this was negative/positive for neck pain, back pain, arm and leg pain, joint pain, numbness/tingling, weakness/paralysis, difficulty walking, falling, balance/coordination problems, movement disorder, bladder symptoms, dizziness, speech disturbance, difficulty swallowing, ringing in ears, hearing loss, vision symptoms, sleepiness during the day, sleeping problems, headaches, memory loss, anxiety, chest pain, cardiac/heart problems, stomach pain, respiratory problems, skin changes, weight gain, appetite problems.  No new concerns/issues.    MEDICATIONS  Current Outpatient Medications   Medication Sig Dispense Refill    aspirin 81 MG EC tablet Take 1 tablet (81 mg) by mouth daily Start tomorrow. 30 tablet 3    blood glucose (CONTOUR NEXT TEST) test strip Use to test blood sugar 2 times daily or as directed. 200 strip 1    blood glucose (NO BRAND SPECIFIED) test strip Use to test blood sugar 2 times daily or as directed. 300 strip 3    blood glucose monitoring (CONTOUR NEXT MONITOR W/DEVICE KIT) meter device kit 1 each      clopidogrel (PLAVIX) 75 MG tablet Take 75 mg by mouth daily      cyanocobalamin (CYANOCOBALAMIN) 1000 MCG/ML injection Inject 1 mL (1,000 mcg) into the muscle every 30 days 12 mL 1    EPINEPHrine (ANY BX GENERIC EQUIV) 0.3 MG/0.3ML injection 2-pack Inject 0.3 mL (0.3 mg) intramuscularly once as needed for up to 1 dose.*      FLOVENT  MCG/ACT inhaler INHALE 1 PUFF BY MOUTH TWO TIMES DAILY 12 g 12    furosemide (LASIX) 20 MG tablet Take 1 tablet (20 mg) by mouth daily 90 tablet 3    hydrOXYzine ha (VISTARIL) 25 MG capsule Take 25 mg by mouth      insulin syringe-needle U-100 (30G X 1/2\" 1 ML) 30G X 1/2\" 1 ML miscellaneous Use 1 syringes daily or as directed. For b12 12 each 0    ipratropium - albuterol 0.5 mg/2.5 mg/3 mL (DUONEB) 0.5-2.5 (3) MG/3ML neb solution Inhale 1 vial (3 mLs) into the lungs every 4 hours as needed for shortness of breath, wheezing or cough 90 mL 0    nicotine (NICODERM " CQ) 14 MG/24HR 24 hr patch Place 1 patch onto the skin every 24 hours 30 patch 0    nicotine (NICODERM CQ) 21 MG/24HR 24 hr patch Place 1 patch onto the skin every 24 hours 30 patch 0    nicotine (NICODERM CQ) 7 MG/24HR 24 hr patch Place 1 patch onto the skin every 24 hours 30 patch 0    nitroGLYcerin (NITROLINGUAL) 0.4 MG/SPRAY spray PLACE 1 SPRAY (0.4 MG) UNDER TONGUE EVERY 5 MINUTES AS NEEDED FOR CHEST PAIN FOR UP TO 3 DOSES. CALL 911 IF NO RELIEF AFTER FIRST SPRAY*      rosuvastatin (CRESTOR) 40 MG tablet Take 1 tablet (40 mg) by mouth daily 90 tablet 3    traZODone (DESYREL) 50 MG tablet Take 1-2 tablets ( mg) by mouth at bedtime 60 tablet 3    VENTOLIN  (90 Base) MCG/ACT inhaler INHALE 1-2 PUFFS BY MOUTH EVERY 4 HOURS AS NEEDED FOR WHEEZING.*      vitamin B-12 (CYANOCOBALAMIN) 1000 MCG tablet Take 1,000 mcg by mouth daily      vitamin D2 (ERGOCALCIFEROL) 71999 units (1250 mcg) capsule Take 1 capsule (50,000 Units) by mouth once a week 12 capsule 0     No current facility-administered medications for this visit.        PHYSICAL EXAMINATION  VITALS: /75   Pulse 68   Resp 16   Wt 46.3 kg (102 lb)   LMP  (LMP Unknown)   BMI 20.26 kg/m    GENERAL: Healthy appearing, alert, no acute distress, normal habitus.  CARDIOVASCULAR: Extremities warm and well perfused. Pulses present.   NEUROLOGICAL:  Patient is awake and oriented to self, place and time.  Attention span is normal.  Memory is grossly intact.  Language is fluent and follows commands appropriately.  Appropriate fund of knowledge. Cranial nerves 2-12 are intact. There is no pronator drift.  Motor exam shows 5/5 strength in all extremities except bilateral lower extremity hip flexion weakness and bilateral knee extension weakness.  Now improved.  She does have new left arm and leg 4/5 weakness.  Tone is symmetric bilaterally in upper and lower extremities.  Reflexes are symmetric and absent in upper extremities and lower extremities.   Reflexes on the left side were 2+.  Sensory exam is grossly intact to light touch, pin prick and vibration with decreased pinprick and vibratory sense in the feet-improved.  Finger to nose and heel to shin is without dysmetria.  Romberg is negative.  Gait is slightly wide-based with bilateral decreased arm swing.  No major difficulty with walking.  No major tremor noted today.  Exam shows left-sided weakness with slightly increased reflexes.  Gait was deferred today due to unsteadiness from the weakness.    DIAGNOSTICS  MRI  CONCLUSION:  1.  Mild to moderate burden of nonspecific T2 prolongation in the supratentorial parenchyma. Mild burden of T2 prolongation in the crossing fibers of the tiffany. The findings may be due to microvascular disease given the patient's smoking history.  2.  The pattern is not classic for demyelination but areas of chronic demyelination are not entirely excluded.  3.  No mass, hemorrhage or stroke.  4.  Incidental left parietal intraosseous hemangioma.    EMG- [facility] 2017      EEG-2019      RELEVANT LABS  2023 labs  B12 173  A1c 6.6  CBC and BMP noncontributory    OUTSIDE RECORDS  Outside referral notes and chart notes were reviewed and pertinent information has been summarized (in addition to the HPI):-      Notes from 2019 from Dr. Bateman were reviewed.  Patient had normal gait.  Was diagnosed to have a B12 deficiency and was put on a B12 supplement.    EMG- Dr. Rayo  Impression:   This is a abnormal nerve conduction and EMG study of bilateral lower extremities that suggests bilateral multilevel radiculopathy involving bilateral L5 and S1 nerve root.  Clinical correlation is recommended.      LABS  Component      Latest Ref Rng 9/1/2023  10:29 AM   Albumin Fraction      3.7 - 5.1 g/dL 4.3    Alpha 1 Fraction      0.2 - 0.4 g/dL 0.3    Alpha 2 Fraction      0.5 - 0.9 g/dL 0.8    Beta Fraction      0.6 - 1.0 g/dL 0.7    Gamma Fraction      0.7 - 1.6 g/dL 0.7    Monoclonal Peak       <=0.0 g/dL 0.0    ELP Interpretation: Essentially normal electrophoretic pattern. No obvious monoclonal proteins seen. Pathologic significance requires clinical correlation. KARRI Haider M.D., Ph.D., Pathologist ().    Vitamin B1 Whole Blood Level      70 - 180 nmol/L 155    TSH      0.30 - 4.20 uIU/mL 1.57    Immunofixation ELP No monoclonal protein seen on immunofixation. Pathologic significance requires clinical correlation.  KARRI Haider M.D., Ph.D., Pathologist ()    Ceruloplasmin      20 - 60 mg/dL 27    Copper      80.0 - 155.0 ug/dL 113.1    Vitamin B6      20.0 - 125.0 nmol/L 20.3    Total Protein Serum for ELP      6.4 - 8.3 g/dL 6.9      Hospital notes        MRI  IMPRESSION:   1. Multiple acute-to-subacute infarctions in the right cerebral hemisphere as above. A few of these are superimposed upon upon small chronic white matter/basal ganglia infarctions that have occurred since 3/15/2019. The intensity of diffusion restriction varies slightly between the different presumed infarctions, suggesting differing ages. Given this slightly unusual configuration, follow-up is recommended to document evolution.   2.  No evidence of hemorrhagic transformation or significant mass effect.   3.  Moderate presumed chronic small vessel ischemic change.     HEAD CT:   1.  No CT evidence for acute intracranial process.   2.  Brain atrophy and presumed chronic microvascular ischemic changes as above.     HEAD CTA:   1.  Right M1 segment distally, greater than 90% stenosis, suggestive of intracranial atherosclerotic disease.     NECK CTA:   1.  No hemodynamically significant stenosis in the neck vessels.   2.  No evidence for dissection.     B12 512  A1c 6.6      ECHO  Summary    1. Sinus rhythm during study.     2. Normal LV size with mildly increased wall thickness. Calculated biplane LVEF 66%. No regional wall motion abnormalities. (Normal function). Grade 1 diastolic dysfunction.      3. Normal RV size and systolic function.     4. No significant valvular abnormalities.     5. The estimated pulmonary artery systolic pressure is 38 mmHg.     6. Negative bubble study.     7. Compared to the prior study from 1/6/24, the current study reveals no significant change.       IMPRESSION/REPORT/PLAN  Low serum vitamin B12  Unsteady gait  Rule out neuropathy  Bilateral leg weakness  Essential tremor  Suspected lumbar radiculopathy  Stroke  Right MCA stenosis  Hyperlipidemia    This is a 64 year old female with multiple issues.  Previous work-up in 2019 was negative.    1.  Unsteady gait:-Exam does not show any evidence of Parkinson's disease.  Could be related to her left hip issues and knee issues.  Could be related to other neurological issues.  This is now improved with B12 supplementation and will monitor.    2.  Tremor:-This is suggestive of essential tremor.  Not present on exam today.  No evidence of Parkinson's disease on exam.  Will hold off on medications.      3.  Exam does show decreased vibratory and pinprick sensation in the feet.  I suspect she does have a neuropathy.  EMG was negative for neuropathy.  B12 was low and B12 supplementations actually helped resolve the symptoms.  Will check a B12 level.  Continue B12 supplementation.  Blood work was negative for other causes of neuropathy.     4.  She does have bilateral hip flexion and knee extension weakness bilaterally.  Not improved on exam with B12 supplementation.  Discussed about ordering MRIs again but will hold off for right now.    5.  Vitamin B12 injections are helping and will continue.  Check B12 level.    6.  EMG suggested lumbar radiculopathy.  Will put her on steroids and set her up with physical therapy.    7.  Patient is recently had left-sided weakness.  MRI brain shows right cortical cerebral infarcts.  CT angiogram shows right M1 segment severe stenosis.  Echocardiogram was noncontributory.  She does have a pacemaker that  "was interrogated and per patient this does not show any atrial fibrillation.  Recommend continuing aspirin Plavix for 3 months.  Continue 40 mg of Crestor.  LDL goal less than 70.  She potentially might need additional medication.  Smoking cessation.  Recommend exercise and healthy lifestyle.  Will set her up for home PT.    Return back in 3 months.    -     cyanocobalamin (CYANOCOBALAMIN) 1000 MCG/ML injection; Inject 1 mL (1,000 mcg) into the muscle every 30 days  -     insulin syringe-needle U-100 (30G X 1/2\" 1 ML) 30G X 1/2\" 1 ML miscellaneous; Use 1 syringes daily or as directed. For b12  -     Vitamin B12; Future  -     predniSONE (DELTASONE) 20 MG tablet; Take 3 tablets (60 mg) by mouth daily for 6 days Take in AM with food.  -     Physical Therapy  Referral; Future    -     Physical Therapy  Referral; Future  -     Home Care Referral  - Aspirin Plavix and Crestor      Return in about 3 months (around 9/17/2024) for In-Clinic Visit (must), Add on PAOR, After testing.    Over 40 minutes were spent coordinating the care for the patient on the day of the encounter.  This includes previsit, during visit and post visit activities as documented above.  Counseling patient.  New problem.  High risk.  Reviewing outside records/testing.  (Activities include but not inclusive of reviewing chart, reviewing outside records, reviewing labs and imaging study results as well as the images, patient visit time including getting history and exam,  use if applicable, review of test results with the patient and coming up with a plan in a shared model, counseling patient and family, education and answering patient questions, EMR , EMR diagnosis entry and problem list management, medication reconciliation and prescription management if applicable, paperwork if applicable, printing documents and documentation of the visit activities.)        Rudolph Person MD  Neurologist  [facility] [facility]  Tel:- [phone]    This note was dictated using voice recognition software.  Any grammatical or context distortions are unintentional and inherent to the software.

## 2024-06-17 NOTE — LETTER
6/17/2024      Kim Correa  [address] [facility] [address]      Dear Colleague,    Thank you for referring your patient, Kim Correa, to the [facility] [place]. Please see a copy of my visit note below.    NEUROLOGY OUTPATIENT PROGRESS NOTE   Jun 17, 2024     CHIEF COMPLAINT/REASON FOR VISIT/REASON FOR CONSULT  Patient presents with:  Follow Up    REASON FOR CONSULTATION- Gait difficulty    REFERRAL SOURCE  Dr. Lorena Baeza  CC Dr. Lorena Baeza    HISTORY OF PRESENT ILLNESS  Kim Correa is a 64 year old female seen today for evaluation of gait difficulty.  She previously had a lot of symptoms and seen by multiple providers.  There was concerns about Parkinson's disease 7 years ago but then she was lost to follow-up because of transportation issues and loss of insurance.    7 years ago she was having difficulty with motor skills.  Since then her symptoms have progressed.  She has some shaking and jerking of her upper extremities.  This can happen at rest and is worse with activity.  She further complains that she walks funny with her legs tilted inward.  This complaint of balance issues as well.  Has had multiple concussions in the past.  Does occasionally shuffle her feet.  She does complain of some stiffness in her arms and legs.  She does have cervical disc herniation issues as well.  Does have a diagnosis of diet-controlled diabetes.  Does complain of some numbness in her feet at times.  Also has headaches.  Does complain of some vertigo at times.  Her B12 level has chronically been low and she is on oral supplement without benefit.  A1c is 6.6.    She has been on Risperdal in the past.  No other antipsychotic use.    5/7/24  Patient returns today  1.  She did the B12 injections that have been found any benefit though her exam is improved and after a lot of discussion it was felt that the B12 injections have significantly helped her.  She is walking better compared  to before.  Leg weakness is also improved.  Has also had an episode of CHF exacerbation and emphysema.  Is working with cardiology and is doing cardiac rehab.  2.  Her EMG did show lumbar radiculopathy.  She does complain of back pain also has neck pain.  This: Of some shooting pain down the legs.  3.  Has had some difficulty with her appointments.    6/17/24  Patient returns today  1.  Since she was last seen she had an event where she had left-sided weakness and was found to have intracranial stroke related to right MCA stenosis.  She has somewhat improved though is at home.  Has not been able to do physical therapy because of lack of transportation.  2.  Her other symptoms are about the same.  B12 level came back normal  3.  Has worked up with cardiology and she does have a pacemaker.  There was some question about increasing her lipid dose that she is already on the maximum dose of the Crestor.  Cardiology notes mention Lipitor.  She does plan to stop smoking though has not completely stop smoking again.    Previous history is reviewed and this is unchanged.    PAST MEDICAL/SURGICAL HISTORY  Past Medical History:   Diagnosis Date     Dysphagia      Enlarged tongue      Myalgia      Polyarthralgia      Sicca (H24)      Patient Active Problem List   Diagnosis     Sprain of lumbar region     Mild intermittent asthma with exacerbation     Esophageal reflux     Hyperlipidemia     Attention deficit disorder     Chronic rhinitis     Dizziness and giddiness     Adenomatous polyp of colon     Anxiety     Asthma     PTSD (post-traumatic stress disorder)     Coronary atherosclerosis     PAD (peripheral artery disease) (H24)     Statin intolerance     Type 2 diabetes mellitus without complication, without long-term current use of insulin (H)     ROSARIO (dyspnea on exertion)     Abnormal cardiovascular stress test     Status post coronary angiogram     Status post percutaneous transluminal coronary angioplasty     Angina at  rest (H24)     Arm pain     Autoimmune disease (H24)     Bicuspid aortic valve     Bilateral hearing loss     Cardiac pacemaker in situ     Contusion of head     Dissociative disorder or reaction     Gastroparesis     General patient noncompliance     History of colonic polyps     Insomnia disorder related to known organic factor     Lipid disorder     Chronic back pain     Lower back pain     Major depressive disorder, recurrent episode, mild (H24)     Mouth pain     New daily persistent headache     Other allergy, other than to medicinal agents     Pacemaker battery depletion     Pain, dental     Recurrent aphthous ulcer     Somatic symptom disorder, persistent, moderate     Stomatitis and mucositis     Temporomandibular joint disorder     Tobacco dependence syndrome     Type 2 diabetes mellitus with diabetic peripheral angiopathy without gangrene, without long-term current use of insulin (H)     Upper respiratory infection     Vasovagal syncope     Vitamin B deficiency     Vitamin D deficiency     Acute on chronic diastolic heart failure (H)   Significant for high cholesterol, diabetes, migraines, tremors, mental illness, arthritis, vision loss.  Questionable diagnosis of Sjogren's.  Has not followed up with rheumatology.    FAMILY HISTORY  Family History   Problem Relation Age of Onset     Hypertension Mother      Thyroid Disease Mother      Alcoholism Mother      Heart Disease Father      Alcoholism Father      Diabetes Sister      Alzheimer Disease Maternal Grandmother      Heart Disease Maternal Grandfather      Cerebrovascular Disease Paternal Grandmother      C.A.D. No family hx of    Positive for Parkinson's disease in multiple uncles and father.    SOCIAL HISTORY  Social History     Tobacco Use     Smoking status: Every Day     Current packs/day: 1.00     Average packs/day: 1 pack/day for 45.0 years (45.0 ttl pk-yrs)     Types: Cigarettes     Passive exposure: Current     Smokeless tobacco: Never   Vaping  Use     Vaping status: Some Days     Substances: Nicotine, THC, CBD     Devices: Disposable, Pre-filled or refillable cartridge, Refillable tank, Pre-filled pod   Substance Use Topics     Alcohol use: No     Drug use: Yes     Types: Marijuana       SYSTEMS REVIEW  Twelve-system ROS was done and other than the HPI this was negative/positive for neck pain, back pain, arm and leg pain, joint pain, numbness/tingling, weakness/paralysis, difficulty walking, falling, balance/coordination problems, movement disorder, bladder symptoms, dizziness, speech disturbance, difficulty swallowing, ringing in ears, hearing loss, vision symptoms, sleepiness during the day, sleeping problems, headaches, memory loss, anxiety, chest pain, cardiac/heart problems, stomach pain, respiratory problems, skin changes, weight gain, appetite problems.  No new concerns/issues.    MEDICATIONS  Current Outpatient Medications   Medication Sig Dispense Refill     aspirin 81 MG EC tablet Take 1 tablet (81 mg) by mouth daily Start tomorrow. 30 tablet 3     blood glucose (CONTOUR NEXT TEST) test strip Use to test blood sugar 2 times daily or as directed. 200 strip 1     blood glucose (NO BRAND SPECIFIED) test strip Use to test blood sugar 2 times daily or as directed. 300 strip 3     blood glucose monitoring (CONTOUR NEXT MONITOR W/DEVICE KIT) meter device kit 1 each       clopidogrel (PLAVIX) 75 MG tablet Take 75 mg by mouth daily       cyanocobalamin (CYANOCOBALAMIN) 1000 MCG/ML injection Inject 1 mL (1,000 mcg) into the muscle every 30 days 12 mL 1     EPINEPHrine (ANY BX GENERIC EQUIV) 0.3 MG/0.3ML injection 2-pack Inject 0.3 mL (0.3 mg) intramuscularly once as needed for up to 1 dose.*       FLOVENT  MCG/ACT inhaler INHALE 1 PUFF BY MOUTH TWO TIMES DAILY 12 g 12     furosemide (LASIX) 20 MG tablet Take 1 tablet (20 mg) by mouth daily 90 tablet 3     hydrOXYzine ha (VISTARIL) 25 MG capsule Take 25 mg by mouth       insulin syringe-needle  "U-100 (30G X 1/2\" 1 ML) 30G X 1/2\" 1 ML miscellaneous Use 1 syringes daily or as directed. For b12 12 each 0     ipratropium - albuterol 0.5 mg/2.5 mg/3 mL (DUONEB) 0.5-2.5 (3) MG/3ML neb solution Inhale 1 vial (3 mLs) into the lungs every 4 hours as needed for shortness of breath, wheezing or cough 90 mL 0     nicotine (NICODERM CQ) 14 MG/24HR 24 hr patch Place 1 patch onto the skin every 24 hours 30 patch 0     nicotine (NICODERM CQ) 21 MG/24HR 24 hr patch Place 1 patch onto the skin every 24 hours 30 patch 0     nicotine (NICODERM CQ) 7 MG/24HR 24 hr patch Place 1 patch onto the skin every 24 hours 30 patch 0     nitroGLYcerin (NITROLINGUAL) 0.4 MG/SPRAY spray PLACE 1 SPRAY (0.4 MG) UNDER TONGUE EVERY 5 MINUTES AS NEEDED FOR CHEST PAIN FOR UP TO 3 DOSES. CALL 911 IF NO RELIEF AFTER FIRST SPRAY*       rosuvastatin (CRESTOR) 40 MG tablet Take 1 tablet (40 mg) by mouth daily 90 tablet 3     traZODone (DESYREL) 50 MG tablet Take 1-2 tablets ( mg) by mouth at bedtime 60 tablet 3     VENTOLIN  (90 Base) MCG/ACT inhaler INHALE 1-2 PUFFS BY MOUTH EVERY 4 HOURS AS NEEDED FOR WHEEZING.*       vitamin B-12 (CYANOCOBALAMIN) 1000 MCG tablet Take 1,000 mcg by mouth daily       vitamin D2 (ERGOCALCIFEROL) 11744 units (1250 mcg) capsule Take 1 capsule (50,000 Units) by mouth once a week 12 capsule 0     No current facility-administered medications for this visit.        PHYSICAL EXAMINATION  VITALS: /75   Pulse 68   Resp 16   Wt 46.3 kg (102 lb)   LMP  (LMP Unknown)   BMI 20.26 kg/m    GENERAL: Healthy appearing, alert, no acute distress, normal habitus.  CARDIOVASCULAR: Extremities warm and well perfused. Pulses present.   NEUROLOGICAL:  Patient is awake and oriented to self, place and time.  Attention span is normal.  Memory is grossly intact.  Language is fluent and follows commands appropriately.  Appropriate fund of knowledge. Cranial nerves 2-12 are intact. There is no pronator drift.  Motor exam " shows 5/5 strength in all extremities except bilateral lower extremity hip flexion weakness and bilateral knee extension weakness.  Now improved.  She does have new left arm and leg 4/5 weakness.  Tone is symmetric bilaterally in upper and lower extremities.  Reflexes are symmetric and absent in upper extremities and lower extremities.  Reflexes on the left side were 2+.  Sensory exam is grossly intact to light touch, pin prick and vibration with decreased pinprick and vibratory sense in the feet-improved.  Finger to nose and heel to shin is without dysmetria.  Romberg is negative.  Gait is slightly wide-based with bilateral decreased arm swing.  No major difficulty with walking.  No major tremor noted today.  Exam shows left-sided weakness with slightly increased reflexes.  Gait was deferred today due to unsteadiness from the weakness.    DIAGNOSTICS  MRI  CONCLUSION:  1.  Mild to moderate burden of nonspecific T2 prolongation in the supratentorial parenchyma. Mild burden of T2 prolongation in the crossing fibers of the tiffany. The findings may be due to microvascular disease given the patient's smoking history.  2.  The pattern is not classic for demyelination but areas of chronic demyelination are not entirely excluded.  3.  No mass, hemorrhage or stroke.  4.  Incidental left parietal intraosseous hemangioma.    EMG- [facility] 2017      EEG-2019      RELEVANT LABS  2023 labs  B12 173  A1c 6.6  CBC and BMP noncontributory    OUTSIDE RECORDS  Outside referral notes and chart notes were reviewed and pertinent information has been summarized (in addition to the HPI):-      Notes from 2019 from Dr. Bateman were reviewed.  Patient had normal gait.  Was diagnosed to have a B12 deficiency and was put on a B12 supplement.    EMG- Dr. Rayo  Impression:   This is a abnormal nerve conduction and EMG study of bilateral lower extremities that suggests bilateral multilevel radiculopathy involving bilateral L5 and S1 nerve root.   Clinical correlation is recommended.      LABS  Component      Latest Ref Rng 9/1/2023  10:29 AM   Albumin Fraction      3.7 - 5.1 g/dL 4.3    Alpha 1 Fraction      0.2 - 0.4 g/dL 0.3    Alpha 2 Fraction      0.5 - 0.9 g/dL 0.8    Beta Fraction      0.6 - 1.0 g/dL 0.7    Gamma Fraction      0.7 - 1.6 g/dL 0.7    Monoclonal Peak      <=0.0 g/dL 0.0    ELP Interpretation: Essentially normal electrophoretic pattern. No obvious monoclonal proteins seen. Pathologic significance requires clinical correlation. KARRI Haider M.D., Ph.D., Pathologist ().    Vitamin B1 Whole Blood Level      70 - 180 nmol/L 155    TSH      0.30 - 4.20 uIU/mL 1.57    Immunofixation ELP No monoclonal protein seen on immunofixation. Pathologic significance requires clinical correlation.  KARRI Haider M.D., Ph.D., Pathologist ()    Ceruloplasmin      20 - 60 mg/dL 27    Copper      80.0 - 155.0 ug/dL 113.1    Vitamin B6      20.0 - 125.0 nmol/L 20.3    Total Protein Serum for ELP      6.4 - 8.3 g/dL 6.9      Hospital notes        MRI  IMPRESSION:   1. Multiple acute-to-subacute infarctions in the right cerebral hemisphere as above. A few of these are superimposed upon upon small chronic white matter/basal ganglia infarctions that have occurred since 3/15/2019. The intensity of diffusion restriction varies slightly between the different presumed infarctions, suggesting differing ages. Given this slightly unusual configuration, follow-up is recommended to document evolution.   2.  No evidence of hemorrhagic transformation or significant mass effect.   3.  Moderate presumed chronic small vessel ischemic change.     HEAD CT:   1.  No CT evidence for acute intracranial process.   2.  Brain atrophy and presumed chronic microvascular ischemic changes as above.     HEAD CTA:   1.  Right M1 segment distally, greater than 90% stenosis, suggestive of intracranial atherosclerotic disease.     NECK CTA:   1.  No hemodynamically  significant stenosis in the neck vessels.   2.  No evidence for dissection.     B12 512  A1c 6.6      ECHO  Summary    1. Sinus rhythm during study.     2. Normal LV size with mildly increased wall thickness. Calculated biplane LVEF 66%. No regional wall motion abnormalities. (Normal function). Grade 1 diastolic dysfunction.     3. Normal RV size and systolic function.     4. No significant valvular abnormalities.     5. The estimated pulmonary artery systolic pressure is 38 mmHg.     6. Negative bubble study.     7. Compared to the prior study from 1/6/24, the current study reveals no significant change.       IMPRESSION/REPORT/PLAN  Low serum vitamin B12  Unsteady gait  Rule out neuropathy  Bilateral leg weakness  Essential tremor  Suspected lumbar radiculopathy  Stroke  Right MCA stenosis  Hyperlipidemia    This is a 64 year old female with multiple issues.  Previous work-up in 2019 was negative.    1.  Unsteady gait:-Exam does not show any evidence of Parkinson's disease.  Could be related to her left hip issues and knee issues.  Could be related to other neurological issues.  This is now improved with B12 supplementation and will monitor.    2.  Tremor:-This is suggestive of essential tremor.  Not present on exam today.  No evidence of Parkinson's disease on exam.  Will hold off on medications.      3.  Exam does show decreased vibratory and pinprick sensation in the feet.  I suspect she does have a neuropathy.  EMG was negative for neuropathy.  B12 was low and B12 supplementations actually helped resolve the symptoms.  Will check a B12 level.  Continue B12 supplementation.  Blood work was negative for other causes of neuropathy.     4.  She does have bilateral hip flexion and knee extension weakness bilaterally.  Not improved on exam with B12 supplementation.  Discussed about ordering MRIs again but will hold off for right now.    5.  Vitamin B12 injections are helping and will continue.  Check B12  "level.    6.  EMG suggested lumbar radiculopathy.  Will put her on steroids and set her up with physical therapy.    7.  Patient is recently had left-sided weakness.  MRI brain shows right cortical cerebral infarcts.  CT angiogram shows right M1 segment severe stenosis.  Echocardiogram was noncontributory.  She does have a pacemaker that was interrogated and per patient this does not show any atrial fibrillation.  Recommend continuing aspirin Plavix for 3 months.  Continue 40 mg of Crestor.  LDL goal less than 70.  She potentially might need additional medication.  Smoking cessation.  Recommend exercise and healthy lifestyle.  Will set her up for home PT.    Return back in 3 months.    -     cyanocobalamin (CYANOCOBALAMIN) 1000 MCG/ML injection; Inject 1 mL (1,000 mcg) into the muscle every 30 days  -     insulin syringe-needle U-100 (30G X 1/2\" 1 ML) 30G X 1/2\" 1 ML miscellaneous; Use 1 syringes daily or as directed. For b12  -     Vitamin B12; Future  -     predniSONE (DELTASONE) 20 MG tablet; Take 3 tablets (60 mg) by mouth daily for 6 days Take in AM with food.  -     Physical Therapy  Referral; Future    -     Physical Therapy  Referral; Future  -     Home Care Referral  - Aspirin Plavix and Crestor      Return in about 3 months (around 9/17/2024) for In-Clinic Visit (must), Add on PAOR, After testing.    Over 40 minutes were spent coordinating the care for the patient on the day of the encounter.  This includes previsit, during visit and post visit activities as documented above.  Counseling patient.  New problem.  High risk.  Reviewing outside records/testing.  (Activities include but not inclusive of reviewing chart, reviewing outside records, reviewing labs and imaging study results as well as the images, patient visit time including getting history and exam,  use if applicable, review of test results with the patient and coming up with a plan in a shared model, counseling " patient and family, education and answering patient questions, EMR , EMR diagnosis entry and problem list management, medication reconciliation and prescription management if applicable, paperwork if applicable, printing documents and documentation of the visit activities.)        Rudolph Person MD  Neurologist  [facility]  Tel:- [phone]    This note was dictated using voice recognition software.  Any grammatical or context distortions are unintentional and inherent to the software.      Again, thank you for allowing me to participate in the care of your patient.        Sincerely,        Rudolph Person MD

## 2024-06-18 ENCOUNTER — TELEPHONE (OUTPATIENT)
Dept: NEUROLOGY | Facility: CLINIC | Age: 64
End: 2024-06-18
Payer: MEDICARE

## 2024-06-18 NOTE — TELEPHONE ENCOUNTER
"Called and spoke with patient and she states that she is homebound. She does not drive and does not have transportation. She has multiple medical conditions preventing here from leaving.     Message from [facility]:   We received home health orders for this patient, however the homebound status state this patient is not homebound. Pt will need to be homebound to receive home health. Could you please clarify if this patient is homebound? If this patient is homebound we will need the orders updated to state \"yes\" under the homebound status. Thank you   "

## 2024-06-18 NOTE — TELEPHONE ENCOUNTER
[facility] Call Center    Phone Message    May a detailed message be left on voicemail: yes     Reason for Call: Olga from [facility] asking for clarification on homebound status, if so they would need skilled nursing added for start of care.    Please call Olga at [phone] , to discuss further.    Action Taken: Message routed to:  Other: [facility]    Travel Screening: Not Applicable     Date of Service:

## 2024-06-19 ENCOUNTER — TELEPHONE (OUTPATIENT)
Dept: FAMILY MEDICINE | Facility: CLINIC | Age: 64
End: 2024-06-19
Payer: MEDICARE

## 2024-06-19 NOTE — TELEPHONE ENCOUNTER
[facility] Call Center    Phone Message    May a detailed message be left on voicemail: yes     Reason for Call: Other:       Laverne with [facility] calling to inform clinic that they received the referral for home care and that the first visit will be on 06/25/2024      Action Taken: Message routed to:  Other: [facility]    Travel Screening: Not Applicable

## 2024-06-19 NOTE — TELEPHONE ENCOUNTER
Reason for Call:  Home Health Care    Laverne with [facility] called regarding (reason for call): Verbal Orders     Orders are needed for this patient. Start of Care 06/25/2024 eval      Pt Provider: Lorena Baeza     Phone Number Homecare Nurse can be reached at: [phone]    Can we leave a detailed message on this number? YES    Phone number patient can be reached at: Other phone number:  [phone]    Best Time: any    Call taken on 6/19/2024 at 12:59 PM by Karen Cazares CNA

## 2024-06-19 NOTE — TELEPHONE ENCOUNTER
Called [facility] and was transferred to [facility], relayed approval of verbal orders for patient. No further requests at this time.     Raffi Burdick RN  [facility]

## 2024-06-28 ENCOUNTER — TELEPHONE (OUTPATIENT)
Dept: NEUROLOGY | Facility: CLINIC | Age: 64
End: 2024-06-28
Payer: MEDICARE

## 2024-06-28 PROBLEM — F43.25 ADJUSTMENT DISORDER WITH MIXED DISTURBANCE OF EMOTIONS AND CONDUCT: Status: ACTIVE | Noted: 2024-05-31

## 2024-06-28 PROBLEM — I63.9 ACUTE ISCHEMIC STROKE (H): Status: ACTIVE | Noted: 2024-05-29

## 2024-06-28 PROBLEM — F10.21 HISTORY OF ALCOHOL DEPENDENCE (H): Status: ACTIVE | Noted: 2024-05-31

## 2024-06-28 PROBLEM — R53.1 LEFT-SIDED WEAKNESS: Status: ACTIVE | Noted: 2024-05-29

## 2024-06-28 PROBLEM — F12.20 CANNABIS USE DISORDER, MODERATE, DEPENDENCE (H): Status: ACTIVE | Noted: 2024-05-31

## 2024-06-28 NOTE — TELEPHONE ENCOUNTER
Spoke with Kim and she expressed her frustrations with the nurse that is coming to her house from [facility]. I listened to her frustrations and suggested she call [facility] to discuss her concerns so they can possibly send someone else out  Susie Nair CMA on 6/28/2024 at 3:01 PM  [facility]

## 2024-06-28 NOTE — TELEPHONE ENCOUNTER
[facility] Call Center    Phone Message    May a detailed message be left on voicemail: yes     Reason for Call: Pt says she has had issues with the nurse that is visiting, and she doesnt want her to visit.    Please call Kim at [phone] to discuss further.      Action Taken: Message routed to:  Other: [facility]    Travel Screening: Not Applicable     Date of Service:

## 2024-07-02 ENCOUNTER — OFFICE VISIT (OUTPATIENT)
Dept: FAMILY MEDICINE | Facility: CLINIC | Age: 64
End: 2024-07-02
Payer: MEDICARE

## 2024-07-02 VITALS
RESPIRATION RATE: 16 BRPM | HEART RATE: 70 BPM | OXYGEN SATURATION: 97 % | BODY MASS INDEX: 20.26 KG/M2 | TEMPERATURE: 98 F | SYSTOLIC BLOOD PRESSURE: 133 MMHG | DIASTOLIC BLOOD PRESSURE: 80 MMHG | HEIGHT: 60 IN

## 2024-07-02 DIAGNOSIS — M25.50 POLYARTHRALGIA: ICD-10-CM

## 2024-07-02 DIAGNOSIS — E11.51 TYPE 2 DIABETES MELLITUS WITH DIABETIC PERIPHERAL ANGIOPATHY WITHOUT GANGRENE, WITHOUT LONG-TERM CURRENT USE OF INSULIN (H): ICD-10-CM

## 2024-07-02 DIAGNOSIS — I50.33 ACUTE ON CHRONIC DIASTOLIC HEART FAILURE (H): ICD-10-CM

## 2024-07-02 DIAGNOSIS — I25.118 ATHEROSCLEROSIS OF NATIVE CORONARY ARTERY OF NATIVE HEART WITH STABLE ANGINA PECTORIS (H): ICD-10-CM

## 2024-07-02 DIAGNOSIS — F17.210 CIGARETTE NICOTINE DEPENDENCE WITHOUT COMPLICATION: ICD-10-CM

## 2024-07-02 DIAGNOSIS — F12.20 CANNABIS USE DISORDER, MODERATE, DEPENDENCE (H): ICD-10-CM

## 2024-07-02 DIAGNOSIS — I63.9 ACUTE ISCHEMIC STROKE (H): Primary | ICD-10-CM

## 2024-07-02 PROBLEM — E83.00 COPPER METABOLISM DISORDER (H): Status: ACTIVE | Noted: 2024-07-02

## 2024-07-02 PROCEDURE — 99215 OFFICE O/P EST HI 40 MIN: CPT | Performed by: NURSE PRACTITIONER

## 2024-07-02 ASSESSMENT — ASTHMA QUESTIONNAIRES
ACT_TOTALSCORE: 9
QUESTION_3 LAST FOUR WEEKS HOW OFTEN DID YOUR ASTHMA SYMPTOMS (WHEEZING, COUGHING, SHORTNESS OF BREATH, CHEST TIGHTNESS OR PAIN) WAKE YOU UP AT NIGHT OR EARLIER THAN USUAL IN THE MORNING: FOUR OR MORE NIGHTS A WEEK
QUESTION_1 LAST FOUR WEEKS HOW MUCH OF THE TIME DID YOUR ASTHMA KEEP YOU FROM GETTING AS MUCH DONE AT WORK, SCHOOL OR AT HOME: SOME OF THE TIME
QUESTION_5 LAST FOUR WEEKS HOW WOULD YOU RATE YOUR ASTHMA CONTROL: SOMEWHAT CONTROLLED
QUESTION_4 LAST FOUR WEEKS HOW OFTEN HAVE YOU USED YOUR RESCUE INHALER OR NEBULIZER MEDICATION (SUCH AS ALBUTEROL): THREE OR MORE TIMES PER DAY
ACT_TOTALSCORE: 9
QUESTION_2 LAST FOUR WEEKS HOW OFTEN HAVE YOU HAD SHORTNESS OF BREATH: MORE THAN ONCE A DAY

## 2024-07-02 ASSESSMENT — PAIN SCALES - GENERAL: PAINLEVEL: EXTREME PAIN (8)

## 2024-07-02 NOTE — PROGRESS NOTES
Hospital Follow-up Visit:    Hospital/Nursing Home/IP Rehab Facility:  [facility]  Date of Admission: 5/29/24  Date of Discharge: 5/31/24  Reason(s) for Admission: CAV   Was the patient in the ICU or did the patient experience delirium during hospitalization?  No  Do you have any other stressors you would like to discuss with your provider? No    Problems taking medications regularly:  None  Medication changes since discharge: None  Problems adhering to non-medication therapy:  None    Summary of hospitalization:  CareEverywhere information obtained and reviewed  Diagnostic Tests/Treatments reviewed.  Follow up needed: none  Other Healthcare Providers Involved in Patient s Care:         None  Update since discharge: improved.         Plan of care communicated with patient             Assessment and Plan:     Acute ischemic stroke (H)  Patient is completing home physical therapy.  She is followed by neurology.  She continues Plavix and aspirin for 3 months.  She continues rosuvastatin.  Goal LDL is less than 70.    Type 2 diabetes mellitus with diabetic peripheral angiopathy without gangrene, without long-term current use of insulin (H)  This is diet controlled.  Last A1c was 6.7% on 5/30/2024.  - Adult Eye  Referral    Polyarthralgia  Patient was diagnosed with an autoimmune disease multiple years ago.  She has seen multiple rheumatologist recently who states she has no evidence of an autoimmune disease.  She recently had an elevated sed rate and CRP.  Borderline positive VITOR.  I will refer to [facility] orthopedics rheumatology group.  - Adult Rheumatology  Referral    Atherosclerosis of native coronary artery of native heart with stable angina pectoris (H24)  Acute on chronic diastolic heart failure (H)  Patient is followed by cardiology.    Cigarette nicotine dependence without complication  Patient is smoking 2 packs/day.  I encourage cessation.  Patient states  she is not ready to quit smoking.    Cannabis use disorder, moderate, dependence (H)  I encouraged cessation.    The longitudinal plan of care for the diagnosis(es)/condition(s) as documented were addressed during this visit. Due to the added complexity in care, I will continue to support Kim in the subsequent management and with ongoing continuity of care.    45 minutes spent by me on the date of the encounter doing chart review, history and exam, documentation and further activities per the note        Subjective:     Kim is a 64 year old female presenting to the clinic for follow-up on hospitalization.  Patient was admitted to the hospital on 5/29/2024 after developing an unsteady gait.  She had transient episodes of this.  She felt as though her left lower extremity was weaker.  She then developed weakness in her left arm.  MRI of the brain showed the following:  MRI brain:  IMPRESSION:  1.  Multiple acute-to-subacute infarctions in the right cerebral hemisphere as above. A few of these are superimposed upon upon small chronic white matter/basal ganglia infarctions that have occurred since 3/15/2019. The intensity of diffusion restriction varies slightly between the different presumed infarctions, suggesting differing ages. Given this slightly unusual configuration, follow-up is recommended to document evolution.  2.  No evidence of hemorrhagic transformation or significant mass effect.  3.  Moderate presumed chronic small vessel ischemic change.   Patient was outside the window of treatment for stroke.  She was treated with Plavix and aspirin.  Echocardiogram showed no significant valvular dysfunction.  She had no arrhythmias noted during hospitalization.  Patient has outpatient PT ordered.  Patient experienced some anxiety during the hospitalization.  She threatened to leave AMA multiple times.  She was treated with hydroxyzine.  Patient followed up with neurology.  MRI of lumbar and cervical spine have  been ordered.  Patient is taking rosuvastatin 40 mg daily and continues Plavix for 3 months.  Patient presents today focused on concerns regarding an underlying autoimmune disease.  When she presented to the clinic multiple years ago, she had been diagnosed with an autoimmune disease.  Patient followed up with [facility] and states she had labs performed while she was taking prednisone.  They told her to repeat labs, but unfortunately she was unable to return to [facility].  She recently saw a rheumatologist where she had borderline positive VITOR, sed rate of 82, CRP of 97.5 05/23/2024.  Patient continues to struggle with joint pain, dry mouth.  She is interested in seeing a new rheumatologist.    Reviewof Systems: A complete 14 point review of systems was obtained and is negative or as stated in the history of present illness.    Social History     Socioeconomic History    Marital status:      Spouse name: Not on file    Number of children: Not on file    Years of education: Not on file    Highest education level: Not on file   Occupational History    Not on file   Tobacco Use    Smoking status: Every Day     Current packs/day: 1.00     Average packs/day: 1 pack/day for 45.0 years (45.0 ttl pk-yrs)     Types: Cigarettes     Passive exposure: Current    Smokeless tobacco: Never   Vaping Use    Vaping status: Some Days    Substances: Nicotine, THC, CBD    Devices: Disposable, Pre-filled or refillable cartridge, Refillable tank, Pre-filled pod    Passive vaping exposure: Yes   Substance and Sexual Activity    Alcohol use: No    Drug use: Yes     Types: Marijuana    Sexual activity: Not on file   Other Topics Concern    Not on file   Social History Narrative    Not on file     Social Determinants of Health     Financial Resource Strain: Low Risk  (2/5/2024)    Financial Resource Strain     Within the past 12 months, have you or your family members you live with been unable to get utilities (heat, electricity) when it was  really needed?: No   Food Insecurity: No Food Insecurity (5/30/2024)    Received from [employer]    Hunger Vital Sign     Worried About Running Out of Food in the Last Year: Never true     Ran Out of Food in the Last Year: Never true   Transportation Needs: Unmet Transportation Needs (5/30/2024)    Received from [employer]    PRAPARE - Transportation     Lack of Transportation (Medical): Yes     Lack of Transportation (Non-Medical): No   Physical Activity: Not on file   Stress: Not on file   Social Connections: Not on file   Interpersonal Safety: Unknown (5/30/2024)    Received from [employer]    Humiliation, Afraid, Rape, and Kick questionnaire     Fear of Current or Ex-Partner: Not on file     Emotionally Abused: Not on file     Physically Abused: No     Sexually Abused: No   Housing Stability: Low Risk  (5/31/2024)    Received from [employer]    Housing Stability Vital Sign     Unable to Pay for Housing in the Last Year: No     Number of Places Lived in the Last Year: 1     In the last 12 months, was there a time when you did not have a steady place to sleep or slept in a shelter (including now)?: No       Active Ambulatory Problems     Diagnosis Date Noted    Sprain of lumbar region 09/08/2006    Mild intermittent asthma with exacerbation 11/10/2006    Esophageal reflux 11/10/2006    Hyperlipidemia 11/10/2006    Attention deficit disorder 11/10/2006    Chronic rhinitis 11/10/2006    Dizziness and giddiness 12/11/2006    Adenomatous polyp of colon 11/19/2013    Anxiety 11/10/2015    Asthma 04/06/2010    PTSD (post-traumatic stress disorder) 01/01/2006    Coronary atherosclerosis 04/06/2010    PAD (peripheral artery disease) (H24) 12/29/2010    Statin intolerance 08/13/2018    Type 2 diabetes mellitus without complication, without long-term current use of insulin (H) 09/11/2023    ROSARIO (dyspnea on exertion) 09/12/2023    Abnormal cardiovascular stress test 09/12/2023    Status post coronary  angiogram 09/12/2023    Status post percutaneous transluminal coronary angioplasty 09/12/2023    Angina at rest (H24) 06/30/2018    Arm pain 04/06/2010    Autoimmune disease (H24) 06/30/2018    Bicuspid aortic valve 01/15/2018    Bilateral hearing loss 06/01/2017    Cardiac pacemaker in situ 10/23/2023    Contusion of head 10/24/2013    Dissociative disorder or reaction 06/15/2005    Gastroparesis 06/04/2017    General patient noncompliance 09/26/2015    History of colonic polyps 06/01/2017    Insomnia disorder related to known organic factor 07/02/2014    Lipid disorder 04/06/2010    Chronic back pain 04/06/2010    Lower back pain 10/23/2023    Major depressive disorder, recurrent episode, mild (H24) 11/24/2015    Mouth pain 08/05/2015    New daily persistent headache 06/18/2014    Other allergy, other than to medicinal agents 10/23/2023    Pacemaker battery depletion 12/19/2018    Pain, dental 08/12/2014    Recurrent aphthous ulcer 10/23/2023    Somatic symptom disorder, persistent, moderate 09/24/2014    Stomatitis and mucositis 03/24/2016    Temporomandibular joint disorder 10/23/2023    Tobacco dependence syndrome 06/14/2017    Type 2 diabetes mellitus with diabetic peripheral angiopathy without gangrene, without long-term current use of insulin (H) 06/02/2020    Upper respiratory infection 12/29/2010    Vasovagal syncope 04/06/2010    Vitamin B deficiency 12/04/2017    Vitamin D deficiency 10/23/2023    Acute on chronic diastolic heart failure (H) 01/05/2024    Acute ischemic stroke (H) 05/29/2024    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/31/2024    Cannabis use disorder, moderate, dependence (H) 05/31/2024    History of alcohol dependence (H) 05/31/2024    Left-sided weakness 05/29/2024     Resolved Ambulatory Problems     Diagnosis Date Noted    Moderate depressed bipolar I disorder (H) 11/10/2006    Refractory migraine without aura 02/05/2007    COSTOCHONDRITIS 03/20/2007     "History of cardiac pacemaker in situ 06/04/2017    Bipolar affective disorder (H) 12/29/2010     Past Medical History:   Diagnosis Date    Dysphagia     Enlarged tongue     Myalgia     Polyarthralgia     Sicca (H24)        Family History   Problem Relation Age of Onset    Hypertension Mother     Thyroid Disease Mother     Alcoholism Mother     Heart Disease Father     Alcoholism Father     Diabetes Sister     Alzheimer Disease Maternal Grandmother     Heart Disease Maternal Grandfather     Cerebrovascular Disease Paternal Grandmother     C.A.D. No family hx of        Objective:     /80   Pulse 70   Temp 98  F (36.7  C)   Resp 16   Ht 1.511 m (4' 11.5\")   LMP  (LMP Unknown)   SpO2 97%   Breastfeeding No   BMI 20.26 kg/m      Patient is alert, in no obvious distress.   Skin: Warm, dry.    Lungs:  Clear to auscultation. Respirations even and unlabored.  No wheezing or rales noted.   Heart:  Regular rate and rhythm.  No murmurs, S3, S4, gallops, or rubs.    Musculoskeletal: She is sitting in a wheelchair.             "

## 2024-07-03 ENCOUNTER — TELEPHONE (OUTPATIENT)
Dept: NEUROLOGY | Facility: CLINIC | Age: 64
End: 2024-07-03
Payer: MEDICARE

## 2024-07-03 ENCOUNTER — TELEPHONE (OUTPATIENT)
Dept: FAMILY MEDICINE | Facility: CLINIC | Age: 64
End: 2024-07-03
Payer: MEDICARE

## 2024-07-03 NOTE — TELEPHONE ENCOUNTER
Priority nurse line transfer from central scheduling. They stated that patient seemed upset and had questions about her medications. Writer was placed on hold for several minutes and then was disconnected.     Writer attempted to call pt back, MELINDAB

## 2024-07-03 NOTE — TELEPHONE ENCOUNTER
Writer received a call from the patient, and patient stated she was waiting on someone to call her. Writer asked patient how she could help. Caller stated she doesn't want to explain the situation all over again and she is going to have to hang up the phone and someone will have to give her a call back.

## 2024-07-03 NOTE — TELEPHONE ENCOUNTER
Called patient to relay message from Lorena and Dr. Palomo. Patient said she understands and will try her best to get in for urgent care but is not sure if she can get a ride. Pt stated that she will see what she can do and may end up calling 911 to get herself to the ER for check up.     Raffi Burdick RN  [facility]

## 2024-07-03 NOTE — TELEPHONE ENCOUNTER
Health Call Center     Phone Message     May a detailed message be left on voicemail: yes      Reason for Call: Pt was crying and upset on the phone while talking with writer given her frustrations with [facility].  Pt is requesting a call back from Susie as Pt states Susie has been such a support to her. Pt states she needs help in what next to do.      Please call Kim at [phone] to discuss further.        Action Taken: Message routed to:  Other: [facility]     Travel Screening: Not Applicable

## 2024-07-03 NOTE — TELEPHONE ENCOUNTER
Called Jodi back and informed her that Kim will not be receiving care from [facility] anymore and to please re-consider the referral. She will send the referral up to director of nursing.   Susie Nair CMA on 7/3/2024 at 3:18 PM  [facility]

## 2024-07-03 NOTE — TELEPHONE ENCOUNTER
"  FYI - Status Update    Who is Calling: patient    Update: received call from patient stating she is frustrated with the phone system, she has spoken to multiple people about her issue and what she needs from her doctor, but no one can tell her what the issue is.Writer asked caller what she needs from her doctor, and what can be done to help. Patient stated \"Maybe a nurse can call me back to figure out what is going on, I am going to have a freaking heart attack\" and abruptly ended the call.     Writer attempted to assure patient that situation could be handled during this call, and attempted to help, patient did not elaborate on what she needs at this time.    Does caller want a call/response back: Yes     Okay to leave a detailed message?: Yes at Cell number on file:    Telephone Information:   Mobile [phone]     "

## 2024-07-03 NOTE — TELEPHONE ENCOUNTER
[facility] called back and said referral was accepted. Patient needed to be discharged from [facility]. Contacted [facility] and they need to speak with the patient and would contact her  No further questions at this time  Susie Nair CMA on 7/3/2024 at 3:51 PM  [facility]

## 2024-07-03 NOTE — TELEPHONE ENCOUNTER
[facility] Call Center    Phone Message    May a detailed message be left on voicemail: yes     Reason for Call: Other:       Jodi with [facility] calling to inform clinic that they cannot accept the referral for home health care that they received because they show that the patient gets home health care from another facility.   May call back with questions, #[phone]    Action Taken: Message routed to:  Other: [facility]    Travel Screening: Not Applicable

## 2024-07-03 NOTE — TELEPHONE ENCOUNTER
I furred with Lorena, they did not address anxiety or discuss a specific medication yesterday.  If hydroxyzine alone is not adequate, I recommend urgent care for further evaluation and consideration of short term medication.  VJ

## 2024-07-03 NOTE — TELEPHONE ENCOUNTER
Spoke with Kim and apologized for her frustrations with [facility]. Called [facility] and they are taking referrals for Home PT/Skilled nursing. Kim was very grateful for this. Order and last OV faxed to [facility]  Susie Nair CMA on 7/3/2024 at 1:22 PM  [facility]

## 2024-07-03 NOTE — TELEPHONE ENCOUNTER
Spoke with patient and she was upset that she was not able to speak with the same nurse she spoke with earlier today. Pt was feeling anxious and saying she is having sensory overload and feels that the Vistaril that she was given is no longer working the way it should and said that she told Lorena she did not need anything but is now admitting that she does need something else to take, she is dealing with all the family stuff and her mother. Pt was gasping for breathes every 5-6th word and Nurse tried to triage but patient stated it was not needed because she is only doing this because she is anxious that she had to re explain all of this again.  Pt just wants lorena to know that she needs something more to take.     Raffi Burdick RN  [facility]

## 2024-07-08 ENCOUNTER — TELEPHONE (OUTPATIENT)
Dept: FAMILY MEDICINE | Facility: CLINIC | Age: 64
End: 2024-07-08
Payer: MEDICARE

## 2024-07-08 NOTE — TELEPHONE ENCOUNTER
Jodi with [facility] calling to inform PCP that patient refused home care services and reported that her mom stated she cannot have anyone to come into home and if she wanted to receive services she would have to move out.     Home care does not need orders just calling to inform PCP.    JOSE R SantacruzN, RN  [facility]

## 2024-07-11 ENCOUNTER — TELEPHONE (OUTPATIENT)
Dept: FAMILY MEDICINE | Facility: CLINIC | Age: 64
End: 2024-07-11
Payer: MEDICARE
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
## 2024-07-11 NOTE — TELEPHONE ENCOUNTER
FYI - Status Update    Who is Calling: patient    Update: Per Patient: I would like for my provider Gloria Baeza CNP to give me a efren back regarding the current home care visit. I feel that they are not going well. I am only requesting for my provider to call me back please.     Does caller want a call/response back: Yes     Okay to leave a detailed message?: Yes at Cell number on file:    Telephone Information:   Mobile [phone]

## 2024-07-11 NOTE — TELEPHONE ENCOUNTER
Writer huddled with PCP regarding patient and due to patient's complexity of care needs a 40 minute appointment. PCP availability is not until 7/26/24, if patient feels she needs to be seen sooner PCP encourages patient to see another provider in clinic that could see her for a 40 minute visit. Offered sooner appointment to patient but she would only like to see PCP and is okay with waiting until 7/26/24. Telephone visit scheduled with PCP for 7/26/24.    HOWARD Santacruz, RN  [facility]
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
## 2024-07-12 NOTE — TELEPHONE ENCOUNTER
Please notify the patient that I can order a resumption of home health visits.  Please let me know if she is interested.  Thanks.

## 2024-07-16 NOTE — TELEPHONE ENCOUNTER
Writer called patient and offered PCP to resume home care. Patient states she cannot resume home care at this time and will discuss her situation with PCP on her visit on 7/23/24.    HOWARD Santacruz, RN  [facility]

## 2024-07-26 ENCOUNTER — VIRTUAL VISIT (OUTPATIENT)
Dept: FAMILY MEDICINE | Facility: CLINIC | Age: 64
End: 2024-07-26
Payer: MEDICARE

## 2024-07-26 ENCOUNTER — TELEPHONE (OUTPATIENT)
Dept: FAMILY MEDICINE | Facility: CLINIC | Age: 64
End: 2024-07-26

## 2024-07-26 DIAGNOSIS — F43.25 ADJUSTMENT DISORDER WITH MIXED DISTURBANCE OF EMOTIONS AND CONDUCT: Primary | ICD-10-CM

## 2024-07-26 DIAGNOSIS — F12.20 CANNABIS USE DISORDER, MODERATE, DEPENDENCE (H): ICD-10-CM

## 2024-07-26 DIAGNOSIS — F43.10 PTSD (POST-TRAUMATIC STRESS DISORDER): ICD-10-CM

## 2024-07-26 DIAGNOSIS — F10.21 HISTORY OF ALCOHOL DEPENDENCE (H): ICD-10-CM

## 2024-07-26 DIAGNOSIS — J45.30 MILD PERSISTENT ASTHMA WITHOUT COMPLICATION: ICD-10-CM

## 2024-07-26 PROCEDURE — 99442 PR PHYSICIAN TELEPHONE EVALUATION 11-20 MIN: CPT | Mod: 93 | Performed by: NURSE PRACTITIONER

## 2024-07-26 RX ORDER — FLUTICASONE PROPIONATE 220 UG/1
AEROSOL, METERED RESPIRATORY (INHALATION)
Qty: 12 G | Refills: 12 | Status: SHIPPED | OUTPATIENT
Start: 2024-07-26

## 2024-07-26 RX ORDER — ALBUTEROL SULFATE 90 UG/1
1-2 AEROSOL, METERED RESPIRATORY (INHALATION) EVERY 4 HOURS PRN
Qty: 18 G | Refills: 1 | Status: SHIPPED | OUTPATIENT
Start: 2024-07-26
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
## 2024-07-26 NOTE — TELEPHONE ENCOUNTER
PA Initiation    Medication: FLOVENT  MCG/ACT inhaler   Insurance Company:  Medicare   Pharmacy Filling the Rx:  [facility]  Filling Pharmacy Phone:  [phone]  Filling Pharmacy Fax:  [phone]  Start Date:   07/26/2024

## 2024-07-30 NOTE — TELEPHONE ENCOUNTER
Prior Authorization Approval    Authorization Effective Date: 7/30/2024  Authorization Expiration Date: 12/31/2024  Medication: FLOVENT  MCG/ACT inhaler   Reference #:     Insurance Company: [employer] - Phone [phone] Fax [phone]  Which Pharmacy is filling the prescription (Not needed for infusion/clinic administered): [id number] [place], [place] - [address] [place] [address]  Pharmacy Notified: Yes  Patient Notified: Instructed pharmacy to notify patient when script is ready to /ship.

## 2024-07-30 NOTE — TELEPHONE ENCOUNTER
Central Prior Authorization Team   Phone: [phone]    PA Initiation    Medication: FLOVENT  MCG/ACT inhaler   Insurance Company: [employer] - Phone [phone] Fax [phone]  Pharmacy Filling the Rx: [facility] #[id number] [place] [address] [address]  Filling Pharmacy Phone: [phone]  Filling Pharmacy Fax:    Start Date: 7/30/2024

## 2024-08-07 ENCOUNTER — HOSPITAL ENCOUNTER (OUTPATIENT)
Dept: RADIOLOGY | Facility: HOSPITAL | Age: 64
Discharge: HOME | End: 2024-08-07
Attending: PSYCHIATRY & NEUROLOGY
Payer: MEDICARE

## 2024-08-07 ENCOUNTER — HOSPITAL ENCOUNTER (OUTPATIENT)
Dept: MRI IMAGING | Facility: HOSPITAL | Age: 64
Discharge: HOME | End: 2024-08-07
Attending: PSYCHIATRY & NEUROLOGY
Payer: MEDICARE

## 2024-08-07 DIAGNOSIS — R29.898 BILATERAL LEG WEAKNESS: ICD-10-CM

## 2024-08-07 DIAGNOSIS — R26.81 UNSTEADY GAIT: ICD-10-CM

## 2024-08-07 DIAGNOSIS — G62.9 NEUROPATHY: ICD-10-CM

## 2024-08-07 DIAGNOSIS — G25.0 ESSENTIAL TREMOR: ICD-10-CM

## 2024-08-07 DIAGNOSIS — E53.8 LOW SERUM VITAMIN B12: ICD-10-CM

## 2024-08-07 PROCEDURE — 71045 X-RAY EXAM CHEST 1 VIEW: CPT

## 2024-08-07 PROCEDURE — 72141 MRI NECK SPINE W/O DYE: CPT | Mod: MF

## 2024-08-07 PROCEDURE — G1010 CDSM STANSON: HCPCS

## 2024-10-21 ENCOUNTER — OFFICE VISIT (OUTPATIENT)
Dept: NEUROLOGY | Facility: CLINIC | Age: 64
End: 2024-10-21
Payer: MEDICARE

## 2024-10-21 VITALS
WEIGHT: 101 LBS | HEIGHT: 60 IN | SYSTOLIC BLOOD PRESSURE: 106 MMHG | BODY MASS INDEX: 19.83 KG/M2 | HEART RATE: 72 BPM | DIASTOLIC BLOOD PRESSURE: 66 MMHG

## 2024-10-21 DIAGNOSIS — R29.898 BILATERAL LEG WEAKNESS: ICD-10-CM

## 2024-10-21 DIAGNOSIS — G62.9 NEUROPATHY: ICD-10-CM

## 2024-10-21 DIAGNOSIS — G43.E09 CHRONIC MIGRAINE WITH AURA WITHOUT STATUS MIGRAINOSUS, NOT INTRACTABLE: ICD-10-CM

## 2024-10-21 DIAGNOSIS — R26.81 UNSTEADY GAIT: Primary | ICD-10-CM

## 2024-10-21 DIAGNOSIS — G25.0 ESSENTIAL TREMOR: ICD-10-CM

## 2024-10-21 DIAGNOSIS — I63.9 CEREBROVASCULAR ACCIDENT (CVA), UNSPECIFIED MECHANISM (H): ICD-10-CM

## 2024-10-21 DIAGNOSIS — E53.8 LOW SERUM VITAMIN B12: ICD-10-CM

## 2024-10-21 DIAGNOSIS — M54.16 LUMBAR RADICULOPATHY: ICD-10-CM

## 2024-10-21 PROCEDURE — G2211 COMPLEX E/M VISIT ADD ON: HCPCS | Performed by: PSYCHIATRY & NEUROLOGY

## 2024-10-21 PROCEDURE — 99215 OFFICE O/P EST HI 40 MIN: CPT | Performed by: PSYCHIATRY & NEUROLOGY

## 2024-10-21 RX ORDER — SYRINGE-NEEDLE,INSULIN,0.5 ML 27GX1/2"
SYRINGE, EMPTY DISPOSABLE MISCELLANEOUS
Qty: 12 EACH | Refills: 3 | Status: SHIPPED | OUTPATIENT
Start: 2024-10-21

## 2024-10-21 RX ORDER — CYANOCOBALAMIN 1000 UG/ML
1 INJECTION, SOLUTION INTRAMUSCULAR; SUBCUTANEOUS
Qty: 12 ML | Refills: 1 | Status: SHIPPED | OUTPATIENT
Start: 2024-10-21

## 2024-10-21 RX ORDER — ASPIRIN 325 MG
325 TABLET, DELAYED RELEASE (ENTERIC COATED) ORAL DAILY
Qty: 90 TABLET | Refills: 1 | Status: SHIPPED | OUTPATIENT
Start: 2024-10-21

## 2024-10-21 RX ORDER — PREDNISONE 20 MG/1
60 TABLET ORAL DAILY
Qty: 18 TABLET | Refills: 0 | Status: SHIPPED | OUTPATIENT
Start: 2024-10-21 | End: 2024-10-27

## 2024-10-21 RX ORDER — NORTRIPTYLINE HYDROCHLORIDE 10 MG/1
CAPSULE ORAL
Qty: 180 CAPSULE | Refills: 1 | Status: SHIPPED | OUTPATIENT
Start: 2024-10-21

## 2024-10-21 NOTE — PROGRESS NOTES
NEUROLOGY OUTPATIENT PROGRESS NOTE   Oct 21, 2024     CHIEF COMPLAINT/REASON FOR VISIT/REASON FOR CONSULT  Patient presents with:  Gait Problem: 6 month follow up. Patient states she is doing about the same, feels stronger.     REASON FOR CONSULTATION- Gait difficulty    REFERRAL SOURCE  Dr. Lorena Baeza   Dr. Lorena Baeza    HISTORY OF PRESENT ILLNESS  Kim Correa is a 64 year old female seen today for evaluation of gait difficulty.  She previously had a lot of symptoms and seen by multiple providers.  There was concerns about Parkinson's disease 7 years ago but then she was lost to follow-up because of transportation issues and loss of insurance.    7 years ago she was having difficulty with motor skills.  Since then her symptoms have progressed.  She has some shaking and jerking of her upper extremities.  This can happen at rest and is worse with activity.  She further complains that she walks funny with her legs tilted inward.  This complaint of balance issues as well.  Has had multiple concussions in the past.  Does occasionally shuffle her feet.  She does complain of some stiffness in her arms and legs.  She does have cervical disc herniation issues as well.  Does have a diagnosis of diet-controlled diabetes.  Does complain of some numbness in her feet at times.  Also has headaches.  Does complain of some vertigo at times.  Her B12 level has chronically been low and she is on oral supplement without benefit.  A1c is 6.6.    She has been on Risperdal in the past.  No other antipsychotic use.    5/7/24  Patient returns today  1.  She did the B12 injections that have been found any benefit though her exam is improved and after a lot of discussion it was felt that the B12 injections have significantly helped her.  She is walking better compared to before.  Leg weakness is also improved.  Has also had an episode of CHF exacerbation and emphysema.  Is working with cardiology and is doing cardiac rehab.  2.   Her EMG did show lumbar radiculopathy.  She does complain of back pain also has neck pain.  This: Of some shooting pain down the legs.  3.  Has had some difficulty with her appointments.    6/17/24  Patient returns today  1.  Since she was last seen she had an event where she had left-sided weakness and was found to have intracranial stroke related to right MCA stenosis.  She has somewhat improved though is at home.  Has not been able to do physical therapy because of lack of transportation.  2.  Her other symptoms are about the same.  B12 level came back normal  3.  Has worked up with cardiology and she does have a pacemaker.  There was some question about increasing her lipid dose that she is already on the maximum dose of the Crestor.  Cardiology notes mention Lipitor.  She does plan to stop smoking though has not completely stop smoking again.    10/21/24  Patient returns today  1.  Has not made any significant progress from the stroke.  Has not been able to see the home physical therapy/home care referral as that did not work out.  Plavix has not been stopped he remains on the aspirin.  Also taking Crestor.  2.  Does complain of pain in her legs and weakness in all over especially the legs that prevents her from sleeping at night  3.  Does complain of headaches.  These are behind the eyes almost every day.  Reports of photophobia and phonophobia with the headaches.  Has not really tried any medications for this.  Does have some longstanding history of headaches.  4.  B12 injections were helping and these have now been stopped as she ran out of the injections.  Wants to resume them  No other new concerns    Previous history is reviewed and this is unchanged.    PAST MEDICAL/SURGICAL HISTORY  Past Medical History:   Diagnosis Date    Dysphagia     Enlarged tongue     Myalgia     Polyarthralgia     Sicca (H)      Patient Active Problem List   Diagnosis    Sprain of lumbar region    Mild intermittent asthma with  exacerbation    Esophageal reflux    Hyperlipidemia    Attention deficit disorder    Chronic rhinitis    Dizziness and giddiness    Adenomatous polyp of colon    Anxiety    Asthma    PTSD (post-traumatic stress disorder)    Coronary atherosclerosis    PAD (peripheral artery disease) (H)    Statin intolerance    Type 2 diabetes mellitus without complication, without long-term current use of insulin (H)    ROSARIO (dyspnea on exertion)    Abnormal cardiovascular stress test    Status post coronary angiogram    Status post percutaneous transluminal coronary angioplasty    Angina at rest (H)    Arm pain    Autoimmune disease (H)    Bicuspid aortic valve    Bilateral hearing loss    Cardiac pacemaker in situ    Contusion of head    Dissociative disorder or reaction    Gastroparesis    General patient noncompliance    History of colonic polyps    Insomnia disorder related to known organic factor    Lipid disorder    Chronic back pain    Lower back pain    Major depressive disorder, recurrent episode, mild (H)    Mouth pain    New daily persistent headache    Other allergy, other than to medicinal agents    Pacemaker battery depletion    Pain, dental    Recurrent aphthous ulcer    Somatic symptom disorder, persistent, moderate    Stomatitis and mucositis    Temporomandibular joint disorder    Tobacco dependence syndrome    Type 2 diabetes mellitus with diabetic peripheral angiopathy without gangrene, without long-term current use of insulin (H)    Upper respiratory infection    Vasovagal syncope    Vitamin B deficiency    Vitamin D deficiency    Acute on chronic diastolic heart failure (H)    Acute ischemic stroke (H)    Adjustment disorder with mixed disturbance of emotions and conduct    Cannabis use disorder, moderate, dependence (H)    History of alcohol dependence (H)    Left-sided weakness    Copper metabolism disorder (H)   Significant for high cholesterol, diabetes, migraines, tremors, mental illness, arthritis, vision  loss.  Questionable diagnosis of Sjogren's.  Has not followed up with rheumatology.    FAMILY HISTORY  Family History   Problem Relation Age of Onset    Hypertension Mother     Thyroid Disease Mother     Alcoholism Mother     Heart Disease Father     Alcoholism Father     Diabetes Sister     Alzheimer Disease Maternal Grandmother     Heart Disease Maternal Grandfather     Cerebrovascular Disease Paternal Grandmother     C.A.D. No family hx of    Positive for Parkinson's disease in multiple uncles and father.    SOCIAL HISTORY  Social History     Tobacco Use    Smoking status: Every Day     Current packs/day: 1.00     Average packs/day: 1 pack/day for 45.0 years (45.0 ttl pk-yrs)     Types: Cigarettes     Passive exposure: Current    Smokeless tobacco: Never   Vaping Use    Vaping status: Some Days    Substances: Nicotine, THC, CBD    Devices: Disposable, Pre-filled or refillable cartridge, Refillable tank, Pre-filled pod    Passive vaping exposure: Yes   Substance Use Topics    Alcohol use: No    Drug use: Yes     Types: Marijuana       SYSTEMS REVIEW  Twelve-system ROS was done and other than the HPI this was negative/positive for neck pain, back pain, arm and leg pain, joint pain, numbness/tingling, weakness/paralysis, difficulty walking, falling, balance/coordination problems, movement disorder, bladder symptoms, dizziness, speech disturbance, difficulty swallowing, ringing in ears, hearing loss, vision symptoms, sleepiness during the day, sleeping problems, headaches, memory loss, anxiety, chest pain, cardiac/heart problems, stomach pain, respiratory problems, skin changes, weight gain, appetite problems.  No new concerns/issues.    MEDICATIONS  Current Outpatient Medications   Medication Sig Dispense Refill    aspirin 81 MG EC tablet Take 1 tablet (81 mg) by mouth daily Start tomorrow. 30 tablet 3    blood glucose (CONTOUR NEXT TEST) test strip Use to test blood sugar 2 times daily or as directed. 200 strip 1  "   blood glucose (NO BRAND SPECIFIED) test strip Use to test blood sugar 2 times daily or as directed. 300 strip 3    blood glucose monitoring (CONTOUR NEXT MONITOR W/DEVICE KIT) meter device kit 1 each      EPINEPHrine (ANY BX GENERIC EQUIV) 0.3 MG/0.3ML injection 2-pack Inject 0.3 mL (0.3 mg) intramuscularly once as needed for up to 1 dose.*      FLOVENT  MCG/ACT inhaler INHALE 1 PUFF BY MOUTH TWO TIMES DAILY 12 g 12    furosemide (LASIX) 20 MG tablet Take 1 tablet (20 mg) by mouth daily 90 tablet 3    insulin syringe-needle U-100 (30G X 1/2\" 1 ML) 30G X 1/2\" 1 ML miscellaneous Use 1 syringes daily or as directed. For b12 12 each 0    ipratropium - albuterol 0.5 mg/2.5 mg/3 mL (DUONEB) 0.5-2.5 (3) MG/3ML neb solution Inhale 1 vial (3 mLs) into the lungs every 4 hours as needed for shortness of breath, wheezing or cough 90 mL 0    nitroGLYcerin (NITROLINGUAL) 0.4 MG/SPRAY spray PLACE 1 SPRAY (0.4 MG) UNDER TONGUE EVERY 5 MINUTES AS NEEDED FOR CHEST PAIN FOR UP TO 3 DOSES. CALL 911 IF NO RELIEF AFTER FIRST SPRAY*      rosuvastatin (CRESTOR) 40 MG tablet Take 1 tablet (40 mg) by mouth daily 90 tablet 3    VENTOLIN  (90 Base) MCG/ACT inhaler Inhale 1-2 puffs into the lungs every 4 hours as needed for shortness of breath, wheezing or cough 18 g 1    vitamin B-12 (CYANOCOBALAMIN) 1000 MCG tablet Take 1,000 mcg by mouth daily      vitamin D2 (ERGOCALCIFEROL) 66035 units (1250 mcg) capsule Take 1 capsule (50,000 Units) by mouth once a week 12 capsule 0    cyanocobalamin (CYANOCOBALAMIN) 1000 MCG/ML injection Inject 1 mL (1,000 mcg) into the muscle every 30 days (Patient not taking: Reported on 10/21/2024) 12 mL 1    hydrOXYzine ha (VISTARIL) 25 MG capsule Take 25 mg by mouth (Patient not taking: Reported on 10/21/2024)       No current facility-administered medications for this visit.        PHYSICAL EXAMINATION  VITALS: /66 (BP Location: Left arm, Patient Position: Sitting)   Pulse 72   Ht 1.511 m " "(4' 11.5\")   Wt 45.8 kg (101 lb)   LMP  (LMP Unknown)   BMI 20.06 kg/m    GENERAL: Healthy appearing, alert, no acute distress, normal habitus.  CARDIOVASCULAR: Extremities warm and well perfused. Pulses present.   NEUROLOGICAL:  Patient is awake and oriented to self, place and time.  Attention span is normal.  Memory is grossly intact.  Language is fluent and follows commands appropriately.  Appropriate fund of knowledge. Cranial nerves 2-12 are intact. There is no pronator drift.  Motor exam shows 5/5 strength in all extremities except bilateral lower extremity hip flexion weakness and bilateral knee extension weakness.  Now improved.  She does have new left arm and leg 4/5 weakness.  Tone is symmetric bilaterally in upper and lower extremities.  Reflexes are symmetric and absent in upper extremities and lower extremities.  Reflexes on the left side were 2+.  Sensory exam is grossly intact to light touch, pin prick and vibration with decreased pinprick and vibratory sense in the feet-improved.  Finger to nose and heel to shin is without dysmetria.  Romberg is negative.  Gait is slightly wide-based with bilateral decreased arm swing.  No major difficulty with walking.  No major tremor noted today.  Exam shows left-sided weakness with slightly increased reflexes.  Gait was deferred today due to unsteadiness from the weakness.  Exam similar to before.    DIAGNOSTICS  MRI  CONCLUSION:  1.  Mild to moderate burden of nonspecific T2 prolongation in the supratentorial parenchyma. Mild burden of T2 prolongation in the crossing fibers of the tiffany. The findings may be due to microvascular disease given the patient's smoking history.  2.  The pattern is not classic for demyelination but areas of chronic demyelination are not entirely excluded.  3.  No mass, hemorrhage or stroke.  4.  Incidental left parietal intraosseous hemangioma.    EMG- [facility] 2017      EEG-2019      RELEVANT LABS  2023 labs  B12 173  A1c " 6.6  CBC and BMP noncontributory    OUTSIDE RECORDS  Outside referral notes and chart notes were reviewed and pertinent information has been summarized (in addition to the HPI):-      Notes from 2019 from Dr. Bateman were reviewed.  Patient had normal gait.  Was diagnosed to have a B12 deficiency and was put on a B12 supplement.    EMG- Dr. Rayo  Impression:   This is a abnormal nerve conduction and EMG study of bilateral lower extremities that suggests bilateral multilevel radiculopathy involving bilateral L5 and S1 nerve root.  Clinical correlation is recommended.      LABS  Component      Latest Ref Rng 9/1/2023  10:29 AM   Albumin Fraction      3.7 - 5.1 g/dL 4.3    Alpha 1 Fraction      0.2 - 0.4 g/dL 0.3    Alpha 2 Fraction      0.5 - 0.9 g/dL 0.8    Beta Fraction      0.6 - 1.0 g/dL 0.7    Gamma Fraction      0.7 - 1.6 g/dL 0.7    Monoclonal Peak      <=0.0 g/dL 0.0    ELP Interpretation: Essentially normal electrophoretic pattern. No obvious monoclonal proteins seen. Pathologic significance requires clinical correlation. KARRI Haider M.D., Ph.D., Pathologist ().    Vitamin B1 Whole Blood Level      70 - 180 nmol/L 155    TSH      0.30 - 4.20 uIU/mL 1.57    Immunofixation ELP No monoclonal protein seen on immunofixation. Pathologic significance requires clinical correlation.  KARRI Haider M.D., Ph.D., Pathologist ()    Ceruloplasmin      20 - 60 mg/dL 27    Copper      80.0 - 155.0 ug/dL 113.1    Vitamin B6      20.0 - 125.0 nmol/L 20.3    Total Protein Serum for ELP      6.4 - 8.3 g/dL 6.9      Hospital notes        MRI  IMPRESSION:   1. Multiple acute-to-subacute infarctions in the right cerebral hemisphere as above. A few of these are superimposed upon upon small chronic white matter/basal ganglia infarctions that have occurred since 3/15/2019. The intensity of diffusion restriction varies slightly between the different presumed infarctions, suggesting differing ages. Given this  slightly unusual configuration, follow-up is recommended to document evolution.   2.  No evidence of hemorrhagic transformation or significant mass effect.   3.  Moderate presumed chronic small vessel ischemic change.     HEAD CT:   1.  No CT evidence for acute intracranial process.   2.  Brain atrophy and presumed chronic microvascular ischemic changes as above.     HEAD CTA:   1.  Right M1 segment distally, greater than 90% stenosis, suggestive of intracranial atherosclerotic disease.     NECK CTA:   1.  No hemodynamically significant stenosis in the neck vessels.   2.  No evidence for dissection.     B12 512  A1c 6.6      ECHO  Summary    1. Sinus rhythm during study.     2. Normal LV size with mildly increased wall thickness. Calculated biplane LVEF 66%. No regional wall motion abnormalities. (Normal function). Grade 1 diastolic dysfunction.     3. Normal RV size and systolic function.     4. No significant valvular abnormalities.     5. The estimated pulmonary artery systolic pressure is 38 mmHg.     6. Negative bubble study.     7. Compared to the prior study from 1/6/24, the current study reveals no significant change.     Component      Latest Ref Rng 5/23/2024  11:23 AM   Vitamin B12      232 - 1,245 pg/mL 512      MRI L spine  IMPRESSION:  1.  Degenerative changes most pronounced at L5-S1 where a disc-osteophyte complex abuts the traversing S1 and distorts the exiting L5 nerves where there is moderate to severe foraminal narrowing.  2.  Moderate facet arthropathy associated with some soft tissue edema on the right.  3.  Minor degenerative changes elsewhere without additional stenosis.    MRI C spine  IMPRESSION:  1.  Moderate degenerative central canal narrowing at C5-C6 and mild to moderate narrowing at C4-C5 and C6-C7. No spinal cord compression or spinal cord signal abnormality.  2.  Neural foraminal narrowing is moderate on the left at C3-C4, severe on the right at C4-C5, and severe on the left  at C5-C6.      IMPRESSION/REPORT/PLAN  Low serum vitamin B12  Unsteady gait  Rule out neuropathy  Bilateral leg weakness  Essential tremor  Suspected lumbar radiculopathy  Stroke  Right MCA stenosis  Hyperlipidemia    This is a 64 year old female with multiple issues.  Previous work-up in 2019 was negative.    1.  Unsteady gait:-Exam does not show any evidence of Parkinson's disease.  Could be related to her left hip issues and knee issues.  Could be related to other neurological issues.  This is now improved with B12 supplementation and will monitor.  Will continue the vitamin B12.    2.  Tremor:-This is suggestive of essential tremor.  Not present on exam today.  No evidence of Parkinson's disease on exam.  Will hold off on medications.  Stable.    3.  Exam does show decreased vibratory and pinprick sensation in the feet.  I suspect she does have a neuropathy.  EMG was negative for neuropathy.  B12 was low and B12 supplementations actually helped resolve the symptoms.  Continue B12 supplementation.  Blood work was negative for other causes of neuropathy.     4.  She does have bilateral hip flexion and knee extension weakness bilaterally.  Now improved on exam with B12 supplementation.  MRI L-spine/T-spine shows some degeneration though no major spinal stenosis.  The MRI L-spine does show radiculopathy which could be causing the weakness/leg pain.  Consider further referral to the spine center if needed.    5.  Vitamin B12 injections are helping and will continue.  Will appropriate.  Continue injections.    6.  EMG suggested lumbar radiculopathy.  L-spine does confirm this.  Oral steroids might have helped and will retry.  Will also see if physical therapy can help.  Consider further referral to the spine center if needed.    7.  Patient is recently had left-sided weakness.  MRI brain shows right cortical cerebral infarcts.  CT angiogram shows right M1 segment severe stenosis.  Echocardiogram was noncontributory.   "She does have a pacemaker that was interrogated and per patient this does not show any atrial fibrillation.  Will switch from aspirin 81 mg to aspirin 325 mg.  Has completed 3 months of aspirin and Plavix.  Continue 40 mg of Crestor.  LDL goal less than 70.  She potentially might need additional medication.  Smoking cessation.  Recommend exercise and healthy lifestyle.  Home physical therapy did not work.  Set her up with regular physical.    8.  She does complain of headache suggestive of chronic migrainous headaches.  Will start her on nortriptyline.  Use over-the-counter medications for abortive therapy.    Return back in 3-4 months.    - Aspirin and Crestor  -     Physical Therapy  Referral; Future  -     cyanocobalamin (CYANOCOBALAMIN) 1000 mcg/mL injection; Inject 1 mL (1,000 mcg) into the muscle every 30 days.  -     insulin syringe-needle U-100 (30G X 1/2\" 1 ML) 30G X 1/2\" 1 ML miscellaneous; Use 1 syringes daily or as directed. For b12  -     aspirin (ASA) 325 MG EC tablet; Take 1 tablet (325 mg) by mouth daily.  -     predniSONE (DELTASONE) 20 MG tablet; Take 3 tablets (60 mg) by mouth daily for 6 days. Take in AM with food.  -     nortriptyline (PAMELOR) 10 MG capsule; Take 1 cap/night and then increase by 1 cap/week to a max of 3 caps/night as needed and tolerated.    No follow-ups on file.    Over 43 minutes were spent coordinating the care for the patient on the day of the encounter.  This includes previsit, during visit and post visit activities as documented above.  Counseling patient.  Multiple problems/test reviewed.  Prescription management.  New problem.  (Activities include but not inclusive of reviewing chart, reviewing outside records, reviewing labs and imaging study results as well as the images, patient visit time including getting history and exam,  use if applicable, review of test results with the patient and coming up with a plan in a shared model, counseling patient " and family, education and answering patient questions, EMR , EMR diagnosis entry and problem list management, medication reconciliation and prescription management if applicable, paperwork if applicable, printing documents and documentation of the visit activities.)        Rudolph Person MD  Neurologist  [facility]  Tel:- [phone]    This note was dictated using voice recognition software.  Any grammatical or context distortions are unintentional and inherent to the software.

## 2024-10-21 NOTE — NURSING NOTE
Chief Complaint   Patient presents with    Gait Problem     6 month follow up. Patient states she is doing about the same, feels stronger.      June Paige MA

## 2024-10-21 NOTE — LETTER
10/21/2024      [facility] Correa  [address] [facility] [address]      Dear Colleague,    Thank you for referring your patient, Kim Correa, to the [facility] [place]. Please see a copy of my visit note below.    NEUROLOGY OUTPATIENT PROGRESS NOTE   Oct 21, 2024     CHIEF COMPLAINT/REASON FOR VISIT/REASON FOR CONSULT  Patient presents with:  Gait Problem: 6 month follow up. Patient states she is doing about the same, feels stronger.     REASON FOR CONSULTATION- Gait difficulty    REFERRAL SOURCE  Dr. Lorena Baeza  CC Dr. Lorena Baeza    HISTORY OF PRESENT ILLNESS  Kim Correa is a 64 year old female seen today for evaluation of gait difficulty.  She previously had a lot of symptoms and seen by multiple providers.  There was concerns about Parkinson's disease 7 years ago but then she was lost to follow-up because of transportation issues and loss of insurance.    7 years ago she was having difficulty with motor skills.  Since then her symptoms have progressed.  She has some shaking and jerking of her upper extremities.  This can happen at rest and is worse with activity.  She further complains that she walks funny with her legs tilted inward.  This complaint of balance issues as well.  Has had multiple concussions in the past.  Does occasionally shuffle her feet.  She does complain of some stiffness in her arms and legs.  She does have cervical disc herniation issues as well.  Does have a diagnosis of diet-controlled diabetes.  Does complain of some numbness in her feet at times.  Also has headaches.  Does complain of some vertigo at times.  Her B12 level has chronically been low and she is on oral supplement without benefit.  A1c is 6.6.    She has been on Risperdal in the past.  No other antipsychotic use.    5/7/24  Patient returns today  1.  She did the B12 injections that have been found any benefit though her exam is improved and after a lot of discussion it was felt  that the B12 injections have significantly helped her.  She is walking better compared to before.  Leg weakness is also improved.  Has also had an episode of CHF exacerbation and emphysema.  Is working with cardiology and is doing cardiac rehab.  2.  Her EMG did show lumbar radiculopathy.  She does complain of back pain also has neck pain.  This: Of some shooting pain down the legs.  3.  Has had some difficulty with her appointments.    6/17/24  Patient returns today  1.  Since she was last seen she had an event where she had left-sided weakness and was found to have intracranial stroke related to right MCA stenosis.  She has somewhat improved though is at home.  Has not been able to do physical therapy because of lack of transportation.  2.  Her other symptoms are about the same.  B12 level came back normal  3.  Has worked up with cardiology and she does have a pacemaker.  There was some question about increasing her lipid dose that she is already on the maximum dose of the Crestor.  Cardiology notes mention Lipitor.  She does plan to stop smoking though has not completely stop smoking again.    10/21/24  Patient returns today  1.  Has not made any significant progress from the stroke.  Has not been able to see the home physical therapy/home care referral as that did not work out.  Plavix has not been stopped he remains on the aspirin.  Also taking Crestor.  2.  Does complain of pain in her legs and weakness in all over especially the legs that prevents her from sleeping at night  3.  Does complain of headaches.  These are behind the eyes almost every day.  Reports of photophobia and phonophobia with the headaches.  Has not really tried any medications for this.  Does have some longstanding history of headaches.  4.  B12 injections were helping and these have now been stopped as she ran out of the injections.  Wants to resume them  No other new concerns    Previous history is reviewed and this is unchanged.    PAST  MEDICAL/SURGICAL HISTORY  Past Medical History:   Diagnosis Date     Dysphagia      Enlarged tongue      Myalgia      Polyarthralgia      Sicca (H)      Patient Active Problem List   Diagnosis     Sprain of lumbar region     Mild intermittent asthma with exacerbation     Esophageal reflux     Hyperlipidemia     Attention deficit disorder     Chronic rhinitis     Dizziness and giddiness     Adenomatous polyp of colon     Anxiety     Asthma     PTSD (post-traumatic stress disorder)     Coronary atherosclerosis     PAD (peripheral artery disease) (H)     Statin intolerance     Type 2 diabetes mellitus without complication, without long-term current use of insulin (H)     ROSARIO (dyspnea on exertion)     Abnormal cardiovascular stress test     Status post coronary angiogram     Status post percutaneous transluminal coronary angioplasty     Angina at rest (H)     Arm pain     Autoimmune disease (H)     Bicuspid aortic valve     Bilateral hearing loss     Cardiac pacemaker in situ     Contusion of head     Dissociative disorder or reaction     Gastroparesis     General patient noncompliance     History of colonic polyps     Insomnia disorder related to known organic factor     Lipid disorder     Chronic back pain     Lower back pain     Major depressive disorder, recurrent episode, mild (H)     Mouth pain     New daily persistent headache     Other allergy, other than to medicinal agents     Pacemaker battery depletion     Pain, dental     Recurrent aphthous ulcer     Somatic symptom disorder, persistent, moderate     Stomatitis and mucositis     Temporomandibular joint disorder     Tobacco dependence syndrome     Type 2 diabetes mellitus with diabetic peripheral angiopathy without gangrene, without long-term current use of insulin (H)     Upper respiratory infection     Vasovagal syncope     Vitamin B deficiency     Vitamin D deficiency     Acute on chronic diastolic heart failure (H)     Acute ischemic stroke (H)      Adjustment disorder with mixed disturbance of emotions and conduct     Cannabis use disorder, moderate, dependence (H)     History of alcohol dependence (H)     Left-sided weakness     Copper metabolism disorder (H)   Significant for high cholesterol, diabetes, migraines, tremors, mental illness, arthritis, vision loss.  Questionable diagnosis of Sjogren's.  Has not followed up with rheumatology.    FAMILY HISTORY  Family History   Problem Relation Age of Onset     Hypertension Mother      Thyroid Disease Mother      Alcoholism Mother      Heart Disease Father      Alcoholism Father      Diabetes Sister      Alzheimer Disease Maternal Grandmother      Heart Disease Maternal Grandfather      Cerebrovascular Disease Paternal Grandmother      C.A.D. No family hx of    Positive for Parkinson's disease in multiple uncles and father.    SOCIAL HISTORY  Social History     Tobacco Use     Smoking status: Every Day     Current packs/day: 1.00     Average packs/day: 1 pack/day for 45.0 years (45.0 ttl pk-yrs)     Types: Cigarettes     Passive exposure: Current     Smokeless tobacco: Never   Vaping Use     Vaping status: Some Days     Substances: Nicotine, THC, CBD     Devices: Disposable, Pre-filled or refillable cartridge, Refillable tank, Pre-filled pod     Passive vaping exposure: Yes   Substance Use Topics     Alcohol use: No     Drug use: Yes     Types: Marijuana       SYSTEMS REVIEW  Twelve-system ROS was done and other than the HPI this was negative/positive for neck pain, back pain, arm and leg pain, joint pain, numbness/tingling, weakness/paralysis, difficulty walking, falling, balance/coordination problems, movement disorder, bladder symptoms, dizziness, speech disturbance, difficulty swallowing, ringing in ears, hearing loss, vision symptoms, sleepiness during the day, sleeping problems, headaches, memory loss, anxiety, chest pain, cardiac/heart problems, stomach pain, respiratory problems, skin changes, weight  "gain, appetite problems.  No new concerns/issues.    MEDICATIONS  Current Outpatient Medications   Medication Sig Dispense Refill     aspirin 81 MG EC tablet Take 1 tablet (81 mg) by mouth daily Start tomorrow. 30 tablet 3     blood glucose (CONTOUR NEXT TEST) test strip Use to test blood sugar 2 times daily or as directed. 200 strip 1     blood glucose (NO BRAND SPECIFIED) test strip Use to test blood sugar 2 times daily or as directed. 300 strip 3     blood glucose monitoring (CONTOUR NEXT MONITOR W/DEVICE KIT) meter device kit 1 each       EPINEPHrine (ANY BX GENERIC EQUIV) 0.3 MG/0.3ML injection 2-pack Inject 0.3 mL (0.3 mg) intramuscularly once as needed for up to 1 dose.*       FLOVENT  MCG/ACT inhaler INHALE 1 PUFF BY MOUTH TWO TIMES DAILY 12 g 12     furosemide (LASIX) 20 MG tablet Take 1 tablet (20 mg) by mouth daily 90 tablet 3     insulin syringe-needle U-100 (30G X 1/2\" 1 ML) 30G X 1/2\" 1 ML miscellaneous Use 1 syringes daily or as directed. For b12 12 each 0     ipratropium - albuterol 0.5 mg/2.5 mg/3 mL (DUONEB) 0.5-2.5 (3) MG/3ML neb solution Inhale 1 vial (3 mLs) into the lungs every 4 hours as needed for shortness of breath, wheezing or cough 90 mL 0     nitroGLYcerin (NITROLINGUAL) 0.4 MG/SPRAY spray PLACE 1 SPRAY (0.4 MG) UNDER TONGUE EVERY 5 MINUTES AS NEEDED FOR CHEST PAIN FOR UP TO 3 DOSES. CALL 911 IF NO RELIEF AFTER FIRST SPRAY*       rosuvastatin (CRESTOR) 40 MG tablet Take 1 tablet (40 mg) by mouth daily 90 tablet 3     VENTOLIN  (90 Base) MCG/ACT inhaler Inhale 1-2 puffs into the lungs every 4 hours as needed for shortness of breath, wheezing or cough 18 g 1     vitamin B-12 (CYANOCOBALAMIN) 1000 MCG tablet Take 1,000 mcg by mouth daily       vitamin D2 (ERGOCALCIFEROL) 44184 units (1250 mcg) capsule Take 1 capsule (50,000 Units) by mouth once a week 12 capsule 0     cyanocobalamin (CYANOCOBALAMIN) 1000 MCG/ML injection Inject 1 mL (1,000 mcg) into the muscle every 30 days " "(Patient not taking: Reported on 10/21/2024) 12 mL 1     hydrOXYzine ha (VISTARIL) 25 MG capsule Take 25 mg by mouth (Patient not taking: Reported on 10/21/2024)       No current facility-administered medications for this visit.        PHYSICAL EXAMINATION  VITALS: /66 (BP Location: Left arm, Patient Position: Sitting)   Pulse 72   Ht 1.511 m (4' 11.5\")   Wt 45.8 kg (101 lb)   LMP  (LMP Unknown)   BMI 20.06 kg/m    GENERAL: Healthy appearing, alert, no acute distress, normal habitus.  CARDIOVASCULAR: Extremities warm and well perfused. Pulses present.   NEUROLOGICAL:  Patient is awake and oriented to self, place and time.  Attention span is normal.  Memory is grossly intact.  Language is fluent and follows commands appropriately.  Appropriate fund of knowledge. Cranial nerves 2-12 are intact. There is no pronator drift.  Motor exam shows 5/5 strength in all extremities except bilateral lower extremity hip flexion weakness and bilateral knee extension weakness.  Now improved.  She does have new left arm and leg 4/5 weakness.  Tone is symmetric bilaterally in upper and lower extremities.  Reflexes are symmetric and absent in upper extremities and lower extremities.  Reflexes on the left side were 2+.  Sensory exam is grossly intact to light touch, pin prick and vibration with decreased pinprick and vibratory sense in the feet-improved.  Finger to nose and heel to shin is without dysmetria.  Romberg is negative.  Gait is slightly wide-based with bilateral decreased arm swing.  No major difficulty with walking.  No major tremor noted today.  Exam shows left-sided weakness with slightly increased reflexes.  Gait was deferred today due to unsteadiness from the weakness.  Exam similar to before.    DIAGNOSTICS  MRI  CONCLUSION:  1.  Mild to moderate burden of nonspecific T2 prolongation in the supratentorial parenchyma. Mild burden of T2 prolongation in the crossing fibers of the tiffany. The findings may be due " to microvascular disease given the patient's smoking history.  2.  The pattern is not classic for demyelination but areas of chronic demyelination are not entirely excluded.  3.  No mass, hemorrhage or stroke.  4.  Incidental left parietal intraosseous hemangioma.    EMG- [facility] 2017      EEG-2019      RELEVANT LABS  2023 labs  B12 173  A1c 6.6  CBC and BMP noncontributory    OUTSIDE RECORDS  Outside referral notes and chart notes were reviewed and pertinent information has been summarized (in addition to the HPI):-      Notes from 2019 from Dr. Bateman were reviewed.  Patient had normal gait.  Was diagnosed to have a B12 deficiency and was put on a B12 supplement.    EMG- Dr. Rayo  Impression:   This is a abnormal nerve conduction and EMG study of bilateral lower extremities that suggests bilateral multilevel radiculopathy involving bilateral L5 and S1 nerve root.  Clinical correlation is recommended.      LABS  Component      Latest Ref Rng 9/1/2023  10:29 AM   Albumin Fraction      3.7 - 5.1 g/dL 4.3    Alpha 1 Fraction      0.2 - 0.4 g/dL 0.3    Alpha 2 Fraction      0.5 - 0.9 g/dL 0.8    Beta Fraction      0.6 - 1.0 g/dL 0.7    Gamma Fraction      0.7 - 1.6 g/dL 0.7    Monoclonal Peak      <=0.0 g/dL 0.0    ELP Interpretation: Essentially normal electrophoretic pattern. No obvious monoclonal proteins seen. Pathologic significance requires clinical correlation. KARRI Haider M.D., Ph.D., Pathologist ().    Vitamin B1 Whole Blood Level      70 - 180 nmol/L 155    TSH      0.30 - 4.20 uIU/mL 1.57    Immunofixation ELP No monoclonal protein seen on immunofixation. Pathologic significance requires clinical correlation.  KARRI Haider M.D., Ph.D., Pathologist ()    Ceruloplasmin      20 - 60 mg/dL 27    Copper      80.0 - 155.0 ug/dL 113.1    Vitamin B6      20.0 - 125.0 nmol/L 20.3    Total Protein Serum for ELP      6.4 - 8.3 g/dL 6.9      Hospital notes        MRI  IMPRESSION:   1.  Multiple acute-to-subacute infarctions in the right cerebral hemisphere as above. A few of these are superimposed upon upon small chronic white matter/basal ganglia infarctions that have occurred since 3/15/2019. The intensity of diffusion restriction varies slightly between the different presumed infarctions, suggesting differing ages. Given this slightly unusual configuration, follow-up is recommended to document evolution.   2.  No evidence of hemorrhagic transformation or significant mass effect.   3.  Moderate presumed chronic small vessel ischemic change.     HEAD CT:   1.  No CT evidence for acute intracranial process.   2.  Brain atrophy and presumed chronic microvascular ischemic changes as above.     HEAD CTA:   1.  Right M1 segment distally, greater than 90% stenosis, suggestive of intracranial atherosclerotic disease.     NECK CTA:   1.  No hemodynamically significant stenosis in the neck vessels.   2.  No evidence for dissection.     B12 512  A1c 6.6      ECHO  Summary    1. Sinus rhythm during study.     2. Normal LV size with mildly increased wall thickness. Calculated biplane LVEF 66%. No regional wall motion abnormalities. (Normal function). Grade 1 diastolic dysfunction.     3. Normal RV size and systolic function.     4. No significant valvular abnormalities.     5. The estimated pulmonary artery systolic pressure is 38 mmHg.     6. Negative bubble study.     7. Compared to the prior study from 1/6/24, the current study reveals no significant change.     Component      Latest Ref Rng 5/23/2024  11:23 AM   Vitamin B12      232 - 1,245 pg/mL 512      MRI L spine  IMPRESSION:  1.  Degenerative changes most pronounced at L5-S1 where a disc-osteophyte complex abuts the traversing S1 and distorts the exiting L5 nerves where there is moderate to severe foraminal narrowing.  2.  Moderate facet arthropathy associated with some soft tissue edema on the right.  3.  Minor degenerative changes elsewhere  without additional stenosis.    MRI C spine  IMPRESSION:  1.  Moderate degenerative central canal narrowing at C5-C6 and mild to moderate narrowing at C4-C5 and C6-C7. No spinal cord compression or spinal cord signal abnormality.  2.  Neural foraminal narrowing is moderate on the left at C3-C4, severe on the right at C4-C5, and severe on the left at C5-C6.      IMPRESSION/REPORT/PLAN  Low serum vitamin B12  Unsteady gait  Rule out neuropathy  Bilateral leg weakness  Essential tremor  Suspected lumbar radiculopathy  Stroke  Right MCA stenosis  Hyperlipidemia    This is a 64 year old female with multiple issues.  Previous work-up in 2019 was negative.    1.  Unsteady gait:-Exam does not show any evidence of Parkinson's disease.  Could be related to her left hip issues and knee issues.  Could be related to other neurological issues.  This is now improved with B12 supplementation and will monitor.  Will continue the vitamin B12.    2.  Tremor:-This is suggestive of essential tremor.  Not present on exam today.  No evidence of Parkinson's disease on exam.  Will hold off on medications.  Stable.    3.  Exam does show decreased vibratory and pinprick sensation in the feet.  I suspect she does have a neuropathy.  EMG was negative for neuropathy.  B12 was low and B12 supplementations actually helped resolve the symptoms.  Continue B12 supplementation.  Blood work was negative for other causes of neuropathy.     4.  She does have bilateral hip flexion and knee extension weakness bilaterally.  Now improved on exam with B12 supplementation.  MRI L-spine/T-spine shows some degeneration though no major spinal stenosis.  The MRI L-spine does show radiculopathy which could be causing the weakness/leg pain.  Consider further referral to the spine center if needed.    5.  Vitamin B12 injections are helping and will continue.  Will appropriate.  Continue injections.    6.  EMG suggested lumbar radiculopathy.  L-spine does confirm  "this.  Oral steroids might have helped and will retry.  Will also see if physical therapy can help.  Consider further referral to the spine center if needed.    7.  Patient is recently had left-sided weakness.  MRI brain shows right cortical cerebral infarcts.  CT angiogram shows right M1 segment severe stenosis.  Echocardiogram was noncontributory.  She does have a pacemaker that was interrogated and per patient this does not show any atrial fibrillation.  Will switch from aspirin 81 mg to aspirin 325 mg.  Has completed 3 months of aspirin and Plavix.  Continue 40 mg of Crestor.  LDL goal less than 70.  She potentially might need additional medication.  Smoking cessation.  Recommend exercise and healthy lifestyle.  Home physical therapy did not work.  Set her up with regular physical.    8.  She does complain of headache suggestive of chronic migrainous headaches.  Will start her on nortriptyline.  Use over-the-counter medications for abortive therapy.    Return back in 3-4 months.    - Aspirin and Crestor  -     Physical Therapy  Referral; Future  -     cyanocobalamin (CYANOCOBALAMIN) 1000 mcg/mL injection; Inject 1 mL (1,000 mcg) into the muscle every 30 days.  -     insulin syringe-needle U-100 (30G X 1/2\" 1 ML) 30G X 1/2\" 1 ML miscellaneous; Use 1 syringes daily or as directed. For b12  -     aspirin (ASA) 325 MG EC tablet; Take 1 tablet (325 mg) by mouth daily.  -     predniSONE (DELTASONE) 20 MG tablet; Take 3 tablets (60 mg) by mouth daily for 6 days. Take in AM with food.  -     nortriptyline (PAMELOR) 10 MG capsule; Take 1 cap/night and then increase by 1 cap/week to a max of 3 caps/night as needed and tolerated.    No follow-ups on file.    Over 43 minutes were spent coordinating the care for the patient on the day of the encounter.  This includes previsit, during visit and post visit activities as documented above.  Counseling patient.  Multiple problems/test reviewed.  Prescription management.  " New problem.  (Activities include but not inclusive of reviewing chart, reviewing outside records, reviewing labs and imaging study results as well as the images, patient visit time including getting history and exam,  use if applicable, review of test results with the patient and coming up with a plan in a shared model, counseling patient and family, education and answering patient questions, EMR , EMR diagnosis entry and problem list management, medication reconciliation and prescription management if applicable, paperwork if applicable, printing documents and documentation of the visit activities.)        Rudolph Person MD  Neurologist  [facility]  Tel:- [phone]    This note was dictated using voice recognition software.  Any grammatical or context distortions are unintentional and inherent to the software.          Again, thank you for allowing me to participate in the care of your patient.        Sincerely,        Rudolph Person MD

## 2025-01-07 DIAGNOSIS — Z95.0 CARDIAC PACEMAKER IN SITU: Primary | ICD-10-CM

## 2025-01-07 DIAGNOSIS — I49.5 SICK SINUS SYNDROME (H): ICD-10-CM

## 2025-01-13 NOTE — PROGRESS NOTES
HEART CARE ENCOUNTER NOTE      [facility]  [phone]      Assessment/Recommendations   Assessment:   Coronary artery disease: Angiogram 9/12/2023 showed severe in-stent restenosis of mid LAD stents, D2 stenosis, distal LAD stenosis, proximal stenosis of RCA most all treated with PTCA.  PTCA of RCA was unsuccessful given 99% stenosed calcific fibrotic lesion.  Stenting of the LAD was not performed due to collaterals and it being a nondominant vessel.  Patient continues to endorse atypical angina that last for 20 to 30 seconds occurring maybe weekly which is stable and has been present for years.  Dyslipidemia: Is on rosuvastatin 40 mg daily.  Last lipids 5/30/2024 showed LDL of 156.  Patient notes at that time she was forgetting to take her statin medication daily.  Will recheck LDL today.    Chronic diastolic heart failure: Patient notes that her normal weight is around 104 pounds and today was 114 pounds.  She has been feeling more bloated.  Patient is unsure if due to fluid or constipation.  Last week took double her Lasix dose for 4 days.  Will obtain BMP and proBNP and adjust diuretic as needed.  No lower extremity edema noted on exam.  Sick sinus syndrome/neurogenic syncope: Status post permanent pacemaker.  Device check today showed 4 episodes of ventricular high rates EGM showing 8-9 beats of NSV in June and July.  Patient asymptomatic.  Will continue to monitor.  Diabetes mellitus type 2: Last hemoglobin A1c 6.7.  Not currently on any medications.  Tobacco use: Patient has decreased from 2 to 1 pack a day.  Elevated blood pressure without diagnosis of hypertension: Blood pressure elevated today at 160/98 and upon recheck 164/84.  Previous visits have been well-controlled and 106/66 most recently.  Will have patient obtain blood pressure cuff and start monitoring at home.      Plan:   Continue current medications  LDL check today as this has been high in the past the patient was not  compliant at that time.  BMP and BNP as patient notes weight gain and bloating in her belly and had doubled her Lasix dose last week.  If BNP elevated will increase Lasix but for now recommend going back down to Lasix 20 mg once a day as scheduled until I can check on her creatinine  Encourage smoking cessation  Blood pressure was elevated today though has been well-controlled at previous visits.  Encourage patient to get blood pressure cuff to start monitoring at home and reach out with elevated readings.  Continue device checks  Recommend monitoring weight daily      Follow up in 6 months with Dr. Garrido.    The longitudinal plan of care for Kim Correa was addressed during this visit. Due to the added complexity in care, I will continue to support Kim in the subsequent management of this condition(s) and with the ongoing continuity of care of this condition(s).      History of Present Illness/Subjective    HPI: Kim Correa is a 63 year old female with PMHx of coronary artery disease, dyslipidemia, sick sinus syndrome status post permanent pacemaker, HFpEF presents for follow-up.  Patient was admitted last January 2024 with acute on chronic heart failure and was diuresed with IV Lasix 20 mg twice daily with symptomatic improvement.  Was also given DuoNebs and prednisone.  A couple days after discharge patient had more weight gain and extreme shortness of breath and a chest x-ray in the ED had shown a new small left pleural effusion.  I saw patient in clinic 2/9/24 where we discussed low-sodium diet, smoking cessation, and initiating a PCSK9 inhibitor patient declined.  Patient saw Dr. Garrido 6/4/2024 after a hospital stay for left-sided weakness in May where she was found to have intracranial stenosis and treated in a conservative manner.  There was discussion about being compliant with her atorvastatin as well as smoking cessation.  proBNP was ordered at that time and found to be within normal  limits.    Patient notes that she deals with headaches on and off but has multiple neurologic issues and sees neurology.  Patient notes having anxiety and other mental health issues and that it is difficult to tell what some of her symptoms are caused from.  Patient notes feeling lightheaded at times and thinks it may correlate to higher blood pressures.  Patient continues to note her atypical chest discomfort that last for 20 to 30 seconds.  Patient notes this occurs both at rest or with exertion but that it does not occur every time she exerts herself.  Patient notes that has been stable for many years.  Patient takes nitroglycerin once a month.  Patient notes she has been feeling more bloated the past couple weeks and was taking 40 mg of her Lasix instead of 20 for 4 days last week.  Patient notes her home scale yesterday she weighed 112 pounds and her normal weight is 100 to 204 pounds.  Patient does note feeling constipated as well.  Denies lower extremity edema            Echocardiogram 1/6/2024 results:   1. Echo  1/6/2024, 11:07:04 AM.     2. Sinus rhythm during study.     3. Normal LV size and systolic function, 3D EF 68%.     4. Normal RV size and systolic function.     5. Can not rule out bicuspid aortic valve with fusion of the left and right coronary cusp. grossly normal function.     6. Pacemaker lead noted in right ventricle.     7. The aortic root measures mildly dilated at 3.4 cm with an index of 2.4 cm per m2.     8. Compared to the prior study from 2011, the current study reveals no significant change.     Chest x-ray 1/11/2024:  IMPRESSION: Stable left pectoral transvenous pacemaker with lead wire tips in right atrium and right ventricle. Coronary stents. Cardiac silhouette size is at the upper limits of normal. Calcified atherosclerotic changes of the thoracic aorta. New, increased density within the left inferior hemithorax suggesting a new, small left pleural effusion. Airspace changes within  the left lung base likely representing atelectasis. Pulmonary vascular distribution is within normal limits. No interstitial changes to suggest pulmonary edema.     Coronary angiogram 9/12/2023:    Mid LAD lesion is 90% stenosed.    2nd Diag lesion is 90% stenosed.    1st LPL lesion is 20% stenosed.    Ost LAD lesion is 40% stenosed.    Prox RCA lesion is 99% stenosed.    Prox Cx to Mid Cx lesion is 40% stenosed.    Prox LAD to Mid LAD lesion is 40% stenosed.    Dist LAD lesion is 80% stenosed.     1.  Severe diffuse in-stent restenosis of the mid LAD stents.  The proximal stent edge appears smaller in caliber than the mid stent zone.  2.  Severe proximal stenosis of nondominant right coronary artery  3. Moderately severe stenosis of distal LAD beyond mid LAD stents, successfully treated with conventional PTCA.  4.  Successful PTCA of mid LAD stents; PTCA of ostium of jailed diagonal with persistence of normal flow.  Apical segment antegrade flow has been restored.  5.  Unsuccessful PTCA attempt of proximal right coronary artery.  Do not need to reattempt as long as LAD does not develop restenosis within the stent or in the distal segment.  Restenting would be a possible option at that point.  6.  Continue Plavix x1 month    Remote device check 1/8/2024:  Encounter Type: Alert remote pacemaker transmission for VT episode. Courtesy check.   Device: [employer] [place].    Pacing % /Programmed: AP 88%,  2% at DDDR 60/130 ppm.   Lead(s): Stable.  Battery longevity: 8yrs, 11mo estimated.    Presenting: Sinus and AP-VS 90 bpm.   Atrial high rates: since 11/23/23; none detected.  Anticoagulant:  None.   Ventricular High rates: since 11/12/23; One ventricular high rate episode on 1/5/24 3:00PM, appears to be NSVT 20bts 175-180 bpm, duration 6 seconds.   Comments: Normal device function.   Plan: Routed to device RN for review. NEMESIO Hastings, Device Specialist. ADD: NSVT noted, patient was inpatient at [facility] at that  time for HF exacerbation. See notes in Epic. Overdue for OV with Dr. Garrido. Janie NARVAEZ         Physical Examination  Review of Systems   Vitals: BP (!) 160/98 (BP Location: Left arm, Patient Position: Sitting, Cuff Size: Adult Regular)   Pulse 88   Resp 14   Wt 51.7 kg (114 lb)   LMP  (LMP Unknown)   BMI 22.64 kg/m    BMI= Body mass index is 22.64 kg/m .  Wt Readings from Last 3 Encounters:   10/21/24 45.8 kg (101 lb)   06/17/24 46.3 kg (102 lb)   06/04/24 48.5 kg (107 lb)           ENT/Mouth: membranes moist, no oral lesions or bleeding gums.      EYES:  no scleral icterus, normal conjunctivae       Chest/Lungs:   lungs are clear to auscultation, no rales or wheezing,  equal chest wall expansion    Cardiovascular:   Regular. Normal first and second heart sounds with no murmurs, rubs, or gallops; the carotid, radial  pulses are intact, no edema bilaterally        Extremities: no cyanosis or clubbing   Skin: no xanthelasma, warm.    Neurologic: no tremors     Psychiatric: alert and oriented x3, calm        Please refer above for cardiac ROS details.        Medical History  Surgical History Family History Social History   Past Medical History:   Diagnosis Date    Dysphagia     Enlarged tongue     Myalgia     Polyarthralgia     Sicca      Past Surgical History:   Procedure Laterality Date    APPENDECTOMY      CARDIAC CATHETERIZATION      CORONARY ANGIOPLASTY      CORONARY STENT PLACEMENT      CV CORONARY ANGIOGRAM N/A 9/12/2023    Procedure: Coronary Angiogram;  Surgeon: Arnie Queen MD;  Location: [facility] CV    CV LEFT HEART CATH N/A 9/12/2023    Procedure: Left Heart Catheterization;  Surgeon: Arnie Queen MD;  Location: [facility] CV    CV PCI N/A 9/12/2023    Procedure: Percutaneous Coronary Intervention;  Surgeon: Arnie Queen MD;  Location: [facility] CV    HC REVISE MEDIAN N/CARPAL TUNNEL SURG      Description: Neuroplasty Decompression Median Nerve At Carpal  Tunnel;  Recorded: 09/19/2008;    IMPLANT PACEMAKER      INSERT / REPLACE / REMOVE PACEMAKER      IR MISCELLANEOUS PROCEDURE  2/2/2009    AK VAGINAL HYSTERECTOMY,UTERUS 250 GMS/<      Description: Vaginal Hysterectomy;  Recorded: 12/16/2011;    SURGICAL HISTORY OF -       vag hyst    SURGICAL HISTORY OF -       carpal tunnel bilateral    SURGICAL HISTORY OF -       arm surgery right side.      ZZC APPENDECTOMY      Description: Appendectomy;  Recorded: 09/19/2008;  Comments: and ovarian cyst     Family History   Problem Relation Age of Onset    Hypertension Mother     Thyroid Disease Mother     Alcoholism Mother     Heart Disease Father     Alcoholism Father     Diabetes Sister     Alzheimer Disease Maternal Grandmother     Heart Disease Maternal Grandfather     Cerebrovascular Disease Paternal Grandmother     C.A.D. No family hx of         Social History     Socioeconomic History    Marital status:      Spouse name: Not on file    Number of children: Not on file    Years of education: Not on file    Highest education level: Not on file   Occupational History    Not on file   Tobacco Use    Smoking status: Every Day     Current packs/day: 1.00     Average packs/day: 1 pack/day for 45.0 years (45.0 ttl pk-yrs)     Types: Cigarettes     Passive exposure: Current    Smokeless tobacco: Never   Vaping Use    Vaping status: Some Days    Substances: Nicotine, THC, CBD    Devices: Disposable, Pre-filled or refillable cartridge, Refillable tank, Pre-filled pod    Passive vaping exposure: Yes   Substance and Sexual Activity    Alcohol use: No    Drug use: Yes     Types: Marijuana    Sexual activity: Not on file   Other Topics Concern    Not on file   Social History Narrative    Not on file     Social Drivers of Health     Financial Resource Strain: Low Risk  (2/5/2024)    Financial Resource Strain     Within the past 12 months, have you or your family members you live with been unable to get utilities (heat,  electricity) when it was really needed?: No   Food Insecurity: No Food Insecurity (5/30/2024)    Received from [employer]    Hunger Vital Sign     Worried About Running Out of Food in the Last Year: Never true     Ran Out of Food in the Last Year: Never true   Transportation Needs: Unmet Transportation Needs (5/30/2024)    [facility]    PRAPARE - Transportation     In the past 12 months, has lack of transportation kept you from medical appointments or from getting medications?: Yes     In the past 12 months, has lack of transportation kept you from meetings, work, or from getting things needed for daily living?: No   Physical Activity: Not on file   Stress: Not on file   Social Connections: Not on file   Interpersonal Safety: Unknown (5/30/2024)    [facility]    Humiliation, Afraid, Rape, and Kick questionnaire     Fear of Current or Ex-Partner: Not on file     Emotionally Abused: Not on file     Physically Abused: No     Sexually Abused: No   Housing Stability: Low Risk  (5/31/2024)    [facility]    Housing Stability Vital Sign     Unable to Pay for Housing in the Last Year: No     Number of Places Lived in the Last Year: 1     Unstable Housing in the Last Year: No           Medications  Allergies   Current Outpatient Medications   Medication Sig Dispense Refill    aspirin (ASA) 325 MG EC tablet Take 1 tablet (325 mg) by mouth daily. 90 tablet 1    blood glucose (CONTOUR NEXT TEST) test strip Use to test blood sugar 2 times daily or as directed. 200 strip 1    blood glucose (NO BRAND SPECIFIED) test strip Use to test blood sugar 2 times daily or as directed. 300 strip 3    blood glucose monitoring (CONTOUR NEXT MONITOR W/DEVICE KIT) meter device kit 1 each      cyanocobalamin (CYANOCOBALAMIN) 1000 mcg/mL injection Inject 1 mL (1,000 mcg) into the muscle every 30 days. 12 mL 1    EPINEPHrine (ANY BX GENERIC EQUIV) 0.3 MG/0.3ML injection 2-pack Inject 0.3 mL  "(0.3 mg) intramuscularly once as needed for up to 1 dose.*      FLOVENT  MCG/ACT inhaler INHALE 1 PUFF BY MOUTH TWO TIMES DAILY 12 g 12    furosemide (LASIX) 20 MG tablet Take 1 tablet (20 mg) by mouth daily 90 tablet 3    hydrOXYzine ha (VISTARIL) 25 MG capsule Take 25 mg by mouth (Patient not taking: Reported on 10/21/2024)      insulin syringe-needle U-100 (30G X 1/2\" 1 ML) 30G X 1/2\" 1 ML miscellaneous Use 1 syringes daily or as directed. For b12 12 each 3    ipratropium - albuterol 0.5 mg/2.5 mg/3 mL (DUONEB) 0.5-2.5 (3) MG/3ML neb solution Inhale 1 vial (3 mLs) into the lungs every 4 hours as needed for shortness of breath, wheezing or cough 90 mL 0    nitroGLYcerin (NITROLINGUAL) 0.4 MG/SPRAY spray PLACE 1 SPRAY (0.4 MG) UNDER TONGUE EVERY 5 MINUTES AS NEEDED FOR CHEST PAIN FOR UP TO 3 DOSES. CALL 911 IF NO RELIEF AFTER FIRST SPRAY*      nortriptyline (PAMELOR) 10 MG capsule Take 1 cap/night and then increase by 1 cap/week to a max of 3 caps/night as needed and tolerated. 180 capsule 1    rosuvastatin (CRESTOR) 40 MG tablet Take 1 tablet (40 mg) by mouth daily 90 tablet 3    VENTOLIN  (90 Base) MCG/ACT inhaler Inhale 1-2 puffs into the lungs every 4 hours as needed for shortness of breath, wheezing or cough 18 g 1    vitamin B-12 (CYANOCOBALAMIN) 1000 MCG tablet Take 1,000 mcg by mouth daily      vitamin D2 (ERGOCALCIFEROL) 41710 units (1250 mcg) capsule Take 1 capsule (50,000 Units) by mouth once a week 12 capsule 0       Allergies   Allergen Reactions    Bee Venom Anaphylaxis and Unknown     unknown  Unknown    unknown  Unknown    Indomethacin Diarrhea and Hives     Stomach pain, sweats, shakes, not good combo with heart meds either.      Sumatriptan Anaphylaxis, Other (See Comments) and Unknown     unknown  Throat closing      Imitrex [Sumatriptan Succinate]     Levonorgestrel-Ethinyl Estrad Other (See Comments)    Sulfa Antibiotics     Gabapentin Anxiety and Nausea and Vomiting    " "Vancomycin Itching          Lab Results    Chemistry/lipid CBC Cardiac Enzymes/BNP/TSH/INR   Recent Labs   Lab Test 09/12/23  0656   CHOL 227*   HDL 52   *   TRIG 139     Recent Labs   Lab Test 09/12/23  0656 09/11/23  1305 08/26/16  1158   * 180* 187*     Recent Labs   Lab Test 09/11/23  1305      POTASSIUM 4.2   CHLORIDE 104   CO2 25   GLC 88   BUN 8.2   CR 0.74   GFRESTIMATED 90   MANISHA 9.6     Recent Labs   Lab Test 09/11/23  1305 09/24/19  1424 03/15/19  1413   CR 0.74 0.74 0.7     Recent Labs   Lab Test 09/11/23  1305 09/24/19  1424 01/28/19  1036   A1C 6.2* 6.6* 6.3*          Recent Labs   Lab Test 09/11/23  1305   WBC 9.9   HGB 14.7   HCT 44.1   MCV 93        Recent Labs   Lab Test 09/11/23  1305 12/31/18  0746 12/28/18  0852   HGB 14.7 14.0 13.9    Recent Labs   Lab Test 07/01/18  0706 07/01/18  0053 06/30/18  1613   TROPONINI <0.01 <0.01 <0.01     No results for input(s): \"BNP\", \"NTBNPI\", \"NTBNP\" in the last 73278 hours.  Recent Labs   Lab Test 09/01/23  1029   TSH 1.57     No results for input(s): \"INR\" in the last 27787 hours.       Teresa Arnett PA-C                                       "

## 2025-01-14 ENCOUNTER — ANCILLARY PROCEDURE (OUTPATIENT)
Dept: CARDIOLOGY | Facility: CLINIC | Age: 65
End: 2025-01-14
Attending: INTERNAL MEDICINE
Payer: MEDICARE

## 2025-01-14 ENCOUNTER — OFFICE VISIT (OUTPATIENT)
Dept: CARDIOLOGY | Facility: CLINIC | Age: 65
End: 2025-01-14
Payer: MEDICARE

## 2025-01-14 VITALS
RESPIRATION RATE: 14 BRPM | HEART RATE: 88 BPM | SYSTOLIC BLOOD PRESSURE: 164 MMHG | WEIGHT: 114 LBS | DIASTOLIC BLOOD PRESSURE: 84 MMHG | BODY MASS INDEX: 22.64 KG/M2

## 2025-01-14 DIAGNOSIS — Z95.5 S/P DRUG ELUTING CORONARY STENT PLACEMENT: ICD-10-CM

## 2025-01-14 DIAGNOSIS — Z95.0 CARDIAC PACEMAKER IN SITU: ICD-10-CM

## 2025-01-14 DIAGNOSIS — E78.2 MIXED HYPERLIPIDEMIA: Primary | ICD-10-CM

## 2025-01-14 DIAGNOSIS — I49.5 SICK SINUS SYNDROME (H): ICD-10-CM

## 2025-01-14 DIAGNOSIS — Z95.0 PACEMAKER: ICD-10-CM

## 2025-01-14 DIAGNOSIS — I50.33 ACUTE ON CHRONIC DIASTOLIC HEART FAILURE (H): ICD-10-CM

## 2025-01-14 DIAGNOSIS — I25.118 ATHEROSCLEROSIS OF NATIVE CORONARY ARTERY OF NATIVE HEART WITH STABLE ANGINA PECTORIS: ICD-10-CM

## 2025-01-14 LAB
ANION GAP SERPL CALCULATED.3IONS-SCNC: 13 MMOL/L (ref 7–15)
BUN SERPL-MCNC: 5.2 MG/DL (ref 8–23)
CALCIUM SERPL-MCNC: 9.6 MG/DL (ref 8.8–10.4)
CHLORIDE SERPL-SCNC: 102 MMOL/L (ref 98–107)
CREAT SERPL-MCNC: 0.64 MG/DL (ref 0.51–0.95)
EGFRCR SERPLBLD CKD-EPI 2021: >90 ML/MIN/1.73M2
GLUCOSE SERPL-MCNC: 106 MG/DL (ref 70–99)
HCO3 SERPL-SCNC: 24 MMOL/L (ref 22–29)
LDLC SERPL DIRECT ASSAY-MCNC: 171 MG/DL
NT-PROBNP SERPL-MCNC: 291 PG/ML (ref 0–900)
POTASSIUM SERPL-SCNC: 4.4 MMOL/L (ref 3.4–5.3)
SODIUM SERPL-SCNC: 139 MMOL/L (ref 135–145)

## 2025-01-14 PROCEDURE — 36415 COLL VENOUS BLD VENIPUNCTURE: CPT | Performed by: STUDENT IN AN ORGANIZED HEALTH CARE EDUCATION/TRAINING PROGRAM

## 2025-01-14 PROCEDURE — 83721 ASSAY OF BLOOD LIPOPROTEIN: CPT | Performed by: STUDENT IN AN ORGANIZED HEALTH CARE EDUCATION/TRAINING PROGRAM

## 2025-01-14 PROCEDURE — 83880 ASSAY OF NATRIURETIC PEPTIDE: CPT | Performed by: STUDENT IN AN ORGANIZED HEALTH CARE EDUCATION/TRAINING PROGRAM

## 2025-01-14 PROCEDURE — 99214 OFFICE O/P EST MOD 30 MIN: CPT | Performed by: STUDENT IN AN ORGANIZED HEALTH CARE EDUCATION/TRAINING PROGRAM

## 2025-01-14 PROCEDURE — 80048 BASIC METABOLIC PNL TOTAL CA: CPT | Performed by: STUDENT IN AN ORGANIZED HEALTH CARE EDUCATION/TRAINING PROGRAM

## 2025-01-14 NOTE — PATIENT INSTRUCTIONS
Kim Correa,    It was a pleasure to see you today at the [facility].     My recommendations after this visit include:    - Continue current medications.   - Get blood pressure cuff and start monitoring at home. Omron is a good brand. Give us a call if consistently >140/90  - Encourage smoking cessation  - Continue device checks  - Discuss aspirin dose with neurology  - LDL, BMP, BNP today  - Go back down to 1 lasix tablet a day and I will let you know how labs look  - Monitor weight daily  - Follow up with Dr. Garrido in 6 months, sooner if needed    - Please call [phone], if you have any questions or concerns      Teresa Arnett PA-C

## 2025-01-14 NOTE — LETTER
1/14/2025    Lorena Baeza, APRN CNP  [address] [place] [address]    RE: Kim Correa       Dear Colleague,     I had the pleasure of seeing Kim Correa in the [facility].    HEART CARE ENCOUNTER NOTE      [facility]  [phone]      Assessment/Recommendations   Assessment:   Coronary artery disease: Angiogram 9/12/2023 showed severe in-stent restenosis of mid LAD stents, D2 stenosis, distal LAD stenosis, proximal stenosis of RCA most all treated with PTCA.  PTCA of RCA was unsuccessful given 99% stenosed calcific fibrotic lesion.  Stenting of the LAD was not performed due to collaterals and it being a nondominant vessel.  Patient continues to endorse atypical angina that last for 20 to 30 seconds occurring maybe weekly which is stable and has been present for years.  Dyslipidemia: Is on rosuvastatin 40 mg daily.  Last lipids 5/30/2024 showed LDL of 156.  Patient notes at that time she was forgetting to take her statin medication daily.  Will recheck LDL today.    Chronic diastolic heart failure: Patient notes that her normal weight is around 104 pounds and today was 114 pounds.  She has been feeling more bloated.  Patient is unsure if due to fluid or constipation.  Last week took double her Lasix dose for 4 days.  Will obtain BMP and proBNP and adjust diuretic as needed.  No lower extremity edema noted on exam.  Sick sinus syndrome/neurogenic syncope: Status post permanent pacemaker.  Device check today showed 4 episodes of ventricular high rates EGM showing 8-9 beats of NSV in June and July.  Patient asymptomatic.  Will continue to monitor.  Diabetes mellitus type 2: Last hemoglobin A1c 6.7.  Not currently on any medications.  Tobacco use: Patient has decreased from 2 to 1 pack a day.  Elevated blood pressure without diagnosis of hypertension: Blood pressure elevated today at 160/98 and upon recheck 164/84.  Previous visits have been well-controlled and 106/66  most recently.  Will have patient obtain blood pressure cuff and start monitoring at home.      Plan:   Continue current medications  LDL check today as this has been high in the past the patient was not compliant at that time.  BMP and BNP as patient notes weight gain and bloating in her belly and had doubled her Lasix dose last week.  If BNP elevated will increase Lasix but for now recommend going back down to Lasix 20 mg once a day as scheduled until I can check on her creatinine  Encourage smoking cessation  Blood pressure was elevated today though has been well-controlled at previous visits.  Encourage patient to get blood pressure cuff to start monitoring at home and reach out with elevated readings.  Continue device checks  Recommend monitoring weight daily      Follow up in 6 months with Dr. Garrido.    The longitudinal plan of care for Kim Correa was addressed during this visit. Due to the added complexity in care, I will continue to support Kim in the subsequent management of this condition(s) and with the ongoing continuity of care of this condition(s).      History of Present Illness/Subjective    HPI: Kim Correa is a 63 year old female with PMHx of coronary artery disease, dyslipidemia, sick sinus syndrome status post permanent pacemaker, HFpEF presents for follow-up.  Patient was admitted last January 2024 with acute on chronic heart failure and was diuresed with IV Lasix 20 mg twice daily with symptomatic improvement.  Was also given DuoNebs and prednisone.  A couple days after discharge patient had more weight gain and extreme shortness of breath and a chest x-ray in the ED had shown a new small left pleural effusion.  I saw patient in clinic 2/9/24 where we discussed low-sodium diet, smoking cessation, and initiating a PCSK9 inhibitor patient declined.  Patient saw Dr. Garrido 6/4/2024 after a hospital stay for left-sided weakness in May where she was found to have intracranial stenosis  and treated in a conservative manner.  There was discussion about being compliant with her atorvastatin as well as smoking cessation.  proBNP was ordered at that time and found to be within normal limits.    Patient notes that she deals with headaches on and off but has multiple neurologic issues and sees neurology.  Patient notes having anxiety and other mental health issues and that it is difficult to tell what some of her symptoms are caused from.  Patient notes feeling lightheaded at times and thinks it may correlate to higher blood pressures.  Patient continues to note her atypical chest discomfort that last for 20 to 30 seconds.  Patient notes this occurs both at rest or with exertion but that it does not occur every time she exerts herself.  Patient notes that has been stable for many years.  Patient takes nitroglycerin once a month.  Patient notes she has been feeling more bloated the past couple weeks and was taking 40 mg of her Lasix instead of 20 for 4 days last week.  Patient notes her home scale yesterday she weighed 112 pounds and her normal weight is 100 to 204 pounds.  Patient does note feeling constipated as well.  Denies lower extremity edema            Echocardiogram 1/6/2024 results:   1. Echo  1/6/2024, 11:07:04 AM.     2. Sinus rhythm during study.     3. Normal LV size and systolic function, 3D EF 68%.     4. Normal RV size and systolic function.     5. Can not rule out bicuspid aortic valve with fusion of the left and right coronary cusp. grossly normal function.     6. Pacemaker lead noted in right ventricle.     7. The aortic root measures mildly dilated at 3.4 cm with an index of 2.4 cm per m2.     8. Compared to the prior study from 2011, the current study reveals no significant change.     Chest x-ray 1/11/2024:  IMPRESSION: Stable left pectoral transvenous pacemaker with lead wire tips in right atrium and right ventricle. Coronary stents. Cardiac silhouette size is at the upper limits  of normal. Calcified atherosclerotic changes of the thoracic aorta. New, increased density within the left inferior hemithorax suggesting a new, small left pleural effusion. Airspace changes within the left lung base likely representing atelectasis. Pulmonary vascular distribution is within normal limits. No interstitial changes to suggest pulmonary edema.     Coronary angiogram 9/12/2023:     Mid LAD lesion is 90% stenosed.     2nd Diag lesion is 90% stenosed.     1st LPL lesion is 20% stenosed.     Ost LAD lesion is 40% stenosed.     Prox RCA lesion is 99% stenosed.     Prox Cx to Mid Cx lesion is 40% stenosed.     Prox LAD to Mid LAD lesion is 40% stenosed.     Dist LAD lesion is 80% stenosed.     1.  Severe diffuse in-stent restenosis of the mid LAD stents.  The proximal stent edge appears smaller in caliber than the mid stent zone.  2.  Severe proximal stenosis of nondominant right coronary artery  3. Moderately severe stenosis of distal LAD beyond mid LAD stents, successfully treated with conventional PTCA.  4.  Successful PTCA of mid LAD stents; PTCA of ostium of jailed diagonal with persistence of normal flow.  Apical segment antegrade flow has been restored.  5.  Unsuccessful PTCA attempt of proximal right coronary artery.  Do not need to reattempt as long as LAD does not develop restenosis within the stent or in the distal segment.  Restenting would be a possible option at that point.  6.  Continue Plavix x1 month    Remote device check 1/8/2024:  Encounter Type: Alert remote pacemaker transmission for VT episode. Courtesy check.   Device: Medtronic Steffi.    Pacing % /Programmed: AP 88%,  2% at DDDR 60/130 ppm.   Lead(s): Stable.  Battery longevity: 8yrs, 11mo estimated.    Presenting: Sinus and AP-VS 90 bpm.   Atrial high rates: since 11/23/23; none detected.  Anticoagulant:  None.   Ventricular High rates: since 11/12/23; One ventricular high rate episode on 1/5/24 3:00PM, appears to be NSVT 20bts  175-180 bpm, duration 6 seconds.   Comments: Normal device function.   Plan: Routed to device RN for review. NEMESIO Hastings, Device Specialist. ADD: NSVT noted, patient was inpatient at [facility] at that time for HF exacerbation. See notes in Epic. Overdue for OV with Dr. Garrido. Janie RN         Physical Examination  Review of Systems   Vitals: BP (!) 160/98 (BP Location: Left arm, Patient Position: Sitting, Cuff Size: Adult Regular)   Pulse 88   Resp 14   Wt 51.7 kg (114 lb)   LMP  (LMP Unknown)   BMI 22.64 kg/m    BMI= Body mass index is 22.64 kg/m .  Wt Readings from Last 3 Encounters:   10/21/24 45.8 kg (101 lb)   06/17/24 46.3 kg (102 lb)   06/04/24 48.5 kg (107 lb)           ENT/Mouth: membranes moist, no oral lesions or bleeding gums.      EYES:  no scleral icterus, normal conjunctivae       Chest/Lungs:   lungs are clear to auscultation, no rales or wheezing,  equal chest wall expansion    Cardiovascular:   Regular. Normal first and second heart sounds with no murmurs, rubs, or gallops; the carotid, radial  pulses are intact, no edema bilaterally        Extremities: no cyanosis or clubbing   Skin: no xanthelasma, warm.    Neurologic: no tremors     Psychiatric: alert and oriented x3, calm        Please refer above for cardiac ROS details.        Medical History  Surgical History Family History Social History   Past Medical History:   Diagnosis Date     Dysphagia      Enlarged tongue      Myalgia      Polyarthralgia      Sicca      Past Surgical History:   Procedure Laterality Date     APPENDECTOMY       CARDIAC CATHETERIZATION       CORONARY ANGIOPLASTY       CORONARY STENT PLACEMENT       CV CORONARY ANGIOGRAM N/A 9/12/2023    Procedure: Coronary Angiogram;  Surgeon: Arnie Queen MD;  Location: [facility] CV     CV LEFT HEART CATH N/A 9/12/2023    Procedure: Left Heart Catheterization;  Surgeon: Arnie Queen MD;  Location: [facility] LAB CV     CV PCI N/A 9/12/2023     Procedure: Percutaneous Coronary Intervention;  Surgeon: Arnie Queen MD;  Location: [facility] CATH LAB CV     HC REVISE MEDIAN N/CARPAL TUNNEL SURG      Description: Neuroplasty Decompression Median Nerve At Carpal Tunnel;  Recorded: 09/19/2008;     IMPLANT PACEMAKER       INSERT / REPLACE / REMOVE PACEMAKER       IR MISCELLANEOUS PROCEDURE  2/2/2009     SC VAGINAL HYSTERECTOMY,UTERUS 250 GMS/<      Description: Vaginal Hysterectomy;  Recorded: 12/16/2011;     SURGICAL HISTORY OF -       vag hyst     SURGICAL HISTORY OF -       carpal tunnel bilateral     SURGICAL HISTORY OF -       arm surgery right side.       ZZC APPENDECTOMY      Description: Appendectomy;  Recorded: 09/19/2008;  Comments: and ovarian cyst     Family History   Problem Relation Age of Onset     Hypertension Mother      Thyroid Disease Mother      Alcoholism Mother      Heart Disease Father      Alcoholism Father      Diabetes Sister      Alzheimer Disease Maternal Grandmother      Heart Disease Maternal Grandfather      Cerebrovascular Disease Paternal Grandmother      C.A.D. No family hx of         Social History     Socioeconomic History     Marital status:      Spouse name: Not on file     Number of children: Not on file     Years of education: Not on file     Highest education level: Not on file   Occupational History     Not on file   Tobacco Use     Smoking status: Every Day     Current packs/day: 1.00     Average packs/day: 1 pack/day for 45.0 years (45.0 ttl pk-yrs)     Types: Cigarettes     Passive exposure: Current     Smokeless tobacco: Never   Vaping Use     Vaping status: Some Days     Substances: Nicotine, THC, CBD     Devices: Disposable, Pre-filled or refillable cartridge, Refillable tank, Pre-filled pod     Passive vaping exposure: Yes   Substance and Sexual Activity     Alcohol use: No     Drug use: Yes     Types: Marijuana     Sexual activity: Not on file   Other Topics Concern     Not on file   Social History  Narrative     Not on file     Social Drivers of Health     Financial Resource Strain: Low Risk  (2/5/2024)    Financial Resource Strain      Within the past 12 months, have you or your family members you live with been unable to get utilities (heat, electricity) when it was really needed?: No   Food Insecurity: No Food Insecurity (5/30/2024)    Received from [facility]    Hunger Vital Sign      Worried About Running Out of Food in the Last Year: Never true      Ran Out of Food in the Last Year: Never true   Transportation Needs: Unmet Transportation Needs (5/30/2024)    Received from [facility]    PRAPARE - Transportation      In the past 12 months, has lack of transportation kept you from medical appointments or from getting medications?: Yes      In the past 12 months, has lack of transportation kept you from meetings, work, or from getting things needed for daily living?: No   Physical Activity: Not on file   Stress: Not on file   Social Connections: Not on file   Interpersonal Safety: Unknown (5/30/2024)    Received from [facility]    Humiliation, Afraid, Rape, and Kick questionnaire      Fear of Current or Ex-Partner: Not on file      Emotionally Abused: Not on file      Physically Abused: No      Sexually Abused: No   Housing Stability: Low Risk  (5/31/2024)    Received from [facility]    Housing Stability Vital Sign      Unable to Pay for Housing in the Last Year: No      Number of Places Lived in the Last Year: 1      Unstable Housing in the Last Year: No           Medications  Allergies   Current Outpatient Medications   Medication Sig Dispense Refill     aspirin (ASA) 325 MG EC tablet Take 1 tablet (325 mg) by mouth daily. 90 tablet 1     blood glucose (CONTOUR NEXT TEST) test strip Use to test blood sugar 2 times daily or as directed. 200 strip 1     blood glucose (NO BRAND SPECIFIED) test strip Use to test blood sugar 2 times daily or as directed. 300 strip 3     blood glucose monitoring  "(CONTOUR NEXT MONITOR W/DEVICE KIT) meter device kit 1 each       cyanocobalamin (CYANOCOBALAMIN) 1000 mcg/mL injection Inject 1 mL (1,000 mcg) into the muscle every 30 days. 12 mL 1     EPINEPHrine (ANY BX GENERIC EQUIV) 0.3 MG/0.3ML injection 2-pack Inject 0.3 mL (0.3 mg) intramuscularly once as needed for up to 1 dose.*       FLOVENT  MCG/ACT inhaler INHALE 1 PUFF BY MOUTH TWO TIMES DAILY 12 g 12     furosemide (LASIX) 20 MG tablet Take 1 tablet (20 mg) by mouth daily 90 tablet 3     hydrOXYzine ha (VISTARIL) 25 MG capsule Take 25 mg by mouth (Patient not taking: Reported on 10/21/2024)       insulin syringe-needle U-100 (30G X 1/2\" 1 ML) 30G X 1/2\" 1 ML miscellaneous Use 1 syringes daily or as directed. For b12 12 each 3     ipratropium - albuterol 0.5 mg/2.5 mg/3 mL (DUONEB) 0.5-2.5 (3) MG/3ML neb solution Inhale 1 vial (3 mLs) into the lungs every 4 hours as needed for shortness of breath, wheezing or cough 90 mL 0     nitroGLYcerin (NITROLINGUAL) 0.4 MG/SPRAY spray PLACE 1 SPRAY (0.4 MG) UNDER TONGUE EVERY 5 MINUTES AS NEEDED FOR CHEST PAIN FOR UP TO 3 DOSES. CALL 911 IF NO RELIEF AFTER FIRST SPRAY*       nortriptyline (PAMELOR) 10 MG capsule Take 1 cap/night and then increase by 1 cap/week to a max of 3 caps/night as needed and tolerated. 180 capsule 1     rosuvastatin (CRESTOR) 40 MG tablet Take 1 tablet (40 mg) by mouth daily 90 tablet 3     VENTOLIN  (90 Base) MCG/ACT inhaler Inhale 1-2 puffs into the lungs every 4 hours as needed for shortness of breath, wheezing or cough 18 g 1     vitamin B-12 (CYANOCOBALAMIN) 1000 MCG tablet Take 1,000 mcg by mouth daily       vitamin D2 (ERGOCALCIFEROL) 79040 units (1250 mcg) capsule Take 1 capsule (50,000 Units) by mouth once a week 12 capsule 0       Allergies   Allergen Reactions     Bee Venom Anaphylaxis and Unknown     unknown  Unknown    unknown  Unknown     Indomethacin Diarrhea and Hives     Stomach pain, sweats, shakes, not good combo with " "heart meds either.       Sumatriptan Anaphylaxis, Other (See Comments) and Unknown     unknown  Throat closing       Imitrex [Sumatriptan Succinate]      Levonorgestrel-Ethinyl Estrad Other (See Comments)     Sulfa Antibiotics      Gabapentin Anxiety and Nausea and Vomiting     Vancomycin Itching          Lab Results    Chemistry/lipid CBC Cardiac Enzymes/BNP/TSH/INR   Recent Labs   Lab Test 09/12/23  0656   CHOL 227*   HDL 52   *   TRIG 139     Recent Labs   Lab Test 09/12/23  0656 09/11/23  1305 08/26/16  1158   * 180* 187*     Recent Labs   Lab Test 09/11/23  1305      POTASSIUM 4.2   CHLORIDE 104   CO2 25   GLC 88   BUN 8.2   CR 0.74   GFRESTIMATED 90   MANISHA 9.6     Recent Labs   Lab Test 09/11/23  1305 09/24/19  1424 03/15/19  1413   CR 0.74 0.74 0.7     Recent Labs   Lab Test 09/11/23  1305 09/24/19  1424 01/28/19  1036   A1C 6.2* 6.6* 6.3*          Recent Labs   Lab Test 09/11/23  1305   WBC 9.9   HGB 14.7   HCT 44.1   MCV 93        Recent Labs   Lab Test 09/11/23  1305 12/31/18  0746 12/28/18  0852   HGB 14.7 14.0 13.9    Recent Labs   Lab Test 07/01/18  0706 07/01/18  0053 06/30/18  1613   TROPONINI <0.01 <0.01 <0.01     No results for input(s): \"BNP\", \"NTBNPI\", \"NTBNP\" in the last 73159 hours.  Recent Labs   Lab Test 09/01/23  1029   TSH 1.57     No results for input(s): \"INR\" in the last 08531 hours.       Teresa Arnett PA-C                                         Thank you for allowing me to participate in the care of your patient.      Sincerely,     Teresa Arnett PA-C     [facility]  cc:   Avelino Garrido MD  [address]  [place] [address]      "

## 2025-01-15 DIAGNOSIS — I50.33 ACUTE ON CHRONIC DIASTOLIC HEART FAILURE (H): ICD-10-CM

## 2025-01-15 DIAGNOSIS — Z95.5 S/P DRUG ELUTING CORONARY STENT PLACEMENT: Primary | ICD-10-CM

## 2025-01-15 DIAGNOSIS — E78.2 MIXED HYPERLIPIDEMIA: ICD-10-CM

## 2025-01-15 LAB
MDC_IDC_EPISODE_DTM: NORMAL
MDC_IDC_EPISODE_DURATION: 1 S
MDC_IDC_EPISODE_DURATION: 1 S
MDC_IDC_EPISODE_DURATION: 8 S
MDC_IDC_EPISODE_ID: 13
MDC_IDC_EPISODE_ID: 14
MDC_IDC_EPISODE_ID: 15
MDC_IDC_EPISODE_TYPE: NORMAL
MDC_IDC_EPISODE_TYPE_INDUCED: NO
MDC_IDC_LEAD_CONNECTION_STATUS: NORMAL
MDC_IDC_LEAD_CONNECTION_STATUS: NORMAL
MDC_IDC_LEAD_IMPLANT_DT: NORMAL
MDC_IDC_LEAD_IMPLANT_DT: NORMAL
MDC_IDC_LEAD_LOCATION: NORMAL
MDC_IDC_LEAD_LOCATION: NORMAL
MDC_IDC_LEAD_LOCATION_DETAIL_1: NORMAL
MDC_IDC_LEAD_LOCATION_DETAIL_1: NORMAL
MDC_IDC_LEAD_MFG: NORMAL
MDC_IDC_LEAD_MFG: NORMAL
MDC_IDC_LEAD_MODEL: NORMAL
MDC_IDC_LEAD_MODEL: NORMAL
MDC_IDC_LEAD_POLARITY_TYPE: NORMAL
MDC_IDC_LEAD_POLARITY_TYPE: NORMAL
MDC_IDC_LEAD_SERIAL: NORMAL
MDC_IDC_LEAD_SERIAL: NORMAL
MDC_IDC_MSMT_BATTERY_DTM: NORMAL
MDC_IDC_MSMT_BATTERY_REMAINING_LONGEVITY: 98 MO
MDC_IDC_MSMT_BATTERY_RRT_TRIGGER: 2.62
MDC_IDC_MSMT_BATTERY_STATUS: NORMAL
MDC_IDC_MSMT_BATTERY_VOLTAGE: 2.98 V
MDC_IDC_MSMT_LEADCHNL_RA_IMPEDANCE_VALUE: 570 OHM
MDC_IDC_MSMT_LEADCHNL_RA_IMPEDANCE_VALUE: 627 OHM
MDC_IDC_MSMT_LEADCHNL_RA_PACING_THRESHOLD_AMPLITUDE: 0.75 V
MDC_IDC_MSMT_LEADCHNL_RA_PACING_THRESHOLD_AMPLITUDE: 0.88 V
MDC_IDC_MSMT_LEADCHNL_RA_PACING_THRESHOLD_PULSEWIDTH: 0.4 MS
MDC_IDC_MSMT_LEADCHNL_RA_PACING_THRESHOLD_PULSEWIDTH: 0.4 MS
MDC_IDC_MSMT_LEADCHNL_RA_SENSING_INTR_AMPL: 1.62 MV
MDC_IDC_MSMT_LEADCHNL_RA_SENSING_INTR_AMPL: 2 MV
MDC_IDC_MSMT_LEADCHNL_RV_IMPEDANCE_VALUE: 475 OHM
MDC_IDC_MSMT_LEADCHNL_RV_IMPEDANCE_VALUE: 627 OHM
MDC_IDC_MSMT_LEADCHNL_RV_PACING_THRESHOLD_AMPLITUDE: 1.75 V
MDC_IDC_MSMT_LEADCHNL_RV_PACING_THRESHOLD_AMPLITUDE: 2.5 V
MDC_IDC_MSMT_LEADCHNL_RV_PACING_THRESHOLD_PULSEWIDTH: 0.4 MS
MDC_IDC_MSMT_LEADCHNL_RV_PACING_THRESHOLD_PULSEWIDTH: 1 MS
MDC_IDC_MSMT_LEADCHNL_RV_SENSING_INTR_AMPL: 2.62 MV
MDC_IDC_MSMT_LEADCHNL_RV_SENSING_INTR_AMPL: 2.75 MV
MDC_IDC_PG_IMPLANT_DTM: NORMAL
MDC_IDC_PG_MFG: NORMAL
MDC_IDC_PG_MODEL: NORMAL
MDC_IDC_PG_SERIAL: NORMAL
MDC_IDC_PG_TYPE: NORMAL
MDC_IDC_SESS_CLINIC_NAME: NORMAL
MDC_IDC_SESS_DTM: NORMAL
MDC_IDC_SESS_TYPE: NORMAL
MDC_IDC_SET_BRADY_AT_MODE_SWITCH_RATE: 171 {BEATS}/MIN
MDC_IDC_SET_BRADY_HYSTRATE: NORMAL
MDC_IDC_SET_BRADY_LOWRATE: 60 {BEATS}/MIN
MDC_IDC_SET_BRADY_MAX_SENSOR_RATE: 130 {BEATS}/MIN
MDC_IDC_SET_BRADY_MAX_TRACKING_RATE: 130 {BEATS}/MIN
MDC_IDC_SET_BRADY_MODE: NORMAL
MDC_IDC_SET_BRADY_PAV_DELAY_LOW: 300 MS
MDC_IDC_SET_BRADY_SAV_DELAY_LOW: 250 MS
MDC_IDC_SET_LEADCHNL_RA_PACING_AMPLITUDE: NORMAL
MDC_IDC_SET_LEADCHNL_RA_PACING_ANODE_ELECTRODE_1: NORMAL
MDC_IDC_SET_LEADCHNL_RA_PACING_ANODE_LOCATION_1: NORMAL
MDC_IDC_SET_LEADCHNL_RA_PACING_CAPTURE_MODE: NORMAL
MDC_IDC_SET_LEADCHNL_RA_PACING_CATHODE_ELECTRODE_1: NORMAL
MDC_IDC_SET_LEADCHNL_RA_PACING_CATHODE_LOCATION_1: NORMAL
MDC_IDC_SET_LEADCHNL_RA_PACING_POLARITY: NORMAL
MDC_IDC_SET_LEADCHNL_RA_PACING_PULSEWIDTH: 0.4 MS
MDC_IDC_SET_LEADCHNL_RA_SENSING_ANODE_ELECTRODE_1: NORMAL
MDC_IDC_SET_LEADCHNL_RA_SENSING_ANODE_LOCATION_1: NORMAL
MDC_IDC_SET_LEADCHNL_RA_SENSING_CATHODE_ELECTRODE_1: NORMAL
MDC_IDC_SET_LEADCHNL_RA_SENSING_CATHODE_LOCATION_1: NORMAL
MDC_IDC_SET_LEADCHNL_RA_SENSING_POLARITY: NORMAL
MDC_IDC_SET_LEADCHNL_RA_SENSING_SENSITIVITY: 0.3 MV
MDC_IDC_SET_LEADCHNL_RV_PACING_AMPLITUDE: 2.75 V
MDC_IDC_SET_LEADCHNL_RV_PACING_ANODE_ELECTRODE_1: NORMAL
MDC_IDC_SET_LEADCHNL_RV_PACING_ANODE_LOCATION_1: NORMAL
MDC_IDC_SET_LEADCHNL_RV_PACING_CAPTURE_MODE: NORMAL
MDC_IDC_SET_LEADCHNL_RV_PACING_CATHODE_ELECTRODE_1: NORMAL
MDC_IDC_SET_LEADCHNL_RV_PACING_CATHODE_LOCATION_1: NORMAL
MDC_IDC_SET_LEADCHNL_RV_PACING_POLARITY: NORMAL
MDC_IDC_SET_LEADCHNL_RV_PACING_PULSEWIDTH: 1 MS
MDC_IDC_SET_LEADCHNL_RV_SENSING_ANODE_ELECTRODE_1: NORMAL
MDC_IDC_SET_LEADCHNL_RV_SENSING_ANODE_LOCATION_1: NORMAL
MDC_IDC_SET_LEADCHNL_RV_SENSING_CATHODE_ELECTRODE_1: NORMAL
MDC_IDC_SET_LEADCHNL_RV_SENSING_CATHODE_LOCATION_1: NORMAL
MDC_IDC_SET_LEADCHNL_RV_SENSING_POLARITY: NORMAL
MDC_IDC_SET_LEADCHNL_RV_SENSING_SENSITIVITY: 0.9 MV
MDC_IDC_SET_ZONE_DETECTION_INTERVAL: 350 MS
MDC_IDC_SET_ZONE_DETECTION_INTERVAL: 400 MS
MDC_IDC_SET_ZONE_STATUS: NORMAL
MDC_IDC_SET_ZONE_STATUS: NORMAL
MDC_IDC_SET_ZONE_TYPE: NORMAL
MDC_IDC_SET_ZONE_VENDOR_TYPE: NORMAL
MDC_IDC_STAT_AT_BURDEN_PERCENT: 0 %
MDC_IDC_STAT_AT_DTM_END: NORMAL
MDC_IDC_STAT_AT_DTM_START: NORMAL
MDC_IDC_STAT_AT_MODE_SW_COUNT: 0
MDC_IDC_STAT_BRADY_AP_VP_PERCENT: 1.37 %
MDC_IDC_STAT_BRADY_AP_VS_PERCENT: 82.54 %
MDC_IDC_STAT_BRADY_AS_VP_PERCENT: 0.02 %
MDC_IDC_STAT_BRADY_AS_VS_PERCENT: 16.07 %
MDC_IDC_STAT_BRADY_DTM_END: NORMAL
MDC_IDC_STAT_BRADY_DTM_START: NORMAL
MDC_IDC_STAT_BRADY_RA_PERCENT_PACED: 83.94 %
MDC_IDC_STAT_BRADY_RV_PERCENT_PACED: 1.39 %
MDC_IDC_STAT_EPISODE_RECENT_COUNT: 0
MDC_IDC_STAT_EPISODE_RECENT_COUNT: 1
MDC_IDC_STAT_EPISODE_RECENT_COUNT: 3
MDC_IDC_STAT_EPISODE_RECENT_COUNT_DTM_END: NORMAL
MDC_IDC_STAT_EPISODE_RECENT_COUNT_DTM_START: NORMAL
MDC_IDC_STAT_EPISODE_TOTAL_COUNT: 0
MDC_IDC_STAT_EPISODE_TOTAL_COUNT: 0
MDC_IDC_STAT_EPISODE_TOTAL_COUNT: 1
MDC_IDC_STAT_EPISODE_TOTAL_COUNT: 5
MDC_IDC_STAT_EPISODE_TOTAL_COUNT: 8
MDC_IDC_STAT_EPISODE_TOTAL_COUNT_DTM_END: NORMAL
MDC_IDC_STAT_EPISODE_TOTAL_COUNT_DTM_START: NORMAL
MDC_IDC_STAT_EPISODE_TYPE: NORMAL
MDC_IDC_STAT_TACHYTHERAPY_RECENT_DTM_END: NORMAL
MDC_IDC_STAT_TACHYTHERAPY_RECENT_DTM_START: NORMAL
MDC_IDC_STAT_TACHYTHERAPY_TOTAL_DTM_END: NORMAL
MDC_IDC_STAT_TACHYTHERAPY_TOTAL_DTM_START: NORMAL

## 2025-01-15 PROCEDURE — 93280 PM DEVICE PROGR EVAL DUAL: CPT | Performed by: INTERNAL MEDICINE

## 2025-01-15 RX ORDER — EVOLOCUMAB 140 MG/ML
140 INJECTION, SOLUTION SUBCUTANEOUS
Qty: 6 ML | Refills: 0 | Status: SHIPPED | OUTPATIENT
Start: 2025-01-15

## 2025-01-15 RX ORDER — FUROSEMIDE 20 MG/1
20 TABLET ORAL DAILY
Qty: 90 TABLET | Refills: 1 | Status: SHIPPED | OUTPATIENT
Start: 2025-01-15

## 2025-01-15 RX ORDER — ROSUVASTATIN CALCIUM 40 MG/1
40 TABLET, COATED ORAL DAILY
Qty: 90 TABLET | Refills: 1 | Status: SHIPPED | OUTPATIENT
Start: 2025-01-15

## 2025-01-15 NOTE — RESULT ENCOUNTER NOTE
Please let patient know that her proBNP was within normal limits so likely is not due to fluid retention and could consider constipation for the bloating in her belly.  Would continue prescribed dose of Lasix 20 mg once daily.  Her LDL cholesterol is significantly elevated at 171.  Patient had confirmed when I saw her in clinic that she was taking her cholesterol medicine every day.  If this is indeed the case would favor adding Repatha if patient is open.  Ideally this number would be less than 70.

## 2025-01-16 ENCOUNTER — TELEPHONE (OUTPATIENT)
Dept: CARDIOLOGY | Facility: CLINIC | Age: 65
End: 2025-01-16
Payer: MEDICARE

## 2025-01-16 DIAGNOSIS — E78.2 MIXED HYPERLIPIDEMIA: Primary | ICD-10-CM

## 2025-01-16 DIAGNOSIS — I25.118 ATHEROSCLEROSIS OF NATIVE CORONARY ARTERY OF NATIVE HEART WITH STABLE ANGINA PECTORIS: ICD-10-CM

## 2025-01-16 DIAGNOSIS — Z95.5 S/P DRUG ELUTING CORONARY STENT PLACEMENT: ICD-10-CM

## 2025-01-16 NOTE — TELEPHONE ENCOUNTER
Central Prior Authorization Team   Phone: [phone]    PA Initiation    Medication: Repatha Rx  Insurance Company: [employer] Part [employer] - Phone [phone] Fax [phone]  Pharmacy Filling the Rx: [id number] [place], [place] - [address]  Filling Pharmacy Phone: [phone]  Filling Pharmacy Fax:    Start Date: 1/16/2025

## 2025-01-20 RX ORDER — EZETIMIBE 10 MG/1
10 TABLET ORAL DAILY
Qty: 30 TABLET | Refills: 5 | Status: SHIPPED | OUTPATIENT
Start: 2025-01-20

## 2025-01-20 NOTE — TELEPHONE ENCOUNTER
Msg rec'd 1-20-25 @ 1509:  Teresa Arnett, Ellyn Navarro, RN  We can try Zetia first 10 mg once daily and recheck fasting cholesterol in 8 weeks.    Phone call to patient - informed her of updated recommendations due to insurance denial of Repatha - patient verbalized understanding, offered no questions / concerns at this time, confirmed preferred pharmacy for new Rx and agreed to sched FLP in 2mo - orders placed per protocol - call transf to sched.  mg

## 2025-01-20 NOTE — TELEPHONE ENCOUNTER
PRIOR AUTHORIZATION DENIED    Medication: Repatha 140mg/ml    Denial Date: 1/20/2025    Denial Rational: [employer] Part D plans now require patient to try/fail zetia therapy as adjunct to a maximally tolerated statin therapy or have an intolerance or contraindication to statin therapy        Appeal Information: This medication was denied. If physician would like to appeal because patient has contraindication or allergy to covered medication please write letter of medical necessity and route back to PA team to initiate.  If no further action is needed please close encounter thank you.

## 2025-02-04 ENCOUNTER — TELEPHONE (OUTPATIENT)
Dept: CARDIOLOGY | Facility: CLINIC | Age: 65
End: 2025-02-04
Payer: MEDICARE

## 2025-02-04 NOTE — TELEPHONE ENCOUNTER
Spoke with patient,she does not have a BP machine at this time, she is waiting for her insurance to come through so she can buy one, asked patient if she could come in for a BP check, patient states she is unable, because she would have to fine a ride and it would be difficult.  Pt has an appt on 4/17 with neurology will await to see what BP is then   DE Kohler

## 2025-02-17 ENCOUNTER — OFFICE VISIT (OUTPATIENT)
Dept: NEUROLOGY | Facility: CLINIC | Age: 65
End: 2025-02-17
Payer: MEDICARE

## 2025-02-17 VITALS
HEIGHT: 60 IN | DIASTOLIC BLOOD PRESSURE: 74 MMHG | SYSTOLIC BLOOD PRESSURE: 132 MMHG | WEIGHT: 114 LBS | BODY MASS INDEX: 22.38 KG/M2 | HEART RATE: 91 BPM

## 2025-02-17 DIAGNOSIS — M54.16 LUMBAR RADICULOPATHY: ICD-10-CM

## 2025-02-17 DIAGNOSIS — R26.81 UNSTEADY GAIT: Primary | ICD-10-CM

## 2025-02-17 DIAGNOSIS — E53.8 LOW SERUM VITAMIN B12: ICD-10-CM

## 2025-02-17 DIAGNOSIS — I63.9 CEREBROVASCULAR ACCIDENT (CVA), UNSPECIFIED MECHANISM (H): ICD-10-CM

## 2025-02-17 PROCEDURE — 99214 OFFICE O/P EST MOD 30 MIN: CPT | Performed by: PSYCHIATRY & NEUROLOGY

## 2025-02-17 PROCEDURE — G2211 COMPLEX E/M VISIT ADD ON: HCPCS | Performed by: PSYCHIATRY & NEUROLOGY

## 2025-02-17 RX ORDER — CYANOCOBALAMIN 1000 UG/ML
1 INJECTION, SOLUTION INTRAMUSCULAR; SUBCUTANEOUS
Qty: 12 ML | Refills: 1 | Status: SHIPPED | OUTPATIENT
Start: 2025-02-17

## 2025-02-17 RX ORDER — ASPIRIN 325 MG
325 TABLET, DELAYED RELEASE (ENTERIC COATED) ORAL DAILY
Qty: 90 TABLET | Refills: 1 | Status: SHIPPED | OUTPATIENT
Start: 2025-02-17

## 2025-02-17 RX ORDER — SYRINGE-NEEDLE,INSULIN,0.5 ML 27GX1/2"
SYRINGE, EMPTY DISPOSABLE MISCELLANEOUS
Qty: 12 EACH | Refills: 3 | Status: SHIPPED | OUTPATIENT
Start: 2025-02-17

## 2025-02-17 NOTE — NURSING NOTE
Chief Complaint   Patient presents with    Gait Problem     4 month follow up. Patient states she is using her cane, she does not walk as much. Since her last visit, she has been doing about the same.      June Paige MA

## 2025-02-17 NOTE — PROGRESS NOTES
NEUROLOGY OUTPATIENT PROGRESS NOTE   Feb 17, 2025     CHIEF COMPLAINT/REASON FOR VISIT/REASON FOR CONSULT  Patient presents with:  Gait Problem: 4 month follow up. Patient states she is using her cane, she does not walk as much. Since her last visit, she has been doing about the same.     REASON FOR CONSULTATION- Gait difficulty    REFERRAL SOURCE  Dr. Lorena Baeza   Dr. Lorena Baeza    HISTORY OF PRESENT ILLNESS  Kim Correa is a 64 year old female seen today for evaluation of gait difficulty.  She previously had a lot of symptoms and seen by multiple providers.  There was concerns about Parkinson's disease 7 years ago but then she was lost to follow-up because of transportation issues and loss of insurance.    7 years ago she was having difficulty with motor skills.  Since then her symptoms have progressed.  She has some shaking and jerking of her upper extremities.  This can happen at rest and is worse with activity.  She further complains that she walks funny with her legs tilted inward.  This complaint of balance issues as well.  Has had multiple concussions in the past.  Does occasionally shuffle her feet.  She does complain of some stiffness in her arms and legs.  She does have cervical disc herniation issues as well.  Does have a diagnosis of diet-controlled diabetes.  Does complain of some numbness in her feet at times.  Also has headaches.  Does complain of some vertigo at times.  Her B12 level has chronically been low and she is on oral supplement without benefit.  A1c is 6.6.    She has been on Risperdal in the past.  No other antipsychotic use.    5/7/24  Patient returns today  1.  She did the B12 injections that have been found any benefit though her exam is improved and after a lot of discussion it was felt that the B12 injections have significantly helped her.  She is walking better compared to before.  Leg weakness is also improved.  Has also had an episode of CHF exacerbation and emphysema.   Is working with cardiology and is doing cardiac rehab.  2.  Her EMG did show lumbar radiculopathy.  She does complain of back pain also has neck pain.  This: Of some shooting pain down the legs.  3.  Has had some difficulty with her appointments.    6/17/24  Patient returns today  1.  Since she was last seen she had an event where she had left-sided weakness and was found to have intracranial stroke related to right MCA stenosis.  She has somewhat improved though is at home.  Has not been able to do physical therapy because of lack of transportation.  2.  Her other symptoms are about the same.  B12 level came back normal  3.  Has worked up with cardiology and she does have a pacemaker.  There was some question about increasing her lipid dose that she is already on the maximum dose of the Crestor.  Cardiology notes mention Lipitor.  She does plan to stop smoking though has not completely stop smoking again.    10/21/24  Patient returns today  1.  Has not made any significant progress from the stroke.  Has not been able to see the home physical therapy/home care referral as that did not work out.  Plavix has not been stopped he remains on the aspirin.  Also taking Crestor.  2.  Does complain of pain in her legs and weakness in all over especially the legs that prevents her from sleeping at night  3.  Does complain of headaches.  These are behind the eyes almost every day.  Reports of photophobia and phonophobia with the headaches.  Has not really tried any medications for this.  Does have some longstanding history of headaches.  4.  B12 injections were helping and these have now been stopped as she ran out of the injections.  Wants to resume them  No other new concerns    2/17/25  Patient returns today.  1.  No significant symptoms from the stroke though continues to have some difficulty with ambulation.  Had stopped the B12 supplementation though it had helped her significantly in the past.  I would recommend that  she go back on the B12 supplementation as her oral supplementation is was not effective before.  2.  Continues to have headaches almost every day.  These are not very bothersome.  Nortriptyline did not make a huge difference is no longer taking it.  Does not want to try more medications for right now  3.  She had decrease the dose of the aspirin and encouraged her to go back on the full aspirin since the lower dose was not helping.  Mainly complains of acidity.  Encouraged her to take the medication with food.  No other new concerns.    Previous history is reviewed and this is unchanged.    PAST MEDICAL/SURGICAL HISTORY  Past Medical History:   Diagnosis Date    Dysphagia     Enlarged tongue     Myalgia     Polyarthralgia     Sicca      Patient Active Problem List   Diagnosis    Sprain of lumbar region    Mild intermittent asthma with exacerbation    Esophageal reflux    Hyperlipidemia    Attention deficit disorder    Chronic rhinitis    Dizziness and giddiness    Adenomatous polyp of colon    Anxiety    Asthma    PTSD (post-traumatic stress disorder)    Coronary atherosclerosis    PAD (peripheral artery disease)    Statin intolerance    Type 2 diabetes mellitus without complication, without long-term current use of insulin (H)    ROSARIO (dyspnea on exertion)    Abnormal cardiovascular stress test    Status post coronary angiogram    Status post percutaneous transluminal coronary angioplasty    Angina at rest    Arm pain    Autoimmune disease    Bicuspid aortic valve    Bilateral hearing loss    Cardiac pacemaker in situ    Contusion of head    Dissociative disorder or reaction    Gastroparesis    General patient noncompliance    History of colonic polyps    Insomnia disorder related to known organic factor    Lipid disorder    Chronic back pain    Lower back pain    Major depressive disorder, recurrent episode, mild    Mouth pain    New daily persistent headache    Other allergy, other than to medicinal agents     Pacemaker battery depletion    Pain, dental    Recurrent aphthous ulcer    Somatic symptom disorder, persistent, moderate    Stomatitis and mucositis    Temporomandibular joint disorder    Tobacco dependence syndrome    Type 2 diabetes mellitus with diabetic peripheral angiopathy without gangrene, without long-term current use of insulin (H)    Upper respiratory infection    Vasovagal syncope    Vitamin B deficiency    Vitamin D deficiency    Acute on chronic diastolic heart failure (H)    Acute ischemic stroke (H)    Adjustment disorder with mixed disturbance of emotions and conduct    Cannabis use disorder, moderate, dependence (H)    History of alcohol dependence (H)    Left-sided weakness    Copper metabolism disorder (H)   Significant for high cholesterol, diabetes, migraines, tremors, mental illness, arthritis, vision loss.  Questionable diagnosis of Sjogren's.  Has not followed up with rheumatology.    FAMILY HISTORY  Family History   Problem Relation Age of Onset    Hypertension Mother     Thyroid Disease Mother     Alcoholism Mother     Heart Disease Father     Alcoholism Father     Diabetes Sister     Alzheimer Disease Maternal Grandmother     Heart Disease Maternal Grandfather     Cerebrovascular Disease Paternal Grandmother     C.A.D. No family hx of    Positive for Parkinson's disease in multiple uncles and father.    SOCIAL HISTORY  Social History     Tobacco Use    Smoking status: Every Day     Current packs/day: 1.00     Average packs/day: 1 pack/day for 45.0 years (45.0 ttl pk-yrs)     Types: Cigarettes     Passive exposure: Current    Smokeless tobacco: Never   Vaping Use    Vaping status: Some Days    Substances: Nicotine, THC, CBD    Devices: Disposable, Pre-filled or refillable cartridge, Refillable tank, Pre-filled pod    Passive vaping exposure: Yes   Substance Use Topics    Alcohol use: No    Drug use: Yes     Types: Marijuana       SYSTEMS REVIEW  Twelve-system ROS was done and other than  "the HPI this was negative/positive for neck pain, back pain, arm and leg pain, joint pain, numbness/tingling, weakness/paralysis, difficulty walking, falling, balance/coordination problems, movement disorder, bladder symptoms, dizziness, speech disturbance, difficulty swallowing, ringing in ears, hearing loss, vision symptoms, sleepiness during the day, sleeping problems, headaches, memory loss, anxiety, chest pain, cardiac/heart problems, stomach pain, respiratory problems, skin changes, weight gain, appetite problems.  No new concerns/issues.    MEDICATIONS  Current Outpatient Medications   Medication Sig Dispense Refill    aspirin (ASA) 325 MG EC tablet Take 1 tablet (325 mg) by mouth daily. Take with food 90 tablet 1    blood glucose (CONTOUR NEXT TEST) test strip Use to test blood sugar 2 times daily or as directed. 200 strip 1    blood glucose (NO BRAND SPECIFIED) test strip Use to test blood sugar 2 times daily or as directed. 300 strip 3    blood glucose monitoring (CONTOUR NEXT MONITOR W/DEVICE KIT) meter device kit 1 each      cyanocobalamin (CYANOCOBALAMIN) 1000 mcg/mL injection Inject 1 mL (1,000 mcg) into the muscle every 30 days. 12 mL 1    EPINEPHrine (ANY BX GENERIC EQUIV) 0.3 MG/0.3ML injection 2-pack Inject 0.3 mg into the muscle as needed for anaphylaxis.      FLOVENT  MCG/ACT inhaler INHALE 1 PUFF BY MOUTH TWO TIMES DAILY 12 g 12    furosemide (LASIX) 20 MG tablet Take 1 tablet (20 mg) by mouth daily. 90 tablet 1    insulin syringe-needle U-100 (30G X 1/2\" 1 ML) 30G X 1/2\" 1 ML miscellaneous Use 1 syringes daily or as directed. For b12 12 each 3    ipratropium - albuterol 0.5 mg/2.5 mg/3 mL (DUONEB) 0.5-2.5 (3) MG/3ML neb solution Inhale 1 vial (3 mLs) into the lungs every 4 hours as needed for shortness of breath, wheezing or cough 90 mL 0    nitroGLYcerin (NITROLINGUAL) 0.4 MG/SPRAY spray PLACE 1 SPRAY (0.4 MG) UNDER TONGUE EVERY 5 MINUTES AS NEEDED FOR CHEST PAIN FOR UP TO 3 DOSES. CALL " "911 IF NO RELIEF AFTER FIRST SPRAY*      rosuvastatin (CRESTOR) 40 MG tablet Take 1 tablet (40 mg) by mouth daily. 90 tablet 1    VENTOLIN  (90 Base) MCG/ACT inhaler Inhale 1-2 puffs into the lungs every 4 hours as needed for shortness of breath, wheezing or cough 18 g 1    vitamin B-12 (CYANOCOBALAMIN) 1000 MCG tablet Take 1,000 mcg by mouth daily      vitamin D2 (ERGOCALCIFEROL) 80763 units (1250 mcg) capsule Take 1 capsule (50,000 Units) by mouth once a week 12 capsule 0    evolocumab (REPATHA SURECLICK) 140 MG/ML prefilled autoinjector Inject 1 mL (140 mg) subcutaneously every 14 days. (Patient not taking: Reported on 2/17/2025) 6 mL 0    ezetimibe (ZETIA) 10 MG tablet Take 1 tablet (10 mg) by mouth daily. (Patient not taking: Reported on 2/17/2025) 30 tablet 5     No current facility-administered medications for this visit.        PHYSICAL EXAMINATION  VITALS: /74 (BP Location: Right arm, Patient Position: Sitting)   Pulse 91   Ht 1.511 m (4' 11.5\")   Wt 51.7 kg (114 lb)   LMP  (LMP Unknown)   BMI 22.64 kg/m    GENERAL: Healthy appearing, alert, no acute distress, normal habitus.  CARDIOVASCULAR: Extremities warm and well perfused. Pulses present.   NEUROLOGICAL:  Patient is awake and oriented to self, place and time.  Attention span is normal.  Memory is grossly intact.  Language is fluent and follows commands appropriately.  Appropriate fund of knowledge. Cranial nerves 2-12 are intact. There is no pronator drift.  Motor exam shows 5/5 strength in all extremities except bilateral lower extremity hip flexion weakness and bilateral knee extension weakness.  Now improved.  She does have new left arm and leg 4/5 weakness.  Tone is symmetric bilaterally in upper and lower extremities.  Reflexes are symmetric and absent in upper extremities and lower extremities.  Reflexes on the left side were 2+.  Sensory exam is grossly intact to light touch, pin prick and vibration with decreased pinprick and " vibratory sense in the feet-improved.  Finger to nose and heel to shin is without dysmetria.  Romberg is negative.  Gait is slightly wide-based with bilateral decreased arm swing.  No major difficulty with walking.  No major tremor noted today.  Exam shows left-sided weakness with slightly increased reflexes.  Gait was deferred today due to unsteadiness from the weakness.  Exam stable.  Continues to have some difficulty with ambulation with some weakness on the left side/left leg.  She reports that her left leg is smaller than the right.    DIAGNOSTICS  MRI  CONCLUSION:  1.  Mild to moderate burden of nonspecific T2 prolongation in the supratentorial parenchyma. Mild burden of T2 prolongation in the crossing fibers of the tiffany. The findings may be due to microvascular disease given the patient's smoking history.  2.  The pattern is not classic for demyelination but areas of chronic demyelination are not entirely excluded.  3.  No mass, hemorrhage or stroke.  4.  Incidental left parietal intraosseous hemangioma.    EMG- [facility] 2017      EEG-2019      RELEVANT LABS  2023 labs  B12 173  A1c 6.6  CBC and BMP noncontributory    OUTSIDE RECORDS  Outside referral notes and chart notes were reviewed and pertinent information has been summarized (in addition to the HPI):-      Notes from 2019 from Dr. Bateman were reviewed.  Patient had normal gait.  Was diagnosed to have a B12 deficiency and was put on a B12 supplement.    EMG- Dr. Rayo  Impression:   This is a abnormal nerve conduction and EMG study of bilateral lower extremities that suggests bilateral multilevel radiculopathy involving bilateral L5 and S1 nerve root.  Clinical correlation is recommended.      LABS  Component      Latest Ref Rng 9/1/2023  10:29 AM   Albumin Fraction      3.7 - 5.1 g/dL 4.3    Alpha 1 Fraction      0.2 - 0.4 g/dL 0.3    Alpha 2 Fraction      0.5 - 0.9 g/dL 0.8    Beta Fraction      0.6 - 1.0 g/dL 0.7    Gamma Fraction      0.7 - 1.6  g/dL 0.7    Monoclonal Peak      <=0.0 g/dL 0.0    ELP Interpretation: Essentially normal electrophoretic pattern. No obvious monoclonal proteins seen. Pathologic significance requires clinical correlation. KARRI Haider M.D., Ph.D., Pathologist ().    Vitamin B1 Whole Blood Level      70 - 180 nmol/L 155    TSH      0.30 - 4.20 uIU/mL 1.57    Immunofixation ELP No monoclonal protein seen on immunofixation. Pathologic significance requires clinical correlation.  KARRI Haider M.D., Ph.D., Pathologist ()    Ceruloplasmin      20 - 60 mg/dL 27    Copper      80.0 - 155.0 ug/dL 113.1    Vitamin B6      20.0 - 125.0 nmol/L 20.3    Total Protein Serum for ELP      6.4 - 8.3 g/dL 6.9      Hospital notes        MRI  IMPRESSION:   1. Multiple acute-to-subacute infarctions in the right cerebral hemisphere as above. A few of these are superimposed upon upon small chronic white matter/basal ganglia infarctions that have occurred since 3/15/2019. The intensity of diffusion restriction varies slightly between the different presumed infarctions, suggesting differing ages. Given this slightly unusual configuration, follow-up is recommended to document evolution.   2.  No evidence of hemorrhagic transformation or significant mass effect.   3.  Moderate presumed chronic small vessel ischemic change.     HEAD CT:   1.  No CT evidence for acute intracranial process.   2.  Brain atrophy and presumed chronic microvascular ischemic changes as above.     HEAD CTA:   1.  Right M1 segment distally, greater than 90% stenosis, suggestive of intracranial atherosclerotic disease.     NECK CTA:   1.  No hemodynamically significant stenosis in the neck vessels.   2.  No evidence for dissection.     B12 512  A1c 6.6      ECHO  Summary    1. Sinus rhythm during study.     2. Normal LV size with mildly increased wall thickness. Calculated biplane LVEF 66%. No regional wall motion abnormalities. (Normal function).  Grade 1 diastolic dysfunction.     3. Normal RV size and systolic function.     4. No significant valvular abnormalities.     5. The estimated pulmonary artery systolic pressure is 38 mmHg.     6. Negative bubble study.     7. Compared to the prior study from 1/6/24, the current study reveals no significant change.     Component      Latest Ref Rng 5/23/2024  11:23 AM   Vitamin B12      232 - 1,245 pg/mL 512      MRI L spine  IMPRESSION:  1.  Degenerative changes most pronounced at L5-S1 where a disc-osteophyte complex abuts the traversing S1 and distorts the exiting L5 nerves where there is moderate to severe foraminal narrowing.  2.  Moderate facet arthropathy associated with some soft tissue edema on the right.  3.  Minor degenerative changes elsewhere without additional stenosis.    MRI C spine  IMPRESSION:  1.  Moderate degenerative central canal narrowing at C5-C6 and mild to moderate narrowing at C4-C5 and C6-C7. No spinal cord compression or spinal cord signal abnormality.  2.  Neural foraminal narrowing is moderate on the left at C3-C4, severe on the right at C4-C5, and severe on the left at C5-C6.      IMPRESSION/REPORT/PLAN  Low serum vitamin B12  Unsteady gait  Rule out neuropathy  Bilateral leg weakness  Essential tremor  Suspected lumbar radiculopathy  Stroke  Right MCA stenosis  Hyperlipidemia    This is a 64 year old female with multiple issues.  Previous work-up in 2019 was negative.    1.  Unsteady gait:-Exam does not show any evidence of Parkinson's disease.  Could be related to her left hip issues and knee issues.  Could be related to other neurological issues.  This is now improved with B12 supplementation and will monitor.  Will continue the vitamin B12.    2.  Tremor:-This is suggestive of essential tremor.  Not present on exam today.  No evidence of Parkinson's disease on exam.  Will hold off on medications.  Stable.  No change.    3.  Exam does show decreased vibratory and pinprick sensation  in the feet.  I suspect she does have a neuropathy.  EMG was negative for neuropathy.  B12 was low and B12 supplementations actually helped resolve the symptoms.  Continue B12 supplementation.  Blood work was negative for other causes of neuropathy.  No change.    4.  She does have bilateral hip flexion and knee extension weakness bilaterally.  Now improved on exam with B12 supplementation.  MRI L-spine/T-spine shows some degeneration though no major spinal stenosis.  The MRI L-spine does show radiculopathy which could be causing the weakness/leg pain.  Consider further referral to the spine center if needed.  Stable.    5.  Vitamin B12 injections are helping and will continue.  She had stopped these and will restart.    6.  EMG suggested lumbar radiculopathy.  L-spine does confirm this.  Continues to have issues and wants to retry the physical therapy as she could not do it before.  Consider further referral to the spine center if needed.  Consider epidural injections during the next visit.    7.  Patient is recently had left-sided weakness.  MRI brain shows right cortical cerebral infarcts.  CT angiogram shows right M1 segment severe stenosis.  Echocardiogram was noncontributory.  She does have a pacemaker that was interrogated and per patient this does not show any atrial fibrillation.  Will switch from aspirin 81 mg to aspirin 325 mg.  Has completed 3 months of aspirin and Plavix.  Continue 40 mg of Crestor.  LDL goal less than 70.  She potentially might need additional medication.  Smoking cessation.  Recommend exercise and healthy lifestyle.  Will reset home physical therapy as previously she had some family issues preventing her from doing home therapy.  Encouraged her to take the aspirin with food because she is having some acid issues.    8.  She does complain of headache suggestive of chronic migrainous headaches.  Nortriptyline was tried though did not provide benefit.  She does not want to try more  "medication as the headaches are not very bothersome.    Return back in 6 months after physical therapy.    - Continue Crestor  -     cyanocobalamin (CYANOCOBALAMIN) 1000 mcg/mL injection; Inject 1 mL (1,000 mcg) into the muscle every 30 days.  -     insulin syringe-needle U-100 (30G X 1/2\" 1 ML) 30G X 1/2\" 1 ML miscellaneous; Use 1 syringes daily or as directed. For b12  -     aspirin (ASA) 325 MG EC tablet; Take 1 tablet (325 mg) by mouth daily. Take with food  -     Home Care Referral for physical therapy    Return in about 6 months (around 8/17/2025) for In-Clinic Visit (must).    Over 35 minutes were spent coordinating the care for the patient on the day of the encounter.  This includes previsit, during visit and post visit activities as documented above.  Counseling patient.  Prescription management.  Multiple problems reviewed.  Refractory problems.  (Activities include but not inclusive of reviewing chart, reviewing outside records, reviewing labs and imaging study results as well as the images, patient visit time including getting history and exam,  use if applicable, review of test results with the patient and coming up with a plan in a shared model, counseling patient and family, education and answering patient questions, EMR , EMR diagnosis entry and problem list management, medication reconciliation and prescription management if applicable, paperwork if applicable, printing documents and documentation of the visit activities.)        Rudolph Person MD  Neurologist  [facility]  Tel:- [phone]    This note was dictated using voice recognition software.  Any grammatical or context distortions are unintentional and inherent to the software.    The longitudinal plan of care for the diagnosis(es)/condition(s) as documented were addressed during this visit. Due to the added complexity in care, I will continue to support Kim in the subsequent " management and with ongoing continuity of care.

## 2025-02-17 NOTE — LETTER
2/17/2025      Kim Correa  [address] [facility] [address]      Dear Colleague,    Thank you for referring your patient, Kim Correa, to the [facility] [place]. Please see a copy of my visit note below.    NEUROLOGY OUTPATIENT PROGRESS NOTE   Feb 17, 2025     CHIEF COMPLAINT/REASON FOR VISIT/REASON FOR CONSULT  Patient presents with:  Gait Problem: 4 month follow up. Patient states she is using her cane, she does not walk as much. Since her last visit, she has been doing about the same.     REASON FOR CONSULTATION- Gait difficulty    REFERRAL SOURCE  Dr. Lorena Baeza  CC Dr. Lorena Baeza    HISTORY OF PRESENT ILLNESS  Kim Correa is a 64 year old female seen today for evaluation of gait difficulty.  She previously had a lot of symptoms and seen by multiple providers.  There was concerns about Parkinson's disease 7 years ago but then she was lost to follow-up because of transportation issues and loss of insurance.    7 years ago she was having difficulty with motor skills.  Since then her symptoms have progressed.  She has some shaking and jerking of her upper extremities.  This can happen at rest and is worse with activity.  She further complains that she walks funny with her legs tilted inward.  This complaint of balance issues as well.  Has had multiple concussions in the past.  Does occasionally shuffle her feet.  She does complain of some stiffness in her arms and legs.  She does have cervical disc herniation issues as well.  Does have a diagnosis of diet-controlled diabetes.  Does complain of some numbness in her feet at times.  Also has headaches.  Does complain of some vertigo at times.  Her B12 level has chronically been low and she is on oral supplement without benefit.  A1c is 6.6.    She has been on Risperdal in the past.  No other antipsychotic use.    5/7/24  Patient returns today  1.  She did the B12 injections that have been found any benefit though her  exam is improved and after a lot of discussion it was felt that the B12 injections have significantly helped her.  She is walking better compared to before.  Leg weakness is also improved.  Has also had an episode of CHF exacerbation and emphysema.  Is working with cardiology and is doing cardiac rehab.  2.  Her EMG did show lumbar radiculopathy.  She does complain of back pain also has neck pain.  This: Of some shooting pain down the legs.  3.  Has had some difficulty with her appointments.    6/17/24  Patient returns today  1.  Since she was last seen she had an event where she had left-sided weakness and was found to have intracranial stroke related to right MCA stenosis.  She has somewhat improved though is at home.  Has not been able to do physical therapy because of lack of transportation.  2.  Her other symptoms are about the same.  B12 level came back normal  3.  Has worked up with cardiology and she does have a pacemaker.  There was some question about increasing her lipid dose that she is already on the maximum dose of the Crestor.  Cardiology notes mention Lipitor.  She does plan to stop smoking though has not completely stop smoking again.    10/21/24  Patient returns today  1.  Has not made any significant progress from the stroke.  Has not been able to see the home physical therapy/home care referral as that did not work out.  Plavix has not been stopped he remains on the aspirin.  Also taking Crestor.  2.  Does complain of pain in her legs and weakness in all over especially the legs that prevents her from sleeping at night  3.  Does complain of headaches.  These are behind the eyes almost every day.  Reports of photophobia and phonophobia with the headaches.  Has not really tried any medications for this.  Does have some longstanding history of headaches.  4.  B12 injections were helping and these have now been stopped as she ran out of the injections.  Wants to resume them  No other new  concerns    2/17/25  Patient returns today.  1.  No significant symptoms from the stroke though continues to have some difficulty with ambulation.  Had stopped the B12 supplementation though it had helped her significantly in the past.  I would recommend that she go back on the B12 supplementation as her oral supplementation is was not effective before.  2.  Continues to have headaches almost every day.  These are not very bothersome.  Nortriptyline did not make a huge difference is no longer taking it.  Does not want to try more medications for right now  3.  She had decrease the dose of the aspirin and encouraged her to go back on the full aspirin since the lower dose was not helping.  Mainly complains of acidity.  Encouraged her to take the medication with food.  No other new concerns.    Previous history is reviewed and this is unchanged.    PAST MEDICAL/SURGICAL HISTORY  Past Medical History:   Diagnosis Date     Dysphagia      Enlarged tongue      Myalgia      Polyarthralgia      Sicca      Patient Active Problem List   Diagnosis     Sprain of lumbar region     Mild intermittent asthma with exacerbation     Esophageal reflux     Hyperlipidemia     Attention deficit disorder     Chronic rhinitis     Dizziness and giddiness     Adenomatous polyp of colon     Anxiety     Asthma     PTSD (post-traumatic stress disorder)     Coronary atherosclerosis     PAD (peripheral artery disease)     Statin intolerance     Type 2 diabetes mellitus without complication, without long-term current use of insulin (H)     ROSARIO (dyspnea on exertion)     Abnormal cardiovascular stress test     Status post coronary angiogram     Status post percutaneous transluminal coronary angioplasty     Angina at rest     Arm pain     Autoimmune disease     Bicuspid aortic valve     Bilateral hearing loss     Cardiac pacemaker in situ     Contusion of head     Dissociative disorder or reaction     Gastroparesis     General patient noncompliance      History of colonic polyps     Insomnia disorder related to known organic factor     Lipid disorder     Chronic back pain     Lower back pain     Major depressive disorder, recurrent episode, mild     Mouth pain     New daily persistent headache     Other allergy, other than to medicinal agents     Pacemaker battery depletion     Pain, dental     Recurrent aphthous ulcer     Somatic symptom disorder, persistent, moderate     Stomatitis and mucositis     Temporomandibular joint disorder     Tobacco dependence syndrome     Type 2 diabetes mellitus with diabetic peripheral angiopathy without gangrene, without long-term current use of insulin (H)     Upper respiratory infection     Vasovagal syncope     Vitamin B deficiency     Vitamin D deficiency     Acute on chronic diastolic heart failure (H)     Acute ischemic stroke (H)     Adjustment disorder with mixed disturbance of emotions and conduct     Cannabis use disorder, moderate, dependence (H)     History of alcohol dependence (H)     Left-sided weakness     Copper metabolism disorder (H)   Significant for high cholesterol, diabetes, migraines, tremors, mental illness, arthritis, vision loss.  Questionable diagnosis of Sjogren's.  Has not followed up with rheumatology.    FAMILY HISTORY  Family History   Problem Relation Age of Onset     Hypertension Mother      Thyroid Disease Mother      Alcoholism Mother      Heart Disease Father      Alcoholism Father      Diabetes Sister      Alzheimer Disease Maternal Grandmother      Heart Disease Maternal Grandfather      Cerebrovascular Disease Paternal Grandmother      C.A.D. No family hx of    Positive for Parkinson's disease in multiple uncles and father.    SOCIAL HISTORY  Social History     Tobacco Use     Smoking status: Every Day     Current packs/day: 1.00     Average packs/day: 1 pack/day for 45.0 years (45.0 ttl pk-yrs)     Types: Cigarettes     Passive exposure: Current     Smokeless tobacco: Never   Vaping Use      "Vaping status: Some Days     Substances: Nicotine, THC, CBD     Devices: Disposable, Pre-filled or refillable cartridge, Refillable tank, Pre-filled pod     Passive vaping exposure: Yes   Substance Use Topics     Alcohol use: No     Drug use: Yes     Types: Marijuana       SYSTEMS REVIEW  Twelve-system ROS was done and other than the HPI this was negative/positive for neck pain, back pain, arm and leg pain, joint pain, numbness/tingling, weakness/paralysis, difficulty walking, falling, balance/coordination problems, movement disorder, bladder symptoms, dizziness, speech disturbance, difficulty swallowing, ringing in ears, hearing loss, vision symptoms, sleepiness during the day, sleeping problems, headaches, memory loss, anxiety, chest pain, cardiac/heart problems, stomach pain, respiratory problems, skin changes, weight gain, appetite problems.  No new concerns/issues.    MEDICATIONS  Current Outpatient Medications   Medication Sig Dispense Refill     aspirin (ASA) 325 MG EC tablet Take 1 tablet (325 mg) by mouth daily. Take with food 90 tablet 1     blood glucose (CONTOUR NEXT TEST) test strip Use to test blood sugar 2 times daily or as directed. 200 strip 1     blood glucose (NO BRAND SPECIFIED) test strip Use to test blood sugar 2 times daily or as directed. 300 strip 3     blood glucose monitoring (CONTOUR NEXT MONITOR W/DEVICE KIT) meter device kit 1 each       cyanocobalamin (CYANOCOBALAMIN) 1000 mcg/mL injection Inject 1 mL (1,000 mcg) into the muscle every 30 days. 12 mL 1     EPINEPHrine (ANY BX GENERIC EQUIV) 0.3 MG/0.3ML injection 2-pack Inject 0.3 mg into the muscle as needed for anaphylaxis.       FLOVENT  MCG/ACT inhaler INHALE 1 PUFF BY MOUTH TWO TIMES DAILY 12 g 12     furosemide (LASIX) 20 MG tablet Take 1 tablet (20 mg) by mouth daily. 90 tablet 1     insulin syringe-needle U-100 (30G X 1/2\" 1 ML) 30G X 1/2\" 1 ML miscellaneous Use 1 syringes daily or as directed. For b12 12 each 3     " "ipratropium - albuterol 0.5 mg/2.5 mg/3 mL (DUONEB) 0.5-2.5 (3) MG/3ML neb solution Inhale 1 vial (3 mLs) into the lungs every 4 hours as needed for shortness of breath, wheezing or cough 90 mL 0     nitroGLYcerin (NITROLINGUAL) 0.4 MG/SPRAY spray PLACE 1 SPRAY (0.4 MG) UNDER TONGUE EVERY 5 MINUTES AS NEEDED FOR CHEST PAIN FOR UP TO 3 DOSES. CALL 911 IF NO RELIEF AFTER FIRST SPRAY*       rosuvastatin (CRESTOR) 40 MG tablet Take 1 tablet (40 mg) by mouth daily. 90 tablet 1     VENTOLIN  (90 Base) MCG/ACT inhaler Inhale 1-2 puffs into the lungs every 4 hours as needed for shortness of breath, wheezing or cough 18 g 1     vitamin B-12 (CYANOCOBALAMIN) 1000 MCG tablet Take 1,000 mcg by mouth daily       vitamin D2 (ERGOCALCIFEROL) 54254 units (1250 mcg) capsule Take 1 capsule (50,000 Units) by mouth once a week 12 capsule 0     evolocumab (REPATHA SURECLICK) 140 MG/ML prefilled autoinjector Inject 1 mL (140 mg) subcutaneously every 14 days. (Patient not taking: Reported on 2/17/2025) 6 mL 0     ezetimibe (ZETIA) 10 MG tablet Take 1 tablet (10 mg) by mouth daily. (Patient not taking: Reported on 2/17/2025) 30 tablet 5     No current facility-administered medications for this visit.        PHYSICAL EXAMINATION  VITALS: /74 (BP Location: Right arm, Patient Position: Sitting)   Pulse 91   Ht 1.511 m (4' 11.5\")   Wt 51.7 kg (114 lb)   LMP  (LMP Unknown)   BMI 22.64 kg/m    GENERAL: Healthy appearing, alert, no acute distress, normal habitus.  CARDIOVASCULAR: Extremities warm and well perfused. Pulses present.   NEUROLOGICAL:  Patient is awake and oriented to self, place and time.  Attention span is normal.  Memory is grossly intact.  Language is fluent and follows commands appropriately.  Appropriate fund of knowledge. Cranial nerves 2-12 are intact. There is no pronator drift.  Motor exam shows 5/5 strength in all extremities except bilateral lower extremity hip flexion weakness and bilateral knee " extension weakness.  Now improved.  She does have new left arm and leg 4/5 weakness.  Tone is symmetric bilaterally in upper and lower extremities.  Reflexes are symmetric and absent in upper extremities and lower extremities.  Reflexes on the left side were 2+.  Sensory exam is grossly intact to light touch, pin prick and vibration with decreased pinprick and vibratory sense in the feet-improved.  Finger to nose and heel to shin is without dysmetria.  Romberg is negative.  Gait is slightly wide-based with bilateral decreased arm swing.  No major difficulty with walking.  No major tremor noted today.  Exam shows left-sided weakness with slightly increased reflexes.  Gait was deferred today due to unsteadiness from the weakness.  Exam stable.  Continues to have some difficulty with ambulation with some weakness on the left side/left leg.  She reports that her left leg is smaller than the right.    DIAGNOSTICS  MRI  CONCLUSION:  1.  Mild to moderate burden of nonspecific T2 prolongation in the supratentorial parenchyma. Mild burden of T2 prolongation in the crossing fibers of the tiffany. The findings may be due to microvascular disease given the patient's smoking history.  2.  The pattern is not classic for demyelination but areas of chronic demyelination are not entirely excluded.  3.  No mass, hemorrhage or stroke.  4.  Incidental left parietal intraosseous hemangioma.    EMG- [facility] 2017      EEG-2019      RELEVANT LABS  2023 labs  B12 173  A1c 6.6  CBC and BMP noncontributory    OUTSIDE RECORDS  Outside referral notes and chart notes were reviewed and pertinent information has been summarized (in addition to the HPI):-      Notes from 2019 from Dr. Bateman were reviewed.  Patient had normal gait.  Was diagnosed to have a B12 deficiency and was put on a B12 supplement.    EMG- Dr. Rayo  Impression:   This is a abnormal nerve conduction and EMG study of bilateral lower extremities that suggests bilateral  multilevel radiculopathy involving bilateral L5 and S1 nerve root.  Clinical correlation is recommended.      LABS  Component      Latest Ref Rng 9/1/2023  10:29 AM   Albumin Fraction      3.7 - 5.1 g/dL 4.3    Alpha 1 Fraction      0.2 - 0.4 g/dL 0.3    Alpha 2 Fraction      0.5 - 0.9 g/dL 0.8    Beta Fraction      0.6 - 1.0 g/dL 0.7    Gamma Fraction      0.7 - 1.6 g/dL 0.7    Monoclonal Peak      <=0.0 g/dL 0.0    ELP Interpretation: Essentially normal electrophoretic pattern. No obvious monoclonal proteins seen. Pathologic significance requires clinical correlation. KARRI Haider M.D., Ph.D., Pathologist ().    Vitamin B1 Whole Blood Level      70 - 180 nmol/L 155    TSH      0.30 - 4.20 uIU/mL 1.57    Immunofixation ELP No monoclonal protein seen on immunofixation. Pathologic significance requires clinical correlation.  KARRI Haider M.D., Ph.D., Pathologist ()    Ceruloplasmin      20 - 60 mg/dL 27    Copper      80.0 - 155.0 ug/dL 113.1    Vitamin B6      20.0 - 125.0 nmol/L 20.3    Total Protein Serum for ELP      6.4 - 8.3 g/dL 6.9      Hospital notes        MRI  IMPRESSION:   1. Multiple acute-to-subacute infarctions in the right cerebral hemisphere as above. A few of these are superimposed upon upon small chronic white matter/basal ganglia infarctions that have occurred since 3/15/2019. The intensity of diffusion restriction varies slightly between the different presumed infarctions, suggesting differing ages. Given this slightly unusual configuration, follow-up is recommended to document evolution.   2.  No evidence of hemorrhagic transformation or significant mass effect.   3.  Moderate presumed chronic small vessel ischemic change.     HEAD CT:   1.  No CT evidence for acute intracranial process.   2.  Brain atrophy and presumed chronic microvascular ischemic changes as above.     HEAD CTA:   1.  Right M1 segment distally, greater than 90% stenosis, suggestive of intracranial  atherosclerotic disease.     NECK CTA:   1.  No hemodynamically significant stenosis in the neck vessels.   2.  No evidence for dissection.     B12 512  A1c 6.6      ECHO  Summary    1. Sinus rhythm during study.     2. Normal LV size with mildly increased wall thickness. Calculated biplane LVEF 66%. No regional wall motion abnormalities. (Normal function). Grade 1 diastolic dysfunction.     3. Normal RV size and systolic function.     4. No significant valvular abnormalities.     5. The estimated pulmonary artery systolic pressure is 38 mmHg.     6. Negative bubble study.     7. Compared to the prior study from 1/6/24, the current study reveals no significant change.     Component      Latest Ref Rng 5/23/2024  11:23 AM   Vitamin B12      232 - 1,245 pg/mL 512      MRI L spine  IMPRESSION:  1.  Degenerative changes most pronounced at L5-S1 where a disc-osteophyte complex abuts the traversing S1 and distorts the exiting L5 nerves where there is moderate to severe foraminal narrowing.  2.  Moderate facet arthropathy associated with some soft tissue edema on the right.  3.  Minor degenerative changes elsewhere without additional stenosis.    MRI C spine  IMPRESSION:  1.  Moderate degenerative central canal narrowing at C5-C6 and mild to moderate narrowing at C4-C5 and C6-C7. No spinal cord compression or spinal cord signal abnormality.  2.  Neural foraminal narrowing is moderate on the left at C3-C4, severe on the right at C4-C5, and severe on the left at C5-C6.      IMPRESSION/REPORT/PLAN  Low serum vitamin B12  Unsteady gait  Rule out neuropathy  Bilateral leg weakness  Essential tremor  Suspected lumbar radiculopathy  Stroke  Right MCA stenosis  Hyperlipidemia    This is a 64 year old female with multiple issues.  Previous work-up in 2019 was negative.    1.  Unsteady gait:-Exam does not show any evidence of Parkinson's disease.  Could be related to her left hip issues and knee issues.  Could be related to  other neurological issues.  This is now improved with B12 supplementation and will monitor.  Will continue the vitamin B12.    2.  Tremor:-This is suggestive of essential tremor.  Not present on exam today.  No evidence of Parkinson's disease on exam.  Will hold off on medications.  Stable.  No change.    3.  Exam does show decreased vibratory and pinprick sensation in the feet.  I suspect she does have a neuropathy.  EMG was negative for neuropathy.  B12 was low and B12 supplementations actually helped resolve the symptoms.  Continue B12 supplementation.  Blood work was negative for other causes of neuropathy.  No change.    4.  She does have bilateral hip flexion and knee extension weakness bilaterally.  Now improved on exam with B12 supplementation.  MRI L-spine/T-spine shows some degeneration though no major spinal stenosis.  The MRI L-spine does show radiculopathy which could be causing the weakness/leg pain.  Consider further referral to the spine center if needed.  Stable.    5.  Vitamin B12 injections are helping and will continue.  She had stopped these and will restart.    6.  EMG suggested lumbar radiculopathy.  L-spine does confirm this.  Continues to have issues and wants to retry the physical therapy as she could not do it before.  Consider further referral to the spine center if needed.  Consider epidural injections during the next visit.    7.  Patient is recently had left-sided weakness.  MRI brain shows right cortical cerebral infarcts.  CT angiogram shows right M1 segment severe stenosis.  Echocardiogram was noncontributory.  She does have a pacemaker that was interrogated and per patient this does not show any atrial fibrillation.  Will switch from aspirin 81 mg to aspirin 325 mg.  Has completed 3 months of aspirin and Plavix.  Continue 40 mg of Crestor.  LDL goal less than 70.  She potentially might need additional medication.  Smoking cessation.  Recommend exercise and healthy lifestyle.  Will  "reset home physical therapy as previously she had some family issues preventing her from doing home therapy.  Encouraged her to take the aspirin with food because she is having some acid issues.    8.  She does complain of headache suggestive of chronic migrainous headaches.  Nortriptyline was tried though did not provide benefit.  She does not want to try more medication as the headaches are not very bothersome.    Return back in 6 months after physical therapy.    - Continue Crestor  -     cyanocobalamin (CYANOCOBALAMIN) 1000 mcg/mL injection; Inject 1 mL (1,000 mcg) into the muscle every 30 days.  -     insulin syringe-needle U-100 (30G X 1/2\" 1 ML) 30G X 1/2\" 1 ML miscellaneous; Use 1 syringes daily or as directed. For b12  -     aspirin (ASA) 325 MG EC tablet; Take 1 tablet (325 mg) by mouth daily. Take with food  -     Home Care Referral for physical therapy    Return in about 6 months (around 8/17/2025) for In-Clinic Visit (must).    Over 35 minutes were spent coordinating the care for the patient on the day of the encounter.  This includes previsit, during visit and post visit activities as documented above.  Counseling patient.  Prescription management.  Multiple problems reviewed.  Refractory problems.  (Activities include but not inclusive of reviewing chart, reviewing outside records, reviewing labs and imaging study results as well as the images, patient visit time including getting history and exam,  use if applicable, review of test results with the patient and coming up with a plan in a shared model, counseling patient and family, education and answering patient questions, EMR , EMR diagnosis entry and problem list management, medication reconciliation and prescription management if applicable, paperwork if applicable, printing documents and documentation of the visit activities.)        Rudolph Person MD  Neurologist  [facility] Neurology [facility]  Tel:- " [phone]    This note was dictated using voice recognition software.  Any grammatical or context distortions are unintentional and inherent to the software.    The longitudinal plan of care for the diagnosis(es)/condition(s) as documented were addressed during this visit. Due to the added complexity in care, I will continue to support Kim in the subsequent management and with ongoing continuity of care.        Again, thank you for allowing me to participate in the care of your patient.        Sincerely,        Rudolph Person MD    Electronically signed

## 2025-03-06 DIAGNOSIS — Z53.9 DIAGNOSIS NOT YET DEFINED: Primary | ICD-10-CM

## 2025-03-20 ENCOUNTER — MEDICAL CORRESPONDENCE (OUTPATIENT)
Dept: HEALTH INFORMATION MANAGEMENT | Facility: CLINIC | Age: 65
End: 2025-03-20

## 2025-03-31 ENCOUNTER — MEDICAL CORRESPONDENCE (OUTPATIENT)
Dept: HEALTH INFORMATION MANAGEMENT | Facility: CLINIC | Age: 65
End: 2025-03-31
Payer: MEDICARE

## 2025-04-03 ENCOUNTER — TELEPHONE (OUTPATIENT)
Dept: NEUROLOGY | Facility: CLINIC | Age: 65
End: 2025-04-03
Payer: MEDICARE

## 2025-04-18 ENCOUNTER — ANCILLARY PROCEDURE (OUTPATIENT)
Dept: CARDIOLOGY | Facility: CLINIC | Age: 65
End: 2025-04-18
Attending: INTERNAL MEDICINE
Payer: MEDICARE

## 2025-04-18 DIAGNOSIS — Z95.0 CARDIAC PACEMAKER IN SITU: ICD-10-CM

## 2025-04-18 DIAGNOSIS — I49.5 SICK SINUS SYNDROME (H): ICD-10-CM

## 2025-04-22 LAB
MDC_IDC_LEAD_CONNECTION_STATUS: NORMAL
MDC_IDC_LEAD_CONNECTION_STATUS: NORMAL
MDC_IDC_LEAD_IMPLANT_DT: NORMAL
MDC_IDC_LEAD_IMPLANT_DT: NORMAL
MDC_IDC_LEAD_LOCATION: NORMAL
MDC_IDC_LEAD_LOCATION: NORMAL
MDC_IDC_LEAD_LOCATION_DETAIL_1: NORMAL
MDC_IDC_LEAD_LOCATION_DETAIL_1: NORMAL
MDC_IDC_LEAD_MFG: NORMAL
MDC_IDC_LEAD_MFG: NORMAL
MDC_IDC_LEAD_MODEL: NORMAL
MDC_IDC_LEAD_MODEL: NORMAL
MDC_IDC_LEAD_POLARITY_TYPE: NORMAL
MDC_IDC_LEAD_POLARITY_TYPE: NORMAL
MDC_IDC_LEAD_SERIAL: NORMAL
MDC_IDC_LEAD_SERIAL: NORMAL
MDC_IDC_MSMT_BATTERY_DTM: NORMAL
MDC_IDC_MSMT_BATTERY_REMAINING_LONGEVITY: 95 MO
MDC_IDC_MSMT_BATTERY_RRT_TRIGGER: 2.62
MDC_IDC_MSMT_BATTERY_STATUS: NORMAL
MDC_IDC_MSMT_BATTERY_VOLTAGE: 2.99 V
MDC_IDC_MSMT_LEADCHNL_RA_IMPEDANCE_VALUE: 494 OHM
MDC_IDC_MSMT_LEADCHNL_RA_IMPEDANCE_VALUE: 551 OHM
MDC_IDC_MSMT_LEADCHNL_RA_PACING_THRESHOLD_AMPLITUDE: 0.75 V
MDC_IDC_MSMT_LEADCHNL_RA_PACING_THRESHOLD_PULSEWIDTH: 0.4 MS
MDC_IDC_MSMT_LEADCHNL_RA_SENSING_INTR_AMPL: 2 MV
MDC_IDC_MSMT_LEADCHNL_RA_SENSING_INTR_AMPL: 2 MV
MDC_IDC_MSMT_LEADCHNL_RV_IMPEDANCE_VALUE: 418 OHM
MDC_IDC_MSMT_LEADCHNL_RV_IMPEDANCE_VALUE: 532 OHM
MDC_IDC_MSMT_LEADCHNL_RV_SENSING_INTR_AMPL: 2.5 MV
MDC_IDC_MSMT_LEADCHNL_RV_SENSING_INTR_AMPL: 2.5 MV
MDC_IDC_PG_IMPLANT_DTM: NORMAL
MDC_IDC_PG_MFG: NORMAL
MDC_IDC_PG_MODEL: NORMAL
MDC_IDC_PG_SERIAL: NORMAL
MDC_IDC_PG_TYPE: NORMAL
MDC_IDC_SESS_CLINIC_NAME: NORMAL
MDC_IDC_SESS_DTM: NORMAL
MDC_IDC_SESS_TYPE: NORMAL
MDC_IDC_SET_BRADY_AT_MODE_SWITCH_RATE: 171 {BEATS}/MIN
MDC_IDC_SET_BRADY_HYSTRATE: NORMAL
MDC_IDC_SET_BRADY_LOWRATE: 60 {BEATS}/MIN
MDC_IDC_SET_BRADY_MAX_SENSOR_RATE: 130 {BEATS}/MIN
MDC_IDC_SET_BRADY_MAX_TRACKING_RATE: 130 {BEATS}/MIN
MDC_IDC_SET_BRADY_MODE: NORMAL
MDC_IDC_SET_BRADY_PAV_DELAY_LOW: 300 MS
MDC_IDC_SET_BRADY_SAV_DELAY_LOW: 250 MS
MDC_IDC_SET_LEADCHNL_RA_PACING_AMPLITUDE: 1.5 V
MDC_IDC_SET_LEADCHNL_RA_PACING_ANODE_ELECTRODE_1: NORMAL
MDC_IDC_SET_LEADCHNL_RA_PACING_ANODE_LOCATION_1: NORMAL
MDC_IDC_SET_LEADCHNL_RA_PACING_CAPTURE_MODE: NORMAL
MDC_IDC_SET_LEADCHNL_RA_PACING_CATHODE_ELECTRODE_1: NORMAL
MDC_IDC_SET_LEADCHNL_RA_PACING_CATHODE_LOCATION_1: NORMAL
MDC_IDC_SET_LEADCHNL_RA_PACING_POLARITY: NORMAL
MDC_IDC_SET_LEADCHNL_RA_PACING_PULSEWIDTH: 0.4 MS
MDC_IDC_SET_LEADCHNL_RA_SENSING_ANODE_ELECTRODE_1: NORMAL
MDC_IDC_SET_LEADCHNL_RA_SENSING_ANODE_LOCATION_1: NORMAL
MDC_IDC_SET_LEADCHNL_RA_SENSING_CATHODE_ELECTRODE_1: NORMAL
MDC_IDC_SET_LEADCHNL_RA_SENSING_CATHODE_LOCATION_1: NORMAL
MDC_IDC_SET_LEADCHNL_RA_SENSING_POLARITY: NORMAL
MDC_IDC_SET_LEADCHNL_RA_SENSING_SENSITIVITY: 0.3 MV
MDC_IDC_SET_LEADCHNL_RV_PACING_AMPLITUDE: 2.75 V
MDC_IDC_SET_LEADCHNL_RV_PACING_ANODE_ELECTRODE_1: NORMAL
MDC_IDC_SET_LEADCHNL_RV_PACING_ANODE_LOCATION_1: NORMAL
MDC_IDC_SET_LEADCHNL_RV_PACING_CAPTURE_MODE: NORMAL
MDC_IDC_SET_LEADCHNL_RV_PACING_CATHODE_ELECTRODE_1: NORMAL
MDC_IDC_SET_LEADCHNL_RV_PACING_CATHODE_LOCATION_1: NORMAL
MDC_IDC_SET_LEADCHNL_RV_PACING_POLARITY: NORMAL
MDC_IDC_SET_LEADCHNL_RV_PACING_PULSEWIDTH: 1 MS
MDC_IDC_SET_LEADCHNL_RV_SENSING_ANODE_ELECTRODE_1: NORMAL
MDC_IDC_SET_LEADCHNL_RV_SENSING_ANODE_LOCATION_1: NORMAL
MDC_IDC_SET_LEADCHNL_RV_SENSING_CATHODE_ELECTRODE_1: NORMAL
MDC_IDC_SET_LEADCHNL_RV_SENSING_CATHODE_LOCATION_1: NORMAL
MDC_IDC_SET_LEADCHNL_RV_SENSING_POLARITY: NORMAL
MDC_IDC_SET_LEADCHNL_RV_SENSING_SENSITIVITY: 0.9 MV
MDC_IDC_SET_ZONE_DETECTION_INTERVAL: 350 MS
MDC_IDC_SET_ZONE_DETECTION_INTERVAL: 400 MS
MDC_IDC_SET_ZONE_STATUS: NORMAL
MDC_IDC_SET_ZONE_STATUS: NORMAL
MDC_IDC_SET_ZONE_TYPE: NORMAL
MDC_IDC_SET_ZONE_VENDOR_TYPE: NORMAL
MDC_IDC_STAT_AT_BURDEN_PERCENT: 0 %
MDC_IDC_STAT_AT_DTM_END: NORMAL
MDC_IDC_STAT_AT_DTM_START: NORMAL
MDC_IDC_STAT_BRADY_AP_VP_PERCENT: 2.89 %
MDC_IDC_STAT_BRADY_AP_VS_PERCENT: 87.02 %
MDC_IDC_STAT_BRADY_AS_VP_PERCENT: 0.01 %
MDC_IDC_STAT_BRADY_AS_VS_PERCENT: 10.07 %
MDC_IDC_STAT_BRADY_DTM_END: NORMAL
MDC_IDC_STAT_BRADY_DTM_START: NORMAL
MDC_IDC_STAT_BRADY_RA_PERCENT_PACED: 89.95 %
MDC_IDC_STAT_BRADY_RV_PERCENT_PACED: 2.91 %
MDC_IDC_STAT_EPISODE_RECENT_COUNT: 0
MDC_IDC_STAT_EPISODE_RECENT_COUNT_DTM_END: NORMAL
MDC_IDC_STAT_EPISODE_RECENT_COUNT_DTM_START: NORMAL
MDC_IDC_STAT_EPISODE_TOTAL_COUNT: 0
MDC_IDC_STAT_EPISODE_TOTAL_COUNT: 0
MDC_IDC_STAT_EPISODE_TOTAL_COUNT: 1
MDC_IDC_STAT_EPISODE_TOTAL_COUNT: 5
MDC_IDC_STAT_EPISODE_TOTAL_COUNT: 8
MDC_IDC_STAT_EPISODE_TOTAL_COUNT_DTM_END: NORMAL
MDC_IDC_STAT_EPISODE_TOTAL_COUNT_DTM_START: NORMAL
MDC_IDC_STAT_EPISODE_TYPE: NORMAL
MDC_IDC_STAT_TACHYTHERAPY_RECENT_DTM_END: NORMAL
MDC_IDC_STAT_TACHYTHERAPY_RECENT_DTM_START: NORMAL
MDC_IDC_STAT_TACHYTHERAPY_TOTAL_DTM_END: NORMAL
MDC_IDC_STAT_TACHYTHERAPY_TOTAL_DTM_START: NORMAL

## 2025-04-22 PROCEDURE — 93296 REM INTERROG EVL PM/IDS: CPT | Performed by: INTERNAL MEDICINE

## 2025-04-22 PROCEDURE — 93294 REM INTERROG EVL PM/LDLS PM: CPT | Performed by: INTERNAL MEDICINE

## 2025-05-06 ENCOUNTER — PATIENT OUTREACH (OUTPATIENT)
Dept: CARE COORDINATION | Facility: CLINIC | Age: 65
End: 2025-05-06
Payer: MEDICARE

## 2025-05-06 NOTE — PROGRESS NOTES
[facility] Medicare ACO Reach Population - Proactive Outreach    Background: Patient outreach conducted proactively to support health maintenance initiatives and identify any opportunities to integrated Care Coordination assistance in patient-centered goals.      Patient agreeable to scheduling Hospital/ED follow up? No, Patient is currently in the ED.       If No, CC team member encouraged patient to contact [facility] at [phone] to schedule an appointment in the future, or [facility]    Patient accepts CC: RN did not offer care coordination at this time due to patient being in the Emergency Department.        Emperatriz Harrington RN  [facility]

## 2025-05-12 ENCOUNTER — TELEPHONE (OUTPATIENT)
Dept: FAMILY MEDICINE | Facility: CLINIC | Age: 65
End: 2025-05-12
Payer: MEDICARE

## 2025-05-12 NOTE — TELEPHONE ENCOUNTER
General Call    Contacts       Contact Date/Time Type Contact Phone/Fax    05/12/2025 01:18 PM CDT Phone (Incoming) Kim Correa (Self) [phone] (M)          Reason for Call:  Patient is requesting a ED Hospital F/U with Lorena Baeza. Pt is not comfortable with anyone else. Wants to know what Lorena Baeza thinks.  Can Lorena Baeza fit her into her schedule or who to schedule with?    Okay to leave a detailed message?: No at Cell number on file:    Telephone Information:   Mobile [phone]

## 2025-05-13 NOTE — TELEPHONE ENCOUNTER
I do not have availability and she needs to be seen sooner.  Please apologize to her for this.  Please schedule her with sooner with a different provider.  Thanks.

## 2025-05-14 NOTE — TELEPHONE ENCOUNTER
Called spoke to pt she stated she saw her surgeon yesterday for a follow up from the Hospital visit and has some follow up testing that he has ordered and prefers to come and see Lorena Baeza     She is scheduled with you for 5/21/25 at 12:40 pm and want to know since she just saw the surgeon yesterday can she wait to see you until 5/21/25    Cleo Rutledge MA

## 2025-05-15 PROBLEM — Z86.73 HISTORY OF STROKE: Status: ACTIVE | Noted: 2025-04-30

## 2025-05-15 PROBLEM — K65.1 INTRA-ABDOMINAL ABSCESS (H): Status: ACTIVE | Noted: 2025-04-30

## 2025-05-15 PROBLEM — K57.32 SIGMOID DIVERTICULITIS: Status: ACTIVE | Noted: 2025-04-30

## 2025-05-15 RX ORDER — NICOTINE 21 MG/24HR
1 PATCH, TRANSDERMAL 24 HOURS TRANSDERMAL
COMMUNITY
Start: 2025-05-03

## 2025-05-21 ENCOUNTER — OFFICE VISIT (OUTPATIENT)
Dept: FAMILY MEDICINE | Facility: CLINIC | Age: 65
End: 2025-05-21
Payer: MEDICARE

## 2025-05-21 VITALS
SYSTOLIC BLOOD PRESSURE: 138 MMHG | TEMPERATURE: 97.7 F | WEIGHT: 106 LBS | RESPIRATION RATE: 24 BRPM | HEIGHT: 60 IN | BODY MASS INDEX: 20.81 KG/M2 | DIASTOLIC BLOOD PRESSURE: 74 MMHG | OXYGEN SATURATION: 97 % | HEART RATE: 80 BPM

## 2025-05-21 DIAGNOSIS — Z12.31 VISIT FOR SCREENING MAMMOGRAM: ICD-10-CM

## 2025-05-21 DIAGNOSIS — K13.79 ORAL PAIN: ICD-10-CM

## 2025-05-21 DIAGNOSIS — Z87.891 HISTORY OF TOBACCO USE, PRESENTING HAZARDS TO HEALTH: ICD-10-CM

## 2025-05-21 DIAGNOSIS — E53.8 LOW SERUM VITAMIN B12: ICD-10-CM

## 2025-05-21 DIAGNOSIS — K57.20 COLONIC DIVERTICULAR ABSCESS: Primary | ICD-10-CM

## 2025-05-21 DIAGNOSIS — I63.9 ACUTE ISCHEMIC STROKE (H): ICD-10-CM

## 2025-05-21 DIAGNOSIS — Z95.5 S/P DRUG ELUTING CORONARY STENT PLACEMENT: ICD-10-CM

## 2025-05-21 DIAGNOSIS — M54.16 LUMBAR RADICULOPATHY: ICD-10-CM

## 2025-05-21 DIAGNOSIS — Z91.030 BEE STING ALLERGY: ICD-10-CM

## 2025-05-21 DIAGNOSIS — I50.33 ACUTE ON CHRONIC DIASTOLIC HEART FAILURE (H): ICD-10-CM

## 2025-05-21 DIAGNOSIS — E11.51 TYPE 2 DIABETES MELLITUS WITH DIABETIC PERIPHERAL ANGIOPATHY WITHOUT GANGRENE, WITHOUT LONG-TERM CURRENT USE OF INSULIN (H): ICD-10-CM

## 2025-05-21 DIAGNOSIS — J45.30 MILD PERSISTENT ASTHMA WITHOUT COMPLICATION: ICD-10-CM

## 2025-05-21 DIAGNOSIS — I25.118 ATHEROSCLEROSIS OF NATIVE CORONARY ARTERY OF NATIVE HEART WITH STABLE ANGINA PECTORIS: ICD-10-CM

## 2025-05-21 DIAGNOSIS — E78.2 MIXED HYPERLIPIDEMIA: ICD-10-CM

## 2025-05-21 PROCEDURE — 3075F SYST BP GE 130 - 139MM HG: CPT | Performed by: NURSE PRACTITIONER

## 2025-05-21 PROCEDURE — G2211 COMPLEX E/M VISIT ADD ON: HCPCS | Performed by: NURSE PRACTITIONER

## 2025-05-21 PROCEDURE — 99214 OFFICE O/P EST MOD 30 MIN: CPT | Performed by: NURSE PRACTITIONER

## 2025-05-21 PROCEDURE — 1125F AMNT PAIN NOTED PAIN PRSNT: CPT | Performed by: NURSE PRACTITIONER

## 2025-05-21 PROCEDURE — 82570 ASSAY OF URINE CREATININE: CPT | Performed by: NURSE PRACTITIONER

## 2025-05-21 PROCEDURE — 3078F DIAST BP <80 MM HG: CPT | Performed by: NURSE PRACTITIONER

## 2025-05-21 PROCEDURE — 36415 COLL VENOUS BLD VENIPUNCTURE: CPT | Performed by: NURSE PRACTITIONER

## 2025-05-21 PROCEDURE — 80061 LIPID PANEL: CPT | Performed by: NURSE PRACTITIONER

## 2025-05-21 PROCEDURE — 82043 UR ALBUMIN QUANTITATIVE: CPT | Performed by: NURSE PRACTITIONER

## 2025-05-21 RX ORDER — POLYETHYLENE GLYCOL 3350 17 G/17G
17 POWDER, FOR SOLUTION ORAL DAILY
Qty: 850 G | Refills: 1 | Status: SHIPPED | OUTPATIENT
Start: 2025-05-21

## 2025-05-21 RX ORDER — ALBUTEROL SULFATE 90 UG/1
1-2 AEROSOL, METERED RESPIRATORY (INHALATION) EVERY 4 HOURS PRN
Qty: 18 G | Refills: 1 | Status: SHIPPED | OUTPATIENT
Start: 2025-05-21

## 2025-05-21 RX ORDER — CYANOCOBALAMIN 1000 UG/ML
1 INJECTION, SOLUTION INTRAMUSCULAR; SUBCUTANEOUS
Qty: 12 ML | Refills: 1 | Status: SHIPPED | OUTPATIENT
Start: 2025-05-21

## 2025-05-21 RX ORDER — FLUTICASONE PROPIONATE 220 UG/1
AEROSOL, METERED RESPIRATORY (INHALATION)
Qty: 12 G | Refills: 12 | Status: SHIPPED | OUTPATIENT
Start: 2025-05-21

## 2025-05-21 RX ORDER — FUROSEMIDE 20 MG/1
20 TABLET ORAL DAILY
Qty: 90 TABLET | Refills: 1 | Status: SHIPPED | OUTPATIENT
Start: 2025-05-21

## 2025-05-21 RX ORDER — POLYETHYLENE GLYCOL 3350 17 G/17G
17 POWDER, FOR SOLUTION ORAL
COMMUNITY
Start: 2025-05-06 | End: 2025-05-21

## 2025-05-21 RX ORDER — EPINEPHRINE 0.3 MG/.3ML
0.3 INJECTION SUBCUTANEOUS PRN
Qty: 2 EACH | Refills: 0 | Status: SHIPPED | OUTPATIENT
Start: 2025-05-21

## 2025-05-21 RX ORDER — ACETAMINOPHEN 500 MG
500-1000 TABLET ORAL
COMMUNITY
Start: 2025-05-06

## 2025-05-21 RX ORDER — NITROGLYCERIN 400 UG/1
SPRAY ORAL
Status: CANCELLED | OUTPATIENT
Start: 2025-05-21

## 2025-05-21 RX ORDER — IPRATROPIUM BROMIDE AND ALBUTEROL SULFATE 2.5; .5 MG/3ML; MG/3ML
3 SOLUTION RESPIRATORY (INHALATION) EVERY 4 HOURS PRN
Qty: 90 ML | Refills: 0 | Status: SHIPPED | OUTPATIENT
Start: 2025-05-21

## 2025-05-21 RX ORDER — MULTIVIT,TX WITH IRON,MINERALS
500 TABLET, EXTENDED RELEASE ORAL 2 TIMES DAILY
COMMUNITY

## 2025-05-21 ASSESSMENT — ASTHMA QUESTIONNAIRES
QUESTION_5 LAST FOUR WEEKS HOW WOULD YOU RATE YOUR ASTHMA CONTROL: SOMEWHAT CONTROLLED
QUESTION_1 LAST FOUR WEEKS HOW MUCH OF THE TIME DID YOUR ASTHMA KEEP YOU FROM GETTING AS MUCH DONE AT WORK, SCHOOL OR AT HOME: MOST OF THE TIME
QUESTION_4 LAST FOUR WEEKS HOW OFTEN HAVE YOU USED YOUR RESCUE INHALER OR NEBULIZER MEDICATION (SUCH AS ALBUTEROL): THREE OR MORE TIMES PER DAY
QUESTION_3 LAST FOUR WEEKS HOW OFTEN DID YOUR ASTHMA SYMPTOMS (WHEEZING, COUGHING, SHORTNESS OF BREATH, CHEST TIGHTNESS OR PAIN) WAKE YOU UP AT NIGHT OR EARLIER THAN USUAL IN THE MORNING: FOUR OR MORE NIGHTS A WEEK
ACT_TOTALSCORE: 8
QUESTION_2 LAST FOUR WEEKS HOW OFTEN HAVE YOU HAD SHORTNESS OF BREATH: MORE THAN ONCE A DAY

## 2025-05-21 ASSESSMENT — PAIN SCALES - GENERAL: PAINLEVEL_OUTOF10: MODERATE PAIN (6)

## 2025-05-21 NOTE — PROGRESS NOTES
Hospital Follow-up Visit:    Hospital/Nursing Home/IP Rehab Facility: [facility]  Date of Admission: 4/30/25  Date of Discharge: 5/3/25  Reason(s) for Admission: Abdominal abscess   Was the patient in the ICU or did the patient experience delirium during hospitalization?  No  Do you have any other stressors you would like to discuss with your provider? No    Problems taking medications regularly:  None  Medication changes since discharge: None  Problems adhering to non-medication therapy:  None    Summary of hospitalization:  CareEverywhere information obtained and reviewed  Diagnostic Tests/Treatments reviewed.  Follow up needed: patient continues to follow with her surgoen.   Other Healthcare Providers Involved in Patient s Care:         None  Update since discharge: improved.         Plan of care communicated with patient         Assessment and Plan:     Colonic diverticular abscess  Patient is followed by general surgery.  She is not currently taking pain medication.  She continues MiraLAX for regular bowel movements.  - polyethylene glycol (MIRALAX) 17 GM/Dose powder  Dispense: 850 g; Refill: 1    Lumbar radiculopathy  Patient complains of lower extremity pain.  She had EMGs performed by neurology showing lumbar radiculopathy.  Will refer to spine clinic.  - Spine  Referral    Oral pain  Patient has chronic mouth pain.  Will refer to oral surgery.  She has no fitting and fractured dentures.  - Oral Surgery Referral    Low serum vitamin B12  She continues vitamin B12 injections.  - cyanocobalamin (CYANOCOBALAMIN) 1000 mcg/mL injection  Dispense: 12 mL; Refill: 1    Mild persistent asthma without complication  She continues Flovent daily and albuterol as needed.  - FLOVENT  MCG/ACT inhaler  Dispense: 12 g; Refill: 12  - ipratropium - albuterol 0.5 mg/2.5 mg/3 mL (DUONEB) 0.5-2.5 (3) MG/3ML neb solution  Dispense: 90 mL; Refill: 0  - VENTOLIN  (90 Base) MCG/ACT  inhaler  Dispense: 18 g; Refill: 1    Acute on chronic diastolic heart failure (H)  She continues furosemide.  No edema is present today.  She monitors her weight.  She is followed by cardiology.  - furosemide (LASIX) 20 MG tablet  Dispense: 90 tablet; Refill: 1  - Lipid panel reflex to direct LDL Fasting    Atherosclerosis of native coronary artery of native heart with stable angina pectoris  Will check lipid cascade.  Goal LDL is less than 70.  She continues rosuvastatin.    S/P drug eluting coronary stent placement  She is followed by cardiology.  She continues daily aspirin.  - Lipid panel reflex to direct LDL Fasting    Mixed hyperlipidemia  Goal LDL less than 70.  She continues rosuvastatin.  - Lipid panel reflex to direct LDL Fasting  - Lipid panel reflex to direct LDL Fasting    Type 2 diabetes mellitus with diabetic peripheral angiopathy without gangrene, without long-term current use of insulin (H)  This is diet controlled.  Microalbumin ordered.  - Albumin Random Urine Quantitative with Creat Ratio  - blood glucose (CONTOUR NEXT TEST) test strip  Dispense: 200 strip; Refill: 1  - Albumin Random Urine Quantitative with Creat Ratio    Bee sting allergy  EpiPen order provided.  - EPINEPHrine (ANY BX GENERIC EQUIV) 0.3 MG/0.3ML injection 2-pack  Dispense: 2 each; Refill: 0    Acute ischemic stroke (H)  Patient is followed by neurology.  She has completed PT.    History of tobacco use, presenting hazards to health  Annual lung cancer screening ordered.  - CT Chest Lung Cancer Screen Low Dose Without    Visit for screening mammogram  - MA Screening Bilateral w/ [facility]    46 minutes spent by me on the date of the encounter doing chart review, history and exam, documentation and further activities per the note      Subjective:     Kim is a 64 year old female presenting to the clinic for follow-up on hospitalization.  She has multiple comorbidities including bipolar disorder, CAD, chronic diastolic heart failure,  type 2 diabetes.  Patient was admitted from 4/30/2025 - 5/30/2025 with mid and distal sigmoid: Diverticulitis with a large pericolonic abscess.  Labs were notable for elevated white blood cell count of 15.7.  CT-guided drain placement was performed.  Patient was discharged with Cipro 500 mg twice daily for 5 days and metronidazole 500 mg for 5 days.  Patient recently saw her surgeon who removed the drain.  Patient states her abdominal pain is improved.  She has not been taking any pain medication.  She does have a HIDA scan scheduled on Friday for concerns of cholecystitis.  Patient has been drinking MiraLAX daily and requests a refill today.  This is assisting with her having normal bowel movements.  Patient is followed by cardiology for chronic diastolic heart failure and CAD.  She has a pacemaker for sick sinus syndrome.  Diabetes has been well-controlled without medication.  She is not currently taking medication for PTSD or bipolar disorder.  She is living with her mother.  She does not feel well supported.  She denies thoughts of suicide.  Patient states she quit smoking 14 days ago and is doing well with this.  She is due for lung cancer screening.  She has a history of a stroke and did complete PT for this.  She is followed by neurology.  She continues to experience weakness and stiffness within her lower extremities.  She has been diagnosed with lumbar radiculopathy via EMG.  She continues to experience some discomfort within her legs.  She also has chronic oral pain and has seen numerous specialist in the past.  She does have fractured dentures.  She needs to consume softer foods due to this.  She has some pain within her gums and cheeks.  She is tearful while speaking of this pain.  She requests refill of EpiPen which she has on hand in case she obtains a bee sting.  She has a history of anaphylactic reaction including throat swelling and hives.    Reviewof Systems: A complete 14 point review of systems  was obtained and is negative or as stated in the history of present illness.    Social History     Socioeconomic History    Marital status:      Spouse name: Not on file    Number of children: Not on file    Years of education: Not on file    Highest education level: Not on file   Occupational History    Not on file   Tobacco Use    Smoking status: Former     Average packs/day: 1 pack/day for 45.0 years (45.0 ttl pk-yrs)     Types: Cigarettes     Start date: 5/7/2025     Passive exposure: Past    Smokeless tobacco: Never   Vaping Use    Vaping status: Former   Substance and Sexual Activity    Alcohol use: No    Drug use: Yes     Types: Marijuana    Sexual activity: Not on file   Other Topics Concern    Not on file   Social History Narrative    Not on file     Social Drivers of Health     Financial Resource Strain: Low Risk  (2/5/2024)    Financial Resource Strain     Within the past 12 months, have you or your family members you live with been unable to get utilities (heat, electricity) when it was really needed?: No   Food Insecurity: No Food Insecurity (4/30/2025)    Received from [employer]    Hunger Vital Sign     Worried About Running Out of Food in the Last Year: Never true     Ran Out of Food in the Last Year: Never true   Transportation Needs: Unmet Transportation Needs (4/30/2025)    Received from [employer]    PRAPARE - Transportation     Lack of Transportation (Medical): Yes     Lack of Transportation (Non-Medical): No   Physical Activity: Not on file   Stress: Not on file   Social Connections: Not on file   Interpersonal Safety: Low Risk  (5/21/2025)    Interpersonal Safety     Do you feel physically and emotionally safe where you currently live?: Yes     Within the past 12 months, have you been hit, slapped, kicked or otherwise physically hurt by someone?: No     Within the past 12 months, have you been humiliated or emotionally abused in other ways by your partner or ex-partner?: No    Housing Stability: Unknown (4/30/2025)    [facility]    Housing Stability Vital Sign     Unable to Pay for Housing in the Last Year: No     Number of Times Moved in the Last Year: Not on file     Homeless in the Last Year: No       Active Ambulatory Problems     Diagnosis Date Noted    Sprain of lumbar region 09/08/2006    Mild intermittent asthma with exacerbation 11/10/2006    Esophageal reflux 11/10/2006    Hyperlipidemia 11/10/2006    Attention deficit disorder 11/10/2006    Chronic rhinitis 11/10/2006    Dizziness and giddiness 12/11/2006    Adenomatous polyp of colon 11/19/2013    Anxiety 11/10/2015    Asthma 04/06/2010    PTSD (post-traumatic stress disorder) 01/01/2006    Coronary atherosclerosis 04/06/2010    PAD (peripheral artery disease) 12/29/2010    Statin intolerance 08/13/2018    Type 2 diabetes mellitus without complication, without long-term current use of insulin (H) 09/11/2023    ROSARIO (dyspnea on exertion) 09/12/2023    Abnormal cardiovascular stress test 09/12/2023    Status post coronary angiogram 09/12/2023    Status post percutaneous transluminal coronary angioplasty 09/12/2023    Angina at rest 06/30/2018    Arm pain 04/06/2010    Autoimmune disease 06/30/2018    Bicuspid aortic valve 01/15/2018    Bilateral hearing loss 06/01/2017    Cardiac pacemaker in situ 10/23/2023    Contusion of head 10/24/2013    Dissociative disorder or reaction 06/15/2005    Gastroparesis 06/04/2017    General patient noncompliance 09/26/2015    History of colonic polyps 06/01/2017    Insomnia disorder related to known organic factor 07/02/2014    Lipid disorder 04/06/2010    Chronic back pain 04/06/2010    Lower back pain 10/23/2023    Major depressive disorder, recurrent episode, mild 11/24/2015    Mouth pain 08/05/2015    New daily persistent headache 06/18/2014    Other allergy, other than to medicinal agents 10/23/2023    Pacemaker battery depletion 12/19/2018    Pain, dental 08/12/2014     Recurrent aphthous ulcer 10/23/2023    Somatic symptom disorder, persistent, moderate 09/24/2014    Stomatitis and mucositis 03/24/2016    Temporomandibular joint disorder 10/23/2023    Tobacco dependence syndrome 06/14/2017    Type 2 diabetes mellitus with diabetic peripheral angiopathy without gangrene, without long-term current use of insulin (H) 06/02/2020    Upper respiratory infection 12/29/2010    Vasovagal syncope 04/06/2010    Vitamin B deficiency 12/04/2017    Vitamin D deficiency 10/23/2023    Acute on chronic diastolic heart failure (H) 01/05/2024    Acute ischemic stroke (H) 05/29/2024    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/31/2024    Cannabis use disorder, moderate, dependence (H) 05/31/2024    History of alcohol dependence (H) 05/31/2024    Left-sided weakness 05/29/2024    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    Bipolar I disorder, most recent episode depressed (H) 09/20/2004    Episodic mood disorder 09/24/2014    History of stroke 04/30/2025    Intra-abdominal abscess (H) 04/30/2025    Sigmoid diverticulitis 04/30/2025     Resolved Ambulatory Problems     Diagnosis Date Noted    Moderate depressed bipolar I disorder (H) 11/10/2006    Refractory migraine without aura 02/05/2007    COSTOCHONDRITIS 03/20/2007    History of cardiac pacemaker in situ 06/04/2017    Bipolar affective disorder (H) 12/29/2010     Past Medical History:   Diagnosis Date    Dysphagia     Enlarged tongue     Myalgia     Polyarthralgia     Sicca        Family History   Problem Relation Age of Onset    Hypertension Mother     Thyroid Disease Mother     Alcoholism Mother     Heart Disease Father     Alcoholism Father     Diabetes Sister     Alzheimer Disease Maternal Grandmother     Heart Disease Maternal Grandfather     Cerebrovascular Disease Paternal Grandmother     C.A.D. No family hx of        Objective:     /74   Pulse 80   Temp 97.7  F (36.5  C)   Resp 24   Ht 1.511 m (4'  "11.5\")   Wt 48.1 kg (106 lb)   LMP  (LMP Unknown)   SpO2 97%   BMI 21.05 kg/m      Patient is alert, in no obvious distress. Patient speaks slowly   Skin: Warm, dry.  No lesions or rashes.  Skin turgor rapid return.   HEENT:  Head normocephalic, atraumatic.  Eyes normal.   Ears normal.  Nose patent, mucosa pink.  Oropharynx mucosa pink.  No lesions or tonsillar enlargement. She has fractured dentures.   Neck: Supple, no lymphadenopathy.   Lungs:  Clear to auscultation. Respirations even and unlabored.  No wheezing or rales noted.   Heart:  Regular rate and rhythm.  No murmurs.   Musculoskeletal: No edema is present in bilateral lower extremities.              "

## 2025-05-22 ENCOUNTER — PATIENT OUTREACH (OUTPATIENT)
Dept: CARE COORDINATION | Facility: CLINIC | Age: 65
End: 2025-05-22
Payer: MEDICARE

## 2025-05-22 ENCOUNTER — TRANSFERRED RECORDS (OUTPATIENT)
Dept: HEALTH INFORMATION MANAGEMENT | Facility: CLINIC | Age: 65
End: 2025-05-22
Payer: MEDICARE

## 2025-05-22 LAB
CHOLEST SERPL-MCNC: 187 MG/DL
CREAT UR-MCNC: 44 MG/DL
FASTING STATUS PATIENT QL REPORTED: YES
HDLC SERPL-MCNC: 64 MG/DL
LDLC SERPL CALC-MCNC: 97 MG/DL
MICROALBUMIN UR-MCNC: <12 MG/L
MICROALBUMIN/CREAT UR: NORMAL MG/G{CREAT}
NONHDLC SERPL-MCNC: 123 MG/DL
TRIGL SERPL-MCNC: 128 MG/DL

## 2025-05-25 ENCOUNTER — RESULTS FOLLOW-UP (OUTPATIENT)
Dept: FAMILY MEDICINE | Facility: CLINIC | Age: 65
End: 2025-05-25
Payer: MEDICARE

## 2025-05-27 ENCOUNTER — TELEPHONE (OUTPATIENT)
Dept: FAMILY MEDICINE | Facility: CLINIC | Age: 65
End: 2025-05-27
Payer: MEDICARE

## 2025-05-27 DIAGNOSIS — I25.118 ATHEROSCLEROSIS OF NATIVE CORONARY ARTERY OF NATIVE HEART WITH STABLE ANGINA PECTORIS: ICD-10-CM

## 2025-05-29 NOTE — TELEPHONE ENCOUNTER
PA Initiation    Medication: NITROGLYCERIN 0.4 MG/SPRAY TL SOLN  Insurance Company: [employer] Part D - Phone [phone] Fax [phone]  Pharmacy Filling the Rx: [id number] [place], [place] - [address] [place] [address]  Filling Pharmacy Phone: [phone]  Filling Pharmacy Fax: [phone]  Start Date: 5/29/2025

## 2025-05-30 RX ORDER — NITROGLYCERIN 400 UG/1
SPRAY ORAL
Qty: 9.8 G | Refills: 0 | Status: CANCELLED | OUTPATIENT
Start: 2025-05-30

## 2025-05-30 NOTE — TELEPHONE ENCOUNTER
See response from the PCP, to the patient, on 5/30/25, regarding a prescription for nitroglycerin.    Writer called the patient and relayed the above message to the patient, who verbalized understanding.    Patient stated that she would like some nitroglycerin tablets sent to the pharmacy to have on hand while waiting for the prior authorization to go through.    Writer will route the above request to the PCP to review and advise next steps.     Renay Wood RN, BSN  [facility]

## 2025-05-30 NOTE — TELEPHONE ENCOUNTER
I have not filled this for her before.  Please contact patient to see why she needs the spray versus the tablets.  Otherwise, she can have her cardiologist place the referral.  Thanks.

## 2025-05-30 NOTE — TELEPHONE ENCOUNTER
I sent the message to the prior auth team.  Does Kim want me to send some tablets of nitroglycerin to the pharmacy?  Thanks.

## 2025-05-30 NOTE — TELEPHONE ENCOUNTER
See response from the PCP, to the patient, on 25, regarding a request for nitroglycerin spray.    Writer called the patient and relayed the above message to the patient, who verbalized understanding.    Stated that she was placed on nitroglycerin spray a long time ago because it is fast acting and has trouble dropping pills due to a tremor.    Denies current chest pain and trouble breathing    Has angina and last time she had an episode of angina was a couple of days ago, which lasts a significant amount of time.    Has a spray bottle of nitroglycerin at home, which is , so she stated she can't use the  spray.    Patient's preferred pharmacy is the [id number] [place], [place] - [address] (Pharmacy).    Writer will route the above refill request to the PCP to review and advise next steps.    Renay Wood RN, BSN  [facility]

## 2025-06-03 NOTE — TELEPHONE ENCOUNTER
Please notify the patient that her nitroglycerin spray was approved by her insurance company.  She can decide if she wants to  the tablets or the spray from the pharmacy. I do not recommend using both.  Thanks.

## 2025-06-03 NOTE — TELEPHONE ENCOUNTER
Prior Authorization Approval    Medication: NITROGLYCERIN 0.4 MG/SPRAY TL AMAIRANI  Authorization Effective Date: 5/29/2025  Authorization Expiration Date: 12/31/2025  Approved Dose/Quantity: as written  Reference #: [id number]   Insurance Company: [employer] Part D - Phone [phone] Fax [phone]  Which Pharmacy is filling the prescription: [id number] [address]  Pharmacy Notified: y  Patient Notified: Instructed pharmacy to notify patient once order is ready.

## 2025-06-03 NOTE — TELEPHONE ENCOUNTER
Called and spoke with patient. Relayed provider response and recommendations. Patient endorses understanding and agrees with plan. States she prefers the spray, and will pick this up from the pharmacy. Denies further questions or concerns at this time.    Radha Lang RN  [facility]

## 2025-07-10 ENCOUNTER — TELEPHONE (OUTPATIENT)
Dept: FAMILY MEDICINE | Facility: CLINIC | Age: 65
End: 2025-07-10
Payer: MEDICARE

## 2025-07-10 NOTE — TELEPHONE ENCOUNTER
Order/Referral Request    Who is requesting: daughterEvelyn.     Orders being requested: referral for oral surgeon    Reason service is needed/diagnosis: oral pain    When are orders needed by: ASAP    Has this been discussed with Provider: Yes    Does patient have a preference on a Group/Provider/Facility? [facility]    Does patient have an appointment scheduled?: No    Where to send orders: Place orders within Epic- referral already placed 5/21/25 however it was not received internally. Please forward this to [facility] oral surgeon.    Okay to leave a detailed message?: Yes at Cell number on file:    Telephone Information:   Mobile [phone]

## 2025-07-30 ENCOUNTER — ANCILLARY PROCEDURE (OUTPATIENT)
Dept: CARDIOLOGY | Facility: CLINIC | Age: 65
End: 2025-07-30
Attending: INTERNAL MEDICINE
Payer: MEDICARE

## 2025-07-30 ENCOUNTER — OFFICE VISIT (OUTPATIENT)
Dept: CARDIOLOGY | Facility: CLINIC | Age: 65
End: 2025-07-30
Attending: STUDENT IN AN ORGANIZED HEALTH CARE EDUCATION/TRAINING PROGRAM
Payer: MEDICARE

## 2025-07-30 VITALS
HEIGHT: 59 IN | HEART RATE: 65 BPM | SYSTOLIC BLOOD PRESSURE: 122 MMHG | BODY MASS INDEX: 22.42 KG/M2 | WEIGHT: 111.2 LBS | DIASTOLIC BLOOD PRESSURE: 60 MMHG | RESPIRATION RATE: 16 BRPM

## 2025-07-30 DIAGNOSIS — I49.5 SICK SINUS SYNDROME (H): ICD-10-CM

## 2025-07-30 DIAGNOSIS — E78.2 MIXED HYPERLIPIDEMIA: ICD-10-CM

## 2025-07-30 DIAGNOSIS — Z95.5 S/P DRUG ELUTING CORONARY STENT PLACEMENT: ICD-10-CM

## 2025-07-30 DIAGNOSIS — Z95.0 PACEMAKER: ICD-10-CM

## 2025-07-30 DIAGNOSIS — I50.33 ACUTE ON CHRONIC DIASTOLIC HEART FAILURE (H): ICD-10-CM

## 2025-07-30 LAB
MDC_IDC_EPISODE_DTM: NORMAL
MDC_IDC_EPISODE_DTM: NORMAL
MDC_IDC_EPISODE_DURATION: 1 S
MDC_IDC_EPISODE_DURATION: 3 S
MDC_IDC_EPISODE_ID: 16
MDC_IDC_EPISODE_ID: 17
MDC_IDC_EPISODE_TYPE: NORMAL
MDC_IDC_EPISODE_TYPE: NORMAL
MDC_IDC_EPISODE_TYPE_INDUCED: NO
MDC_IDC_EPISODE_TYPE_INDUCED: NO
MDC_IDC_LEAD_CONNECTION_STATUS: NORMAL
MDC_IDC_LEAD_CONNECTION_STATUS: NORMAL
MDC_IDC_LEAD_IMPLANT_DT: NORMAL
MDC_IDC_LEAD_IMPLANT_DT: NORMAL
MDC_IDC_LEAD_LOCATION: NORMAL
MDC_IDC_LEAD_LOCATION: NORMAL
MDC_IDC_LEAD_LOCATION_DETAIL_1: NORMAL
MDC_IDC_LEAD_LOCATION_DETAIL_1: NORMAL
MDC_IDC_LEAD_MFG: NORMAL
MDC_IDC_LEAD_MFG: NORMAL
MDC_IDC_LEAD_MODEL: NORMAL
MDC_IDC_LEAD_MODEL: NORMAL
MDC_IDC_LEAD_POLARITY_TYPE: NORMAL
MDC_IDC_LEAD_POLARITY_TYPE: NORMAL
MDC_IDC_LEAD_SERIAL: NORMAL
MDC_IDC_LEAD_SERIAL: NORMAL
MDC_IDC_MSMT_BATTERY_DTM: NORMAL
MDC_IDC_MSMT_BATTERY_REMAINING_LONGEVITY: 91 MO
MDC_IDC_MSMT_BATTERY_RRT_TRIGGER: 2.62
MDC_IDC_MSMT_BATTERY_STATUS: NORMAL
MDC_IDC_MSMT_BATTERY_VOLTAGE: 2.98 V
MDC_IDC_MSMT_LEADCHNL_RA_IMPEDANCE_VALUE: 646 OHM
MDC_IDC_MSMT_LEADCHNL_RA_IMPEDANCE_VALUE: 684 OHM
MDC_IDC_MSMT_LEADCHNL_RA_PACING_THRESHOLD_AMPLITUDE: 0.75 V
MDC_IDC_MSMT_LEADCHNL_RA_PACING_THRESHOLD_AMPLITUDE: 0.75 V
MDC_IDC_MSMT_LEADCHNL_RA_PACING_THRESHOLD_PULSEWIDTH: 0.4 MS
MDC_IDC_MSMT_LEADCHNL_RA_PACING_THRESHOLD_PULSEWIDTH: 0.4 MS
MDC_IDC_MSMT_LEADCHNL_RA_SENSING_INTR_AMPL: 1.4 MV
MDC_IDC_MSMT_LEADCHNL_RA_SENSING_INTR_AMPL: 2.12 MV
MDC_IDC_MSMT_LEADCHNL_RV_IMPEDANCE_VALUE: 475 OHM
MDC_IDC_MSMT_LEADCHNL_RV_IMPEDANCE_VALUE: 608 OHM
MDC_IDC_MSMT_LEADCHNL_RV_PACING_THRESHOLD_AMPLITUDE: 2.25 V
MDC_IDC_MSMT_LEADCHNL_RV_PACING_THRESHOLD_AMPLITUDE: 2.5 V
MDC_IDC_MSMT_LEADCHNL_RV_PACING_THRESHOLD_PULSEWIDTH: 0.4 MS
MDC_IDC_MSMT_LEADCHNL_RV_PACING_THRESHOLD_PULSEWIDTH: 1 MS
MDC_IDC_MSMT_LEADCHNL_RV_SENSING_INTR_AMPL: 3 MV
MDC_IDC_MSMT_LEADCHNL_RV_SENSING_INTR_AMPL: 3.6 MV
MDC_IDC_PG_IMPLANT_DTM: NORMAL
MDC_IDC_PG_MFG: NORMAL
MDC_IDC_PG_MODEL: NORMAL
MDC_IDC_PG_SERIAL: NORMAL
MDC_IDC_PG_TYPE: NORMAL
MDC_IDC_SESS_CLINIC_NAME: NORMAL
MDC_IDC_SESS_DTM: NORMAL
MDC_IDC_SESS_TYPE: NORMAL
MDC_IDC_SET_BRADY_AT_MODE_SWITCH_RATE: 171 {BEATS}/MIN
MDC_IDC_SET_BRADY_HYSTRATE: NORMAL
MDC_IDC_SET_BRADY_LOWRATE: 60 {BEATS}/MIN
MDC_IDC_SET_BRADY_MAX_SENSOR_RATE: 130 {BEATS}/MIN
MDC_IDC_SET_BRADY_MAX_TRACKING_RATE: 130 {BEATS}/MIN
MDC_IDC_SET_BRADY_MODE: NORMAL
MDC_IDC_SET_BRADY_PAV_DELAY_LOW: 300 MS
MDC_IDC_SET_BRADY_SAV_DELAY_LOW: 250 MS
MDC_IDC_SET_LEADCHNL_RA_PACING_AMPLITUDE: NORMAL
MDC_IDC_SET_LEADCHNL_RA_PACING_ANODE_ELECTRODE_1: NORMAL
MDC_IDC_SET_LEADCHNL_RA_PACING_ANODE_LOCATION_1: NORMAL
MDC_IDC_SET_LEADCHNL_RA_PACING_CAPTURE_MODE: NORMAL
MDC_IDC_SET_LEADCHNL_RA_PACING_CATHODE_ELECTRODE_1: NORMAL
MDC_IDC_SET_LEADCHNL_RA_PACING_CATHODE_LOCATION_1: NORMAL
MDC_IDC_SET_LEADCHNL_RA_PACING_POLARITY: NORMAL
MDC_IDC_SET_LEADCHNL_RA_PACING_PULSEWIDTH: 0.4 MS
MDC_IDC_SET_LEADCHNL_RA_SENSING_ANODE_ELECTRODE_1: NORMAL
MDC_IDC_SET_LEADCHNL_RA_SENSING_ANODE_LOCATION_1: NORMAL
MDC_IDC_SET_LEADCHNL_RA_SENSING_CATHODE_ELECTRODE_1: NORMAL
MDC_IDC_SET_LEADCHNL_RA_SENSING_CATHODE_LOCATION_1: NORMAL
MDC_IDC_SET_LEADCHNL_RA_SENSING_POLARITY: NORMAL
MDC_IDC_SET_LEADCHNL_RA_SENSING_SENSITIVITY: 0.3 MV
MDC_IDC_SET_LEADCHNL_RV_PACING_AMPLITUDE: 3.5 V
MDC_IDC_SET_LEADCHNL_RV_PACING_ANODE_ELECTRODE_1: NORMAL
MDC_IDC_SET_LEADCHNL_RV_PACING_ANODE_LOCATION_1: NORMAL
MDC_IDC_SET_LEADCHNL_RV_PACING_CAPTURE_MODE: NORMAL
MDC_IDC_SET_LEADCHNL_RV_PACING_CATHODE_ELECTRODE_1: NORMAL
MDC_IDC_SET_LEADCHNL_RV_PACING_CATHODE_LOCATION_1: NORMAL
MDC_IDC_SET_LEADCHNL_RV_PACING_POLARITY: NORMAL
MDC_IDC_SET_LEADCHNL_RV_PACING_PULSEWIDTH: 1 MS
MDC_IDC_SET_LEADCHNL_RV_SENSING_ANODE_ELECTRODE_1: NORMAL
MDC_IDC_SET_LEADCHNL_RV_SENSING_ANODE_LOCATION_1: NORMAL
MDC_IDC_SET_LEADCHNL_RV_SENSING_CATHODE_ELECTRODE_1: NORMAL
MDC_IDC_SET_LEADCHNL_RV_SENSING_CATHODE_LOCATION_1: NORMAL
MDC_IDC_SET_LEADCHNL_RV_SENSING_POLARITY: NORMAL
MDC_IDC_SET_LEADCHNL_RV_SENSING_SENSITIVITY: 0.9 MV
MDC_IDC_SET_ZONE_DETECTION_INTERVAL: 350 MS
MDC_IDC_SET_ZONE_DETECTION_INTERVAL: 400 MS
MDC_IDC_SET_ZONE_STATUS: NORMAL
MDC_IDC_SET_ZONE_STATUS: NORMAL
MDC_IDC_SET_ZONE_TYPE: NORMAL
MDC_IDC_SET_ZONE_VENDOR_TYPE: NORMAL
MDC_IDC_STAT_AT_BURDEN_PERCENT: 0 %
MDC_IDC_STAT_AT_DTM_END: NORMAL
MDC_IDC_STAT_AT_DTM_START: NORMAL
MDC_IDC_STAT_AT_MODE_SW_COUNT: 0
MDC_IDC_STAT_BRADY_AP_VP_PERCENT: 3.7 %
MDC_IDC_STAT_BRADY_AP_VS_PERCENT: 82.29 %
MDC_IDC_STAT_BRADY_AS_VP_PERCENT: 0.02 %
MDC_IDC_STAT_BRADY_AS_VS_PERCENT: 14 %
MDC_IDC_STAT_BRADY_DTM_END: NORMAL
MDC_IDC_STAT_BRADY_DTM_START: NORMAL
MDC_IDC_STAT_BRADY_RA_PERCENT_PACED: 86 %
MDC_IDC_STAT_BRADY_RV_PERCENT_PACED: 4 %
MDC_IDC_STAT_EPISODE_RECENT_COUNT: 0
MDC_IDC_STAT_EPISODE_RECENT_COUNT: 2
MDC_IDC_STAT_EPISODE_RECENT_COUNT_DTM_END: NORMAL
MDC_IDC_STAT_EPISODE_RECENT_COUNT_DTM_START: NORMAL
MDC_IDC_STAT_EPISODE_TOTAL_COUNT: 0
MDC_IDC_STAT_EPISODE_TOTAL_COUNT: 0
MDC_IDC_STAT_EPISODE_TOTAL_COUNT: 1
MDC_IDC_STAT_EPISODE_TOTAL_COUNT: 10
MDC_IDC_STAT_EPISODE_TOTAL_COUNT: 5
MDC_IDC_STAT_EPISODE_TOTAL_COUNT_DTM_END: NORMAL
MDC_IDC_STAT_EPISODE_TOTAL_COUNT_DTM_START: NORMAL
MDC_IDC_STAT_EPISODE_TYPE: NORMAL
MDC_IDC_STAT_TACHYTHERAPY_RECENT_DTM_END: NORMAL
MDC_IDC_STAT_TACHYTHERAPY_RECENT_DTM_START: NORMAL
MDC_IDC_STAT_TACHYTHERAPY_TOTAL_DTM_END: NORMAL
MDC_IDC_STAT_TACHYTHERAPY_TOTAL_DTM_START: NORMAL

## 2025-07-30 PROCEDURE — 99214 OFFICE O/P EST MOD 30 MIN: CPT | Performed by: INTERNAL MEDICINE

## 2025-07-30 PROCEDURE — G2211 COMPLEX E/M VISIT ADD ON: HCPCS | Performed by: INTERNAL MEDICINE

## 2025-07-30 PROCEDURE — 3074F SYST BP LT 130 MM HG: CPT | Performed by: INTERNAL MEDICINE

## 2025-07-30 PROCEDURE — 3078F DIAST BP <80 MM HG: CPT | Performed by: INTERNAL MEDICINE

## 2025-07-30 NOTE — LETTER
7/30/2025    Lorena Baeza, APRN CNP  [address] [place] [address]    RE: Kim Correa       Dear Colleague,     I had the pleasure of seeing Kimurmila Correa in the [facility].      Cardiology Progress Note     Assessment:  Coronary artery disease, multivessel history of remote LAD stenting in 2012, restenosis of LAD stent status post reintervention to LAD and diagonal in September 2023, chronic subtotal occlusion of small nondominant RCA/ failed attempt of PCI in September 2023, no clear-cut angina  CVA ischemic with intracranial stenosis, left-sided weakness, no evidence of arrhythmias during hospitalization or pacemaker follow-up  Permanent pacemaker for neurogenic syncope, normal function  Hypercholesterolemia, suboptimal but improved control, history of noncompliance with atorvastatin  Bipolar disorder  COPD with predominant emphysema, active smoker  Mild pulmonary hypertension  Intra-abdominal abscess status post hospitalization in the [facility]    Plan:  She appears to be well compensated from cardiac standpoint.  Will continue current medications follow-up in 1 year      The longitudinal plan of care for the diagnosis(es)/condition(s) as documented were addressed during this visit. Due to the added complexity in care, I will continue to support Kim in the subsequent management and with ongoing continuity of care.   Subjective:   This is 65 year old female who comes in today for follow-up visit.  She reports no new cardiac symptoms.  She continues to experience atypical chest pains.  Denies waking, PND, orthopnea.  She was hospitalized at [facility] for intra-abdominal abscess.  She had percutaneous drain which was eventually removed.  She is feeling better now close to her baseline.    Review of Systems:   Negative other than history of present illness    Objective:   /60 (BP Location: Right arm, Patient Position: Sitting, Cuff Size: Adult Small)   Pulse 65   Resp 16   Ht  "1.499 m (4' 11\")   Wt 50.4 kg (111 lb 3.2 oz)   LMP  (LMP Unknown)   BMI 22.46 kg/m    Physical Exam:  GENERAL: no distress  NECK: No JVD  LUNGS: Decreased breath sounds bilaterally  CARDIAC: regular rhythm, S1 & S2 normal.  No heaves, thrills, gallops or murmurs.  ABDOMEN: flat, negative hepatosplenomegaly, soft and non-tender.  EXTREMITIES: No evidence of cyanosis, clubbing or edema.    Current Outpatient Medications   Medication Sig Dispense Refill     acetaminophen (TYLENOL) 500 MG tablet Take 500-1,000 mg by mouth. (Patient taking differently: Take 500-1,000 mg by mouth as needed.)       aspirin (ASA) 325 MG EC tablet Take 1 tablet (325 mg) by mouth daily. Take with food 90 tablet 1     blood glucose (CONTOUR NEXT TEST) test strip Use to test blood sugar 2 times daily or as directed. 200 strip 1     blood glucose (NO BRAND SPECIFIED) test strip Use to test blood sugar 2 times daily or as directed. 300 strip 3     blood glucose monitoring (CONTOUR NEXT MONITOR W/DEVICE KIT) meter device kit 1 each       cyanocobalamin (CYANOCOBALAMIN) 1000 mcg/mL injection Inject 1 mL (1,000 mcg) into the muscle every 30 days. 12 mL 1     EPINEPHrine (ANY BX GENERIC EQUIV) 0.3 MG/0.3ML injection 2-pack Inject 0.3 mLs (0.3 mg) into the muscle as needed for anaphylaxis. 2 each 0     ezetimibe (ZETIA) 10 MG tablet Take 1 tablet (10 mg) by mouth daily. 30 tablet 5     FLOVENT  MCG/ACT inhaler INHALE 1 PUFF BY MOUTH TWO TIMES DAILY 12 g 12     furosemide (LASIX) 20 MG tablet Take 1 tablet (20 mg) by mouth daily. 90 tablet 1     insulin syringe-needle U-100 (30G X 1/2\" 1 ML) 30G X 1/2\" 1 ML miscellaneous Use 1 syringes daily or as directed. For b12 12 each 3     ipratropium - albuterol 0.5 mg/2.5 mg/3 mL (DUONEB) 0.5-2.5 (3) MG/3ML neb solution Inhale 1 vial (3 mLs) into the lungs every 4 hours as needed for shortness of breath, wheezing or cough. 90 mL 0     magnesium gluconate (MAGONATE) 250 MG tablet Take 500 mg by mouth " 2 times daily.       nitroGLYcerin (NITROLINGUAL) 0.4 MG/SPRAY spray PLACE 1 SPRAY (0.4 MG) UNDER TONGUE EVERY 5 MINUTES AS NEEDED FOR CHEST PAIN FOR UP TO 3 DOSES. CALL 911 IF NO RELIEF AFTER FIRST SPRAY (Patient taking differently: every 5 minutes as needed.) 9.8 g 0     nitroGLYcerin (NITROSTAT) 0.4 MG sublingual tablet For chest pain place 1 tablet under the tongue every 5 minutes for 3 doses. If symptoms persist 5 minutes after 1st dose call 911. 25 tablet 1     polyethylene glycol (MIRALAX) 17 GM/Dose powder Take 17 g by mouth daily. 850 g 1     rosuvastatin (CRESTOR) 40 MG tablet Take 1 tablet (40 mg) by mouth daily. 90 tablet 1     VENTOLIN  (90 Base) MCG/ACT inhaler Inhale 1-2 puffs into the lungs every 4 hours as needed for shortness of breath, wheezing or cough. 18 g 1     vitamin B-12 (CYANOCOBALAMIN) 1000 MCG tablet Take 1,000 mcg by mouth daily       vitamin D2 (ERGOCALCIFEROL) 89028 units (1250 mcg) capsule Take 1 capsule (50,000 Units) by mouth once a week 12 capsule 0     nicotine (NICODERM CQ) 21 MG/24HR 24 hr patch Place 1 patch onto the skin. (Patient not taking: Reported on 7/30/2025)         Cardiographics:    Pacemaker interrogation: 7/30/2025  Encounter Type: Patient seen in clinic for annual device evaluation and iterative programming, followed by Dr. Garriod  Device: Medtronic [place] (D) pacemaker  Pacing %/Programmed: AP 86%,  4%, DDDR 60-130bpm  Lead(s): Stable  Battery longevity: Estimating 7.6 years remaining  Presenting rhythm: APVS 91bpm  Underlying rhythm: SR 60's  Heart rates: Stable, HR's per histogram range 60-130bpm and primarily 60-100bpm  Atrial High rates: None  Anticoagulant: None  Ventricular High rates: 2 VHR episodes logged, EGM's suggest 7-11 beats NSVT vs SVT, patient denies symptoms. EF 65% per echo 9/1/23.  Comments: Normal device function. RV Amplitude changed from 2.75V to 3.5V. Adjusted device clock time to current.          Coronary angio: 2012  95%  proximal RCA, mild to moderate diffuse less than 30% disease elsewhere     September 2023  1.  Severe diffuse in-stent restenosis of the mid LAD stents.  The proximal stent edge appears smaller in caliber than the mid stent zone.  2.  Severe proximal stenosis of nondominant right coronary artery  3. Moderately severe stenosis of distal LAD beyond mid LAD stents, successfully treated with conventional PTCA.  4.  Successful PTCA of mid LAD stents; PTCA of ostium of jailed diagonal with persistence of normal flow.  Apical segment antegrade flow has been restored.  5.  Unsuccessful PTCA attempt of proximal right coronary artery.  Do not need to reattempt as long as LAD does not develop restenosis within the stent or in the distal segment.  Restenting would be a possible option at that point.  6.  Continue Plavix x1 month     Echo: May 2024 at [facility]     1. Sinus rhythm during study.     2. Normal LV size with mildly increased wall thickness. Calculated biplane LVEF 66%. No regional wall motion abnormalities. (Normal function). Grade 1 diastolic dysfunction.     3. Normal RV size and systolic function.     4. No significant valvular abnormalities.     5. The estimated pulmonary artery systolic pressure is 38 mmHg.     6. Negative bubble study.      Stress test: September 2023     1.The nuclear stress test is abnormal.     2.Negative pharmacological regadenoson ECG for ischemia.     3.There is a small area of mild ischemia in the distal inferoseptal segment(s) of the left ventricle.  No scar seen.     4.The left ventricular ejection fraction at stress is greater than 70%.     5.The patient is at a low risk of future cardiac ischemic events.     A prior study was conducted on 5/10/2017.  The minimal distal inferior ischemia is new, prior study suggesting basal inferior ischemia was felt to be artifactual.     Lab Results    Chemistry/lipid CBC Cardiac Enzymes/BNP/TSH/INR   Recent Labs   Lab Test 05/21/25  1325   CHOL 187  "  HDL 64   LDL 97   TRIG 128     Recent Labs   Lab Test 05/21/25  1325 01/14/25  1450 09/12/23  0656   LDL 97 171* 147*     Recent Labs   Lab Test 01/14/25  1450      POTASSIUM 4.4   CHLORIDE 102   CO2 24   *   BUN 5.2*   CR 0.64   GFRESTIMATED >90   MANISHA 9.6     Recent Labs   Lab Test 01/14/25  1450 06/04/24  1400 05/23/24  1123   CR 0.64 0.76 0.62     Recent Labs   Lab Test 05/23/24  1123 09/11/23  1305 09/24/19  1424   A1C 6.6* 6.2* 6.6*          Recent Labs   Lab Test 09/11/23  1305   WBC 9.9   HGB 14.7   HCT 44.1   MCV 93        Recent Labs   Lab Test 09/11/23  1305 12/31/18  0746 12/28/18  0852   HGB 14.7 14.0 13.9    Recent Labs   Lab Test 07/01/18  0706 07/01/18  0053 06/30/18  1613   TROPONINI <0.01 <0.01 <0.01     Recent Labs   Lab Test 01/14/25  1450 06/04/24  1400   NTBNP 291 260     Recent Labs   Lab Test 09/01/23  1029   TSH 1.57     No results for input(s): \"INR\" in the last 91828 hours.                    Thank you for allowing me to participate in the care of your patient.      Sincerely,     Jere Garrido MD     [facility]  cc:   Teresa Arnett PA-C  [address] [place] [address]      "

## 2025-07-30 NOTE — PROGRESS NOTES
"    Cardiology Progress Note     Assessment:  Coronary artery disease, multivessel history of remote LAD stenting in 2012, restenosis of LAD stent status post reintervention to LAD and diagonal in September 2023, chronic subtotal occlusion of small nondominant RCA/ failed attempt of PCI in September 2023, no clear-cut angina  CVA ischemic with intracranial stenosis, left-sided weakness, no evidence of arrhythmias during hospitalization or pacemaker follow-up  Permanent pacemaker for neurogenic syncope, normal function  Hypercholesterolemia, suboptimal but improved control, history of noncompliance with atorvastatin  Bipolar disorder  COPD with predominant emphysema, active smoker  Mild pulmonary hypertension  Intra-abdominal abscess status post hospitalization in the [facility]    Plan:  She appears to be well compensated from cardiac standpoint.  Will continue current medications follow-up in 1 year      The longitudinal plan of care for the diagnosis(es)/condition(s) as documented were addressed during this visit. Due to the added complexity in care, I will continue to support Kim in the subsequent management and with ongoing continuity of care.   Subjective:   This is 65 year old female who comes in today for follow-up visit.  She reports no new cardiac symptoms.  She continues to experience atypical chest pains.  Denies waking, PND, orthopnea.  She was hospitalized at [facility] for intra-abdominal abscess.  She had percutaneous drain which was eventually removed.  She is feeling better now close to her baseline.    Review of Systems:   Negative other than history of present illness    Objective:   /60 (BP Location: Right arm, Patient Position: Sitting, Cuff Size: Adult Small)   Pulse 65   Resp 16   Ht 1.499 m (4' 11\")   Wt 50.4 kg (111 lb 3.2 oz)   LMP  (LMP Unknown)   BMI 22.46 kg/m    Physical Exam:  GENERAL: no distress  NECK: No JVD  LUNGS: Decreased breath sounds bilaterally  CARDIAC: regular " "rhythm, S1 & S2 normal.  No heaves, thrills, gallops or murmurs.  ABDOMEN: flat, negative hepatosplenomegaly, soft and non-tender.  EXTREMITIES: No evidence of cyanosis, clubbing or edema.    Current Outpatient Medications   Medication Sig Dispense Refill    acetaminophen (TYLENOL) 500 MG tablet Take 500-1,000 mg by mouth. (Patient taking differently: Take 500-1,000 mg by mouth as needed.)      aspirin (ASA) 325 MG EC tablet Take 1 tablet (325 mg) by mouth daily. Take with food 90 tablet 1    blood glucose (CONTOUR NEXT TEST) test strip Use to test blood sugar 2 times daily or as directed. 200 strip 1    blood glucose (NO BRAND SPECIFIED) test strip Use to test blood sugar 2 times daily or as directed. 300 strip 3    blood glucose monitoring (CONTOUR NEXT MONITOR W/DEVICE KIT) meter device kit 1 each      cyanocobalamin (CYANOCOBALAMIN) 1000 mcg/mL injection Inject 1 mL (1,000 mcg) into the muscle every 30 days. 12 mL 1    EPINEPHrine (ANY BX GENERIC EQUIV) 0.3 MG/0.3ML injection 2-pack Inject 0.3 mLs (0.3 mg) into the muscle as needed for anaphylaxis. 2 each 0    ezetimibe (ZETIA) 10 MG tablet Take 1 tablet (10 mg) by mouth daily. 30 tablet 5    FLOVENT  MCG/ACT inhaler INHALE 1 PUFF BY MOUTH TWO TIMES DAILY 12 g 12    furosemide (LASIX) 20 MG tablet Take 1 tablet (20 mg) by mouth daily. 90 tablet 1    insulin syringe-needle U-100 (30G X 1/2\" 1 ML) 30G X 1/2\" 1 ML miscellaneous Use 1 syringes daily or as directed. For b12 12 each 3    ipratropium - albuterol 0.5 mg/2.5 mg/3 mL (DUONEB) 0.5-2.5 (3) MG/3ML neb solution Inhale 1 vial (3 mLs) into the lungs every 4 hours as needed for shortness of breath, wheezing or cough. 90 mL 0    magnesium gluconate (MAGONATE) 250 MG tablet Take 500 mg by mouth 2 times daily.      nitroGLYcerin (NITROLINGUAL) 0.4 MG/SPRAY spray PLACE 1 SPRAY (0.4 MG) UNDER TONGUE EVERY 5 MINUTES AS NEEDED FOR CHEST PAIN FOR UP TO 3 DOSES. CALL 911 IF NO RELIEF AFTER FIRST SPRAY (Patient " taking differently: every 5 minutes as needed.) 9.8 g 0    nitroGLYcerin (NITROSTAT) 0.4 MG sublingual tablet For chest pain place 1 tablet under the tongue every 5 minutes for 3 doses. If symptoms persist 5 minutes after 1st dose call 911. 25 tablet 1    polyethylene glycol (MIRALAX) 17 GM/Dose powder Take 17 g by mouth daily. 850 g 1    rosuvastatin (CRESTOR) 40 MG tablet Take 1 tablet (40 mg) by mouth daily. 90 tablet 1    VENTOLIN  (90 Base) MCG/ACT inhaler Inhale 1-2 puffs into the lungs every 4 hours as needed for shortness of breath, wheezing or cough. 18 g 1    vitamin B-12 (CYANOCOBALAMIN) 1000 MCG tablet Take 1,000 mcg by mouth daily      vitamin D2 (ERGOCALCIFEROL) 46161 units (1250 mcg) capsule Take 1 capsule (50,000 Units) by mouth once a week 12 capsule 0    nicotine (NICODERM CQ) 21 MG/24HR 24 hr patch Place 1 patch onto the skin. (Patient not taking: Reported on 7/30/2025)         Cardiographics:    Pacemaker interrogation: 7/30/2025  Encounter Type: Patient seen in clinic for annual device evaluation and iterative programming, followed by Dr. Garrido  Device: Medtronic [place] (D) pacemaker  Pacing %/Programmed: AP 86%,  4%, DDDR 60-130bpm  Lead(s): Stable  Battery longevity: Estimating 7.6 years remaining  Presenting rhythm: APVS 91bpm  Underlying rhythm: SR 60's  Heart rates: Stable, HR's per histogram range 60-130bpm and primarily 60-100bpm  Atrial High rates: None  Anticoagulant: None  Ventricular High rates: 2 VHR episodes logged, EGM's suggest 7-11 beats NSVT vs SVT, patient denies symptoms. EF 65% per echo 9/1/23.  Comments: Normal device function. RV Amplitude changed from 2.75V to 3.5V. Adjusted device clock time to current.          Coronary angio: 2012  95% proximal RCA, mild to moderate diffuse less than 30% disease elsewhere     September 2023  1.  Severe diffuse in-stent restenosis of the mid LAD stents.  The proximal stent edge appears smaller in caliber than the mid stent  zone.  2.  Severe proximal stenosis of nondominant right coronary artery  3. Moderately severe stenosis of distal LAD beyond mid LAD stents, successfully treated with conventional PTCA.  4.  Successful PTCA of mid LAD stents; PTCA of ostium of jailed diagonal with persistence of normal flow.  Apical segment antegrade flow has been restored.  5.  Unsuccessful PTCA attempt of proximal right coronary artery.  Do not need to reattempt as long as LAD does not develop restenosis within the stent or in the distal segment.  Restenting would be a possible option at that point.  6.  Continue Plavix x1 month     Echo: May 2024 at [facility]     1. Sinus rhythm during study.     2. Normal LV size with mildly increased wall thickness. Calculated biplane LVEF 66%. No regional wall motion abnormalities. (Normal function). Grade 1 diastolic dysfunction.     3. Normal RV size and systolic function.     4. No significant valvular abnormalities.     5. The estimated pulmonary artery systolic pressure is 38 mmHg.     6. Negative bubble study.      Stress test: September 2023    1.The nuclear stress test is abnormal.    2.Negative pharmacological regadenoson ECG for ischemia.    3.There is a small area of mild ischemia in the distal inferoseptal segment(s) of the left ventricle.  No scar seen.    4.The left ventricular ejection fraction at stress is greater than 70%.    5.The patient is at a low risk of future cardiac ischemic events.    A prior study was conducted on 5/10/2017.  The minimal distal inferior ischemia is new, prior study suggesting basal inferior ischemia was felt to be artifactual.     Lab Results    Chemistry/lipid CBC Cardiac Enzymes/BNP/TSH/INR   Recent Labs   Lab Test 05/21/25  1325   CHOL 187   HDL 64   LDL 97   TRIG 128     Recent Labs   Lab Test 05/21/25  1325 01/14/25  1450 09/12/23  0656   LDL 97 171* 147*     Recent Labs   Lab Test 01/14/25  1450      POTASSIUM 4.4   CHLORIDE 102   CO2 24   *   BUN  "5.2*   CR 0.64   GFRESTIMATED >90   MANISHA 9.6     Recent Labs   Lab Test 01/14/25  1450 06/04/24  1400 05/23/24  1123   CR 0.64 0.76 0.62     Recent Labs   Lab Test 05/23/24  1123 09/11/23  1305 09/24/19  1424   A1C 6.6* 6.2* 6.6*          Recent Labs   Lab Test 09/11/23  1305   WBC 9.9   HGB 14.7   HCT 44.1   MCV 93        Recent Labs   Lab Test 09/11/23  1305 12/31/18  0746 12/28/18  0852   HGB 14.7 14.0 13.9    Recent Labs   Lab Test 07/01/18  0706 07/01/18  0053 06/30/18  1613   TROPONINI <0.01 <0.01 <0.01     Recent Labs   Lab Test 01/14/25  1450 06/04/24  1400   NTBNP 291 260     Recent Labs   Lab Test 09/01/23  1029   TSH 1.57     No results for input(s): \"INR\" in the last 69421 hours.                  "

## 2025-08-03 PROCEDURE — 93280 PM DEVICE PROGR EVAL DUAL: CPT | Performed by: INTERNAL MEDICINE

## 2025-08-05 ENCOUNTER — PATIENT OUTREACH (OUTPATIENT)
Dept: CARE COORDINATION | Facility: CLINIC | Age: 65
End: 2025-08-05
Payer: MEDICARE

## 2025-08-26 ENCOUNTER — OFFICE VISIT (OUTPATIENT)
Dept: NEUROLOGY | Facility: CLINIC | Age: 65
End: 2025-08-26
Payer: MEDICARE

## 2025-08-26 VITALS
WEIGHT: 110 LBS | RESPIRATION RATE: 18 BRPM | HEART RATE: 84 BPM | SYSTOLIC BLOOD PRESSURE: 96 MMHG | DIASTOLIC BLOOD PRESSURE: 60 MMHG | BODY MASS INDEX: 22.22 KG/M2

## 2025-08-26 DIAGNOSIS — M54.6 PAIN IN THORACIC SPINE: ICD-10-CM

## 2025-08-26 DIAGNOSIS — E53.8 LOW SERUM VITAMIN B12: ICD-10-CM

## 2025-08-26 DIAGNOSIS — R26.9 GAIT DIFFICULTY: Primary | ICD-10-CM

## 2025-08-26 PROCEDURE — 3078F DIAST BP <80 MM HG: CPT | Performed by: PSYCHIATRY & NEUROLOGY

## 2025-08-26 PROCEDURE — 3074F SYST BP LT 130 MM HG: CPT | Performed by: PSYCHIATRY & NEUROLOGY

## 2025-08-26 PROCEDURE — 99214 OFFICE O/P EST MOD 30 MIN: CPT | Performed by: PSYCHIATRY & NEUROLOGY

## 2025-08-26 PROCEDURE — G2211 COMPLEX E/M VISIT ADD ON: HCPCS | Performed by: PSYCHIATRY & NEUROLOGY

## 2025-08-26 RX ORDER — SYRINGE-NEEDLE,INSULIN,0.5 ML 27GX1/2"
SYRINGE, EMPTY DISPOSABLE MISCELLANEOUS
Qty: 12 EACH | Refills: 3 | Status: SHIPPED | OUTPATIENT
Start: 2025-08-26

## 2025-08-26 RX ORDER — CYANOCOBALAMIN 1000 UG/ML
1 INJECTION, SOLUTION INTRAMUSCULAR; SUBCUTANEOUS
Qty: 12 ML | Refills: 1 | Status: SHIPPED | OUTPATIENT
Start: 2025-08-26

## 2025-08-27 ENCOUNTER — PATIENT OUTREACH (OUTPATIENT)
Dept: CARE COORDINATION | Facility: CLINIC | Age: 65
End: 2025-08-27
Payer: MEDICARE

## (undated) DEVICE — CATH GUIDING 6FR AL .75 LA6AL75

## (undated) DEVICE — ELECTRODE DEFIB CADENCE 22550R

## (undated) DEVICE — SHTH INTRO 0.021IN ID 6FR DIA

## (undated) DEVICE — CATH BALLOON NC EMERGE 3.00X12MM H7493926712300

## (undated) DEVICE — CATH LAUNCHER 6FR EBU 3.5 LA6EBU35

## (undated) DEVICE — SYR ANGIOGRAPHY MULTIUSE KIT ACIST 014612

## (undated) DEVICE — WIRE GUIDE HI-TRQ  WHISPER MS JTIP 0.014"X190CM 1005357HJ

## (undated) DEVICE — CATH GUIDE LAUNCHER CORONARY 6FR

## (undated) DEVICE — CATH DIAGNOSTIC RADIAL 5FR TIG 4.0

## (undated) DEVICE — Device

## (undated) DEVICE — CATH BALLOON EMERGE 2.0X15MM H7493918915200

## (undated) DEVICE — CATH RX TAKERU OTW PTCA BALLOON 1.5X15MM DC-RY1515UA1

## (undated) DEVICE — DEVICE INFLATION SYR W/ HEMOSTASIS VALVE 12IN EXT IN4904

## (undated) DEVICE — BALLOON SAPPHIRE 1X8 210-083-5UU

## (undated) DEVICE — CATH BALLOON NC EMERGE 2.50X12MM H7493926712250

## (undated) DEVICE — CATH LAUNCHER 6FR AR 2.0 LA6AR20

## (undated) DEVICE — HEMOSTAT COMPRESSION VASC REGULAR OD24 CM 3524

## (undated) DEVICE — CUSTOM PACK CORONARY SAN5BCRHEA

## (undated) DEVICE — EXCHANGE WIRE .035 260 STAR/JFC/035/260/ M001491681

## (undated) DEVICE — CATH RX TAKERU PTCA BALLOON 1.5X6MM DC-RY1506UA1

## (undated) DEVICE — MANIFOLD KIT ANGIO AUTOMATED 014613

## (undated) DEVICE — KIT HAND CONTROL ACIST 014644 AR-P54

## (undated) RX ORDER — FENTANYL CITRATE 50 UG/ML
INJECTION, SOLUTION INTRAMUSCULAR; INTRAVENOUS
Status: DISPENSED
Start: 2023-09-12

## (undated) RX ORDER — CLOPIDOGREL 300 MG/1
TABLET, FILM COATED ORAL
Status: DISPENSED
Start: 2023-09-12

## (undated) RX ORDER — OXYCODONE HYDROCHLORIDE 5 MG/1
TABLET ORAL
Status: DISPENSED
Start: 2023-09-12

## (undated) RX ORDER — ACETAMINOPHEN 325 MG/1
TABLET ORAL
Status: DISPENSED
Start: 2023-09-12

## (undated) RX ORDER — HYDRALAZINE HYDROCHLORIDE 20 MG/ML
INJECTION INTRAMUSCULAR; INTRAVENOUS
Status: DISPENSED
Start: 2023-09-12

## (undated) RX ORDER — DIAZEPAM 5 MG
TABLET ORAL
Status: DISPENSED
Start: 2023-09-12

## (undated) RX ORDER — EPTIFIBATIDE 2 MG/ML
INJECTION, SOLUTION INTRAVENOUS
Status: DISPENSED
Start: 2023-09-12